# Patient Record
Sex: FEMALE | Race: WHITE | NOT HISPANIC OR LATINO | Employment: OTHER | URBAN - METROPOLITAN AREA
[De-identification: names, ages, dates, MRNs, and addresses within clinical notes are randomized per-mention and may not be internally consistent; named-entity substitution may affect disease eponyms.]

---

## 2017-01-04 ENCOUNTER — ALLSCRIPTS OFFICE VISIT (OUTPATIENT)
Dept: OTHER | Facility: OTHER | Age: 72
End: 2017-01-04

## 2017-01-04 DIAGNOSIS — Z00.00 ENCOUNTER FOR GENERAL ADULT MEDICAL EXAMINATION WITHOUT ABNORMAL FINDINGS: ICD-10-CM

## 2017-01-04 DIAGNOSIS — Z12.31 ENCOUNTER FOR SCREENING MAMMOGRAM FOR MALIGNANT NEOPLASM OF BREAST: ICD-10-CM

## 2017-08-04 ENCOUNTER — APPOINTMENT (OUTPATIENT)
Dept: LAB | Facility: CLINIC | Age: 72
End: 2017-08-04
Payer: COMMERCIAL

## 2017-08-04 DIAGNOSIS — Z12.11 ENCOUNTER FOR SCREENING FOR MALIGNANT NEOPLASM OF COLON: ICD-10-CM

## 2017-08-04 DIAGNOSIS — N28.9 DISORDER OF KIDNEY AND URETER: ICD-10-CM

## 2017-08-04 DIAGNOSIS — D49.4 NEOPLASM OF BLADDER: ICD-10-CM

## 2017-08-04 DIAGNOSIS — R73.01 IMPAIRED FASTING GLUCOSE: ICD-10-CM

## 2017-08-04 DIAGNOSIS — Z00.00 ENCOUNTER FOR GENERAL ADULT MEDICAL EXAMINATION WITHOUT ABNORMAL FINDINGS: ICD-10-CM

## 2017-08-04 DIAGNOSIS — R93.89 ABNORMAL FINDINGS ON DIAGNOSTIC IMAGING OF OTHER SPECIFIED BODY STRUCTURES: ICD-10-CM

## 2017-08-04 DIAGNOSIS — E78.5 HYPERLIPIDEMIA: ICD-10-CM

## 2017-08-04 LAB
ALBUMIN SERPL BCP-MCNC: 3.6 G/DL (ref 3.5–5)
ALP SERPL-CCNC: 60 U/L (ref 46–116)
ALT SERPL W P-5'-P-CCNC: 38 U/L (ref 12–78)
ANION GAP SERPL CALCULATED.3IONS-SCNC: 6 MMOL/L (ref 4–13)
AST SERPL W P-5'-P-CCNC: 21 U/L (ref 5–45)
BILIRUB SERPL-MCNC: 0.48 MG/DL (ref 0.2–1)
BUN SERPL-MCNC: 10 MG/DL (ref 5–25)
CALCIUM SERPL-MCNC: 8.7 MG/DL (ref 8.3–10.1)
CHLORIDE SERPL-SCNC: 102 MMOL/L (ref 100–108)
CHOLEST SERPL-MCNC: 151 MG/DL (ref 50–200)
CO2 SERPL-SCNC: 29 MMOL/L (ref 21–32)
CREAT SERPL-MCNC: 0.61 MG/DL (ref 0.6–1.3)
EST. AVERAGE GLUCOSE BLD GHB EST-MCNC: 140 MG/DL
GFR SERPL CREATININE-BSD FRML MDRD: 91 ML/MIN/1.73SQ M
GLUCOSE P FAST SERPL-MCNC: 102 MG/DL (ref 65–99)
HBA1C MFR BLD: 6.5 % (ref 4.2–6.3)
HDLC SERPL-MCNC: 38 MG/DL (ref 40–60)
LDLC SERPL CALC-MCNC: 81 MG/DL (ref 0–100)
POTASSIUM SERPL-SCNC: 4.1 MMOL/L (ref 3.5–5.3)
PROT SERPL-MCNC: 7.5 G/DL (ref 6.4–8.2)
SODIUM SERPL-SCNC: 137 MMOL/L (ref 136–145)
TRIGL SERPL-MCNC: 160 MG/DL

## 2017-08-04 PROCEDURE — 80061 LIPID PANEL: CPT

## 2017-08-04 PROCEDURE — 36415 COLL VENOUS BLD VENIPUNCTURE: CPT

## 2017-08-04 PROCEDURE — 83036 HEMOGLOBIN GLYCOSYLATED A1C: CPT

## 2017-08-04 PROCEDURE — 80053 COMPREHEN METABOLIC PANEL: CPT

## 2017-08-09 ENCOUNTER — HOSPITAL ENCOUNTER (EMERGENCY)
Facility: HOSPITAL | Age: 72
Discharge: HOME | End: 2017-08-09
Attending: EMERGENCY MEDICINE
Payer: COMMERCIAL

## 2017-08-09 ENCOUNTER — ALLSCRIPTS OFFICE VISIT (OUTPATIENT)
Dept: OTHER | Facility: OTHER | Age: 72
End: 2017-08-09

## 2017-08-09 ENCOUNTER — APPOINTMENT (EMERGENCY)
Dept: CT IMAGING | Facility: HOSPITAL | Age: 72
End: 2017-08-09
Payer: COMMERCIAL

## 2017-08-09 ENCOUNTER — TRANSCRIBE ORDERS (OUTPATIENT)
Dept: ADMINISTRATIVE | Facility: HOSPITAL | Age: 72
End: 2017-08-09

## 2017-08-09 VITALS
RESPIRATION RATE: 18 BRPM | HEART RATE: 90 BPM | BODY MASS INDEX: 29.71 KG/M2 | TEMPERATURE: 97.5 F | OXYGEN SATURATION: 97 % | DIASTOLIC BLOOD PRESSURE: 76 MMHG | WEIGHT: 152.12 LBS | SYSTOLIC BLOOD PRESSURE: 163 MMHG

## 2017-08-09 DIAGNOSIS — R19.7 DIARRHEA: ICD-10-CM

## 2017-08-09 DIAGNOSIS — D41.4 BLADDER POLYP: ICD-10-CM

## 2017-08-09 DIAGNOSIS — N28.9 UNSPECIFIED DISORDER OF KIDNEY AND URETER: ICD-10-CM

## 2017-08-09 DIAGNOSIS — N28.89 BILATERAL RENAL MASSES: ICD-10-CM

## 2017-08-09 DIAGNOSIS — R93.89 ABNORMAL RADIOLOGICAL FINDINGS IN SKIN AND SUBCUTANEOUS TISSUE: Primary | ICD-10-CM

## 2017-08-09 DIAGNOSIS — D49.4 NEOPLASM OF UNSPECIFIED NATURE OF BLADDER: ICD-10-CM

## 2017-08-09 DIAGNOSIS — R10.9 ABDOMINAL PAIN: Primary | ICD-10-CM

## 2017-08-09 LAB
ALBUMIN SERPL BCP-MCNC: 4.1 G/DL (ref 3.5–5)
ALP SERPL-CCNC: 72 U/L (ref 46–116)
ALT SERPL W P-5'-P-CCNC: 46 U/L (ref 12–78)
ANION GAP SERPL CALCULATED.3IONS-SCNC: 10 MMOL/L (ref 4–13)
AST SERPL W P-5'-P-CCNC: 25 U/L (ref 5–45)
BACTERIA UR QL AUTO: ABNORMAL /HPF
BASOPHILS # BLD AUTO: 0.07 THOUSANDS/ΜL (ref 0–0.1)
BASOPHILS NFR BLD AUTO: 1 % (ref 0–1)
BILIRUB SERPL-MCNC: 0.3 MG/DL (ref 0.2–1)
BILIRUB UR QL STRIP: NEGATIVE
BUN SERPL-MCNC: 15 MG/DL (ref 5–25)
CALCIUM SERPL-MCNC: 9.8 MG/DL (ref 8.3–10.1)
CHLORIDE SERPL-SCNC: 102 MMOL/L (ref 100–108)
CLARITY UR: CLEAR
CLARITY, POC: NORMAL
CO2 SERPL-SCNC: 31 MMOL/L (ref 21–32)
COLOR UR: YELLOW
COLOR, POC: YELLOW
CREAT SERPL-MCNC: 0.69 MG/DL (ref 0.6–1.3)
EOSINOPHIL # BLD AUTO: 0.32 THOUSAND/ΜL (ref 0–0.61)
EOSINOPHIL NFR BLD AUTO: 3 % (ref 0–6)
ERYTHROCYTE [DISTWIDTH] IN BLOOD BY AUTOMATED COUNT: 13.1 % (ref 11.6–15.1)
EXT BILIRUBIN, UA: NEGATIVE
EXT BLOOD URINE: NORMAL
EXT GLUCOSE, UA: NEGATIVE
EXT KETONES: NEGATIVE
EXT NITRITE, UA: NEGATIVE
EXT PH, UA: 6
EXT PROTEIN, UA: NEGATIVE
EXT SPECIFIC GRAVITY, UA: 1.02
EXT UROBILINOGEN: NEGATIVE
GFR SERPL CREATININE-BSD FRML MDRD: 87 ML/MIN/1.73SQ M
GLUCOSE SERPL-MCNC: 98 MG/DL (ref 65–140)
GLUCOSE UR STRIP-MCNC: NEGATIVE MG/DL
HCT VFR BLD AUTO: 44.7 % (ref 34.8–46.1)
HGB BLD-MCNC: 14.8 G/DL (ref 11.5–15.4)
HGB UR QL STRIP.AUTO: ABNORMAL
HOLD SPECIMEN: NORMAL
KETONES UR STRIP-MCNC: NEGATIVE MG/DL
LEUKOCYTE ESTERASE UR QL STRIP: ABNORMAL
LIPASE SERPL-CCNC: 148 U/L (ref 73–393)
LYMPHOCYTES # BLD AUTO: 4.07 THOUSANDS/ΜL (ref 0.6–4.47)
LYMPHOCYTES NFR BLD AUTO: 44 % (ref 14–44)
MCH RBC QN AUTO: 29.7 PG (ref 26.8–34.3)
MCHC RBC AUTO-ENTMCNC: 33.1 G/DL (ref 31.4–37.4)
MCV RBC AUTO: 90 FL (ref 82–98)
MONOCYTES # BLD AUTO: 0.79 THOUSAND/ΜL (ref 0.17–1.22)
MONOCYTES NFR BLD AUTO: 9 % (ref 4–12)
NEUTROPHILS # BLD AUTO: 4.05 THOUSANDS/ΜL (ref 1.85–7.62)
NEUTS SEG NFR BLD AUTO: 43 % (ref 43–75)
NITRITE UR QL STRIP: NEGATIVE
NON-SQ EPI CELLS URNS QL MICRO: ABNORMAL /HPF
PH UR STRIP.AUTO: 5.5 [PH] (ref 4.5–8)
PLATELET # BLD AUTO: 362 THOUSANDS/UL (ref 149–390)
PMV BLD AUTO: 9.5 FL (ref 8.9–12.7)
POTASSIUM SERPL-SCNC: 3.6 MMOL/L (ref 3.5–5.3)
PROT SERPL-MCNC: 8.6 G/DL (ref 6.4–8.2)
PROT UR STRIP-MCNC: NEGATIVE MG/DL
RBC # BLD AUTO: 4.99 MILLION/UL (ref 3.81–5.12)
RBC #/AREA URNS AUTO: ABNORMAL /HPF
SODIUM SERPL-SCNC: 143 MMOL/L (ref 136–145)
SP GR UR STRIP.AUTO: 1.02 (ref 1–1.03)
UROBILINOGEN UR QL STRIP.AUTO: 0.2 E.U./DL
WBC # BLD AUTO: 9.3 THOUSAND/UL (ref 4.31–10.16)
WBC # BLD EST: NEGATIVE 10*3/UL
WBC #/AREA URNS AUTO: ABNORMAL /HPF

## 2017-08-09 PROCEDURE — 74177 CT ABD & PELVIS W/CONTRAST: CPT

## 2017-08-09 PROCEDURE — 36415 COLL VENOUS BLD VENIPUNCTURE: CPT | Performed by: EMERGENCY MEDICINE

## 2017-08-09 PROCEDURE — 96360 HYDRATION IV INFUSION INIT: CPT

## 2017-08-09 PROCEDURE — 81002 URINALYSIS NONAUTO W/O SCOPE: CPT | Performed by: EMERGENCY MEDICINE

## 2017-08-09 PROCEDURE — 85025 COMPLETE CBC W/AUTO DIFF WBC: CPT | Performed by: PHYSICIAN ASSISTANT

## 2017-08-09 PROCEDURE — 96361 HYDRATE IV INFUSION ADD-ON: CPT

## 2017-08-09 PROCEDURE — 83690 ASSAY OF LIPASE: CPT | Performed by: PHYSICIAN ASSISTANT

## 2017-08-09 PROCEDURE — 81001 URINALYSIS AUTO W/SCOPE: CPT | Performed by: PHYSICIAN ASSISTANT

## 2017-08-09 PROCEDURE — 99284 EMERGENCY DEPT VISIT MOD MDM: CPT

## 2017-08-09 PROCEDURE — 80053 COMPREHEN METABOLIC PANEL: CPT | Performed by: PHYSICIAN ASSISTANT

## 2017-08-09 RX ORDER — LOSARTAN POTASSIUM 100 MG/1
100 TABLET ORAL DAILY
COMMUNITY
End: 2018-01-25 | Stop reason: SDUPTHER

## 2017-08-09 RX ORDER — SODIUM CHLORIDE 9 MG/ML
125 INJECTION, SOLUTION INTRAVENOUS CONTINUOUS
Status: DISCONTINUED | OUTPATIENT
Start: 2017-08-09 | End: 2017-08-09 | Stop reason: HOSPADM

## 2017-08-09 RX ORDER — SIMVASTATIN 20 MG
20 TABLET ORAL
COMMUNITY
End: 2018-01-25 | Stop reason: SDUPTHER

## 2017-08-09 RX ORDER — GABAPENTIN 300 MG/1
100 CAPSULE ORAL 3 TIMES DAILY
COMMUNITY
End: 2018-04-05

## 2017-08-09 RX ORDER — ALENDRONATE SODIUM 10 MG/1
TABLET ORAL
COMMUNITY
End: 2018-04-05 | Stop reason: SDUPTHER

## 2017-08-09 RX ORDER — HYDROCHLOROTHIAZIDE 12.5 MG/1
12.5 TABLET ORAL DAILY
COMMUNITY
End: 2018-02-09 | Stop reason: SDUPTHER

## 2017-08-09 RX ADMIN — SODIUM CHLORIDE 125 ML/HR: 0.9 INJECTION, SOLUTION INTRAVENOUS at 13:52

## 2017-08-09 RX ADMIN — IOHEXOL 100 ML: 350 INJECTION, SOLUTION INTRAVENOUS at 14:44

## 2017-08-21 ENCOUNTER — ALLSCRIPTS OFFICE VISIT (OUTPATIENT)
Dept: OTHER | Facility: OTHER | Age: 72
End: 2017-08-21

## 2017-08-21 ENCOUNTER — HOSPITAL ENCOUNTER (OUTPATIENT)
Dept: RADIOLOGY | Facility: HOSPITAL | Age: 72
Discharge: HOME | End: 2017-08-21
Payer: COMMERCIAL

## 2017-08-21 ENCOUNTER — APPOINTMENT (OUTPATIENT)
Dept: LAB | Facility: HOSPITAL | Age: 72
End: 2017-08-21
Attending: UROLOGY
Payer: COMMERCIAL

## 2017-08-21 DIAGNOSIS — N28.9 DISORDER OF KIDNEY AND URETER: ICD-10-CM

## 2017-08-21 DIAGNOSIS — D49.4 NEOPLASM OF BLADDER: ICD-10-CM

## 2017-08-21 DIAGNOSIS — R93.89 ABNORMAL FINDINGS ON DIAGNOSTIC IMAGING OF OTHER SPECIFIED BODY STRUCTURES: ICD-10-CM

## 2017-08-21 PROCEDURE — 74183 MRI ABD W/O CNTR FLWD CNTR: CPT

## 2017-08-21 PROCEDURE — A9585 GADOBUTROL INJECTION: HCPCS | Performed by: RADIOLOGY

## 2017-08-21 PROCEDURE — 87086 URINE CULTURE/COLONY COUNT: CPT

## 2017-08-21 RX ADMIN — GADOBUTROL 6 ML: 604.72 INJECTION INTRAVENOUS at 17:47

## 2017-08-22 ENCOUNTER — GENERIC CONVERSION - ENCOUNTER (OUTPATIENT)
Dept: OTHER | Facility: OTHER | Age: 72
End: 2017-08-22

## 2017-08-22 LAB — BACTERIA UR CULT: NORMAL

## 2017-08-23 ENCOUNTER — GENERIC CONVERSION - ENCOUNTER (OUTPATIENT)
Dept: OTHER | Facility: OTHER | Age: 72
End: 2017-08-23

## 2017-08-28 ENCOUNTER — GENERIC CONVERSION - ENCOUNTER (OUTPATIENT)
Dept: OTHER | Facility: OTHER | Age: 72
End: 2017-08-28

## 2017-09-12 ENCOUNTER — GENERIC CONVERSION - ENCOUNTER (OUTPATIENT)
Dept: OTHER | Facility: OTHER | Age: 72
End: 2017-09-12

## 2017-09-15 ENCOUNTER — GENERIC CONVERSION - ENCOUNTER (OUTPATIENT)
Dept: OTHER | Facility: OTHER | Age: 72
End: 2017-09-15

## 2017-09-21 PROCEDURE — G0145 SCR C/V CYTO,THINLAYER,RESCR: HCPCS | Performed by: OBSTETRICS & GYNECOLOGY

## 2017-09-21 PROCEDURE — 87624 HPV HI-RISK TYP POOLED RSLT: CPT | Performed by: OBSTETRICS & GYNECOLOGY

## 2017-09-22 ENCOUNTER — LAB REQUISITION (OUTPATIENT)
Dept: LAB | Facility: HOSPITAL | Age: 72
End: 2017-09-22
Payer: COMMERCIAL

## 2017-09-22 DIAGNOSIS — Z11.51 ENCOUNTER FOR SCREENING FOR HUMAN PAPILLOMAVIRUS (HPV): ICD-10-CM

## 2017-09-22 DIAGNOSIS — Z01.419 ENCOUNTER FOR GYNECOLOGICAL EXAMINATION WITHOUT ABNORMAL FINDING: ICD-10-CM

## 2017-09-29 LAB — HPV RRNA GENITAL QL NAA+PROBE: NORMAL

## 2017-10-04 LAB
LAB AP GYN PRIMARY INTERPRETATION: NORMAL
Lab: NORMAL

## 2017-10-10 ENCOUNTER — GENERIC CONVERSION - ENCOUNTER (OUTPATIENT)
Dept: OTHER | Facility: OTHER | Age: 72
End: 2017-10-10

## 2017-10-13 DIAGNOSIS — N64.89 OTHER SPECIFIED DISORDERS OF BREAST (CODE): ICD-10-CM

## 2017-10-19 ENCOUNTER — GENERIC CONVERSION - ENCOUNTER (OUTPATIENT)
Dept: OTHER | Facility: OTHER | Age: 72
End: 2017-10-19

## 2017-10-26 ENCOUNTER — ALLSCRIPTS OFFICE VISIT (OUTPATIENT)
Dept: OTHER | Facility: OTHER | Age: 72
End: 2017-10-26
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
## 2017-12-09 DIAGNOSIS — E11.9 TYPE 2 DIABETES MELLITUS WITHOUT COMPLICATIONS (HCC): ICD-10-CM

## 2018-01-09 NOTE — RESULT NOTES
Message   notify the patient normal blood urea nitrogen follow-up as scheduled        Verified Results  (1) BUN [id number] 12:22PM [facility] Order Number: [id number]     Test Name Result Flag Reference   BLOOD UREA NITROGEN 13 mg/dL  5-25       Signatures   Electronically signed by : Leon Jenkins DO; Feb 25 2016  8:13PM EST                       (Author)

## 2018-01-10 NOTE — RESULT NOTES
Message   Notify the patient normal urine protein electrophoresis follow up as scheduled        Verified Results  (1) PROTEIN ELECTRO, URINE 31Pjg3122 02:09PM [employer]     Test Name Result Flag Reference   ALPHA 1 URINE 0 0 %     ALPHA 2 URINE 0 0 %     BETA URINE 0 0 %     GAMMA GLOBULIN URINE 0 0 %     UPEP INTERPRETATION      The urine total protein and electrophoresis are within normal limits  No monoclonal bands noted  Reviewed by Neris Lopez, ([id number]) **Electronic Signature**   ALBUMIN URINE  0 %     URINE PROTEIN 10 0 mg/dL  2 0-17 5     (1) PROTEIN ELECTRO, SERUM 92Sga3900 07:54AM [employer]     Test Name Result Flag Reference   A/G RATIO 1 34  1 10-1 80   Albumin 57 2 %  52 0-65 0   Albumin Conc  4 06 g/dl  3 50-5 00   Alpha 1 Conc  0 27 g/dL  0 10-0 40   ALPHA 1 3 8 %  2 5-5 0   Alpha 2 Conc  0 84 g/dL  0 40-1 20   ALPHA 2 11 8 %  7 0-13 0   Beta 1 Conc  0 53 g/dL  0 40-0 80   BETA-1 7 4 %  5 0-13 0   Beta 2 Conc 0 45 g/dL  0 20-0 50   BETA-2 6 3 %  2 0-8 0   Gamma Conc 0 96 g/dL  0 50-1 60   GAMMA GLOBULIN 13 5 %  12 0-22 0   Interpretation      The serum total protein, albumin and electrophoresis are within normal limits  No monoclonal bands noted  Reviewed by: Michel Tai MD, PhD ([id number]) **Electronic Signature**   TOTAL PROTEIN 6 8 g/dL  6 4-8 2       Signatures   Electronically signed by : Treasure Madrid DO; Aug 21 2016  8:08AM EST                       (Author)
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
## 2018-01-10 NOTE — RESULT NOTES
Message   notify the patient normal serum protein electrophoresis follow up as scheduled        Verified Results  (1) PROTEIN ELECTRO, SERUM 22Ypg1410 07:54AM [facility]     Test Name Result Flag Reference   A/G RATIO 1 34  1 10-1 80   Albumin 57 2 %  52 0-65 0   Albumin Conc  4 06 g/dl  3 50-5 00   Alpha 1 Conc  0 27 g/dL  0 10-0 40   ALPHA 1 3 8 %  2 5-5 0   Alpha 2 Conc  0 84 g/dL  0 40-1 20   ALPHA 2 11 8 %  7 0-13 0   Beta 1 Conc  0 53 g/dL  0 40-0 80   BETA-1 7 4 %  5 0-13 0   Beta 2 Conc 0 45 g/dL  0 20-0 50   BETA-2 6 3 %  2 0-8 0   Gamma Conc 0 96 g/dL  0 50-1 60   GAMMA GLOBULIN 13 5 %  12 0-22 0   Interpretation      The serum total protein, albumin and electrophoresis are within normal limits  No monoclonal bands noted  Reviewed by: Ralston Canterbury [place], MD, PhD ([id number]) **Electronic Signature**   TOTAL PROTEIN 6 8 g/dL  6 4-8 2       Signatures   Electronically signed by : Tolu Rodrigues DO; Aug 21 2016  8:07AM EST                       (Author)

## 2018-01-11 NOTE — RESULT NOTES
Message   Please have the patient follow up with me in the next 1 week to discuss this report         Verified Results  * MRI ABDOMEN W WO CONTRAST [id number] 04:52PM Jared [address] Order Number: [id number]    - Patient Instructions: To schedule this appointment, please contact Central Scheduling at [phone]  Test Name Result Flag Reference   MRI ABDOMEN W [address] (Report)     This is a summary report  The complete report is available in the patient's medical record  If you cannot access the medical record, please contact the sending organization for a detailed fax or copy  MRI OF THE ABDOMEN (KIDNEYS) WITH AND WITHOUT CONTRAST     INDICATION: Bladder neoplasm  Evaluate renal lesions seen on recent CT imaging  COMPARISON: CT abdomen pelvis 8/9/2017, CTA chest and abdomen dated 3/27/2013     TECHNIQUE: The following pulse sequences were obtained on a 1 5 T scanner: Coronal and axial T2 with TE of 90 and 180 respectively, axial T2 with fat saturation, axial FIESTA fat-sat, axialT1-weighted in-and-out-of phase, axial DWI/ADC, pre-contrast    axial T1 with fat saturation, thin-section post-contrast dynamic axial T1 with fat saturation at 20, 70, and 180 seconds, followed by coronal T1 with fat saturation and 7-minute delayed axial T1 with fat saturation  Subtraction images of the kidneys were   obtained  IV Contrast: 6 mL of gadobutrol injection (MULTI-DOSE)      FINDINGS:     KIDNEYS:       Right:   11 x 6 mm iso to slightly hypointense T1 nodule seen along the anterior mid to upper pole cortex which is iso to minimally hypointense on T2  There is no discernible postcontrast enhancement  This measured only a few millimeters on the prior CT study    from March 27, 2013  Normal in size and contour with prompt enhancement  No hydronephrosis  There is an 8 mm exophytic posterior upper pole simple cyst      Left:   Normal in size and contour with prompt enhancement     No hydronephrosis  9 x 9 mm hemorrhagic or proteinaceous anterior upper pole cyst    Tiny cyst is seen in the midpole  LIVER: Fatty infiltration and hepatomegaly  Subcentimeter cyst seen in the dome of the lateral segment left lobe  BILIARY TREE: Normal       GALLBLADDER: Normal      PANCREAS: Normal      ADRENAL GLANDS: Normal      SPLEEN: Normal      ABDOMINAL CAVITY: No lymphadenopathy or ascites  3 4 x 2 5 cm simple appearing right adnexal cyst is stable  BOWEL: Large descending duodenal diverticulum  OSSEOUS STRUCTURES: No osseous destruction  VASCULAR STRUCTURES: Visualized vasculature is normal      ABDOMINAL WALL: Normal      LUNG BASES: Probable small hiatal hernia  IMPRESSION:     1  9 mm hemorrhagic or proteinaceous cyst upper pole left kidney  2  11 mm nodule right mid to upper kidney has indeterminate signal on T1 and T2, however, demonstrates no suspicious postcontrast enhancement  It has shown interval increase in size from 2013 in retrospect  I would recommend a follow-up MRI in 6 months   to ensure stability of this finding  3  Hepatomegaly with fatty infiltration and subcentimeter left hepatic lobe cyst  No evidence of metastatic disease  4  3 4 cm simple appearing right adnexal cyst  Annual ultrasound surveillance recommended       ##fuslh6##fuslh6       Workstation performed: [id number]     Signed by:   Jt Tran DO   8/22/17       Signatures   Electronically signed by : Juan Alonso DO; Aug 22 2017  6:58PM EST                       (Author)

## 2018-01-12 VITALS
SYSTOLIC BLOOD PRESSURE: 130 MMHG | HEART RATE: 78 BPM | BODY MASS INDEX: 30.65 KG/M2 | OXYGEN SATURATION: 97 % | WEIGHT: 152.03 LBS | DIASTOLIC BLOOD PRESSURE: 70 MMHG | HEIGHT: 59 IN | TEMPERATURE: 98.5 F | RESPIRATION RATE: 16 BRPM

## 2018-01-12 VITALS
HEART RATE: 76 BPM | SYSTOLIC BLOOD PRESSURE: 152 MMHG | DIASTOLIC BLOOD PRESSURE: 82 MMHG | RESPIRATION RATE: 14 BRPM | HEIGHT: 59 IN | WEIGHT: 155 LBS | BODY MASS INDEX: 31.25 KG/M2

## 2018-01-12 NOTE — RESULT NOTES
Message   Notify the patient normal lipid panel except a elevated triglyceride level please have the patient reduce carbohydrates and sweets follow up as scheduled        Verified Results  (1) COMPREHENSIVE METABOLIC PANEL [id number] 62:24HX Heather Issa     Test Name Result Flag Reference   GLUCOSE,RANDM 115 mg/dL     If the patient is fasting, the ADA then defines impaired fasting glucose as > 100 mg/dL and diabetes as > or equal to 123 mg/dL  SODIUM 139 mmol/L  136-145   POTASSIUM 4 2 mmol/L  3 5-5 3   CHLORIDE 103 mmol/L  100-108   CARBON DIOXIDE 32 mmol/L  21-32   ANION GAP (CALC) 4 mmol/L  4-13   BLOOD UREA NITROGEN 16 mg/dL  5-25   CREATININE 0 63 mg/dL  0 60-1 30   Standardized to IDMS reference method   CALCIUM 8 5 mg/dL  8 3-10 1   BILI, TOTAL 0 36 mg/dL  0 20-1 00   ALK PHOSPHATAS 59 U/L     ALT (SGPT) 34 U/L  12-78   AST(SGOT) 16 U/L  5-45   ALBUMIN 3 5 g/dL  3 5-5 0   TOTAL PROTEIN 7 1 g/dL  6 4-8 2   eGFR Non-African American      >60 0 ml/min/1 73sq m   [facility] [employer] Disease Education Program recommendations are as follows:  GFR calculation is accurate only with a steady state creatinine  Chronic Kidney disease less than 60 ml/min/1 73 sq  meters  Kidney failure less than 15 ml/min/1 73 sq  meters  (1) LIPID PANEL, FASTING 68Fei2163 07:54AM Heather Issa     Test Name Result Flag Reference   CHOLESTEROL 156 mg/dL     HDL,DIRECT 41 mg/dL  40-60   Specimen collection should occur prior to Metamizole administration due to the potential for falsely depressed results     LDL CHOLESTEROL CALCULATED 83 mg/dL  0-100   Triglyceride:         Normal              <150 mg/dl       Borderline High    150-199 mg/dl       High               200-499 mg/dl       Very High          >499 mg/dl  Cholesterol:         Desirable        <200 mg/dl      Borderline High  200-239 mg/dl      High             >239 mg/dl  HDL Cholesterol:        High    >59 mg/dL      Low     <41 mg/dL  LDL CALCULATED:    This screening LDL is a calculated result  It does not have the accuracy of the Direct Measured LDL in the monitoring of patients with hyperlipidemia and/or statin therapy  Direct Measure LDL (TFP445) must be ordered separately in these patients  TRIGLYCERIDES 158 mg/dL H <=150   Specimen collection should occur prior to N-Acetylcysteine or Metamizole administration due to the potential for falsely depressed results         Signatures   Electronically signed by : Greg Manzo DO; Aug 16 2016  5:27PM EST                       (Author)

## 2018-01-13 VITALS
RESPIRATION RATE: 16 BRPM | DIASTOLIC BLOOD PRESSURE: 88 MMHG | BODY MASS INDEX: 31.05 KG/M2 | WEIGHT: 154.04 LBS | SYSTOLIC BLOOD PRESSURE: 134 MMHG | HEART RATE: 84 BPM | OXYGEN SATURATION: 96 % | HEIGHT: 59 IN

## 2018-01-14 VITALS
DIASTOLIC BLOOD PRESSURE: 74 MMHG | SYSTOLIC BLOOD PRESSURE: 128 MMHG | WEIGHT: 154 LBS | BODY MASS INDEX: 31.04 KG/M2 | HEART RATE: 78 BPM | HEIGHT: 59 IN

## 2018-01-15 NOTE — RESULT NOTES
Message   Notify the patient normal comprehensive metabolic panel except a mild elevation of blood glucose please have the patient reduce carbohydrates and sweets in the diet follow up as scheduled        Verified Results  (1) COMPREHENSIVE METABOLIC PANEL [id number] 12:12DW Joel Holding     Test Name Result Flag Reference   GLUCOSE,RANDM 115 mg/dL     If the patient is fasting, the ADA then defines impaired fasting glucose as > 100 mg/dL and diabetes as > or equal to 123 mg/dL  SODIUM 139 mmol/L  136-145   POTASSIUM 4 2 mmol/L  3 5-5 3   CHLORIDE 103 mmol/L  100-108   CARBON DIOXIDE 32 mmol/L  21-32   ANION GAP (CALC) 4 mmol/L  4-13   BLOOD UREA NITROGEN 16 mg/dL  5-25   CREATININE 0 63 mg/dL  0 60-1 30   Standardized to IDMS reference method   CALCIUM 8 5 mg/dL  8 3-10 1   BILI, TOTAL 0 36 mg/dL  0 20-1 00   ALK PHOSPHATAS 59 U/L     ALT (SGPT) 34 U/L  12-78   AST(SGOT) 16 U/L  5-45   ALBUMIN 3 5 g/dL  3 5-5 0   TOTAL PROTEIN 7 1 g/dL  6 4-8 2   eGFR Non-African American      >60 0 ml/min/1 73sq [facility] Disease Education Program recommendations are as follows:  GFR calculation is accurate only with a steady state creatinine  Chronic Kidney disease less than 60 ml/min/1 73 sq  meters  Kidney failure less than 15 ml/min/1 73 sq  meters         Signatures   Electronically signed by : Mabel Mukherjee DO; Aug 16 2016  5:26PM EST                       (Author)

## 2018-01-16 NOTE — RESULT NOTES
Message   notify the patient MRI of the cervical spine does show enlarging disc herniation at C3-4, muscle spasm and arthritis it also shows persistent central disc herniation C4-5 and C5-6 please have the patient see orthopedics Dr Jessie Sparks follow up as scheduled        Verified Results  * MRI CERVICAL SPINE [address] [facility] [id number] 04:51PM Vivian Boyle Order Number: [id number]     Test Name Result Flag Reference   MRI CERVICAL SPINE [address] [facility] (Report)     This is a summary report  The complete report is available in the patient's medical record  If you cannot access the medical record, please contact the sending organization for a detailed fax or copy  MRI CERVICAL SPINE WITHOUT CONTRAST     INDICATION: 68-year-old female, chronic neck pain   COMPARISON: 12/20/2010 MRI     TECHNIQUE: Sagittal T1, sagittal T2, sagittal inversion recovery, axial T2, axial 2D merge        IMAGE QUALITY: Diagnostic     FINDINGS:     ALIGNMENT:    Straightening of normal lordosis suspicious for muscle spasm, similar to previous   No evidence of subluxation   No evidence of fracture   No evidence of scoliosis        MARROW SIGNAL: Normal marrow signal is identified within the visualized bony structures  No discrete marrow lesion  CERVICAL AND VISUALIZED THORACIC CORD: Normal signal within the visualized cord  PREVERTEBRAL AND PARASPINAL SOFT TISSUES: Prevertebral and paraspinal soft tissues are unremarkable  VISUALIZED POSTERIOR FOSSA: The visualized posterior fossa demonstrates no abnormal signal      CERVICAL DISC SPACES:        C2-C3: Normal      C3-C4: Mild degenerative disc dehydration, small to moderate central disc herniation posterior displacement of the noncompressed spinal cord, possible bilateral C4 nerve root encroachment  The disc herniation is larger than it was on the prior study       C4-C5: Mild degenerative disc and facet disease, tiny noncompressive central disc protrusion, essentially unchanged     C5-C6: Mild degenerative disc and facet disease, tiny noncompressive central disc protrusion, essentially unchanged     C6-C7: Normal      C7-T1: Normal      UPPER THORACIC DISC SPACES: Normal        IMPRESSION:   Persistent mild multilevel degenerative spondylosis     Persistent straightening of normal cervical lordosis suspicious for muscle spasm     Enlarging small to moderate central disc herniation C3-4 with posterior displacement of the noncompressed spinal cord and possible bilateral C4 nerve root encroachment     Persistent tiny noncompressive central disc protrusions C4-5 and C5-6       Workstation performed: [id number]     Signed by:   Ria Williamson MD   3/10/16       Signatures   Electronically signed by : Tolu Rodrigues DO; Mar 10 2016  7:51PM EST                       (Author)
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
## 2018-01-22 VITALS
WEIGHT: 154.03 LBS | OXYGEN SATURATION: 95 % | BODY MASS INDEX: 31.05 KG/M2 | RESPIRATION RATE: 16 BRPM | HEIGHT: 59 IN | SYSTOLIC BLOOD PRESSURE: 112 MMHG | DIASTOLIC BLOOD PRESSURE: 80 MMHG | TEMPERATURE: 98.2 F | HEART RATE: 81 BPM

## 2018-01-22 VITALS
DIASTOLIC BLOOD PRESSURE: 70 MMHG | SYSTOLIC BLOOD PRESSURE: 122 MMHG | HEART RATE: 78 BPM | BODY MASS INDEX: 30.7 KG/M2 | WEIGHT: 152 LBS | TEMPERATURE: 97.4 F | OXYGEN SATURATION: 97 %

## 2018-01-25 DIAGNOSIS — I10 ESSENTIAL HYPERTENSION: Primary | ICD-10-CM

## 2018-01-25 DIAGNOSIS — E78.5 HYPERLIPIDEMIA, UNSPECIFIED HYPERLIPIDEMIA TYPE: ICD-10-CM

## 2018-01-25 RX ORDER — SIMVASTATIN 20 MG
TABLET ORAL
Qty: 90 TABLET | Refills: 0 | Status: SHIPPED | OUTPATIENT
Start: 2018-01-25 | End: 2018-04-05 | Stop reason: SDUPTHER

## 2018-01-25 RX ORDER — LOSARTAN POTASSIUM 100 MG/1
TABLET ORAL
Qty: 90 TABLET | Refills: 3 | Status: SHIPPED | OUTPATIENT
Start: 2018-01-25 | End: 2018-02-09 | Stop reason: SDUPTHER

## 2018-01-31 ENCOUNTER — TELEPHONE (OUTPATIENT)
Dept: INTERNAL MEDICINE CLINIC | Facility: CLINIC | Age: 73
End: 2018-01-31

## 2018-02-05 DIAGNOSIS — E78.5 HYPERLIPIDEMIA, UNSPECIFIED HYPERLIPIDEMIA TYPE: ICD-10-CM

## 2018-02-05 DIAGNOSIS — K21.9 GASTROESOPHAGEAL REFLUX DISEASE WITHOUT ESOPHAGITIS: ICD-10-CM

## 2018-02-05 DIAGNOSIS — I10 ESSENTIAL HYPERTENSION: Primary | ICD-10-CM

## 2018-02-05 DIAGNOSIS — E11.8 TYPE 2 DIABETES MELLITUS WITH COMPLICATION, WITHOUT LONG-TERM CURRENT USE OF INSULIN (HCC): ICD-10-CM

## 2018-02-05 DIAGNOSIS — I25.10 CARDIOVASCULAR ARTERIOSCLEROSIS: ICD-10-CM

## 2018-02-05 RX ORDER — HYDROCHLOROTHIAZIDE 12.5 MG/1
12.5 TABLET ORAL DAILY
Qty: 90 TABLET | Refills: 1 | Status: CANCELLED | OUTPATIENT
Start: 2018-02-05

## 2018-02-08 ENCOUNTER — TELEPHONE (OUTPATIENT)
Dept: INTERNAL MEDICINE CLINIC | Facility: CLINIC | Age: 73
End: 2018-02-08

## 2018-02-09 DIAGNOSIS — I10 ESSENTIAL HYPERTENSION: ICD-10-CM

## 2018-02-09 RX ORDER — OMEPRAZOLE 40 MG/1
1 CAPSULE, DELAYED RELEASE ORAL DAILY
COMMUNITY
Start: 2017-08-28 | End: 2018-04-05 | Stop reason: SDUPTHER

## 2018-02-09 RX ORDER — MULTIVITAMIN/IRON/FOLIC ACID 18MG-0.4MG
1 TABLET ORAL DAILY
COMMUNITY

## 2018-02-09 RX ORDER — HYDROCHLOROTHIAZIDE 12.5 MG/1
12.5 TABLET ORAL DAILY
Qty: 90 TABLET | Refills: 1 | Status: SHIPPED | OUTPATIENT
Start: 2018-02-09 | End: 2018-04-05 | Stop reason: SDUPTHER

## 2018-02-09 RX ORDER — LOSARTAN POTASSIUM 100 MG/1
100 TABLET ORAL DAILY
Qty: 90 TABLET | Refills: 1 | Status: SHIPPED | OUTPATIENT
Start: 2018-02-09 | End: 2018-04-05 | Stop reason: SDUPTHER

## 2018-02-09 RX ORDER — BIOTIN 1 MG
1 TABLET ORAL DAILY
COMMUNITY

## 2018-02-09 RX ORDER — FEXOFENADINE HCL 180 MG/1
1 TABLET ORAL DAILY PRN
COMMUNITY
Start: 2017-10-26 | End: 2021-08-06

## 2018-02-09 NOTE — TELEPHONE ENCOUNTER
Sent order for you to authorize both medications  The Losartan was sent on 1/25/18 but they never received it  Please authorize

## 2018-02-12 RX ORDER — FAMOTIDINE 40 MG/1
40 TABLET, FILM COATED ORAL DAILY
Qty: 90 TABLET | Refills: 1 | Status: SHIPPED | OUTPATIENT
Start: 2018-02-12 | End: 2018-04-05 | Stop reason: SDUPTHER

## 2018-04-05 ENCOUNTER — HOSPITAL ENCOUNTER (OUTPATIENT)
Facility: HOSPITAL | Age: 73
Setting detail: OBSERVATION
Discharge: HOME | End: 2018-04-06
Attending: EMERGENCY MEDICINE | Admitting: INTERNAL MEDICINE
Payer: COMMERCIAL

## 2018-04-05 ENCOUNTER — APPOINTMENT (EMERGENCY)
Dept: RADIOLOGY | Facility: HOSPITAL | Age: 73
End: 2018-04-05
Payer: COMMERCIAL

## 2018-04-05 ENCOUNTER — OFFICE VISIT (OUTPATIENT)
Dept: INTERNAL MEDICINE CLINIC | Facility: CLINIC | Age: 73
End: 2018-04-05
Payer: COMMERCIAL

## 2018-04-05 VITALS
WEIGHT: 152.6 LBS | BODY MASS INDEX: 30.82 KG/M2 | TEMPERATURE: 98.1 F | SYSTOLIC BLOOD PRESSURE: 162 MMHG | DIASTOLIC BLOOD PRESSURE: 92 MMHG | OXYGEN SATURATION: 96 % | HEART RATE: 106 BPM

## 2018-04-05 DIAGNOSIS — R09.02 HYPOXIA: ICD-10-CM

## 2018-04-05 DIAGNOSIS — I10 ESSENTIAL HYPERTENSION: ICD-10-CM

## 2018-04-05 DIAGNOSIS — Q78.2 OSTEOPETROSIS: ICD-10-CM

## 2018-04-05 DIAGNOSIS — R09.89 LUNG CRACKLES: ICD-10-CM

## 2018-04-05 DIAGNOSIS — R05.9 COUGH: Primary | ICD-10-CM

## 2018-04-05 DIAGNOSIS — R05.9 COUGH: ICD-10-CM

## 2018-04-05 DIAGNOSIS — K21.9 GASTROESOPHAGEAL REFLUX DISEASE WITHOUT ESOPHAGITIS: ICD-10-CM

## 2018-04-05 DIAGNOSIS — R07.9 CHEST PAIN, UNSPECIFIED TYPE: ICD-10-CM

## 2018-04-05 DIAGNOSIS — J40 BRONCHITIS: ICD-10-CM

## 2018-04-05 DIAGNOSIS — E78.5 HYPERLIPIDEMIA, UNSPECIFIED HYPERLIPIDEMIA TYPE: ICD-10-CM

## 2018-04-05 DIAGNOSIS — R06.02 SHORTNESS OF BREATH: Primary | ICD-10-CM

## 2018-04-05 LAB
ALBUMIN SERPL BCP-MCNC: 3.8 G/DL (ref 3.5–5)
ALP SERPL-CCNC: 69 U/L (ref 46–116)
ALT SERPL W P-5'-P-CCNC: 65 U/L (ref 12–78)
ANION GAP SERPL CALCULATED.3IONS-SCNC: 9 MMOL/L (ref 4–13)
AST SERPL W P-5'-P-CCNC: 39 U/L (ref 5–45)
BASOPHILS # BLD AUTO: 0.04 THOUSANDS/ΜL (ref 0–0.1)
BASOPHILS NFR BLD AUTO: 1 % (ref 0–1)
BILIRUB SERPL-MCNC: 0.3 MG/DL (ref 0.2–1)
BUN SERPL-MCNC: 10 MG/DL (ref 5–25)
CALCIUM SERPL-MCNC: 8.4 MG/DL (ref 8.3–10.1)
CHLORIDE SERPL-SCNC: 97 MMOL/L (ref 100–108)
CO2 SERPL-SCNC: 30 MMOL/L (ref 21–32)
CREAT SERPL-MCNC: 0.74 MG/DL (ref 0.6–1.3)
EOSINOPHIL # BLD AUTO: 0.2 THOUSAND/ΜL (ref 0–0.61)
EOSINOPHIL NFR BLD AUTO: 3 % (ref 0–6)
ERYTHROCYTE [DISTWIDTH] IN BLOOD BY AUTOMATED COUNT: 13.4 % (ref 11.6–15.1)
GFR SERPL CREATININE-BSD FRML MDRD: 81 ML/MIN/1.73SQ M
GLUCOSE SERPL-MCNC: 115 MG/DL (ref 65–140)
HCT VFR BLD AUTO: 42.3 % (ref 34.8–46.1)
HGB BLD-MCNC: 13.9 G/DL (ref 11.5–15.4)
HOLD SPECIMEN: NORMAL
LACTATE SERPL-SCNC: 1.2 MMOL/L (ref 0.5–2)
LYMPHOCYTES # BLD AUTO: 1.83 THOUSANDS/ΜL (ref 0.6–4.47)
LYMPHOCYTES NFR BLD AUTO: 24 % (ref 14–44)
MCH RBC QN AUTO: 29 PG (ref 26.8–34.3)
MCHC RBC AUTO-ENTMCNC: 32.9 G/DL (ref 31.4–37.4)
MCV RBC AUTO: 88 FL (ref 82–98)
MONOCYTES # BLD AUTO: 0.8 THOUSAND/ΜL (ref 0.17–1.22)
MONOCYTES NFR BLD AUTO: 11 % (ref 4–12)
NEUTROPHILS # BLD AUTO: 4.71 THOUSANDS/ΜL (ref 1.85–7.62)
NEUTS SEG NFR BLD AUTO: 61 % (ref 43–75)
NT-PROBNP SERPL-MCNC: 17 PG/ML
PLATELET # BLD AUTO: 304 THOUSANDS/UL (ref 149–390)
PMV BLD AUTO: 9.1 FL (ref 8.9–12.7)
POTASSIUM SERPL-SCNC: 3.7 MMOL/L (ref 3.5–5.3)
PROT SERPL-MCNC: 7.9 G/DL (ref 6.4–8.2)
RBC # BLD AUTO: 4.79 MILLION/UL (ref 3.81–5.12)
SODIUM SERPL-SCNC: 136 MMOL/L (ref 136–145)
TROPONIN I SERPL-MCNC: <0.02 NG/ML
WBC # BLD AUTO: 7.58 THOUSAND/UL (ref 4.31–10.16)

## 2018-04-05 PROCEDURE — 87040 BLOOD CULTURE FOR BACTERIA: CPT | Performed by: EMERGENCY MEDICINE

## 2018-04-05 PROCEDURE — 80053 COMPREHEN METABOLIC PANEL: CPT | Performed by: EMERGENCY MEDICINE

## 2018-04-05 PROCEDURE — 99214 OFFICE O/P EST MOD 30 MIN: CPT | Performed by: INTERNAL MEDICINE

## 2018-04-05 PROCEDURE — 96360 HYDRATION IV INFUSION INIT: CPT

## 2018-04-05 PROCEDURE — 83605 ASSAY OF LACTIC ACID: CPT | Performed by: EMERGENCY MEDICINE

## 2018-04-05 PROCEDURE — 99285 EMERGENCY DEPT VISIT HI MDM: CPT

## 2018-04-05 PROCEDURE — 99220 PR INITIAL OBSERVATION CARE/DAY 70 MINUTES: CPT | Performed by: INTERNAL MEDICINE

## 2018-04-05 PROCEDURE — 83880 ASSAY OF NATRIURETIC PEPTIDE: CPT | Performed by: EMERGENCY MEDICINE

## 2018-04-05 PROCEDURE — 96361 HYDRATE IV INFUSION ADD-ON: CPT

## 2018-04-05 PROCEDURE — 71046 X-RAY EXAM CHEST 2 VIEWS: CPT

## 2018-04-05 PROCEDURE — 94640 AIRWAY INHALATION TREATMENT: CPT

## 2018-04-05 PROCEDURE — 94760 N-INVAS EAR/PLS OXIMETRY 1: CPT

## 2018-04-05 PROCEDURE — 1160F RVW MEDS BY RX/DR IN RCRD: CPT | Performed by: INTERNAL MEDICINE

## 2018-04-05 PROCEDURE — 94664 DEMO&/EVAL PT USE INHALER: CPT

## 2018-04-05 PROCEDURE — 36415 COLL VENOUS BLD VENIPUNCTURE: CPT

## 2018-04-05 PROCEDURE — 1111F DSCHRG MED/CURRENT MED MERGE: CPT | Performed by: INTERNAL MEDICINE

## 2018-04-05 PROCEDURE — 85025 COMPLETE CBC W/AUTO DIFF WBC: CPT | Performed by: EMERGENCY MEDICINE

## 2018-04-05 PROCEDURE — 93005 ELECTROCARDIOGRAM TRACING: CPT

## 2018-04-05 PROCEDURE — 87631 RESP VIRUS 3-5 TARGETS: CPT | Performed by: EMERGENCY MEDICINE

## 2018-04-05 PROCEDURE — 84484 ASSAY OF TROPONIN QUANT: CPT | Performed by: EMERGENCY MEDICINE

## 2018-04-05 RX ORDER — FAMOTIDINE 40 MG/1
20 TABLET, FILM COATED ORAL DAILY
Qty: 90 TABLET | Refills: 0 | Status: SHIPPED | OUTPATIENT
Start: 2018-04-05 | End: 2019-08-28 | Stop reason: SDUPTHER

## 2018-04-05 RX ORDER — HYDROCHLOROTHIAZIDE 12.5 MG/1
12.5 TABLET ORAL DAILY
Qty: 90 TABLET | Refills: 0 | Status: SHIPPED | OUTPATIENT
Start: 2018-04-05 | End: 2020-01-08

## 2018-04-05 RX ORDER — FAMOTIDINE 20 MG/1
20 TABLET, FILM COATED ORAL DAILY
Status: DISCONTINUED | OUTPATIENT
Start: 2018-04-06 | End: 2018-04-06 | Stop reason: HOSPADM

## 2018-04-05 RX ORDER — MELATONIN
1000 DAILY
Status: DISCONTINUED | OUTPATIENT
Start: 2018-04-06 | End: 2018-04-06 | Stop reason: HOSPADM

## 2018-04-05 RX ORDER — ALENDRONATE SODIUM 10 MG/1
10 TABLET ORAL
Status: DISCONTINUED | OUTPATIENT
Start: 2018-04-06 | End: 2018-04-05

## 2018-04-05 RX ORDER — METOCLOPRAMIDE HYDROCHLORIDE 5 MG/ML
10 INJECTION INTRAMUSCULAR; INTRAVENOUS ONCE
Status: COMPLETED | OUTPATIENT
Start: 2018-04-05 | End: 2018-04-05

## 2018-04-05 RX ORDER — LOSARTAN POTASSIUM 50 MG/1
100 TABLET ORAL DAILY
Status: DISCONTINUED | OUTPATIENT
Start: 2018-04-06 | End: 2018-04-06 | Stop reason: HOSPADM

## 2018-04-05 RX ORDER — METHYLPREDNISOLONE SODIUM SUCCINATE 40 MG/ML
20 INJECTION, POWDER, LYOPHILIZED, FOR SOLUTION INTRAMUSCULAR; INTRAVENOUS EVERY 8 HOURS SCHEDULED
Status: DISCONTINUED | OUTPATIENT
Start: 2018-04-05 | End: 2018-04-06

## 2018-04-05 RX ORDER — BENZONATATE 100 MG/1
100 CAPSULE ORAL 3 TIMES DAILY PRN
Status: DISCONTINUED | OUTPATIENT
Start: 2018-04-05 | End: 2018-04-06 | Stop reason: HOSPADM

## 2018-04-05 RX ORDER — KETOROLAC TROMETHAMINE 30 MG/ML
15 INJECTION, SOLUTION INTRAMUSCULAR; INTRAVENOUS ONCE
Status: COMPLETED | OUTPATIENT
Start: 2018-04-05 | End: 2018-04-05

## 2018-04-05 RX ORDER — MULTIVITAMIN/IRON/FOLIC ACID 18MG-0.4MG
1 TABLET ORAL DAILY
Status: DISCONTINUED | OUTPATIENT
Start: 2018-04-06 | End: 2018-04-06 | Stop reason: HOSPADM

## 2018-04-05 RX ORDER — ALENDRONATE SODIUM 10 MG/1
10 TABLET ORAL
Qty: 4 TABLET | Refills: 6 | Status: SHIPPED | OUTPATIENT
Start: 2018-04-05 | End: 2020-01-08

## 2018-04-05 RX ORDER — HYDROCHLOROTHIAZIDE 12.5 MG/1
12.5 TABLET ORAL DAILY
Status: DISCONTINUED | OUTPATIENT
Start: 2018-04-06 | End: 2018-04-06 | Stop reason: HOSPADM

## 2018-04-05 RX ORDER — DOCUSATE SODIUM 100 MG/1
100 CAPSULE, LIQUID FILLED ORAL 2 TIMES DAILY
Status: DISCONTINUED | OUTPATIENT
Start: 2018-04-05 | End: 2018-04-06 | Stop reason: HOSPADM

## 2018-04-05 RX ORDER — SIMVASTATIN 20 MG
20 TABLET ORAL DAILY
Qty: 90 TABLET | Refills: 0 | Status: SHIPPED | OUTPATIENT
Start: 2018-04-05 | End: 2019-04-29 | Stop reason: SDUPTHER

## 2018-04-05 RX ORDER — LORATADINE 10 MG/1
10 TABLET ORAL DAILY
Status: DISCONTINUED | OUTPATIENT
Start: 2018-04-06 | End: 2018-04-06 | Stop reason: HOSPADM

## 2018-04-05 RX ORDER — DIPHENHYDRAMINE HYDROCHLORIDE 50 MG/ML
25 INJECTION INTRAMUSCULAR; INTRAVENOUS ONCE
Status: COMPLETED | OUTPATIENT
Start: 2018-04-05 | End: 2018-04-05

## 2018-04-05 RX ORDER — SODIUM CHLORIDE 9 MG/ML
1000 INJECTION, SOLUTION INTRAVENOUS ONCE
Status: COMPLETED | OUTPATIENT
Start: 2018-04-05 | End: 2018-04-05

## 2018-04-05 RX ORDER — CALCIUM CARBONATE 200(500)MG
1000 TABLET,CHEWABLE ORAL DAILY PRN
Status: DISCONTINUED | OUTPATIENT
Start: 2018-04-05 | End: 2018-04-06 | Stop reason: HOSPADM

## 2018-04-05 RX ORDER — LOSARTAN POTASSIUM 100 MG/1
100 TABLET ORAL DAILY
Qty: 90 TABLET | Refills: 0 | Status: SHIPPED | OUTPATIENT
Start: 2018-04-05 | End: 2020-01-08

## 2018-04-05 RX ORDER — ACETAMINOPHEN 325 MG/1
650 TABLET ORAL EVERY 6 HOURS PRN
Status: DISCONTINUED | OUTPATIENT
Start: 2018-04-05 | End: 2018-04-06 | Stop reason: HOSPADM

## 2018-04-05 RX ORDER — CALCIUM CARBONATE/VITAMIN D3 250-3.125
1 TABLET ORAL 2 TIMES DAILY
Status: DISCONTINUED | OUTPATIENT
Start: 2018-04-05 | End: 2018-04-06 | Stop reason: HOSPADM

## 2018-04-05 RX ORDER — ONDANSETRON 2 MG/ML
4 INJECTION INTRAMUSCULAR; INTRAVENOUS EVERY 6 HOURS PRN
Status: DISCONTINUED | OUTPATIENT
Start: 2018-04-05 | End: 2018-04-06 | Stop reason: HOSPADM

## 2018-04-05 RX ORDER — OMEPRAZOLE 40 MG/1
40 CAPSULE, DELAYED RELEASE ORAL DAILY
Qty: 30 CAPSULE | Refills: 6 | Status: SHIPPED | OUTPATIENT
Start: 2018-04-05 | End: 2021-08-06 | Stop reason: HOSPADM

## 2018-04-05 RX ORDER — PRAVASTATIN SODIUM 40 MG
40 TABLET ORAL
Status: DISCONTINUED | OUTPATIENT
Start: 2018-04-05 | End: 2018-04-06 | Stop reason: HOSPADM

## 2018-04-05 RX ORDER — NEBIVOLOL 10 MG/1
10 TABLET ORAL DAILY
Qty: 28 TABLET | Refills: 0 | Status: CANCELLED | COMMUNITY
Start: 2018-04-05

## 2018-04-05 RX ORDER — GUAIFENESIN 600 MG
600 TABLET, EXTENDED RELEASE 12 HR ORAL EVERY 12 HOURS SCHEDULED
Status: DISCONTINUED | OUTPATIENT
Start: 2018-04-05 | End: 2018-04-06 | Stop reason: HOSPADM

## 2018-04-05 RX ORDER — OSELTAMIVIR PHOSPHATE 75 MG/1
75 CAPSULE ORAL EVERY 12 HOURS SCHEDULED
Status: DISCONTINUED | OUTPATIENT
Start: 2018-04-05 | End: 2018-04-06

## 2018-04-05 RX ORDER — PANTOPRAZOLE SODIUM 40 MG/1
40 TABLET, DELAYED RELEASE ORAL
Status: DISCONTINUED | OUTPATIENT
Start: 2018-04-06 | End: 2018-04-06 | Stop reason: HOSPADM

## 2018-04-05 RX ORDER — IPRATROPIUM BROMIDE AND ALBUTEROL SULFATE 2.5; .5 MG/3ML; MG/3ML
3 SOLUTION RESPIRATORY (INHALATION) ONCE
Status: COMPLETED | OUTPATIENT
Start: 2018-04-05 | End: 2018-04-05

## 2018-04-05 RX ADMIN — OSELTAMIVIR PHOSPHATE 75 MG: 75 CAPSULE ORAL at 18:34

## 2018-04-05 RX ADMIN — GUAIFENESIN 600 MG: 600 TABLET, EXTENDED RELEASE ORAL at 21:16

## 2018-04-05 RX ADMIN — ONDANSETRON 4 MG: 2 INJECTION INTRAMUSCULAR; INTRAVENOUS at 19:22

## 2018-04-05 RX ADMIN — DOCUSATE SODIUM 100 MG: 100 CAPSULE, LIQUID FILLED ORAL at 18:34

## 2018-04-05 RX ADMIN — SODIUM CHLORIDE 1000 ML/HR: 0.9 INJECTION, SOLUTION INTRAVENOUS at 12:19

## 2018-04-05 RX ADMIN — IPRATROPIUM BROMIDE AND ALBUTEROL SULFATE 3 ML: .5; 3 SOLUTION RESPIRATORY (INHALATION) at 14:34

## 2018-04-05 RX ADMIN — AZITHROMYCIN MONOHYDRATE 500 MG: 500 INJECTION, POWDER, LYOPHILIZED, FOR SOLUTION INTRAVENOUS at 15:35

## 2018-04-05 RX ADMIN — DIPHENHYDRAMINE HYDROCHLORIDE 25 MG: 50 INJECTION, SOLUTION INTRAMUSCULAR; INTRAVENOUS at 23:23

## 2018-04-05 RX ADMIN — METHYLPREDNISOLONE SODIUM SUCCINATE 20 MG: 40 INJECTION, POWDER, FOR SOLUTION INTRAMUSCULAR; INTRAVENOUS at 18:33

## 2018-04-05 RX ADMIN — KETOROLAC TROMETHAMINE 15 MG: 30 INJECTION, SOLUTION INTRAMUSCULAR at 23:23

## 2018-04-05 RX ADMIN — METOCLOPRAMIDE 10 MG: 5 INJECTION, SOLUTION INTRAMUSCULAR; INTRAVENOUS at 23:24

## 2018-04-05 RX ADMIN — ACETAMINOPHEN 650 MG: 325 TABLET, FILM COATED ORAL at 18:33

## 2018-04-05 RX ADMIN — CALCIUM CARBONATE-CHOLECALCIFEROL TAB 250 MG-125 UNIT 1 TABLET: 250-125 TAB at 19:20

## 2018-04-05 RX ADMIN — METHYLPREDNISOLONE SODIUM SUCCINATE 20 MG: 40 INJECTION, POWDER, FOR SOLUTION INTRAMUSCULAR; INTRAVENOUS at 21:16

## 2018-04-05 RX ADMIN — PRAVASTATIN SODIUM 40 MG: 40 TABLET ORAL at 18:34

## 2018-04-05 NOTE — PROGRESS NOTES
Assessment/Plan:         Diagnoses and all orders for this visit:    Shortness of breath  Comments:  Acute shortness of breath, lung crackles, hypoxia with ambulation will have the patient go to ER for evaluation of flu/pneumonia she will likely need admission     Essential hypertension  Comments:  Currently stable continue with current medical regimen will continue monitor  Orders:  -     hydrochlorothiazide (HYDRODIURIL) 12 5 mg tablet; Take 1 tablet (12 5 mg total) by mouth daily  -     losartan (COZAAR) 100 MG tablet; Take 1 tablet (100 mg total) by mouth daily    Gastroesophageal reflux disease without esophagitis  -     famotidine (PEPCID) 40 MG tablet; Take 0 5 tablets (20 mg total) by mouth daily  -     omeprazole (PriLOSEC) 40 MG capsule; Take 1 capsule (40 mg total) by mouth daily    Hyperlipidemia, unspecified hyperlipidemia type  Comments: Will refill the patient's Zocor  Orders:  -     simvastatin (ZOCOR) 20 mg tablet; Take 1 tablet (20 mg total) by mouth daily    Osteopetrosis  Comments: Will refill the patient's Fosamax  Orders:  -     alendronate (FOSAMAX) 10 mg tablet; Take 1 tablet (10 mg total) by mouth weekly before breakfast    Cough  Comments:  ER evaluation for pneumonia ER was notified of her pending arrival in the patient's  will drive her to [facility]  Orders:  -     Ambulatory Referral to Emergency Medicine; Future  -     Influenza A/B and RSV by PCR (Indicated for patients > 2 mo of age)    Chest pain, unspecified type  Comments:  Likely secondary to pleurisy she can have a cardiac workup in the emergency room to ensure no cardiac issue    Lung crackles  Comments:  Suspect pneumonia chest x-ray will completed in the ER, ER notified of pending arrival    Other orders  -     nebivolol (BYSTOLIC) 10 mg tablet;  Take 1 tablet (10 mg total) by mouth daily        Patient to call after hospitalization to set up a follow-up visit   Subjective:      Patient ID: Esperanza Anca Chapito Keita is a 67 y o  female  HPI 68 she is coming in for a follow-up examination regarding hypertension, GERD, hyperlipidemia, osteoporosis; the patient has an acute issue today she has developed shortness of breath, chest pain, coughing in the last several days; she does report me she has the symptoms started her initial symptoms were the coughing, sneezing, is a dry cough she reports no fevers, positive chills reports me body, fatigue, weakness, decreased appetite, the patient continues the 100 fluids  She reports me frontal sinus pain /headache, using Advil q 4 hours no nerve Lora he reports me sore throat , reports me on Sunday when after drinking too much coughing he she developed pain in the epigastrium, sub sternum, neck, mid axilla has a reports me pain with pressing, pain with coughing pain is in the epigastrium, mid chest wall into her lower neck, reports man who cough is not as bad today  No change in the pain with deep breath no change in the pain with rotation or movement,  burning type pain worsened with cough no cp tried delsym , got the flu shot , dizziness all week    The following portions of the patient's history were reviewed and updated as appropriate: allergies, current medications, past family history, past medical history, past social history and problem list     Review of Systems   Constitutional: Positive for chills  Negative for fever  HENT: Positive for congestion, ear pain, sinus pain, sinus pressure and sore throat  Respiratory: Positive for cough and wheezing  Cardiovascular: Negative for chest pain     Musculoskeletal:        Chest wall pain         Objective:      /92   Pulse (!) 106   Temp 98 1 °F (36 7 °C) (Oral)   Wt 69 2 kg (152 lb 9 6 oz)   LMP  (LMP Unknown)   SpO2 96%   BMI 30 82 kg/m²       Taila in the room during the examination nurse reproducible anterior chest wall pain which is reproducible    Physical Exam   Constitutional: She appears well-developed and well-nourished  Tired appearance, ill-appearing   HENT:   Head: Normocephalic  Mouth/Throat: Oropharynx is clear and moist    Eyes: Conjunctivae are normal  Pupils are equal, round, and reactive to light  Right eye exhibits no discharge  Left eye exhibits no discharge  No scleral icterus  Neck: Neck supple  Cardiovascular: Normal rate, regular rhythm, normal heart sounds and intact distal pulses  Exam reveals no gallop and no friction rub  No murmur heard  Pulmonary/Chest: No respiratory distress  She has no wheezes  She has rales (Left mid lung field)  Abdominal: Soft  Bowel sounds are normal  She exhibits no distension and no mass  There is no tenderness  There is no rebound and no guarding  Musculoskeletal: She exhibits no edema or deformity  Lymphadenopathy:     She has no cervical adenopathy  Neurological: She is alert   Coordination normal

## 2018-04-05 NOTE — H&P
History and Physical - [facility]    Patient Information: Carole Riley 67 y o  female MRN: [id number]  Unit/Bed#: -01 Encounter: [id number]  Admitting Physician: Akin Lai MD  PCP: Mabel Mukherjee DO  Date of Admission:  04/05/18    Assessment/Plan:    Hospital Problem List:     Principal Problem:    Bronchitis  Active Problems:    Hypoxia    Cough    Hypertension    Dyslipidemia      Plan for the Primary Problem(s):    · Acute bronchitis likely viral, follow up on flu PCR, will empirically place on Tamiflu, Solu-Medrol, respiratory treatments, supportive care  · Hypoxia noted in the ED will monitor O2 sats and being as tolerated to keep O2 sats more than 90%    Plan for Additional Problems:   · Dyslipidemia continue statin  · Hypertension continue antihypertensives    VTE Prophylaxis: Enoxaparin (Lovenox)  / sequential compression device   Code Status:  Full code  POLST: There is no POLST form on file for this patient (pre-hospital)    Anticipated Length of Stay:  Patient will be admitted on an Observation basis with an anticipated length of stay of  Less than 2 midnights  Chief Complaint:      Cough chest tightness for 3-4 days    History of Present Illness:    Carole Riley is a 67 y o  female who presents with cough chest tightness since 3-4 days  Patient reports worsening cough for the last few days associated scanty mucoid sputum chest discomfort and wheezing  She reports fatigue and myalgias  She however denies history of fever chills sweats  Denies abdominal pain nausea vomiting or diarrhea  No history of exertional chest pain diaphoresis palpitations  Denies urinary symptoms  Review of Systems:    Review of Systems   All other systems reviewed and are negative        Past Medical and Surgical History:     Past Medical History:   Diagnosis Date    Hypertension        Past Surgical History:   Procedure Laterality Date    APPENDECTOMY Meds/Allergies:    Prior to Admission medications    Medication Sig Start Date End Date Taking?  Authorizing Provider   alendronate (FOSAMAX) 10 mg tablet Take 1 tablet (10 mg total) by mouth weekly before breakfast 4/5/18  Yes Neyda Donaldson DO   calcium-vitamin D 250-100 MG-UNIT per tablet Take 1 tablet by mouth 2 (two) times a day   Yes Historical Provider, MD   Cholecalciferol (VITAMIN D3) 1000 units CAPS Take 1 capsule by mouth daily   Yes Historical Provider, MD   famotidine (PEPCID) 40 MG tablet Take 0 5 tablets (20 mg total) by mouth daily 4/5/18  Yes Neyda Donaldson DO   fexofenadine (ALLEGRA) 180 MG tablet Take 1 tablet by mouth daily as needed 10/26/17  Yes Historical Provider, MD   hydrochlorothiazide (HYDRODIURIL) 12 5 mg tablet Take 1 tablet (12 5 mg total) by mouth daily 4/5/18  Yes Neyda Donaldson DO   losartan (COZAAR) 100 MG tablet Take 1 tablet (100 mg total) by mouth daily 4/5/18  Yes Neyda Donaldson DO   multivitamin-minerals (CENTRUM ADULTS) tablet Take 1 tablet by mouth daily   Yes Historical Provider, MD   omeprazole (PriLOSEC) 40 MG capsule Take 1 capsule (40 mg total) by mouth daily 4/5/18  Yes Neyda Donaldson DO   simvastatin (ZOCOR) 20 mg tablet Take 1 tablet (20 mg total) by mouth daily 4/5/18  Yes Neyda Donaldson DO   alendronate (FOSAMAX) 10 mg tablet Take by mouth weekly before breakfast  4/5/18  Historical Provider, MD   famotidine (PEPCID) 40 MG tablet Take 1 tablet (40 mg total) by mouth daily 2/12/18 4/5/18  Neyda Donaldson DO   gabapentin (NEURONTIN) 300 mg capsule Take 100 mg by mouth 3 (three) times a day  4/5/18  Historical Provider, MD   hydrochlorothiazide (HYDRODIURIL) 12 5 mg tablet Take 1 tablet (12 5 mg total) by mouth daily 2/9/18 4/5/18  Neyda Donaldson DO   losartan (COZAAR) 100 MG tablet Take 1 tablet (100 mg total) by mouth daily 2/9/18 4/5/18  Neyda Donaldson DO   omeprazole (PriLOSEC) 40 MG capsule Take 1 capsule by mouth daily 8/28/17 4/5/18  Historical Provider, MD   simvastatin (ZOCOR) 20 mg tablet TAKE ONE TABLET BY MOUTH EVERY DAY 1/25/18 4/5/18  Magnus Seip, DO     I have reviewed home medications with patient personally  Allergies: Allergies   Allergen Reactions    Pollen Extract        Social History:     Marital Status: /Civil Union   Occupation:   Patient Pre-hospital Living Situation: home  Patient Pre-hospital Level of Mobility:  Independent  Patient Pre-hospital Diet Restrictions: no  Substance Use History:   History   Alcohol Use No     History   Smoking Status    Never Smoker   Smokeless Tobacco    Never Used     History   Drug Use No       Family History:    History reviewed  No pertinent family history  Physical Exam:     Vitals:   Blood Pressure: 120/74 (04/05/18 1607)  Pulse: 80 (04/05/18 1607)  Temperature: 98 2 °F (36 8 °C) (04/05/18 1607)  Temp Source: Oral (04/05/18 1607)  Respirations: 18 (04/05/18 1607)  Height: 5' 2" (157 5 cm) (04/05/18 1235)  Weight - Scale: 68 kg (150 lb) (04/05/18 1126)  SpO2: 99 % (04/05/18 1607)    Physical Exam    Comfortably sitting up in bed  Neck supple  Lungs bilateral rhonchi, diminished breath sounds at bases  Heart sounds S1-S2 noted no murmurs appreciable  Abdomen soft nontender  Pulses present  No pedal edema  Awake obeys simple commands  No rash    Additional Data:     Lab Results: I have personally reviewed pertinent reports          Results from last 7 days  Lab Units 04/05/18  1215   WBC Thousand/uL 7 58   HEMOGLOBIN g/dL 13 9   HEMATOCRIT % 42 3   PLATELETS Thousands/uL 304   NEUTROS PCT % 61   LYMPHS PCT % 24   MONOS PCT % 11   EOS PCT % 3       Results from last 7 days  Lab Units 04/05/18  1215   SODIUM mmol/L 136   POTASSIUM mmol/L 3 7   CHLORIDE mmol/L 97*   CO2 mmol/L 30   BUN mg/dL 10   CREATININE mg/dL 0 74   CALCIUM mg/dL 8 4   TOTAL PROTEIN g/dL 7 9   BILIRUBIN TOTAL mg/dL 0 30   ALK PHOS U/L 69   ALT U/L 65   AST U/L 39   GLUCOSE RANDOM mg/dL 115           Imaging: I have personally reviewed pertinent reports  X-ray Chest 2 Views    Chest x-ray per reviewed no active lung disease    Result Date: 4/5/2018  Narrative: CHEST INDICATION:   Chest pain  COMPARISON:  11/2/2015, report CT chest 11/27/2013 EXAM PERFORMED/VIEWS:  XR CHEST PA & LATERAL  The frontal view was performed utilizing dual energy radiographic technique  FINDINGS: Cardiomediastinal silhouette appears unremarkable  The lungs are clear  No pneumothorax or pleural effusion  Paravertebral ossifications thoracic spine  Impression: No acute cardiopulmonary disease  Workstation performed: [id number]       EKG, Pathology, and Other Studies Reviewed on Admission:   · EKG:  Pending    Allscripts / Epic Records Reviewed: Yes     ** Please Note: This note has been constructed using a voice recognition system   **

## 2018-04-05 NOTE — ED PROVIDER NOTES
History  Chief Complaint   Patient presents with    Cough     c/o body aches, coughing and sneezing sice sunday  denies fevers at home  sent from pcp for further eval of possible pna      79-year-old female presents today complaining body aches, cough, shortness of breath since Thursday  Was sent in from PCP to rule out possible pneumonia  History provided by:  Patient  Shortness of Breath   Severity:  Mild  Onset quality:  Gradual  Duration:  5 days  Timing:  Constant  Progression:  Unchanged  Chronicity:  New  Relieved by:  None tried  Worsened by:  Nothing  Ineffective treatments:  None tried  Associated symptoms: chest pain (With cough) and cough    Associated symptoms: no abdominal pain, no diaphoresis, no fever and no rash    Risk factors: no recent alcohol use, no family hx of DVT, no hx of cancer, no hx of PE/DVT, no obesity, no oral contraceptive use, no prolonged immobilization, no recent surgery and no tobacco use        Prior to Admission Medications   Prescriptions Last Dose Informant Patient Reported? Taking?    Cholecalciferol (VITAMIN D3) 1000 units CAPS   Yes Yes   Sig: Take 1 capsule by mouth daily   alendronate (FOSAMAX) 10 mg tablet   No Yes   Sig: Take 1 tablet (10 mg total) by mouth weekly before breakfast   calcium-vitamin D 250-100 MG-UNIT per tablet   Yes Yes   Sig: Take 1 tablet by mouth 2 (two) times a day   famotidine (PEPCID) 40 MG tablet   No Yes   Sig: Take 0 5 tablets (20 mg total) by mouth daily   fexofenadine (ALLEGRA) 180 MG tablet   Yes Yes   Sig: Take 1 tablet by mouth daily as needed   hydrochlorothiazide (HYDRODIURIL) 12 5 mg tablet   No Yes   Sig: Take 1 tablet (12 5 mg total) by mouth daily   losartan (COZAAR) 100 MG tablet   No Yes   Sig: Take 1 tablet (100 mg total) by mouth daily   multivitamin-minerals (CENTRUM ADULTS) tablet   Yes Yes   Sig: Take 1 tablet by mouth daily   omeprazole (PriLOSEC) 40 MG capsule   No Yes   Sig: Take 1 capsule (40 mg total) by mouth daily   simvastatin (ZOCOR) 20 mg tablet   No Yes   Sig: Take 1 tablet (20 mg total) by mouth daily      Facility-Administered Medications: None       Past Medical History:   Diagnosis Date    Hypertension        Past Surgical History:   Procedure Laterality Date    APPENDECTOMY         History reviewed  No pertinent family history  I have reviewed and agree with the history as documented  Social History   Substance Use Topics    Smoking status: Never Smoker    Smokeless tobacco: Never Used    Alcohol use No        Review of Systems   Constitutional: Negative for diaphoresis, fatigue and fever  HENT: Negative for congestion  Respiratory: Positive for cough and shortness of breath  Cardiovascular: Positive for chest pain (With cough)  Gastrointestinal: Negative for abdominal pain  Genitourinary: Negative for difficulty urinating  Musculoskeletal: Positive for myalgias  Skin: Negative for pallor, rash and wound  Neurological: Negative for dizziness  Psychiatric/Behavioral: Negative for confusion  Physical Exam  ED Triage Vitals   Temperature Pulse Respirations Blood Pressure SpO2   04/05/18 1126 04/05/18 1126 04/05/18 1126 04/05/18 1126 04/05/18 1126   98 5 °F (36 9 °C) 95 18 142/66 94 %      Temp Source Heart Rate Source Patient Position - Orthostatic VS BP Location FiO2 (%)   04/05/18 1126 04/05/18 1126 04/05/18 1126 04/05/18 1126 --   Oral Monitor Sitting Left arm       Pain Score       04/05/18 1610       7           Orthostatic Vital Signs  Vitals:    04/05/18 1430 04/05/18 1500 04/05/18 1530 04/05/18 1607   BP:  117/55 130/68 120/74   Pulse: 78 84 88 80   Patient Position - Orthostatic VS:    Lying       Physical Exam   Constitutional: She is oriented to person, place, and time  She appears well-developed and well-nourished  HENT:   Head: Normocephalic and atraumatic     Mouth/Throat: Uvula is midline, oropharynx is clear and moist and mucous membranes are normal  No tonsillar exudate  Eyes: Pupils are equal, round, and reactive to light  Neck: Normal range of motion  Neck supple  Cardiovascular: Normal rate and regular rhythm  Pulmonary/Chest: Effort normal  No respiratory distress  She has decreased breath sounds  She has rhonchi (Mild, left base)  Abdominal: Soft  Bowel sounds are normal  There is no tenderness  There is no rebound and no guarding  Musculoskeletal: Normal range of motion  Neurological: She is alert and oriented to person, place, and time  Patient moving all extremities equally, no focal neuro deficits noted  Skin: Skin is warm and dry  Psychiatric: She has a normal mood and affect  Nursing note and vitals reviewed        ED Medications  Medications   calcium-vitamin D 250-100 MG-UNIT per tablet 1 tablet (not administered)   cholecalciferol (VITAMIN D3) tablet 1,000 Units (not administered)   loratadine (CLARITIN) tablet 10 mg (not administered)   multivitamin-minerals (CENTRUM ADULTS) tablet 1 tablet (not administered)   alendronate (FOSAMAX) tablet 10 mg (not administered)   famotidine (PEPCID) tablet 20 mg (not administered)   hydrochlorothiazide (HYDRODIURIL) tablet 12 5 mg (not administered)   losartan (COZAAR) tablet 100 mg (not administered)   pantoprazole (PROTONIX) EC tablet 40 mg (not administered)   pravastatin (PRAVACHOL) tablet 40 mg (not administered)   docusate sodium (COLACE) capsule 100 mg (not administered)   ondansetron (ZOFRAN) injection 4 mg (not administered)   calcium carbonate (TUMS) chewable tablet 1,000 mg (not administered)   enoxaparin (LOVENOX) subcutaneous injection 40 mg (not administered)   oseltamivir (TAMIFLU) capsule 75 mg (not administered)   methylPREDNISolone sodium succinate (Solu-MEDROL) injection 20 mg (not administered)   benzonatate (TESSALON PERLES) capsule 100 mg (not administered)   guaiFENesin (MUCINEX) 12 hr tablet 600 mg (not administered)   sodium chloride 0 9 % infusion (1,000 mL/hr Intravenous New Bag 4/5/18 1219)   ipratropium-albuterol (DUO-NEB) 0 5-2 5 mg/3 mL inhalation solution 3 mL (3 mL Nebulization Given 4/5/18 1434)   azithromycin (ZITHROMAX) 500 mg in sodium chloride 0 9% 250mL IVPB 500 mg (500 mg Intravenous New Bag 4/5/18 1535)       Diagnostic Studies  Results Reviewed     Procedure Component Value Units Date/Time    Influenza A/B and RSV by PCR (indicated for patients >2 mo of age) [[id number]] Collected:  04/05/18 1520    Lab Status: In process Specimen:  Nasopharyngeal from Nasopharyngeal Swab Updated:  04/05/18 1524    Blood culture #2 [[id number]] Collected:  04/05/18 1520    Lab Status: In process Specimen:  Blood from Arm, Left Updated:  04/05/18 1523    Blood culture #1 [[id number]] Collected:  04/05/18 1520    Lab Status: In process Specimen:  Blood from Arm, Left Updated:  04/05/18 1523    B-type natriuretic peptide [[id number]]  (Normal) Collected:  04/05/18 1215    Lab Status:  Final result Specimen:  Blood from Arm, Right Updated:  04/05/18 1438     NT-proBNP 17 pg/mL     Lactic acid, plasma [[id number]]  (Normal) Collected:  04/05/18 1317    Lab Status:  Final result Specimen:  Blood from Arm, Right Updated:  04/05/18 1346     LACTIC ACID 1 2 mmol/L     Narrative:         Result may be elevated if tourniquet was used during collection  Troponin I [[id number]]  (Normal) Collected:  04/05/18 1317    Lab Status:  Final result Specimen:  Blood from Arm, Right Updated:  04/05/18 1343     Troponin I <0 02 ng/mL     Narrative:         [employer] Chemistry analyzer 99% cutoff is > 0 04 ng/mL in network labs    o cTnI 99% cutoff is useful only when applied to patients in the clinical setting of myocardial ischemia  o cTnI 99% cutoff should be interpreted in the context of clinical history, ECG findings and possibly cardiac imaging to establish correct diagnosis  o cTnI 99% cutoff may be suggestive but clearly not indicative of a coronary event without the clinical setting of myocardial ischemia  Comprehensive metabolic panel [[id number]]  (Abnormal) Collected:  04/05/18 1215    Lab Status:  Final result Specimen:  Blood from Arm, Right Updated:  04/05/18 1258     Sodium 136 mmol/L      Potassium 3 7 mmol/L      Chloride 97 (L) mmol/L      CO2 30 mmol/L      Anion Gap 9 mmol/L      BUN 10 mg/dL      Creatinine 0 74 mg/dL      Glucose 115 mg/dL      Calcium 8 4 mg/dL      AST 39 U/L      ALT 65 U/L      Alkaline Phosphatase 69 U/L      Total Protein 7 9 g/dL      Albumin 3 8 g/dL      Total Bilirubin 0 30 mg/dL      eGFR 81 ml/min/1 73sq m     Narrative:         National Kidney Disease Education Program recommendations are as follows:  GFR calculation is accurate only with a steady state creatinine  Chronic Kidney disease less than 60 ml/min/1 73 sq  meters  Kidney failure less than 15 ml/min/1 73 sq  meters  CBC and differential [[id number]]  (Normal) Collected:  04/05/18 1215    Lab Status:  Final result Specimen:  Blood from Arm, Right Updated:  04/05/18 1226     WBC 7 58 Thousand/uL      RBC 4 79 Million/uL      Hemoglobin 13 9 g/dL      Hematocrit 42 3 %      MCV 88 fL      MCH 29 0 pg      MCHC 32 9 g/dL      RDW 13 4 %      MPV 9 1 fL      Platelets 242 Thousands/uL      Neutrophils Relative 61 %      Lymphocytes Relative 24 %      Monocytes Relative 11 %      Eosinophils Relative 3 %      Basophils Relative 1 %      Neutrophils Absolute 4 71 Thousands/µL      Lymphocytes Absolute 1 83 Thousands/µL      Monocytes Absolute 0 80 Thousand/µL      Eosinophils Absolute 0 20 Thousand/µL      Basophils Absolute 0 04 Thousands/µL                  X-ray chest 2 views   Final Result by Shell Quan DO (04/05 1431)      No acute cardiopulmonary disease              Workstation performed: [id number]                    Procedures  ECG 12 Lead Documentation  Date/Time: 4/5/2018 1:00 PM  Performed by: Antolin Mcmillan  Authorized by: Antolin Mcmillan     Indications / Diagnosis:  Shortness of breath  ECG reviewed by me, the ED Provider: yes    Patient location:  ED  Previous ECG:     Previous ECG:  Compared to current  Comments:      Normal sinus rhythm at 88 beats per minute normal axis, normal intervals, no ST T wave changes consistent with ischemia  No change when compared old EKG from March of 2013  Phone Contacts  ED Phone Contact    ED Course  ED Course                                MDM  Number of Diagnoses or Management Options  Bronchitis: new and requires workup  Cough: new and requires workup  Hypoxia: new and requires workup  Diagnosis management comments: 2:48 PM  Feeling better after nebulizer  Breath sounds are improved, with some slight expiratory wheezing after the nebulizer that I did hear prior  Most likely diagnosis is bronchitis  Will observe off nasal cannula O2 right now and if remains normal, will ambulate with an ambulatory sat and if remains normal will likely Dc     5:23 PM  Late entry - O2 sat dropped to 82% on room air while at rest without ambulation  Discussed with ELIZABETH  We had a detailed discussion of the patient's condition and case,  including need for admission  Accepts to his/her service  Bed request/bridging orders placed             Amount and/or Complexity of Data Reviewed  Clinical lab tests: ordered and reviewed  Tests in the radiology section of CPT®: ordered and reviewed  Tests in the medicine section of CPT®: reviewed and ordered  Decide to obtain previous medical records or to obtain history from someone other than the patient: yes  Review and summarize past medical records: yes  Independent visualization of images, tracings, or specimens: yes    Risk of Complications, Morbidity, and/or Mortality  Presenting problems: high  Diagnostic procedures: high  Management options: moderate    Patient Progress  Patient progress: stable    CritCare Time    Disposition  Final diagnoses:   Cough   Bronchitis   Hypoxia     Time reflects when diagnosis was documented in both MDM as applicable and the Disposition within this note     Time User Action Codes Description Comment    4/5/2018  2:10 PM Marcy Kee Add [R05] Cough     4/5/2018  3:08 PM Marcy Kee Add [J40] Bronchitis     4/5/2018  3:08 PM Ras Valdez Add [R09 02] Hypoxia       ED Disposition     ED Disposition Condition Comment    Admit  Case was discussed with ELIZABETH and the patient's admission status was agreed to be Admission Status: observation status to the service of Dr Maria Esther Oliveira           Follow-up Information    None       Current Discharge Medication List      CONTINUE these medications which have NOT CHANGED    Details   alendronate (FOSAMAX) 10 mg tablet Take 1 tablet (10 mg total) by mouth weekly before breakfast  Qty: 4 tablet, Refills: 6    Associated Diagnoses: Osteopetrosis      calcium-vitamin D 250-100 MG-UNIT per tablet Take 1 tablet by mouth 2 (two) times a day      Cholecalciferol (VITAMIN D3) 1000 units CAPS Take 1 capsule by mouth daily      famotidine (PEPCID) 40 MG tablet Take 0 5 tablets (20 mg total) by mouth daily  Qty: 90 tablet, Refills: 0    Associated Diagnoses: Gastroesophageal reflux disease without esophagitis      fexofenadine (ALLEGRA) 180 MG tablet Take 1 tablet by mouth daily as needed      hydrochlorothiazide (HYDRODIURIL) 12 5 mg tablet Take 1 tablet (12 5 mg total) by mouth daily  Qty: 90 tablet, Refills: 0    Associated Diagnoses: Essential hypertension      losartan (COZAAR) 100 MG tablet Take 1 tablet (100 mg total) by mouth daily  Qty: 90 tablet, Refills: 0    Associated Diagnoses: Essential hypertension      multivitamin-minerals (CENTRUM ADULTS) tablet Take 1 tablet by mouth daily      omeprazole (PriLOSEC) 40 MG capsule Take 1 capsule (40 mg total) by mouth daily  Qty: 30 capsule, Refills: 6    Associated Diagnoses: Gastroesophageal reflux disease without esophagitis      simvastatin (ZOCOR) 20 mg tablet Take 1 tablet (20 mg total) by mouth daily  Qty: 90 tablet, Refills: 0    Associated Diagnoses: Hyperlipidemia, unspecified hyperlipidemia type           No discharge procedures on file      ED Provider  Electronically Signed by           Leticia Rodrigues DO  04/05/18 4391

## 2018-04-06 VITALS
BODY MASS INDEX: 27.6 KG/M2 | SYSTOLIC BLOOD PRESSURE: 173 MMHG | OXYGEN SATURATION: 92 % | HEART RATE: 99 BPM | HEIGHT: 62 IN | RESPIRATION RATE: 24 BRPM | WEIGHT: 150 LBS | TEMPERATURE: 97.9 F | DIASTOLIC BLOOD PRESSURE: 79 MMHG

## 2018-04-06 PROBLEM — R09.02 HYPOXIA: Status: RESOLVED | Noted: 2018-04-05 | Resolved: 2018-04-06

## 2018-04-06 LAB
ATRIAL RATE: 88 BPM
FLUAV AG SPEC QL: NORMAL
FLUBV AG SPEC QL: NORMAL
P AXIS: 69 DEGREES
PR INTERVAL: 162 MS
QRS AXIS: 35 DEGREES
QRSD INTERVAL: 68 MS
QT INTERVAL: 352 MS
QTC INTERVAL: 425 MS
RSV B RNA SPEC QL NAA+PROBE: NORMAL
T WAVE AXIS: 44 DEGREES
VENTRICULAR RATE: 88 BPM

## 2018-04-06 PROCEDURE — 93010 ELECTROCARDIOGRAM REPORT: CPT | Performed by: INTERNAL MEDICINE

## 2018-04-06 PROCEDURE — 99217 PR OBSERVATION CARE DISCHARGE MANAGEMENT: CPT | Performed by: INTERNAL MEDICINE

## 2018-04-06 RX ORDER — PREDNISONE 20 MG/1
40 TABLET ORAL DAILY
Status: DISCONTINUED | OUTPATIENT
Start: 2018-04-06 | End: 2018-04-06 | Stop reason: HOSPADM

## 2018-04-06 RX ORDER — PREDNISONE 20 MG/1
40 TABLET ORAL DAILY
Qty: 10 TABLET | Refills: 0 | Status: SHIPPED | OUTPATIENT
Start: 2018-04-06 | End: 2018-04-11

## 2018-04-06 RX ORDER — NEBIVOLOL 10 MG/1
10 TABLET ORAL DAILY
Qty: 21 TABLET | Refills: 0 | Status: CANCELLED | COMMUNITY
Start: 2018-04-06

## 2018-04-06 RX ORDER — ALBUTEROL SULFATE 90 UG/1
2 AEROSOL, METERED RESPIRATORY (INHALATION) EVERY 6 HOURS PRN
Qty: 1 EACH | Refills: 0 | Status: SHIPPED | OUTPATIENT
Start: 2018-04-06 | End: 2020-01-08

## 2018-04-06 RX ORDER — BENZONATATE 100 MG/1
100 CAPSULE ORAL 3 TIMES DAILY PRN
Qty: 20 CAPSULE | Refills: 0 | Status: ON HOLD | OUTPATIENT
Start: 2018-04-06 | End: 2020-01-15

## 2018-04-06 RX ORDER — GUAIFENESIN 600 MG
600 TABLET, EXTENDED RELEASE 12 HR ORAL EVERY 12 HOURS SCHEDULED
Qty: 10 TABLET | Refills: 0 | Status: SHIPPED | OUTPATIENT
Start: 2018-04-06 | End: 2018-04-11

## 2018-04-06 RX ORDER — KETOROLAC TROMETHAMINE 30 MG/ML
15 INJECTION, SOLUTION INTRAMUSCULAR; INTRAVENOUS ONCE
Status: COMPLETED | OUTPATIENT
Start: 2018-04-06 | End: 2018-04-06

## 2018-04-06 RX ORDER — PREDNISONE 20 MG/1
40 TABLET ORAL DAILY
Qty: 5 TABLET | Refills: 0 | Status: SHIPPED | OUTPATIENT
Start: 2018-04-06 | End: 2018-04-06

## 2018-04-06 RX ADMIN — KETOROLAC TROMETHAMINE 15 MG: 30 INJECTION, SOLUTION INTRAMUSCULAR at 11:04

## 2018-04-06 RX ADMIN — ACETAMINOPHEN 650 MG: 325 TABLET, FILM COATED ORAL at 08:35

## 2018-04-06 RX ADMIN — FAMOTIDINE 20 MG: 20 TABLET, FILM COATED ORAL at 08:35

## 2018-04-06 RX ADMIN — OSELTAMIVIR PHOSPHATE 75 MG: 75 CAPSULE ORAL at 08:34

## 2018-04-06 RX ADMIN — ENOXAPARIN SODIUM 40 MG: 40 INJECTION SUBCUTANEOUS at 08:34

## 2018-04-06 RX ADMIN — METHYLPREDNISOLONE SODIUM SUCCINATE 20 MG: 40 INJECTION, POWDER, FOR SOLUTION INTRAMUSCULAR; INTRAVENOUS at 05:05

## 2018-04-06 RX ADMIN — HYDROCHLOROTHIAZIDE 12.5 MG: 12.5 TABLET ORAL at 08:34

## 2018-04-06 RX ADMIN — LOSARTAN POTASSIUM 100 MG: 50 TABLET, FILM COATED ORAL at 08:34

## 2018-04-06 RX ADMIN — PREDNISONE 40 MG: 20 TABLET ORAL at 11:50

## 2018-04-06 RX ADMIN — VITAMIN D, TAB 1000IU (100/BT) 1000 UNITS: 25 TAB at 08:34

## 2018-04-06 RX ADMIN — Medication 1 TABLET: at 08:35

## 2018-04-06 RX ADMIN — PANTOPRAZOLE SODIUM 40 MG: 40 TABLET, DELAYED RELEASE ORAL at 05:05

## 2018-04-06 RX ADMIN — LORATADINE 10 MG: 10 TABLET ORAL at 08:34

## 2018-04-06 RX ADMIN — DOCUSATE SODIUM 100 MG: 100 CAPSULE, LIQUID FILLED ORAL at 08:34

## 2018-04-06 RX ADMIN — CALCIUM CARBONATE-CHOLECALCIFEROL TAB 250 MG-125 UNIT 1 TABLET: 250-125 TAB at 08:34

## 2018-04-06 RX ADMIN — GUAIFENESIN 600 MG: 600 TABLET, EXTENDED RELEASE ORAL at 08:34

## 2018-04-06 NOTE — DISCHARGE SUMMARY
Discharge Summary - [facility]    Patient Information: Jefe Maloney 67 y o  female MRN: [id number]  Unit/Bed#: -01 Encounter: [id number]    Discharging Physician / Practitioner: Jaci Madison MD  PCP: Leon Jenkins DO  Admission Date: 4/5/2018  Discharge Date: 04/06/18    Disposition:     Home    Reason for Admission:  Cough chest tightness 3-4 days    Discharge Diagnoses:     Principal Problem:    Bronchitis  Active Problems:    Cough    Hypertension    Dyslipidemia  Resolved Problems:    Hypoxia      Consultations During Hospital Stay:  · None     Procedures Performed:     · Chest radiograph no active lung disease      Hospital Course:     Jefe Maloney is a 67 y o  female patient who originally presented to the hospital on 4/5/2018 due to cough chest tightness 3-4 days  Patient presented to worsening cough of last few days duration associated scanty mucoid sputum and wheezing  Patient was admitted, empirically placed on Tamiflu, her flu PCR was checked and was negative, Tamiflu was discontinued  She was treated with IV Solu-Medrol and supportive care for cough, she reports improvement in her symptoms, her dyspnea and hypoxia resolved her wheezing and cough for improving, she will be transitioned to p o  prednisone  She remains hemodynamically stable symptomatically improved since admission and is deemed ready for discharge today      Condition at Discharge: fair     Discharge Day Visit / Exam:     Subjective:      Reports feeling better today  Dyspnea and cough improving  Headache slightly better today  Agreeable to discharge plan    Vitals: Blood Pressure: (!) 173/79 (04/06/18 0730)  Pulse: 99 (04/06/18 0730)  Temperature: 97 9 °F (36 6 °C) (04/06/18 0730)  Temp Source: Oral (04/06/18 0730)  Respirations: (!) 24 (04/06/18 0730)  Height: 5' 2" (157 5 cm) (04/05/18 1235)  Weight - Scale: 68 kg (150 lb) (04/05/18 1126)  SpO2: 92 % (04/06/18 0730)  Exam: Physical Exam     Comfortably sitting up in chair  Neck supple  Lungs occasional rhonchi much improved since yesterday  Heart sounds S1-S2 noted  Pulses present  No pedal edema  Awake obeys simple commands  No rash    The discharge plan discussed with the patient, she is agreeable discharge plan as outlined, outpatient follow-up with primary care physician    Discharge instructions/Information to patient and family:   See after visit summary for information provided to patient and family  Provisions for Follow-Up Care:  See after visit summary for information related to follow-up care and any pertinent home health orders  Planned Readmission: no     Discharge Statement:  I spent 45 minutes discharging the patient  This time was spent on the day of discharge  I had direct contact with the patient on the day of discharge  Greater than 50% of the total time was spent examining patient, answering all patient questions, arranging and discussing plan of care with patient as well as directly providing post-discharge instructions  Additional time then spent on discharge activities  Discharge Medications:  See after visit summary for reconciled discharge medications provided to patient and family        ** Please Note: This note has been constructed using a voice recognition system **

## 2018-04-06 NOTE — PROGRESS NOTES
Pt is resting, no s/s of distress  Pt has no complaints at this time  Bed is low and locked with call bell at reach  Pt is on room air and tolerating well, will continue to monitor

## 2018-04-06 NOTE — CASE MANAGEMENT
Initial Clinical Review    Admission: Date/Time/Statement:  5/5/2018  1527 OBSERVATION    Orders Placed This Encounter   Procedures    Place in Observation (expected length of stay for this patient is less than two midnights)     Standing Status:   Standing     Number of Occurrences:   1     Order Specific Question:   Admitting Physician     Answer:   Con Roca     Order Specific Question:   Level of Care     Answer:   Med Surg [16]         ED: Date/Time/Mode of Arrival:   ED Arrival Information     Expected Arrival Acuity Means of Arrival Escorted By Service Admission Type    - 4/5/2018 11:20 Urgent Walk-In Self General Medicine Urgent    Arrival Complaint    uri          Chief Complaint:   Chief Complaint   Patient presents with    Cough     c/o body aches, coughing and sneezing sice sunday  denies fevers at home  sent from pcp for further eval of possible pna  History of Illness: 67 y o  female who presents with cough chest tightness since 3-4 days  Patient reports worsening cough for the last few days associated scanty mucoid sputum chest discomfort and wheezing  She reports fatigue and myalgias  ED Vital Signs:   ED Triage Vitals   Temperature Pulse Respirations Blood Pressure SpO2   04/05/18 1126 04/05/18 1126 04/05/18 1126 04/05/18 1126 04/05/18 1126   98 5 °F (36 9 °C) 95 18 142/66 94 %      Temp Source Heart Rate Source Patient Position - Orthostatic VS BP Location FiO2 (%)   04/05/18 1126 04/05/18 1126 04/05/18 1126 04/05/18 1126 --   Oral Monitor Sitting Left arm       Pain Score       04/05/18 1610       7        Wt Readings from Last 1 Encounters:   04/05/18 68 kg (150 lb)       Vital Signs (abnormal):  Low sat 84% room air  Maximum pulse 106  Exam decreased breath sounds  Rhonchi - mild left base  Abnormal Labs/Diagnostic Test Results:   Cl 97        ED Treatment: oxygen 4 liters  Medication Administration from 04/05/2018 1120 to 04/05/2018 1557       Date/Time Order Dose Route Action Action by Comments     04/05/2018 1219 sodium chloride 0 9 % infusion 1,000 mL/hr Intravenous New Bag [id number] [id number], RN      04/05/2018 1434 ipratropium-albuterol (DUO-NEB) 0 5-2 5 mg/3 mL inhalation solution 3 mL 3 mL Nebulization Given Jamarcus Rehman RN      04/05/2018 1535 azithromycin (ZITHROMAX) 500 mg in sodium chloride 0 9% 250mL IVPB 500 mg 500 mg Intravenous New Bag Jamarcus Rehman RN           Past Medical/Surgical History:    Active Ambulatory Problems     Diagnosis Date Noted    No Active Ambulatory Problems     Resolved Ambulatory Problems     Diagnosis Date Noted    No Resolved Ambulatory Problems     Past Medical History:   Diagnosis Date    Hypertension        Admitting Diagnosis: Cough [R05]  Bronchitis [J40]  Hypoxia [R09 02]    Age/Sex: 67 y o  female  Assessment/Plan: Acute bronchitis likely viral, follow up on flu PCR, will empirically place on Tamiflu, Solu-Medrol, respiratory treatments, supportive care  · Hypoxia noted in the ED will monitor O2 sats and being as tolerated to keep O2 sats more than 90%     Plan for Additional Problems:   · Dyslipidemia continue statin  Hypertension continue antihypertensives    Admission Orders:  4/5/2018  1527 OBSERVATION  Scheduled Meds:   Current Facility-Administered Medications:  acetaminophen 650 mg Oral Q6H PRN Zechariah Denson PA-C   benzonatate 100 mg Oral TID PRN Dolores Meek MD   calcium carbonate 1,000 mg Oral Daily PRN Dolores Meek MD   calcium carbonate-vitamin D 1 tablet Oral BID Dolores Meek MD   cholecalciferol 1,000 Units Oral Daily Dolores Meek MD   docusate sodium 100 mg Oral BID Dolores Meek MD   enoxaparin 40 mg Subcutaneous Daily Dolores Meek MD   famotidine 20 mg Oral Daily Dolores Meek MD   guaiFENesin 600 mg Oral Q12H [address] Dolores Meek MD   hydrochlorothiazide 12 5 mg Oral Daily Dolores Meek MD   loratadine 10 mg Oral Daily Latanya Sarmiento MD   losartan 100 mg Oral Daily Latanya Sarmiento MD   methylPREDNISolone sodium succinate 20 mg Intravenous Q8H [facility] Latanya Sarmiento MD   multivitamin-minerals 1 tablet Oral Daily Latanya Sarmiento MD   ondansetron 4 mg Intravenous Q6H PRN Latanya Sarmiento MD   oseltamivir 75 mg Oral Q12H [facility] Latanya Sarmiento MD   pantoprazole 40 mg Oral Early Morning Latanya Sarmiento MD   pravastatin 40 mg Oral Daily With Robbie Haro MD     Continuous Infusions:    PRN Meds:   Acetaminophen - used x 1      benzonatate    calcium carbonate    Ondansetron  4 mg iv - used x 1  OTHER ORDERS: respiratory protocol - oxygen 3 liters  scds      Thank you,  [facility] in the [facility] by Dutch Cheney for 2017  Network Utilization Review Department  Phone: [phone]; Fax [phone]  ATTENTION: The Network Utilization Review Department is now centralized for our 7 Facilities  Make a note that we have a new phone and fax numbers for our Department  Please call with any questions or concerns to [phone] and carefully follow the prompts so that you are directed to the right person  All voicemails are confidential  Fax any determinations, approvals, denials, and requests for initial or continue stay review clinical to [phone]  Due to HIGH CALL volume, it would be easier if you could please send faxed requests to expedite your requests and in part, help us provide discharge notifications faster

## 2018-04-06 NOTE — SOCIAL WORK
Observation notice reviewed  Copy provided to patient  Copy placed in medical records    Patient provided with discharge checklist and information on Meds to [facility]

## 2018-04-06 NOTE — PROGRESS NOTES
Pt O2 stat on RA resting is 95%  O2 stat ambulating is 93%  Pt will remain on room air and will continue to monitor

## 2018-04-06 NOTE — NURSING NOTE
AVS summary explained to patient- verbalized understanding  Given scripts and walked with writer to lobby where  waiting

## 2018-04-10 LAB
BACTERIA BLD CULT: NORMAL
BACTERIA BLD CULT: NORMAL

## 2018-10-13 ENCOUNTER — IMMUNIZATION (OUTPATIENT)
Dept: INTERNAL MEDICINE CLINIC | Facility: CLINIC | Age: 73
End: 2018-10-13
Payer: COMMERCIAL

## 2018-10-13 DIAGNOSIS — Z23 ENCOUNTER FOR IMMUNIZATION: ICD-10-CM

## 2018-10-13 PROCEDURE — 90662 IIV NO PRSV INCREASED AG IM: CPT

## 2018-10-13 PROCEDURE — 4040F PNEUMOC VAC/ADMIN/RCVD: CPT

## 2018-10-13 PROCEDURE — G0008 ADMIN INFLUENZA VIRUS VAC: HCPCS

## 2018-10-29 DIAGNOSIS — I10 ESSENTIAL HYPERTENSION: ICD-10-CM

## 2018-10-29 RX ORDER — HYDROCHLOROTHIAZIDE 12.5 MG/1
TABLET ORAL
Qty: 90 TABLET | Refills: 1 | Status: SHIPPED | OUTPATIENT
Start: 2018-10-29 | End: 2019-04-19 | Stop reason: SDUPTHER

## 2019-01-30 DIAGNOSIS — I10 ESSENTIAL HYPERTENSION: ICD-10-CM

## 2019-01-30 PROCEDURE — 4010F ACE/ARB THERAPY RXD/TAKEN: CPT | Performed by: INTERNAL MEDICINE

## 2019-01-30 RX ORDER — LOSARTAN POTASSIUM 100 MG/1
TABLET ORAL
Qty: 90 TABLET | Refills: 1 | Status: SHIPPED | OUTPATIENT
Start: 2019-01-30 | End: 2019-08-28 | Stop reason: SDUPTHER

## 2019-02-07 DIAGNOSIS — I10 ESSENTIAL HYPERTENSION: Primary | ICD-10-CM

## 2019-02-18 ENCOUNTER — APPOINTMENT (OUTPATIENT)
Dept: LAB | Facility: CLINIC | Age: 74
End: 2019-02-18
Payer: COMMERCIAL

## 2019-02-18 DIAGNOSIS — I10 ESSENTIAL HYPERTENSION: ICD-10-CM

## 2019-02-18 LAB
ALBUMIN SERPL BCP-MCNC: 3.9 G/DL (ref 3.5–5)
ALP SERPL-CCNC: 58 U/L (ref 46–116)
ALT SERPL W P-5'-P-CCNC: 29 U/L (ref 12–78)
ANION GAP SERPL CALCULATED.3IONS-SCNC: 7 MMOL/L (ref 4–13)
AST SERPL W P-5'-P-CCNC: 17 U/L (ref 5–45)
BILIRUB SERPL-MCNC: 0.51 MG/DL (ref 0.2–1)
BUN SERPL-MCNC: 18 MG/DL (ref 5–25)
CALCIUM SERPL-MCNC: 9.1 MG/DL (ref 8.3–10.1)
CHLORIDE SERPL-SCNC: 103 MMOL/L (ref 100–108)
CHOLEST SERPL-MCNC: 153 MG/DL (ref 50–200)
CO2 SERPL-SCNC: 29 MMOL/L (ref 21–32)
CREAT SERPL-MCNC: 0.66 MG/DL (ref 0.6–1.3)
GFR SERPL CREATININE-BSD FRML MDRD: 87 ML/MIN/1.73SQ M
GLUCOSE P FAST SERPL-MCNC: 126 MG/DL (ref 65–99)
HDLC SERPL-MCNC: 44 MG/DL (ref 40–60)
LDLC SERPL CALC-MCNC: 79 MG/DL (ref 0–100)
POTASSIUM SERPL-SCNC: 4.4 MMOL/L (ref 3.5–5.3)
PROT SERPL-MCNC: 7.8 G/DL (ref 6.4–8.2)
SODIUM SERPL-SCNC: 139 MMOL/L (ref 136–145)
TRIGL SERPL-MCNC: 152 MG/DL

## 2019-02-18 PROCEDURE — 36415 COLL VENOUS BLD VENIPUNCTURE: CPT

## 2019-02-18 PROCEDURE — 80053 COMPREHEN METABOLIC PANEL: CPT

## 2019-02-18 PROCEDURE — 80061 LIPID PANEL: CPT

## 2019-02-20 ENCOUNTER — OFFICE VISIT (OUTPATIENT)
Dept: INTERNAL MEDICINE CLINIC | Facility: CLINIC | Age: 74
End: 2019-02-20
Payer: COMMERCIAL

## 2019-02-20 VITALS
RESPIRATION RATE: 16 BRPM | WEIGHT: 154.8 LBS | DIASTOLIC BLOOD PRESSURE: 70 MMHG | HEIGHT: 62 IN | BODY MASS INDEX: 28.49 KG/M2 | SYSTOLIC BLOOD PRESSURE: 144 MMHG

## 2019-02-20 DIAGNOSIS — I10 ESSENTIAL HYPERTENSION: ICD-10-CM

## 2019-02-20 DIAGNOSIS — E11.9 TYPE 2 DIABETES MELLITUS WITHOUT COMPLICATION, WITHOUT LONG-TERM CURRENT USE OF INSULIN (HCC): Primary | ICD-10-CM

## 2019-02-20 DIAGNOSIS — E78.5 DYSLIPIDEMIA: ICD-10-CM

## 2019-02-20 DIAGNOSIS — M54.16 LUMBAR RADICULOPATHY: ICD-10-CM

## 2019-02-20 DIAGNOSIS — Z12.31 ENCOUNTER FOR SCREENING MAMMOGRAM FOR BREAST CANCER: ICD-10-CM

## 2019-02-20 PROBLEM — E08.00 DIABETES MELLITUS DUE TO UNDERLYING CONDITION WITH HYPEROSMOLARITY WITHOUT COMA (HCC): Status: ACTIVE | Noted: 2019-02-20

## 2019-02-20 PROCEDURE — 99214 OFFICE O/P EST MOD 30 MIN: CPT | Performed by: INTERNAL MEDICINE

## 2019-02-20 PROCEDURE — 3725F SCREEN DEPRESSION PERFORMED: CPT | Performed by: INTERNAL MEDICINE

## 2019-02-20 PROCEDURE — 1101F PT FALLS ASSESS-DOCD LE1/YR: CPT | Performed by: INTERNAL MEDICINE

## 2019-02-20 NOTE — ASSESSMENT & PLAN NOTE
Increasing symptoms of lumbar radiculopathy no weakness no incontinence will have the patient see pain management for epidural steroid injection

## 2019-02-20 NOTE — ASSESSMENT & PLAN NOTE
Lab Results   Component Value Date    HGBA1C 6 5 (H) 08/04/2017       No results for input(s): POCGLU in the last 72 hours  Blood Sugar Average: Last 72 hrs:   I have counselled the pt to follow a healthy and balanced diet ,and recommend routine exercise  I will be ordering diabetic laboratories including comprehensive metabolic panel, hemoglobin A1c, urine microalbumin, lipid panel    Recommend weight loss

## 2019-02-20 NOTE — PROGRESS NOTES
Assessment/Plan:    Type 2 diabetes mellitus without complication, without long-term current use of insulin ([facility])  Lab Results   Component Value Date    HGBA1C 6 5 (H) 08/04/2017       No results for input(s): POCGLU in the last 72 hours  Blood Sugar Average: Last 72 hrs:   I have counselled the pt to follow a healthy and balanced diet ,and recommend routine exercise  I will be ordering diabetic laboratories including comprehensive metabolic panel, hemoglobin A1c, urine microalbumin, lipid panel  Recommend weight loss    Hypertension  Hypertension - controlled, I have counseled patient following healthy balance diet, I would like the patient reduce sodium, exercise routinely, I would like the patient continued the med current medical regiment and we will continue to monitor  Lumbar radiculopathy  Increasing symptoms of lumbar radiculopathy no weakness no incontinence will have the patient see pain management for epidural steroid injection  Dyslipidemia  Hyperlipidemia controlled continue with current medical regiment recommend a low-cholesterol diet and recommend routine exercise we will continue to monitor the progress  Encounter for screening mammogram for breast cancer  Counseled, will check mammogram screening         Problem List Items Addressed This Visit        Endocrine    Type 2 diabetes mellitus without complication, without long-term current use of insulin [facility] - Primary     Lab Results   Component Value Date    HGBA1C 6 5 (H) 08/04/2017       No results for input(s): POCGLU in the last 72 hours  Blood Sugar Average: Last 72 hrs:   I have counselled the pt to follow a healthy and balanced diet ,and recommend routine exercise  I will be ordering diabetic laboratories including comprehensive metabolic panel, hemoglobin A1c, urine microalbumin, lipid panel    Recommend weight loss         Relevant Orders    Hemoglobin A1C    Comprehensive metabolic panel    Lipid Panel with Direct LDL reflex       Cardiovascular and Mediastinum    Hypertension     Hypertension - controlled, I have counseled patient following healthy balance diet, I would like the patient reduce sodium, exercise routinely, I would like the patient continued the med current medical regiment and we will continue to monitor  Nervous and Auditory    Lumbar radiculopathy     Increasing symptoms of lumbar radiculopathy no weakness no incontinence will have the patient see pain management for epidural steroid injection  Relevant Orders    Ambulatory referral to Pain Management       Other    Dyslipidemia     Hyperlipidemia controlled continue with current medical regiment recommend a low-cholesterol diet and recommend routine exercise we will continue to monitor the progress  Encounter for screening mammogram for breast cancer     Counseled, will check mammogram screening         Relevant Orders    Mammo screening bilateral w 3d & cad          Return to office  6 months  call if any problems  Subjective:      Patient ID: Luis Alberto Ortega is a 75 y o  female  HPI 70-year old female coming in for a follow up office visit regarding type 2 diabetes, lumbar radiculopathy, essential hypertension, hyperlipidemia; The patient reports me compliant taking medications without untoward side effects the  The patient is here to review his medical condition, update me on the medical condition and the patient reports me no hospitalizations and no ER visits  Patient does report me increasing lower back pain with radiation down the right leg no weakness, no incontinence  She does reports me less active because of the back pain  She is trying to follow healthy diet      The following portions of the patient's history were reviewed and updated as appropriate: allergies, current medications, past family history, past medical history, past social history, past surgical history and problem list     Review of Systems Constitutional: Negative for activity change, appetite change and unexpected weight change  HENT: Negative for congestion and postnasal drip  Eyes: Negative for visual disturbance  Respiratory: Negative for cough and shortness of breath  Cardiovascular: Negative for chest pain  Gastrointestinal: Negative for abdominal pain, diarrhea, nausea and vomiting  Musculoskeletal: Positive for back pain  Neurological: Negative for dizziness, light-headedness and headaches  Hematological: Negative for adenopathy  Objective:    Return in about 6 months (around 8/27/2019)  No results found        Allergies   Allergen Reactions    Pollen Extract        Past Medical History:   Diagnosis Date    Hypertension      Past Surgical History:   Procedure Laterality Date    APPENDECTOMY      VEIN LIGATION AND STRIPPING       Current Outpatient Medications on File Prior to Visit   Medication Sig Dispense Refill    albuterol (PROVENTIL HFA,VENTOLIN HFA) 90 mcg/act inhaler Inhale 2 puffs every 6 (six) hours as needed for wheezing 1 each 0    alendronate (FOSAMAX) 10 mg tablet Take 1 tablet (10 mg total) by mouth weekly before breakfast 4 tablet 6    benzonatate (TESSALON PERLES) 100 mg capsule Take 1 capsule (100 mg total) by mouth 3 (three) times a day as needed for cough 20 capsule 0    calcium-vitamin D 250-100 MG-UNIT per tablet Take 1 tablet by mouth 2 (two) times a day      Cholecalciferol (VITAMIN D3) 1000 units CAPS Take 1 capsule by mouth daily      famotidine (PEPCID) 40 MG tablet Take 0 5 tablets (20 mg total) by mouth daily 90 tablet 0    fexofenadine (ALLEGRA) 180 MG tablet Take 1 tablet by mouth daily as needed      hydrochlorothiazide (HYDRODIURIL) 12 5 mg tablet Take 1 tablet (12 5 mg total) by mouth daily 90 tablet 0    hydrochlorothiazide (HYDRODIURIL) 12 5 mg tablet TAKE ONE TABLET BY MOUTH EVERY DAY 90 tablet 1    losartan (COZAAR) 100 MG tablet Take 1 tablet (100 mg total) by mouth daily 90 tablet 0    losartan (COZAAR) 100 MG tablet TAKE ONE TABLET BY MOUTH EVERY DAY 90 tablet 1    multivitamin-minerals (CENTRUM ADULTS) tablet Take 1 tablet by mouth daily      omeprazole (PriLOSEC) 40 MG capsule Take 1 capsule (40 mg total) by mouth daily 30 capsule 6    simvastatin (ZOCOR) 20 mg tablet Take 1 tablet (20 mg total) by mouth daily 90 tablet 0     No current facility-administered medications on file prior to visit        Family History   Problem Relation Age of Onset    Heart disease Mother     Hypertension Mother      Social History     Socioeconomic History    Marital status: /Civil Union     Spouse name: Not on file    Number of children: Not on file    Years of education: Not on file    Highest education level: Not on file   Occupational History    Not on file   Social Needs    Financial resource strain: Not on file    Food insecurity:     Worry: Not on file     Inability: Not on file    Transportation needs:     Medical: Not on file     Non-medical: Not on file   Tobacco Use    Smoking status: Never Smoker    Smokeless tobacco: Never Used   Substance and Sexual Activity    Alcohol use: No    Drug use: No    Sexual activity: Not on file   Lifestyle    Physical activity:     Days per week: Not on file     Minutes per session: Not on file    Stress: Not on file   Relationships    Social connections:     Talks on phone: Not on file     Gets together: Not on file     Attends [employer] service: Not on file     Active member of club or organization: Not on file     Attends meetings of clubs or organizations: Not on file     Relationship status: Not on file    Intimate partner violence:     Fear of current or ex partner: Not on file     Emotionally abused: Not on file     Physically abused: Not on file     Forced sexual activity: Not on file   Other Topics Concern    Not on file   Social History Narrative    Not on file     Vitals:    02/20/19 1108   BP: 144/70 Resp: 16   Weight: 70 2 kg (154 lb 12 8 oz)   Height: 5' 2" (1 575 m)     Results for orders placed or performed in visit on 02/18/19   Lipid Panel with Direct LDL reflex   Result Value Ref Range    Cholesterol 153 50 - 200 mg/dL    Triglycerides 152 (H) <=150 mg/dL    HDL, Direct 44 40 - 60 mg/dL    LDL Calculated 79 0 - 100 mg/dL   Comprehensive metabolic panel   Result Value Ref Range    Sodium 139 136 - 145 mmol/L    Potassium 4 4 3 5 - 5 3 mmol/L    Chloride 103 100 - 108 mmol/L    CO2 29 21 - 32 mmol/L    ANION GAP 7 4 - 13 mmol/L    BUN 18 5 - 25 mg/dL    Creatinine 0 66 0 60 - 1 30 mg/dL    Glucose, Fasting 126 (H) 65 - 99 mg/dL    Calcium 9 1 8 3 - 10 1 mg/dL    AST 17 5 - 45 U/L    ALT 29 12 - 78 U/L    Alkaline Phosphatase 58 46 - 116 U/L    Total Protein 7 8 6 4 - 8 2 g/dL    Albumin 3 9 3 5 - 5 0 g/dL    Total Bilirubin 0 51 0 20 - 1 00 mg/dL    eGFR 87 ml/min/1 73sq m     Weight (last 2 days)     Date/Time   Weight    02/20/19 1108   70 2 (154 8)            Body mass index is 28 31 kg/m²  BP      Temp      Pulse     Resp      SpO2        Vitals:    02/20/19 1108   Weight: 70 2 kg (154 lb 12 8 oz)     Vitals:    02/20/19 1108   Weight: 70 2 kg (154 lb 12 8 oz)       /70   Resp 16   Ht 5' 2" (1 575 m)   Wt 70 2 kg (154 lb 12 8 oz)   LMP  (LMP Unknown)   BMI 28 31 kg/m²          Physical Exam   Constitutional: She appears well-developed and well-nourished  HENT:   Head: Normocephalic  Mouth/Throat: Oropharynx is clear and moist    Eyes: Pupils are equal, round, and reactive to light  Conjunctivae are normal  Right eye exhibits no discharge  Left eye exhibits no discharge  No scleral icterus  Neck: Neck supple  Cardiovascular: Normal rate, regular rhythm, normal heart sounds and intact distal pulses  Exam reveals no gallop and no friction rub  No murmur heard  Pulmonary/Chest: Breath sounds normal  No respiratory distress  She has no wheezes  She has no rales  Abdominal: Soft  Bowel sounds are normal  She exhibits no distension and no mass  There is no tenderness  There is no rebound and no guarding  Musculoskeletal: She exhibits no edema or deformity  Lymphadenopathy:     She has no cervical adenopathy  Neurological: She is alert   Coordination normal

## 2019-02-26 ENCOUNTER — OFFICE VISIT (OUTPATIENT)
Dept: PAIN MEDICINE | Facility: CLINIC | Age: 74
End: 2019-02-26
Payer: COMMERCIAL

## 2019-02-26 ENCOUNTER — HOSPITAL ENCOUNTER (OUTPATIENT)
Dept: RADIOLOGY | Facility: HOSPITAL | Age: 74
Discharge: HOME | End: 2019-02-26
Payer: COMMERCIAL

## 2019-02-26 ENCOUNTER — TRANSCRIBE ORDERS (OUTPATIENT)
Dept: RADIOLOGY | Facility: HOSPITAL | Age: 74
End: 2019-02-26

## 2019-02-26 VITALS
HEART RATE: 79 BPM | BODY MASS INDEX: 28.16 KG/M2 | DIASTOLIC BLOOD PRESSURE: 84 MMHG | WEIGHT: 153 LBS | SYSTOLIC BLOOD PRESSURE: 138 MMHG | TEMPERATURE: 98.4 F | HEIGHT: 62 IN | RESPIRATION RATE: 18 BRPM

## 2019-02-26 DIAGNOSIS — M54.16 LUMBAR RADICULOPATHY: ICD-10-CM

## 2019-02-26 DIAGNOSIS — M25.551 RIGHT HIP PAIN: ICD-10-CM

## 2019-02-26 DIAGNOSIS — M47.816 LUMBAR SPONDYLOSIS: ICD-10-CM

## 2019-02-26 DIAGNOSIS — M25.551 RIGHT HIP PAIN: Primary | ICD-10-CM

## 2019-02-26 PROCEDURE — 99213 OFFICE O/P EST LOW 20 MIN: CPT | Performed by: NURSE PRACTITIONER

## 2019-02-26 PROCEDURE — 73502 X-RAY EXAM HIP UNI 2-3 VIEWS: CPT

## 2019-02-26 NOTE — PROGRESS NOTES
Pt c/o back pain that radiates to the right hip and leg    Assessment:  1  Right hip pain    2  Lumbar spondylosis    3  Lumbar radiculopathy        Plan:  Regla Soriano is a 75 y o  female with a history of lumbar spondylosis and sacroiliitis  The patient presents today with low back pain, which has worsened since her last office visit  I believe that her pain is multifactorial nature and may be stemming from her lumbar spine as well as her right hip  She reports increased right leg weakness and new pain radiating into her right groin and radiating up in to her back  She was noted to have a positive right straight leg raise as well as pain with internal and external rotation of her right leg  Therefore, at this time I have ordered the patient an updated MRI of her lumbar spine for further evaluation and a right hip x-ray  She was instructed that our office will call with results of the studies once they were resulted and at that time we can discuss moving forward with either a right hip x-ray or a lumbar epidural steroid injection  She verbalized understanding  The patient will follow up after the completion of her lumbar MRI and right hip x-ray, or sooner with worsening symptoms  My impressions and treatment recommendations were discussed in detail with the patient who verbalized understanding and had no further questions  Discharge instructions were provided  I personally saw and examined the patient and I agree with the above discussed plan of care  Orders Placed This Encounter   Procedures    MRI lumbar spine wo contrast     Standing Status:   Future     Standing Expiration Date:   2/26/2023     Scheduling Instructions: There is no preparation for this test  Please leave your jewelry and valuables at home, wedding rings are the exception  Please bring your insurance cards, a form of photo ID and a list of your medications with you   Arrive 15 minutes prior to your appointment time in order to register  Please bring any prior CT or MRI studies of this area that were not performed at a [facility] facility  To schedule this appointment, please contact Central Scheduling at [phone]  Order Specific Question:   What is the patient's sedation requirement? Answer:   No Sedation    XR hip/pelv 2-3 vws right if performed     Standing Status:   Future     Number of Occurrences:   1     Standing Expiration Date:   2/26/2023     Scheduling Instructions:      Bring along any outside films relating to this procedure  No orders of the defined types were placed in this encounter  History of Present Illness:  Mely Chacon is a 75 y o  female with a history of lumbar spondylosis and sacroiliitis  The patient was last seen office on 8/15/2016 where she was ordered to undergo a right L4-L5 transforaminal epidural steroid injection  She  presents for a follow up office visit in regards to Back Pain (radaites to the right hip and leg); Hip Pain; and Leg Pain  The patients current symptoms include right low back pain that radiates down the posterior aspect of her right leg to the posterior aspect of her right foot  She reports radiating pain in her right groin and right leg weakness  She denies left leg weakness or bowel or bladder issues  She denies any recent injury or trauma  She reports that her symptoms worsened over the last 2-3 months with symptoms being significantly worse since her last office visit  She describes her pain as cramping, pins and needles pain that is constant nature occurring mostly during the morning and nighttime hours  She currently rates her pain 8 out 10 numeric pain scale  I have personally reviewed and/or updated the patient's past medical history, past surgical history, family history, social history, current medications, allergies, and vital signs today  Review of Systems   Respiratory: Negative for shortness of breath  Cardiovascular: Negative for chest pain  Gastrointestinal: Negative for constipation, diarrhea, nausea and vomiting  Musculoskeletal: Negative for arthralgias, gait problem, joint swelling and myalgias  Skin: Negative for rash  Neurological: Negative for dizziness, seizures and weakness  All other systems reviewed and are negative        Patient Active Problem List   Diagnosis    Cough    Bronchitis    Hypertension    Dyslipidemia    Type 2 diabetes mellitus without complication, without long-term current use of insulin ([facility])    Lumbar radiculopathy    Encounter for screening mammogram for breast cancer       Past Medical History:   Diagnosis Date    Hypertension        Past Surgical History:   Procedure Laterality Date    APPENDECTOMY      VEIN LIGATION AND STRIPPING         Family History   Problem Relation Age of Onset    Heart disease Mother     Hypertension Mother        Social History     Occupational History    Not on file   Tobacco Use    Smoking status: Never Smoker    Smokeless tobacco: Never Used   Substance and Sexual Activity    Alcohol use: No    Drug use: No    Sexual activity: Not on file       Current Outpatient Medications on File Prior to Visit   Medication Sig    albuterol (PROVENTIL HFA,VENTOLIN HFA) 90 mcg/act inhaler Inhale 2 puffs every 6 (six) hours as needed for wheezing    alendronate (FOSAMAX) 10 mg tablet Take 1 tablet (10 mg total) by mouth weekly before breakfast    benzonatate (TESSALON PERLES) 100 mg capsule Take 1 capsule (100 mg total) by mouth 3 (three) times a day as needed for cough    calcium-vitamin D 250-100 MG-UNIT per tablet Take 1 tablet by mouth 2 (two) times a day    Cholecalciferol (VITAMIN D3) 1000 units CAPS Take 1 capsule by mouth daily    famotidine (PEPCID) 40 MG tablet Take 0 5 tablets (20 mg total) by mouth daily    fexofenadine (ALLEGRA) 180 MG tablet Take 1 tablet by mouth daily as needed    hydrochlorothiazide (HYDRODIURIL) 12 5 mg tablet Take 1 tablet (12 5 mg total) by mouth daily    hydrochlorothiazide (HYDRODIURIL) 12 5 mg tablet TAKE ONE TABLET BY MOUTH EVERY DAY    losartan (COZAAR) 100 MG tablet Take 1 tablet (100 mg total) by mouth daily    losartan (COZAAR) 100 MG tablet TAKE ONE TABLET BY MOUTH EVERY DAY    multivitamin-minerals (CENTRUM ADULTS) tablet Take 1 tablet by mouth daily    omeprazole (PriLOSEC) 40 MG capsule Take 1 capsule (40 mg total) by mouth daily    simvastatin (ZOCOR) 20 mg tablet Take 1 tablet (20 mg total) by mouth daily     No current facility-administered medications on file prior to visit  Allergies   Allergen Reactions    Pollen Extract        Physical Exam:    /84 (BP Location: Right arm, Patient Position: Sitting, Cuff Size: Standard)   Pulse 79   Temp 98 4 °F (36 9 °C) (Oral)   Resp 18   Ht 5' 2" (1 575 m)   Wt 69 4 kg (153 lb)   LMP  (LMP Unknown)   BMI 27 98 kg/m²     Constitutional:normal, well developed, well nourished, alert, in no distress and non-toxic and no overt pain behavior   and overweight  Eyes:anicteric  HEENT:grossly intact  Neck:supple, symmetric, trachea midline and no masses   Pulmonary:even and unlabored  Cardiovascular:No edema or pitting edema present  Skin:Normal without rashes or lesions and well hydrated  Psychiatric:Mood and affect appropriate  Neurologic:Cranial Nerves II-XII grossly intact  Musculoskeletal:normal     Lumbar Spine Exam    Appearance:  Normal lordosis  Palpation/Tenderness:  right lumbar paraspinal tenderness  right sacroiliac joint tenderness  Right hip tenderness  Sensory:  no sensory deficits noted  Range of Motion:  Flexion:  Minimally limited  with pain  Extension:  Minimally limited  with pain  Lateral Flexion - Left:  Minimally limited  with pain  Lateral Flexion - Right:  Minimally limited  with pain  Rotation - Left:  No limitation  without pain  Rotation - Right:  No limitation  without pain  Motor Strength:  Left hip flexion:  5/5  Right hip flexion:  5/5  Left knee extension:  5/5  Right knee extension:  5/5  Left foot dorsiflexion:  5/5  Left foot plantar flexion:  5/5  Right foot dorsiflexion:  5/5  Right foot plantar flexion:  5/5  Special Tests:  Left Straight Leg Test:  negative  Right Straight Leg Test:  positive  Left Juan A's Maneuver:  negative  Right Juan A's Maneuver:  positive  Left Pelvic Distraction Test:  negative  Right Pelvic Distraction Test:  positive     Imaging    MRI LUMBAR SPINE WITHOUT CONTRAST     INDICATION:  Radiculopathy of lumbar region  Chronic pain across low back and down right leg  No history of trauma      COMPARISON:  Plain radiographs lumbar spine October 12, 2015 as well as MRI lumbar spine April 23, 2013      TECHNIQUE:  Sagittal T1, sagittal T2, sagittal inversion recovery, axial T1 and axial T2, coronal T2        IMAGE QUALITY:  Diagnostic     FINDINGS:     ALIGNMENT:  Straightening of the lumbar lordotic curvature  No compression fracture  No spondylolysis or spondylolisthesis  No scoliosis      MARROW SIGNAL:  Normal marrow signal is identified within the visualized bony structures  No discrete marrow lesion  Anterior osteophytic spur formation throughout the lumbar spine  Progressive degenerative endplate changes K4-R6 anteriorly      DISTAL CORD AND CONUS:  Normal size and signal within the distal cord and conus  The conus ends at the T12-L1 level      PARASPINAL SOFT TISSUES:  Paraspinal soft tissues are unremarkable      SACRUM:  Normal signal within the sacrum  No evidence of insufficiency or stress fracture      LOWER THORACIC DISC SPACES:  Normal disc height and signal   No disc herniation, canal stenosis or foraminal narrowing      LUMBAR DISC SPACES:    L1-L2:  Mild desiccation  Facet hypertrophic changes bilaterally      L2-L3:  Mild desiccation  Facet hypertrophic changes bilaterally      L3-L4:  Mild desiccation    Facet hypertrophic changes bilaterally      L4-L5:  Progressive disc space narrowing with desiccation  Diffuse disc bulge with superimposed left paramedian and far lateral disc herniation  Small right paramedian annular tear  Facet hypertrophic changes bilaterally as well as ligamentum flavum   hypertrophy  Small bilateral facet effusions  Moderate bilateral neural foraminal narrowing, left side greater than right  This has progressed      L5-S1:  Mild disc desiccation    Facet hypertrophic changes bilaterally      IMPRESSION:  Progressive degenerative changes L4-L5

## 2019-03-09 ENCOUNTER — HOSPITAL ENCOUNTER (OUTPATIENT)
Dept: MRI IMAGING | Facility: HOSPITAL | Age: 74
Discharge: HOME | End: 2019-03-09
Payer: COMMERCIAL

## 2019-03-09 DIAGNOSIS — M47.816 LUMBAR SPONDYLOSIS: ICD-10-CM

## 2019-03-09 PROCEDURE — 72148 MRI LUMBAR SPINE W/O DYE: CPT

## 2019-03-15 ENCOUNTER — TELEPHONE (OUTPATIENT)
Dept: PAIN MEDICINE | Facility: CLINIC | Age: 74
End: 2019-03-15

## 2019-03-15 DIAGNOSIS — M51.16 INTERVERTEBRAL DISC DISORDERS WITH RADICULOPATHY, LUMBAR REGION: Primary | ICD-10-CM

## 2019-04-01 ENCOUNTER — TELEPHONE (OUTPATIENT)
Dept: UROLOGY | Facility: MEDICAL CENTER | Age: 74
End: 2019-04-01

## 2019-04-04 ENCOUNTER — HOSPITAL ENCOUNTER (OUTPATIENT)
Dept: RADIOLOGY | Facility: CLINIC | Age: 74
Discharge: HOME | End: 2019-04-04
Attending: ANESTHESIOLOGY | Admitting: ANESTHESIOLOGY
Payer: COMMERCIAL

## 2019-04-04 VITALS
RESPIRATION RATE: 20 BRPM | DIASTOLIC BLOOD PRESSURE: 78 MMHG | OXYGEN SATURATION: 97 % | TEMPERATURE: 98.1 F | SYSTOLIC BLOOD PRESSURE: 134 MMHG | HEART RATE: 73 BPM

## 2019-04-04 DIAGNOSIS — M51.16 INTERVERTEBRAL DISC DISORDERS WITH RADICULOPATHY, LUMBAR REGION: ICD-10-CM

## 2019-04-04 PROCEDURE — 64484 NJX AA&/STRD TFRM EPI L/S EA: CPT | Performed by: ANESTHESIOLOGY

## 2019-04-04 PROCEDURE — 64483 NJX AA&/STRD TFRM EPI L/S 1: CPT | Performed by: ANESTHESIOLOGY

## 2019-04-04 RX ORDER — 0.9 % SODIUM CHLORIDE 0.9 %
10 VIAL (ML) INJECTION ONCE
Status: COMPLETED | OUTPATIENT
Start: 2019-04-04 | End: 2019-04-04

## 2019-04-04 RX ORDER — METHYLPREDNISOLONE ACETATE 80 MG/ML
80 INJECTION, SUSPENSION INTRA-ARTICULAR; INTRALESIONAL; INTRAMUSCULAR; PARENTERAL; SOFT TISSUE ONCE
Status: COMPLETED | OUTPATIENT
Start: 2019-04-04 | End: 2019-04-04

## 2019-04-04 RX ORDER — BUPIVACAINE HCL/PF 2.5 MG/ML
10 VIAL (ML) INJECTION ONCE
Status: COMPLETED | OUTPATIENT
Start: 2019-04-04 | End: 2019-04-04

## 2019-04-04 RX ADMIN — BUPIVACAINE HYDROCHLORIDE 2 ML: 2.5 INJECTION, SOLUTION EPIDURAL; INFILTRATION; INTRACAUDAL at 14:26

## 2019-04-04 RX ADMIN — METHYLPREDNISOLONE ACETATE 80 MG: 80 INJECTION, SUSPENSION INTRA-ARTICULAR; INTRALESIONAL; INTRAMUSCULAR; PARENTERAL; SOFT TISSUE at 14:26

## 2019-04-04 RX ADMIN — Medication 5 ML: at 14:24

## 2019-04-04 RX ADMIN — SODIUM CHLORIDE 5 ML: 9 INJECTION, SOLUTION INTRAMUSCULAR; INTRAVENOUS; SUBCUTANEOUS at 14:24

## 2019-04-04 RX ADMIN — IOHEXOL 1 ML: 300 INJECTION, SOLUTION INTRAVENOUS at 14:26

## 2019-04-11 ENCOUNTER — TELEPHONE (OUTPATIENT)
Dept: PAIN MEDICINE | Facility: CLINIC | Age: 74
End: 2019-04-11

## 2019-04-19 DIAGNOSIS — I10 ESSENTIAL HYPERTENSION: ICD-10-CM

## 2019-04-19 RX ORDER — HYDROCHLOROTHIAZIDE 12.5 MG/1
TABLET ORAL
Qty: 90 TABLET | Refills: 1 | Status: SHIPPED | OUTPATIENT
Start: 2019-04-19 | End: 2019-08-28 | Stop reason: SDUPTHER

## 2019-04-29 DIAGNOSIS — E78.5 HYPERLIPIDEMIA, UNSPECIFIED HYPERLIPIDEMIA TYPE: ICD-10-CM

## 2019-04-29 RX ORDER — SIMVASTATIN 20 MG
TABLET ORAL
Qty: 90 TABLET | Refills: 3 | Status: SHIPPED | OUTPATIENT
Start: 2019-04-29 | End: 2019-08-28 | Stop reason: SDUPTHER

## 2019-07-09 ENCOUNTER — OFFICE VISIT (OUTPATIENT)
Dept: OBGYN CLINIC | Facility: CLINIC | Age: 74
End: 2019-07-09
Payer: COMMERCIAL

## 2019-07-09 VITALS
SYSTOLIC BLOOD PRESSURE: 122 MMHG | HEIGHT: 60 IN | WEIGHT: 152 LBS | BODY MASS INDEX: 29.84 KG/M2 | DIASTOLIC BLOOD PRESSURE: 80 MMHG

## 2019-07-09 DIAGNOSIS — R10.2 PELVIC PAIN: ICD-10-CM

## 2019-07-09 DIAGNOSIS — R30.0 BURNING WITH URINATION: Primary | ICD-10-CM

## 2019-07-09 DIAGNOSIS — N94.9 VAGINAL BURNING: ICD-10-CM

## 2019-07-09 LAB
SL AMB  POCT GLUCOSE, UA: NEGATIVE
SL AMB LEUKOCYTE ESTERASE,UA: ABNORMAL
SL AMB POCT BILIRUBIN,UA: NEGATIVE
SL AMB POCT BLOOD,UA: ABNORMAL
SL AMB POCT KETONES,UA: NEGATIVE
SL AMB POCT NITRITE,UA: POSITIVE
SL AMB POCT PH,UA: NEGATIVE
SL AMB POCT SPECIFIC GRAVITY,UA: NEGATIVE
SL AMB POCT URINE PROTEIN: NEGATIVE
SL AMB POCT UROBILINOGEN: NEGATIVE

## 2019-07-09 PROCEDURE — 81002 URINALYSIS NONAUTO W/O SCOPE: CPT | Performed by: PHYSICIAN ASSISTANT

## 2019-07-09 PROCEDURE — 99213 OFFICE O/P EST LOW 20 MIN: CPT | Performed by: PHYSICIAN ASSISTANT

## 2019-07-09 RX ORDER — CEPHALEXIN 500 MG/1
500 CAPSULE ORAL EVERY 12 HOURS SCHEDULED
Qty: 14 CAPSULE | Refills: 0 | Status: SHIPPED | OUTPATIENT
Start: 2019-07-09 | End: 2019-07-16

## 2019-07-09 NOTE — PROGRESS NOTES
Patient is here with complaints of burning internally and externally in her vaginal area  She states sym[ptoms started last night  She has no itching or discharge  She does have frequency and pelvic pain that radiates to her back as well  She also reports no bleeding

## 2019-07-09 NOTE — PROGRESS NOTES
Assessment/Plan:    No problem-specific Assessment & Plan notes found for this encounter  Diagnoses and all orders for this visit:    Burning with urination  -     POCT urine dip  -     cephalexin (KEFLEX) 500 mg capsule; Take 1 capsule (500 mg total) by mouth every 12 (twelve) hours for 7 days  -     terconazole (TERAZOL 3) 0 8 % vaginal cream; Insert 1 applicator into the vagina daily at bedtime    Pelvic pain  -     terconazole (TERAZOL 3) 0 8 % vaginal cream; Insert 1 applicator into the vagina daily at bedtime    Vaginal burning          Subjective:      Patient ID: Juliana Ortiz is a 75 y o  female  Pt complains of increase in urination x 2 days  Burning with urination  External burning  She denies any bleeding or itching/burning/discharge--  She does have some pelvic pressure  She denies any change in meds/vits/products/partners    UA+    H/o bladder ca--sees uro q 6 months, last was April 2019      The following portions of the patient's history were reviewed and updated as appropriate: allergies, current medications, past family history, past medical history, past social history, past surgical history and problem list     Review of Systems   Constitutional: Negative for chills, fever and unexpected weight change  Gastrointestinal: Negative for abdominal pain, blood in stool, constipation and diarrhea  Genitourinary: Positive for dysuria, frequency and pelvic pain  Negative for vaginal bleeding  Objective:      /80 (BP Location: Right arm, Patient Position: Sitting, Cuff Size: Standard)   Ht 5' (1 524 m)   Wt 68 9 kg (152 lb)   LMP  (LMP Unknown)   BMI 29 69 kg/m²          Physical Exam   Nursing note and vitals reviewed

## 2019-07-13 ENCOUNTER — APPOINTMENT (EMERGENCY)
Dept: CT IMAGING | Facility: HOSPITAL | Age: 74
End: 2019-07-13
Payer: COMMERCIAL

## 2019-07-13 ENCOUNTER — HOSPITAL ENCOUNTER (EMERGENCY)
Facility: HOSPITAL | Age: 74
Discharge: HOME | End: 2019-07-13
Attending: EMERGENCY MEDICINE
Payer: COMMERCIAL

## 2019-07-13 VITALS
DIASTOLIC BLOOD PRESSURE: 64 MMHG | SYSTOLIC BLOOD PRESSURE: 132 MMHG | RESPIRATION RATE: 18 BRPM | OXYGEN SATURATION: 95 % | HEART RATE: 70 BPM

## 2019-07-13 DIAGNOSIS — R10.32 ABDOMINAL PAIN, ACUTE, BILATERAL LOWER QUADRANT: Primary | ICD-10-CM

## 2019-07-13 DIAGNOSIS — R10.31 ABDOMINAL PAIN, ACUTE, BILATERAL LOWER QUADRANT: Primary | ICD-10-CM

## 2019-07-13 LAB
ALBUMIN SERPL BCP-MCNC: 3.6 G/DL (ref 3.5–5)
ALP SERPL-CCNC: 74 U/L (ref 46–116)
ALT SERPL W P-5'-P-CCNC: 23 U/L (ref 12–78)
ANION GAP SERPL CALCULATED.3IONS-SCNC: 9 MMOL/L (ref 4–13)
AST SERPL W P-5'-P-CCNC: 17 U/L (ref 5–45)
BACTERIA UR QL AUTO: ABNORMAL /HPF
BASOPHILS # BLD AUTO: 0.08 THOUSANDS/ΜL (ref 0–0.1)
BASOPHILS NFR BLD AUTO: 1 % (ref 0–1)
BILIRUB SERPL-MCNC: 0.3 MG/DL (ref 0.2–1)
BILIRUB UR QL STRIP: NEGATIVE
BUN SERPL-MCNC: 14 MG/DL (ref 5–25)
CALCIUM SERPL-MCNC: 8.7 MG/DL (ref 8.3–10.1)
CHLORIDE SERPL-SCNC: 99 MMOL/L (ref 100–108)
CLARITY UR: CLEAR
CO2 SERPL-SCNC: 28 MMOL/L (ref 21–32)
COLOR UR: YELLOW
CREAT SERPL-MCNC: 0.69 MG/DL (ref 0.6–1.3)
EOSINOPHIL # BLD AUTO: 0.4 THOUSAND/ΜL (ref 0–0.61)
EOSINOPHIL NFR BLD AUTO: 4 % (ref 0–6)
ERYTHROCYTE [DISTWIDTH] IN BLOOD BY AUTOMATED COUNT: 12.5 % (ref 11.6–15.1)
GFR SERPL CREATININE-BSD FRML MDRD: 86 ML/MIN/1.73SQ M
GLUCOSE SERPL-MCNC: 89 MG/DL (ref 65–140)
GLUCOSE UR STRIP-MCNC: NEGATIVE MG/DL
HCT VFR BLD AUTO: 39.7 % (ref 34.8–46.1)
HGB BLD-MCNC: 13.3 G/DL (ref 11.5–15.4)
HGB UR QL STRIP.AUTO: ABNORMAL
IMM GRANULOCYTES # BLD AUTO: 0.02 THOUSAND/UL (ref 0–0.2)
IMM GRANULOCYTES NFR BLD AUTO: 0 % (ref 0–2)
KETONES UR STRIP-MCNC: NEGATIVE MG/DL
LEUKOCYTE ESTERASE UR QL STRIP: NEGATIVE
LYMPHOCYTES # BLD AUTO: 3.12 THOUSANDS/ΜL (ref 0.6–4.47)
LYMPHOCYTES NFR BLD AUTO: 35 % (ref 14–44)
MCH RBC QN AUTO: 30.8 PG (ref 26.8–34.3)
MCHC RBC AUTO-ENTMCNC: 33.5 G/DL (ref 31.4–37.4)
MCV RBC AUTO: 92 FL (ref 82–98)
MONOCYTES # BLD AUTO: 0.88 THOUSAND/ΜL (ref 0.17–1.22)
MONOCYTES NFR BLD AUTO: 10 % (ref 4–12)
NEUTROPHILS # BLD AUTO: 4.49 THOUSANDS/ΜL (ref 1.85–7.62)
NEUTS SEG NFR BLD AUTO: 50 % (ref 43–75)
NITRITE UR QL STRIP: NEGATIVE
NON-SQ EPI CELLS URNS QL MICRO: ABNORMAL /HPF
NRBC BLD AUTO-RTO: 0 /100 WBCS
PH UR STRIP.AUTO: 6 [PH]
PLATELET # BLD AUTO: 306 THOUSANDS/UL (ref 149–390)
PMV BLD AUTO: 9 FL (ref 8.9–12.7)
POTASSIUM SERPL-SCNC: 3.3 MMOL/L (ref 3.5–5.3)
PROT SERPL-MCNC: 7.4 G/DL (ref 6.4–8.2)
PROT UR STRIP-MCNC: NEGATIVE MG/DL
RBC # BLD AUTO: 4.32 MILLION/UL (ref 3.81–5.12)
RBC #/AREA URNS AUTO: ABNORMAL /HPF
SODIUM SERPL-SCNC: 136 MMOL/L (ref 136–145)
SP GR UR STRIP.AUTO: <=1.005 (ref 1–1.03)
UROBILINOGEN UR QL STRIP.AUTO: 0.2 E.U./DL
WBC # BLD AUTO: 8.99 THOUSAND/UL (ref 4.31–10.16)
WBC #/AREA URNS AUTO: ABNORMAL /HPF

## 2019-07-13 PROCEDURE — 85025 COMPLETE CBC W/AUTO DIFF WBC: CPT | Performed by: EMERGENCY MEDICINE

## 2019-07-13 PROCEDURE — 99284 EMERGENCY DEPT VISIT MOD MDM: CPT | Performed by: EMERGENCY MEDICINE

## 2019-07-13 PROCEDURE — 80053 COMPREHEN METABOLIC PANEL: CPT | Performed by: EMERGENCY MEDICINE

## 2019-07-13 PROCEDURE — 96360 HYDRATION IV INFUSION INIT: CPT

## 2019-07-13 PROCEDURE — 81001 URINALYSIS AUTO W/SCOPE: CPT | Performed by: EMERGENCY MEDICINE

## 2019-07-13 PROCEDURE — 36415 COLL VENOUS BLD VENIPUNCTURE: CPT | Performed by: EMERGENCY MEDICINE

## 2019-07-13 PROCEDURE — 99284 EMERGENCY DEPT VISIT MOD MDM: CPT

## 2019-07-13 PROCEDURE — 74177 CT ABD & PELVIS W/CONTRAST: CPT

## 2019-07-13 RX ORDER — ACETAMINOPHEN 325 MG/1
650 TABLET ORAL ONCE
Status: COMPLETED | OUTPATIENT
Start: 2019-07-13 | End: 2019-07-13

## 2019-07-13 RX ORDER — IBUPROFEN 400 MG/1
400 TABLET ORAL ONCE
Status: COMPLETED | OUTPATIENT
Start: 2019-07-13 | End: 2019-07-13

## 2019-07-13 RX ADMIN — IBUPROFEN 400 MG: 400 TABLET ORAL at 19:32

## 2019-07-13 RX ADMIN — IOHEXOL 100 ML: 350 INJECTION, SOLUTION INTRAVENOUS at 19:10

## 2019-07-13 RX ADMIN — ACETAMINOPHEN 650 MG: 325 TABLET, FILM COATED ORAL at 19:32

## 2019-07-13 RX ADMIN — SODIUM CHLORIDE 1000 ML: 0.9 INJECTION, SOLUTION INTRAVENOUS at 17:48

## 2019-07-14 NOTE — ED PROVIDER NOTES
History  Chief Complaint   Patient presents with    Abdominal Pain     lower radiating to both sides  c o burning with urination  was seen at gyn on wednesday  told she had uti and started on abx   does not know name       History provided by:  Patient  Abdominal Pain   Pain location:  LLQ and RLQ  Pain quality: aching    Pain radiates to:  Does not radiate  Pain severity:  Moderate  Onset quality:  Gradual  Duration:  2 days  Timing:  Constant  Progression:  Waxing and waning  Chronicity:  New  Context comment:  Recent diagnosed with a UTI, having increasing lower abdominal pain, no back pain no flank pain no fevers no chills  Relieved by:  None tried  Worsened by:  Palpation  Ineffective treatments:  None tried  Associated symptoms: dysuria ( Significantly improved since being on Keflex over the past 3 days)    Associated symptoms: no anorexia, no chest pain, no chills, no constipation, no cough, no diarrhea, no fever, no nausea, no shortness of breath, no sore throat and no vomiting        Prior to Admission Medications   Prescriptions Last Dose Informant Patient Reported? Taking?    Cholecalciferol (VITAMIN D3) 1000 units CAPS   Yes No   Sig: Take 1 capsule by mouth daily   albuterol (PROVENTIL HFA,VENTOLIN HFA) 90 mcg/act inhaler   No No   Sig: Inhale 2 puffs every 6 (six) hours as needed for wheezing   alendronate (FOSAMAX) 10 mg tablet   No No   Sig: Take 1 tablet (10 mg total) by mouth weekly before breakfast   benzonatate (TESSALON PERLES) 100 mg capsule   No No   Sig: Take 1 capsule (100 mg total) by mouth 3 (three) times a day as needed for cough   calcium-vitamin D 250-100 MG-UNIT per tablet   Yes No   Sig: Take 1 tablet by mouth 2 (two) times a day   cephalexin (KEFLEX) 500 mg capsule   No No   Sig: Take 1 capsule (500 mg total) by mouth every 12 (twelve) hours for 7 days   famotidine (PEPCID) 40 MG tablet   No No   Sig: Take 0 5 tablets (20 mg total) by mouth daily   fexofenadine (ALLEGRA) 180 MG tablet   Yes No   Sig: Take 1 tablet by mouth daily as needed   hydrochlorothiazide (HYDRODIURIL) 12 5 mg tablet   No No   Sig: Take 1 tablet (12 5 mg total) by mouth daily   hydrochlorothiazide (HYDRODIURIL) 12 5 mg tablet   No No   Sig: TAKE ONE TABLET BY MOUTH EVERY DAY   losartan (COZAAR) 100 MG tablet   No No   Sig: Take 1 tablet (100 mg total) by mouth daily   losartan (COZAAR) 100 MG tablet   No No   Sig: TAKE ONE TABLET BY MOUTH EVERY DAY   multivitamin-minerals (CENTRUM ADULTS) tablet   Yes No   Sig: Take 1 tablet by mouth daily   omeprazole (PriLOSEC) 40 MG capsule   No No   Sig: Take 1 capsule (40 mg total) by mouth daily   simvastatin (ZOCOR) 20 mg tablet   No No   Sig: TAKE ONE TABLET BY MOUTH EVERY DAY   terconazole (TERAZOL 3) 0 8 % vaginal cream   No No   Sig: Insert 1 applicator into the vagina daily at bedtime      Facility-Administered Medications: None       Past Medical History:   Diagnosis Date    [facility]     bladder cancer     Hyperlipidemia     Hypertension        Past Surgical History:   Procedure Laterality Date    APPENDECTOMY      VEIN LIGATION AND STRIPPING         Family History   Problem Relation Age of Onset    Heart disease Mother     Hypertension Mother      I have reviewed and agree with the history as documented  Social History     Tobacco Use    Smoking status: Never Smoker    Smokeless tobacco: Never Used   Substance Use Topics    Alcohol use: No    Drug use: No        Review of Systems   Constitutional: Negative for activity change, chills, diaphoresis and fever  HENT: Negative for congestion, sinus pressure and sore throat  Eyes: Negative for pain and visual disturbance  Respiratory: Negative for cough, chest tightness, shortness of breath, wheezing and stridor  Cardiovascular: Negative for chest pain and palpitations  Gastrointestinal: Positive for abdominal pain   Negative for abdominal distention, anorexia, constipation, diarrhea, nausea and vomiting  Genitourinary: Positive for dysuria ( Significantly improved since being on Keflex over the past 3 days)  Negative for frequency  Musculoskeletal: Negative for neck pain and neck stiffness  Skin: Negative for rash  Neurological: Negative for dizziness, speech difficulty, light-headedness, numbness and headaches  Physical Exam  Physical Exam   Constitutional: She is oriented to person, place, and time  She appears well-developed  She appears toxic  No distress  HENT:   Head: Normocephalic and atraumatic  Eyes: Pupils are equal, round, and reactive to light  Neck: Normal range of motion  Neck supple  No tracheal deviation present  Cardiovascular: Normal rate, regular rhythm, normal heart sounds and intact distal pulses  No murmur heard  Pulmonary/Chest: Effort normal and breath sounds normal  No stridor  No respiratory distress  Abdominal: Soft  She exhibits no distension  There is tenderness in the right lower quadrant and left lower quadrant  There is no rebound and no guarding  Musculoskeletal: Normal range of motion  Neurological: She is alert and oriented to person, place, and time  Skin: Skin is warm and dry  She is not diaphoretic  No erythema  No pallor  Psychiatric: She has a normal mood and affect  Vitals reviewed        Vital Signs  ED Triage Vitals   Temp Pulse Respirations Blood Pressure SpO2   -- 07/13/19 1703 07/13/19 1703 07/13/19 1703 07/13/19 1703    80 16 169/74 95 %      Temp src Heart Rate Source Patient Position - Orthostatic VS BP Location FiO2 (%)   -- 07/13/19 1846 07/13/19 1703 07/13/19 1703 --    Monitor Lying Right arm       Pain Score       07/13/19 1703       Worst Possible Pain           Vitals:    07/13/19 1703 07/13/19 1846   BP: 169/74 132/64   Pulse: 80 70   Patient Position - Orthostatic VS: Lying Lying         Visual Acuity      ED Medications  Medications   sodium chloride 0 9 % bolus 1,000 mL (0 mL Intravenous Stopped 7/13/19 1900) iohexol (OMNIPAQUE) 350 MG/ML injection (MULTI-DOSE) 100 mL (100 mL Intravenous Given 7/13/19 1910)   acetaminophen (TYLENOL) tablet 650 mg (650 mg Oral Given 7/13/19 1932)   ibuprofen (MOTRIN) tablet 400 mg (400 mg Oral Given 7/13/19 1932)       Diagnostic Studies  Results Reviewed     Procedure Component Value Units Date/Time    Urine Microscopic [[id number]]  (Abnormal) Collected:  07/13/19 1752    Lab Status:  Final result Specimen:  Urine, Clean Catch Updated:  07/13/19 1855     RBC, UA 0-1 /hpf      WBC, UA None Seen /hpf      Epithelial Cells Occasional /hpf      Bacteria, UA None Seen /hpf     UA w Reflex to Microscopic w Reflex to Culture [[id number]]  (Abnormal) Collected:  07/13/19 1752    Lab Status:  Final result Specimen:  Urine, Clean Catch Updated:  07/13/19 1848     Color, UA Yellow     Clarity, UA Clear     Specific Gravity, UA <=1 005     pH, UA 6 0     Leukocytes, UA Negative     Nitrite, UA Negative     Protein, UA Negative mg/dl      Glucose, UA Negative mg/dl      Ketones, UA Negative mg/dl      Urobilinogen, UA 0 2 E U /dl      Bilirubin, UA Negative     Blood, UA Small    Comprehensive metabolic panel [[id number]]  (Abnormal) Collected:  07/13/19 1751    Lab Status:  Final result Specimen:  Blood from Arm, Right Updated:  07/13/19 1823     Sodium 136 mmol/L      Potassium 3 3 mmol/L      Chloride 99 mmol/L      CO2 28 mmol/L      ANION GAP 9 mmol/L      BUN 14 mg/dL      Creatinine 0 69 mg/dL      Glucose 89 mg/dL      Calcium 8 7 mg/dL      AST 17 U/L      ALT 23 U/L      Alkaline Phosphatase 74 U/L      Total Protein 7 4 g/dL      Albumin 3 6 g/dL      Total Bilirubin 0 30 mg/dL      eGFR 86 ml/min/1 73sq m     Narrative:       [place] guidelines for Chronic Kidney Disease (CKD):     Stage 1 with normal or high GFR (GFR > 90 mL/min/1 73 square meters)    Stage 2 Mild CKD (GFR = 60-89 mL/min/1 73 square meters)    Stage 3A Moderate CKD (GFR = 45-59 mL/min/1 73 square meters)    Stage 3B Moderate CKD (GFR = 30-44 mL/min/1 73 square meters)    Stage 4 Severe CKD (GFR = 15-29 mL/min/1 73 square meters)    Stage 5 End Stage CKD (GFR <15 mL/min/1 73 square meters)  Note: GFR calculation is accurate only with a steady state creatinine    CBC and differential [[id number]] Collected:  07/13/19 1751    Lab Status:  Final result Specimen:  Blood from Arm, Right Updated:  07/13/19 1803     WBC 8 99 Thousand/uL      RBC 4 32 Million/uL      Hemoglobin 13 3 g/dL      Hematocrit 39 7 %      MCV 92 fL      MCH 30 8 pg      MCHC 33 5 g/dL      RDW 12 5 %      MPV 9 0 fL      Platelets 860 Thousands/uL      nRBC 0 /100 WBCs      Neutrophils Relative 50 %      Immat GRANS % 0 %      Lymphocytes Relative 35 %      Monocytes Relative 10 %      Eosinophils Relative 4 %      Basophils Relative 1 %      Neutrophils Absolute 4 49 Thousands/µL      Immature Grans Absolute 0 02 Thousand/uL      Lymphocytes Absolute 3 12 Thousands/µL      Monocytes Absolute 0 88 Thousand/µL      Eosinophils Absolute 0 40 Thousand/µL      Basophils Absolute 0 08 Thousands/µL                  CT abdomen pelvis with contrast   Final Result by Martha Kang MD (07/13 1917)   No acute findings  No findings to account for lower abdominal pain                 Workstation performed: [id number]                    Procedures  Procedures       ED Course  ED Course as of Jul 14 1624   Sat Jul 13, 2019 1921 CT scan unremarkable, patient reports large amount of improvement, will discharge                                  MDM  Number of Diagnoses or Management Options  Abdominal pain, acute, bilateral lower quadrant: new and requires workup  Diagnosis management comments:       Initial ED assessment:  65-year-old female presents with bilateral lower quadrant abdominal pain recent diagnosis UTI but pain from the UTIs improving but now with worsening lower abdominal pain, tender on examination prompting ED visit    Initial DDx includes but is not limited to:   Diverticulitis, pyelonephritis, upper urinary tract infection, mesenteric adenitis, muscular pain    Initial ED plan:   Blood work, CT scan, disposition pending ED workup        Final ED summary/disposition:   After evaluation and workup in the emergency department, CT scan unremarkable, without intervention patient's symptoms actually greatly improved, unclear etiology of her discomfort, but no sinister pathology identified, patient agrees to return for worsening symptoms        Amount and/or Complexity of Data Reviewed  Clinical lab tests: ordered and reviewed  Tests in the radiology section of CPT®: ordered and reviewed  Decide to obtain previous medical records or to obtain history from someone other than the patient: yes  Obtain history from someone other than the patient: yes  Review and summarize past medical records: yes  Independent visualization of images, tracings, or specimens: yes        Disposition  Final diagnoses:   Abdominal pain, acute, bilateral lower quadrant     Time reflects when diagnosis was documented in both MDM as applicable and the Disposition within this note     Time User Action Codes Description Comment    7/13/2019  7:23 PM Carolynn Hernandez Add [R10 31,  R10 32] Abdominal pain, acute, bilateral lower quadrant       ED Disposition     ED Disposition Condition Date/Time Comment    Discharge Stable Sat Jul 13, 2019  7:21 PM Cheri Badillo discharge to home/self care              Follow-up Information     Follow up With Specialties Details Why Contact Info Additional [address] Emergency Department Emergency Medicine Go to  If symptoms worsen [address]  [facility] ED, [address], [place], [place], [address]          Discharge Medication List as of 7/13/2019  7:23 PM      CONTINUE these medications which have NOT CHANGED    Details   albuterol (PROVENTIL HFA,VENTOLIN HFA) 90 mcg/act inhaler Inhale 2 puffs every 6 (six) hours as needed for wheezing, Starting Fri 4/6/2018, Print      alendronate (FOSAMAX) 10 mg tablet Take 1 tablet (10 mg total) by mouth weekly before breakfast, Starting Thu 4/5/2018, Normal      benzonatate (TESSALON PERLES) 100 mg capsule Take 1 capsule (100 mg total) by mouth 3 (three) times a day as needed for cough, Starting Fri 4/6/2018, Print      calcium-vitamin D 250-100 MG-UNIT per tablet Take 1 tablet by mouth 2 (two) times a day, Historical Med      cephalexin (KEFLEX) 500 mg capsule Take 1 capsule (500 mg total) by mouth every 12 (twelve) hours for 7 days, Starting Tue 7/9/2019, Until Tue 7/16/2019, Normal      Cholecalciferol (VITAMIN D3) 1000 units CAPS Take 1 capsule by mouth daily, Historical Med      famotidine (PEPCID) 40 MG tablet Take 0 5 tablets (20 mg total) by mouth daily, Starting Thu 4/5/2018, Normal      fexofenadine (ALLEGRA) 180 MG tablet Take 1 tablet by mouth daily as needed, Starting Thu 10/26/2017, Historical Med      !! hydrochlorothiazide (HYDRODIURIL) 12 5 mg tablet Take 1 tablet (12 5 mg total) by mouth daily, Starting Thu 4/5/2018, Normal      !! hydrochlorothiazide (HYDRODIURIL) 12 5 mg tablet TAKE ONE TABLET BY MOUTH EVERY DAY, Normal      !! losartan (COZAAR) 100 MG tablet Take 1 tablet (100 mg total) by mouth daily, Starting Thu 4/5/2018, Normal      !! losartan (COZAAR) 100 MG tablet TAKE ONE TABLET BY MOUTH EVERY DAY, Normal      multivitamin-minerals (CENTRUM ADULTS) tablet Take 1 tablet by mouth daily, Historical Med      omeprazole (PriLOSEC) 40 MG capsule Take 1 capsule (40 mg total) by mouth daily, Starting Thu 4/5/2018, Normal      simvastatin (ZOCOR) 20 mg tablet TAKE ONE TABLET BY MOUTH EVERY DAY, Normal      terconazole (TERAZOL 3) 0 8 % vaginal cream Insert 1 applicator into the vagina daily at bedtime, Starting Tue 7/9/2019, Normal       !! - Potential duplicate medications found   Please discuss with provider  No discharge procedures on file      ED Provider  Electronically Signed by           [place],   07/14/19 6329

## 2019-07-16 ENCOUNTER — OFFICE VISIT (OUTPATIENT)
Dept: UROLOGY | Facility: CLINIC | Age: 74
End: 2019-07-16
Payer: COMMERCIAL

## 2019-07-16 VITALS
HEART RATE: 85 BPM | WEIGHT: 152 LBS | DIASTOLIC BLOOD PRESSURE: 80 MMHG | SYSTOLIC BLOOD PRESSURE: 140 MMHG | BODY MASS INDEX: 29.84 KG/M2 | HEIGHT: 60 IN

## 2019-07-16 DIAGNOSIS — R30.0 DYSURIA: Primary | ICD-10-CM

## 2019-07-16 DIAGNOSIS — C67.9 MALIGNANT NEOPLASM OF URINARY BLADDER, UNSPECIFIED SITE (HCC): ICD-10-CM

## 2019-07-16 DIAGNOSIS — R10.9 FLANK PAIN: ICD-10-CM

## 2019-07-16 LAB
BACTERIA UR QL AUTO: ABNORMAL /HPF
BILIRUB UR QL STRIP: NEGATIVE
CLARITY UR: CLEAR
COLOR UR: YELLOW
GLUCOSE UR STRIP-MCNC: NEGATIVE MG/DL
HGB UR QL STRIP.AUTO: ABNORMAL
HYALINE CASTS #/AREA URNS LPF: ABNORMAL /LPF
KETONES UR STRIP-MCNC: NEGATIVE MG/DL
LEUKOCYTE ESTERASE UR QL STRIP: NEGATIVE
NITRITE UR QL STRIP: NEGATIVE
NON-SQ EPI CELLS URNS QL MICRO: ABNORMAL /HPF
PH UR STRIP.AUTO: 6 [PH]
PROT UR STRIP-MCNC: NEGATIVE MG/DL
RBC #/AREA URNS AUTO: ABNORMAL /HPF
SL AMB  POCT GLUCOSE, UA: ABNORMAL
SL AMB LEUKOCYTE ESTERASE,UA: ABNORMAL
SL AMB POCT BILIRUBIN,UA: ABNORMAL
SL AMB POCT BLOOD,UA: ABNORMAL
SL AMB POCT CLARITY,UA: CLEAR
SL AMB POCT COLOR,UA: YELLOW
SL AMB POCT KETONES,UA: ABNORMAL
SL AMB POCT NITRITE,UA: ABNORMAL
SL AMB POCT PH,UA: 5
SL AMB POCT SPECIFIC GRAVITY,UA: 1.01
SL AMB POCT URINE PROTEIN: ABNORMAL
SL AMB POCT UROBILINOGEN: ABNORMAL
SP GR UR STRIP.AUTO: 1.01 (ref 1–1.03)
UROBILINOGEN UR QL STRIP.AUTO: 0.2 E.U./DL
WBC #/AREA URNS AUTO: ABNORMAL /HPF

## 2019-07-16 PROCEDURE — 81002 URINALYSIS NONAUTO W/O SCOPE: CPT | Performed by: UROLOGY

## 2019-07-16 PROCEDURE — 81001 URINALYSIS AUTO W/SCOPE: CPT | Performed by: UROLOGY

## 2019-07-16 PROCEDURE — 99204 OFFICE O/P NEW MOD 45 MIN: CPT | Performed by: UROLOGY

## 2019-07-16 RX ORDER — PHENAZOPYRIDINE HYDROCHLORIDE 100 MG/1
100 TABLET, FILM COATED ORAL 3 TIMES DAILY PRN
Qty: 20 TABLET | Refills: 0 | Status: SHIPPED | OUTPATIENT
Start: 2019-07-16 | End: 2020-01-15 | Stop reason: HOSPADM

## 2019-07-16 NOTE — PROGRESS NOTES
UROLOGY NEW CONSULT NOTE     CHIEF COMPLAINT   Victoria Chase is a 75 y o  female with a complaint of   Chief Complaint   Patient presents with    Flank Pain       History of Present Illness:     75 y o  female with a history of high-grade TA bladder cancer diagnosed and resected 9/27/2007  She has been undergoing routine surveillance cystoscopy at [facility]  Her last cystoscopy was in April of 2019  Given the difficulty with transportation, she was referred for more local cystoscopic surveillance  Patient has been having some lower abdominal discomfort and burning with urination  She was started on Keflex by her primary care team and then was seen in the emergency room on the 13th  Urine was tested and negative for microscopic blood or infection  Patient is still having persistent symptoms  She denies prior history of smoking or heavy alcohol use  She is anxious about the symptoms and her history of bladder cancer      Past Medical History:     Past Medical History:   Diagnosis Date    Cancer ([address] )     bladder cancer     Hyperlipidemia     Hypertension        PAST SURGICAL HISTORY:     Past Surgical History:   Procedure Laterality Date    APPENDECTOMY      VEIN LIGATION AND STRIPPING         CURRENT MEDICATIONS:     Current Outpatient Medications   Medication Sig Dispense Refill    albuterol (PROVENTIL HFA,VENTOLIN HFA) 90 mcg/act inhaler Inhale 2 puffs every 6 (six) hours as needed for wheezing 1 each 0    alendronate (FOSAMAX) 10 mg tablet Take 1 tablet (10 mg total) by mouth weekly before breakfast 4 tablet 6    benzonatate (TESSALON PERLES) 100 mg capsule Take 1 capsule (100 mg total) by mouth 3 (three) times a day as needed for cough 20 capsule 0    calcium-vitamin D 250-100 MG-UNIT per tablet Take 1 tablet by mouth 2 (two) times a day      cephalexin (KEFLEX) 500 mg capsule Take 1 capsule (500 mg total) by mouth every 12 (twelve) hours for 7 days 14 capsule 0    Cholecalciferol (VITAMIN D3) 1000 units CAPS Take 1 capsule by mouth daily      famotidine (PEPCID) 40 MG tablet Take 0 5 tablets (20 mg total) by mouth daily 90 tablet 0    fexofenadine (ALLEGRA) 180 MG tablet Take 1 tablet by mouth daily as needed      hydrochlorothiazide (HYDRODIURIL) 12 5 mg tablet Take 1 tablet (12 5 mg total) by mouth daily 90 tablet 0    hydrochlorothiazide (HYDRODIURIL) 12 5 mg tablet TAKE ONE TABLET BY MOUTH EVERY DAY 90 tablet 1    losartan (COZAAR) 100 MG tablet Take 1 tablet (100 mg total) by mouth daily 90 tablet 0    losartan (COZAAR) 100 MG tablet TAKE ONE TABLET BY MOUTH EVERY DAY 90 tablet 1    multivitamin-minerals (CENTRUM ADULTS) tablet Take 1 tablet by mouth daily      omeprazole (PriLOSEC) 40 MG capsule Take 1 capsule (40 mg total) by mouth daily 30 capsule 6    simvastatin (ZOCOR) 20 mg tablet TAKE ONE TABLET BY MOUTH EVERY DAY 90 tablet 3    terconazole (TERAZOL 3) 0 8 % vaginal cream Insert 1 applicator into the vagina daily at bedtime 20 g 0     No current facility-administered medications for this visit          ALLERGIES:     Allergies   Allergen Reactions    Pollen Extract        SOCIAL HISTORY:     Social History     Socioeconomic History    Marital status: /Civil Union     Spouse name: None    Number of children: None    Years of education: None    Highest education level: None   Occupational History    None   Social Needs    Financial resource strain: None    Food insecurity:     Worry: None     Inability: None    Transportation needs:     Medical: None     Non-medical: None   Tobacco Use    Smoking status: Never Smoker    Smokeless tobacco: Never Used   Substance and Sexual Activity    Alcohol use: No    Drug use: No    Sexual activity: None   Lifestyle    Physical activity:     Days per week: None     Minutes per session: None    Stress: None   Relationships    Social connections:     Talks on phone: None     Gets together: None Attends Denominational service: None     Active member of club or organization: None     Attends meetings of clubs or organizations: None     Relationship status: None    Intimate partner violence:     Fear of current or ex partner: None     Emotionally abused: None     Physically abused: None     Forced sexual activity: None   Other Topics Concern    None   Social History Narrative    None       SOCIAL HISTORY:     Family History   Problem Relation Age of Onset    Heart disease Mother     Hypertension Mother        REVIEW OF SYSTEMS:     Review of Systems   Constitutional: Negative  Respiratory: Negative  Cardiovascular: Negative  Gastrointestinal: Negative  Genitourinary: Positive for dysuria and pelvic pain  Musculoskeletal: Negative  Skin: Negative  Psychiatric/Behavioral: Negative  PHYSICAL EXAM:     /80   Pulse 85   Ht 5' (1 524 m)   Wt 68 9 kg (152 lb)   LMP  (LMP Unknown)   BMI 29 69 kg/m²     General:  Healthy appearing female in no acute distress  They have a normal affect  There is not appear to be any gross neurologic defects or abnormalities  HEENT:  Normocephalic, atraumatic  Neck is supple without any palpable lymphadenopathy  Cardiovascular:  Patient has normal palpable distal radial pulses  There is no significant peripheral edema  No JVD is noted  Respiratory:  Patient has unlabored respirations  There is no audible wheeze or rhonchi  Abdomen:  Abdomen is   Without surgical scars  Abdomen is soft and nontender  There is no tympany  Inguinal and umbilical hernia are not appreciated  Musculoskeletal:  Patient does not have significant CVA tenderness in the  flank with palpation or percussion  They full range of motion in all 4 extremities  Strength in all 4 extremities appears congruent  Patient is able to ambulate without assistance or difficulty  Dermatologic:  Patient has no skin abnormalities or rashes        LABS:     CBC:   Lab Results Component Value Date    WBC 8 99 2019    HGB 13 3 2019    HCT 39 7 2019    MCV 92 2019     2019       BMP:   Lab Results   Component Value Date    GLUCOSE 111 2015    CALCIUM 8 7 2019     2015    K 3 3 (L) 2019    CO2 28 2019    CL 99 (L) 2019    BUN 14 2019    CREATININE 0 69 2019       IMAGIN/13/19  CT ABDOMEN AND PELVIS WITH IV CONTRAST     INDICATION:   Bilateral lower abdominal pain, significant left lower quadrant tenderness, dysuria      COMPARISON:  2017 CT, 2017 MRI     TECHNIQUE:  CT examination of the abdomen and pelvis was performed  Axial, sagittal, and coronal 2D reformatted images were created from the source data and submitted for interpretation      Radiation dose length product (DLP) for this visit:  390 mGy-cm   This examination, like all CT scans performed in the [facility], was performed utilizing techniques to minimize radiation dose exposure, including the use of iterative   reconstruction and automated exposure control      IV Contrast:  100 mL of iohexol (OMNIPAQUE)  Enteric Contrast:  Enteric contrast was not administered      FINDINGS:     ABDOMEN     LOWER CHEST:  No clinically significant abnormality identified in the visualized lower chest      LIVER/BILIARY TREE:  Fatty liver noted  Tiny left hepatic dome hypodensity on , previously characterized as a simple cyst      GALLBLADDER:  No calcified gallstones  No pericholecystic inflammatory change      SPLEEN:  Unremarkable      PANCREAS:  Unremarkable      ADRENAL GLANDS:  Unremarkable      KIDNEYS/URETERS:  One or more sharply circumscribed subcentimeter renal hypodensities are noted  These lesions are too small to accurately characterize, but are statistically most likely to represent benign cortical renal cyst(s)    According to the   guidelines published in the [facility] Paper of the ACR Incidental Findings Committee (Radiology 2010), no further workup of these lesions is recommended      STOMACH AND BOWEL:  Small sliding-type hiatal hernia      APPENDIX:  No findings to suggest appendicitis      ABDOMINOPELVIC CAVITY:  No ascites or free intraperitoneal air  No lymphadenopathy      VESSELS:  Unremarkable for patient's age      PELVIS     REPRODUCTIVE ORGANS:  Right adnexal cyst noted, also seen on MRI dated 8/21/2017 and CT dated 8/9/2017     URINARY BLADDER:  Unremarkable      ABDOMINAL WALL/INGUINAL REGIONS:  Left inguinal fat only containing hernia      OSSEOUS STRUCTURES:  No acute fracture or destructive osseous lesion      IMPRESSION:  No acute findings  No findings to account for lower abdominal pain        PATHOLOGY:     9/2017  Final Diagnosis:  1  Bladder tumor near orifice, transurethral resection:  Non-invasive high-grade papillary urothelial carcinoma  - Muscularis propria is identified and uninvolved by carcinoma  2  Posterior bladder wall, biopsy:  Benign urothelium with no tumor seen  - Muscularis propria is identified  3  Lateral bladder wall, biopsy:  Benign urothelium with no tumor seen  - Muscularis propria is identified  DBF/wf/mm    PROCEDURE:     Recent Results (from the past 2 hour(s))   POCT urine dip    Collection Time: 07/16/19  2:58 PM   Result Value Ref Range    LEUKOCYTE ESTERASE,UA -     NITRITE,UA -     SL AMB POCT UROBILINOGEN -     POCT URINE PROTEIN -      PH,UA 5 0     BLOOD,UA trace     SPECIFIC GRAVITY,UA 1 010     KETONES,UA -     BILIRUBIN,UA -     GLUCOSE, UA -      COLOR,UA yellow     CLARITY,UA clear      ASSESSMENT:     75 y o  female with history of HGTa bladder cancer    PLAN:      given the patient's history of bladder cancer and her change in symptomatology, I have recommended stool in her cystoscopy here in the office  Patient has a history of high-grade disease that has not recurred over the last 1/2 years  Last cystoscopy in April    In the interim, I have recommended peridium as well as continue Tylenol and Motrin for symptomatic control  Patient knows to call with any worsening signs or symptoms  We will see her back in the near future for her cystoscopy

## 2019-07-29 DIAGNOSIS — I10 ESSENTIAL HYPERTENSION: Primary | ICD-10-CM

## 2019-07-29 PROCEDURE — 4010F ACE/ARB THERAPY RXD/TAKEN: CPT | Performed by: INTERNAL MEDICINE

## 2019-07-29 RX ORDER — LOSARTAN POTASSIUM 100 MG/1
TABLET ORAL
Qty: 90 TABLET | Refills: 0 | Status: SHIPPED | OUTPATIENT
Start: 2019-07-29 | End: 2019-10-25 | Stop reason: SDUPTHER

## 2019-08-12 ENCOUNTER — PROCEDURE VISIT (OUTPATIENT)
Dept: UROLOGY | Facility: CLINIC | Age: 74
End: 2019-08-12
Payer: COMMERCIAL

## 2019-08-12 VITALS
BODY MASS INDEX: 29.64 KG/M2 | DIASTOLIC BLOOD PRESSURE: 80 MMHG | SYSTOLIC BLOOD PRESSURE: 130 MMHG | HEIGHT: 60 IN | HEART RATE: 70 BPM | WEIGHT: 151 LBS

## 2019-08-12 DIAGNOSIS — C67.9 MALIGNANT NEOPLASM OF URINARY BLADDER, UNSPECIFIED SITE (HCC): Primary | ICD-10-CM

## 2019-08-12 PROCEDURE — 88112 CYTOPATH CELL ENHANCE TECH: CPT | Performed by: PATHOLOGY

## 2019-08-12 PROCEDURE — 52000 CYSTOURETHROSCOPY: CPT | Performed by: UROLOGY

## 2019-08-12 NOTE — PROGRESS NOTES
Cystoscopy  Date/Time: 8/12/2019 1:47 PM  Performed by: Corona Espinal MD  Authorized by: Corona Espinal MD     Procedure details: cystoscopy    Patient tolerance: Patient tolerated the procedure well with no immediate complications    Additional Procedure Details:   Cystoscopy Procedure note    Risk and benefits of flexible cystoscopy were discussed  Informed consent was obtained  A urine dip is adequate for cystoscopy  The patient was placed into the modified supine position  Her genitalia was prepped with Betadine and draped in a sterile fashion  Viscous 2% lidocaine was instilled into the urethra and allowed dwell time for local anesthesia  Flexible cystoscopy was then performed with a 16F scope  Urethra was inspected on entrance and exit of the scope  Patient was noted to have some apical or posterior prolapse  The bladder was thoroughly inspected in a 360 degree fashion  Both ureteral orifices were visualized in their orthotopic location with clear efflux of urine  The bladder mucosa was thoroughly inspected  Lateral to the right ureteral orifice, there was a less than 0 5 cm area of sessile erythema  Retroflexion for evaluation of the bladder neck was normal       Overall this was a  cystoscopy that demonstrated one potential suspicious area of low concern  A female office staff member was present throughout the entire procedure

## 2019-08-12 NOTE — PROGRESS NOTES
UROLOGY FOLLOWUP NOTE     CHIEF COMPLAINT   Simon Still is a 75 y o  female with a complaint of   Chief Complaint   Patient presents with    Cystoscopy       History of Present Illness:     75 y o  female with a history of high-grade TA bladder cancer diagnosed and resected 9/27/2007  She has been undergoing routine surveillance cystoscopy at [facility]  Her last cystoscopy was in April of 2019  Given the difficulty with transportation, she was referred for more local cystoscopic surveillance  Patient has been having some lower abdominal discomfort and burning with urination  She was started on Keflex by her primary care team and then was seen in the emergency room on the 13th  Urine was tested and negative for microscopic blood or infection  Patient is still having persistent symptoms  She denies prior history of smoking or heavy alcohol use  She is anxious about the symptoms and her history of bladder cancer  Returns for cystoscopy       Past Medical History:     Past Medical History:   Diagnosis Date    Cancer ([address] )     bladder cancer     Hyperlipidemia     Hypertension        PAST SURGICAL HISTORY:     Past Surgical History:   Procedure Laterality Date    APPENDECTOMY      VEIN LIGATION AND STRIPPING         CURRENT MEDICATIONS:     Current Outpatient Medications   Medication Sig Dispense Refill    albuterol (PROVENTIL HFA,VENTOLIN HFA) 90 mcg/act inhaler Inhale 2 puffs every 6 (six) hours as needed for wheezing 1 each 0    alendronate (FOSAMAX) 10 mg tablet Take 1 tablet (10 mg total) by mouth weekly before breakfast 4 tablet 6    benzonatate (TESSALON PERLES) 100 mg capsule Take 1 capsule (100 mg total) by mouth 3 (three) times a day as needed for cough 20 capsule 0    calcium-vitamin D 250-100 MG-UNIT per tablet Take 1 tablet by mouth 2 (two) times a day      Cholecalciferol (VITAMIN D3) 1000 units CAPS Take 1 capsule by mouth daily      famotidine (PEPCID) 40 MG tablet Take 0 5 tablets (20 mg total) by mouth daily 90 tablet 0    fexofenadine (ALLEGRA) 180 MG tablet Take 1 tablet by mouth daily as needed      hydrochlorothiazide (HYDRODIURIL) 12 5 mg tablet Take 1 tablet (12 5 mg total) by mouth daily 90 tablet 0    hydrochlorothiazide (HYDRODIURIL) 12 5 mg tablet TAKE ONE TABLET BY MOUTH EVERY DAY 90 tablet 1    losartan (COZAAR) 100 MG tablet Take 1 tablet (100 mg total) by mouth daily 90 tablet 0    losartan (COZAAR) 100 MG tablet TAKE ONE TABLET BY MOUTH EVERY DAY 90 tablet 1    losartan (COZAAR) 100 MG tablet TAKE ONE TABLET BY MOUTH EVERY DAY 90 tablet 0    multivitamin-minerals (CENTRUM ADULTS) tablet Take 1 tablet by mouth daily      omeprazole (PriLOSEC) 40 MG capsule Take 1 capsule (40 mg total) by mouth daily 30 capsule 6    phenazopyridine (PYRIDIUM) 100 mg tablet Take 1 tablet (100 mg total) by mouth 3 (three) times a day as needed for bladder spasms 20 tablet 0    simvastatin (ZOCOR) 20 mg tablet TAKE ONE TABLET BY MOUTH EVERY DAY 90 tablet 3    terconazole (TERAZOL 3) 0 8 % vaginal cream Insert 1 applicator into the vagina daily at bedtime 20 g 0     No current facility-administered medications for this visit          ALLERGIES:     Allergies   Allergen Reactions    Pollen Extract        SOCIAL HISTORY:     Social History     Socioeconomic History    Marital status: /Civil Union     Spouse name: None    Number of children: None    Years of education: None    Highest education level: None   Occupational History    None   Social Needs    Financial resource strain: None    Food insecurity:     Worry: None     Inability: None    Transportation needs:     Medical: None     Non-medical: None   Tobacco Use    Smoking status: Never Smoker    Smokeless tobacco: Never Used   Substance and Sexual Activity    Alcohol use: No    Drug use: No    Sexual activity: None   Lifestyle    Physical activity:     Days per week: None     Minutes per session: None    Stress: None   Relationships    Social connections:     Talks on phone: None     Gets together: None     Attends [employer] service: None     Active member of club or organization: None     Attends meetings of clubs or organizations: None     Relationship status: None    Intimate partner violence:     Fear of current or ex partner: None     Emotionally abused: None     Physically abused: None     Forced sexual activity: None   Other Topics Concern    None   Social History Narrative    None       SOCIAL HISTORY:     Family History   Problem Relation Age of Onset    Heart disease Mother     Hypertension Mother        REVIEW OF SYSTEMS:     Review of Systems   Constitutional: Negative  Respiratory: Negative  Cardiovascular: Negative  Gastrointestinal: Negative  Genitourinary: Positive for dysuria and pelvic pain  Musculoskeletal: Negative  Skin: Negative  Psychiatric/Behavioral: Negative  PHYSICAL EXAM:     /80   Pulse 70   Ht 5' (1 524 m)   Wt 68 5 kg (151 lb)   LMP  (LMP Unknown)   BMI 29 49 kg/m²     General:  Healthy appearing female in no acute distress  They have a normal affect  There is not appear to be any gross neurologic defects or abnormalities  HEENT:  Normocephalic, atraumatic  Neck is supple without any palpable lymphadenopathy  Cardiovascular:  Patient has normal palpable distal radial pulses  There is no significant peripheral edema  No JVD is noted  Respiratory:  Patient has unlabored respirations  There is no audible wheeze or rhonchi  Abdomen:  Abdomen is   Without surgical scars  Abdomen is soft and nontender  There is no tympany  Inguinal and umbilical hernia are not appreciated  :   Normal external female genitalia, some signs of apical or posterior prolapse easily reducible  Musculoskeletal:  Patient does not have significant CVA tenderness in the  flank with palpation or percussion    They full range of motion in all 4 extremities  Strength in all 4 extremities appears congruent  Patient is able to ambulate without assistance or difficulty  Dermatologic:  Patient has no skin abnormalities or rashes  LABS:     CBC:   Lab Results   Component Value Date    WBC 8 99 2019    HGB 13 3 2019    HCT 39 7 2019    MCV 92 2019     2019       BMP:   Lab Results   Component Value Date    GLUCOSE 111 2015    CALCIUM 8 7 2019     2015    K 3 3 (L) 2019    CO2 28 2019    CL 99 (L) 2019    BUN 14 2019    CREATININE 0 69 2019       IMAGIN/13/19  CT ABDOMEN AND PELVIS WITH IV CONTRAST     INDICATION:   Bilateral lower abdominal pain, significant left lower quadrant tenderness, dysuria      COMPARISON:  2017 CT, 2017 MRI     TECHNIQUE:  CT examination of the abdomen and pelvis was performed  Axial, sagittal, and coronal 2D reformatted images were created from the source data and submitted for interpretation      Radiation dose length product (DLP) for this visit:  390 mGy-cm   This examination, like all CT scans performed in the [facility], was performed utilizing techniques to minimize radiation dose exposure, including the use of iterative   reconstruction and automated exposure control      IV Contrast:  100 mL of iohexol (OMNIPAQUE)  Enteric Contrast:  Enteric contrast was not administered      FINDINGS:     ABDOMEN     LOWER CHEST:  No clinically significant abnormality identified in the visualized lower chest      LIVER/BILIARY TREE:  Fatty liver noted  Tiny left hepatic dome hypodensity on , previously characterized as a simple cyst      GALLBLADDER:  No calcified gallstones  No pericholecystic inflammatory change      SPLEEN:  Unremarkable      PANCREAS:  Unremarkable      ADRENAL GLANDS:  Unremarkable      KIDNEYS/URETERS:  One or more sharply circumscribed subcentimeter renal hypodensities are noted   These lesions are too small to accurately characterize, but are statistically most likely to represent benign cortical renal cyst(s)  According to the   guidelines published in the [facility] Paper of the ACR Incidental Findings Committee (Radiology 2010), no further workup of these lesions is recommended      STOMACH AND BOWEL:  Small sliding-type hiatal hernia      APPENDIX:  No findings to suggest appendicitis      ABDOMINOPELVIC CAVITY:  No ascites or free intraperitoneal air  No lymphadenopathy      VESSELS:  Unremarkable for patient's age      PELVIS     REPRODUCTIVE ORGANS:  Right adnexal cyst noted, also seen on MRI dated 8/21/2017 and CT dated 8/9/2017     URINARY BLADDER:  Unremarkable      ABDOMINAL WALL/INGUINAL REGIONS:  Left inguinal fat only containing hernia      OSSEOUS STRUCTURES:  No acute fracture or destructive osseous lesion      IMPRESSION:  No acute findings  No findings to account for lower abdominal pain        PATHOLOGY:     9/2017  Final Diagnosis:  1  Bladder tumor near orifice, transurethral resection:  Non-invasive high-grade papillary urothelial carcinoma  - Muscularis propria is identified and uninvolved by carcinoma  2  Posterior bladder wall, biopsy:  Benign urothelium with no tumor seen  - Muscularis propria is identified  3  Lateral bladder wall, biopsy:  Benign urothelium with no tumor seen  - Muscularis propria is identified  DBF/wf/mm    PROCEDURE:     SEE NOTE    ASSESSMENT:     75 y o  female with history of HGTa bladder cancer    PLAN:       The patient has a small (<0 5cm) area of sessile erythema lateral to the right ureteral orifice  I have offered her the option of proceeding to the operating room for biopsy  The patient will be traveling to [facility] and would prefer to reassess when she returns  Given the fact that she has noninvasive cancer and I do not see any dramatic signs of recurrence, I think this is reasonable  Return for cystoscopy in 3 months   She knows to call me with any worsening urinary signs or symptoms or gross hematuria

## 2019-08-14 ENCOUNTER — TELEPHONE (OUTPATIENT)
Dept: UROLOGY | Facility: CLINIC | Age: 74
End: 2019-08-14

## 2019-08-14 NOTE — TELEPHONE ENCOUNTER
----- Message from Baltazar Sethi MD sent at 8/14/2019 10:23 AM EDT -----  Please let Sydney Diaz know her cytology was negative, should be reassuring before her trip to [facility]

## 2019-08-22 ENCOUNTER — TRANSCRIBE ORDERS (OUTPATIENT)
Dept: LAB | Facility: CLINIC | Age: 74
End: 2019-08-22

## 2019-08-22 ENCOUNTER — APPOINTMENT (OUTPATIENT)
Dept: LAB | Facility: CLINIC | Age: 74
End: 2019-08-22
Payer: COMMERCIAL

## 2019-08-22 DIAGNOSIS — E11.9 TYPE 2 DIABETES MELLITUS WITHOUT COMPLICATION, WITHOUT LONG-TERM CURRENT USE OF INSULIN (HCC): ICD-10-CM

## 2019-08-22 LAB
ALBUMIN SERPL BCP-MCNC: 3.6 G/DL (ref 3.5–5)
ALP SERPL-CCNC: 64 U/L (ref 46–116)
ALT SERPL W P-5'-P-CCNC: 28 U/L (ref 12–78)
ANION GAP SERPL CALCULATED.3IONS-SCNC: 9 MMOL/L (ref 4–13)
AST SERPL W P-5'-P-CCNC: 21 U/L (ref 5–45)
BILIRUB SERPL-MCNC: 0.3 MG/DL (ref 0.2–1)
BUN SERPL-MCNC: 16 MG/DL (ref 5–25)
CALCIUM SERPL-MCNC: 9.1 MG/DL (ref 8.3–10.1)
CHLORIDE SERPL-SCNC: 103 MMOL/L (ref 100–108)
CHOLEST SERPL-MCNC: 149 MG/DL (ref 50–200)
CO2 SERPL-SCNC: 28 MMOL/L (ref 21–32)
CREAT SERPL-MCNC: 0.67 MG/DL (ref 0.6–1.3)
EST. AVERAGE GLUCOSE BLD GHB EST-MCNC: 128 MG/DL
GFR SERPL CREATININE-BSD FRML MDRD: 87 ML/MIN/1.73SQ M
GLUCOSE P FAST SERPL-MCNC: 109 MG/DL (ref 65–99)
HBA1C MFR BLD: 6.1 % (ref 4.2–6.3)
HDLC SERPL-MCNC: 38 MG/DL (ref 40–60)
LDLC SERPL CALC-MCNC: 82 MG/DL (ref 0–100)
POTASSIUM SERPL-SCNC: 4 MMOL/L (ref 3.5–5.3)
PROT SERPL-MCNC: 7.6 G/DL (ref 6.4–8.2)
SODIUM SERPL-SCNC: 140 MMOL/L (ref 136–145)
TRIGL SERPL-MCNC: 147 MG/DL

## 2019-08-22 PROCEDURE — 80053 COMPREHEN METABOLIC PANEL: CPT

## 2019-08-22 PROCEDURE — 80061 LIPID PANEL: CPT

## 2019-08-22 PROCEDURE — 83036 HEMOGLOBIN GLYCOSYLATED A1C: CPT

## 2019-08-22 PROCEDURE — 36415 COLL VENOUS BLD VENIPUNCTURE: CPT

## 2019-08-28 ENCOUNTER — OFFICE VISIT (OUTPATIENT)
Dept: INTERNAL MEDICINE CLINIC | Facility: CLINIC | Age: 74
End: 2019-08-28
Payer: COMMERCIAL

## 2019-08-28 VITALS
OXYGEN SATURATION: 94 % | SYSTOLIC BLOOD PRESSURE: 134 MMHG | BODY MASS INDEX: 29.25 KG/M2 | WEIGHT: 149 LBS | HEART RATE: 74 BPM | DIASTOLIC BLOOD PRESSURE: 82 MMHG | RESPIRATION RATE: 16 BRPM | HEIGHT: 60 IN

## 2019-08-28 DIAGNOSIS — K21.9 GASTROESOPHAGEAL REFLUX DISEASE WITHOUT ESOPHAGITIS: ICD-10-CM

## 2019-08-28 DIAGNOSIS — E78.5 DYSLIPIDEMIA: ICD-10-CM

## 2019-08-28 DIAGNOSIS — I10 ESSENTIAL HYPERTENSION: Primary | ICD-10-CM

## 2019-08-28 DIAGNOSIS — E78.5 HYPERLIPIDEMIA, UNSPECIFIED HYPERLIPIDEMIA TYPE: ICD-10-CM

## 2019-08-28 DIAGNOSIS — Z79.4 TYPE 2 DIABETES MELLITUS WITHOUT COMPLICATION, WITH LONG-TERM CURRENT USE OF INSULIN (HCC): ICD-10-CM

## 2019-08-28 DIAGNOSIS — E11.9 TYPE 2 DIABETES MELLITUS WITHOUT COMPLICATION, WITH LONG-TERM CURRENT USE OF INSULIN (HCC): ICD-10-CM

## 2019-08-28 DIAGNOSIS — E11.9 TYPE 2 DIABETES MELLITUS WITHOUT COMPLICATION, WITHOUT LONG-TERM CURRENT USE OF INSULIN (HCC): ICD-10-CM

## 2019-08-28 DIAGNOSIS — C67.9 MALIGNANT NEOPLASM OF URINARY BLADDER, UNSPECIFIED SITE (HCC): ICD-10-CM

## 2019-08-28 DIAGNOSIS — Z00.00 MEDICARE ANNUAL WELLNESS VISIT, SUBSEQUENT: ICD-10-CM

## 2019-08-28 PROCEDURE — 99214 OFFICE O/P EST MOD 30 MIN: CPT | Performed by: INTERNAL MEDICINE

## 2019-08-28 PROCEDURE — G0439 PPPS, SUBSEQ VISIT: HCPCS | Performed by: INTERNAL MEDICINE

## 2019-08-28 RX ORDER — SIMVASTATIN 20 MG
20 TABLET ORAL DAILY
Qty: 90 TABLET | Refills: 3 | Status: SHIPPED | OUTPATIENT
Start: 2019-08-28 | End: 2020-09-17

## 2019-08-28 RX ORDER — FAMOTIDINE 40 MG/1
20 TABLET, FILM COATED ORAL DAILY
Qty: 90 TABLET | Refills: 0 | Status: SHIPPED | OUTPATIENT
Start: 2019-08-28 | End: 2020-03-05 | Stop reason: SDUPTHER

## 2019-08-28 NOTE — PATIENT INSTRUCTIONS
Obesity   AMBULATORY CARE:   Obesity  is when your body mass index (BMI) is greater than 30  Your healthcare provider will use your height and weight to measure your BMI  The risks of obesity include  many health problems, such as injuries or physical disability  You may need tests to check for the following:  · Diabetes     · High blood pressure or high cholesterol     · Heart disease     · Gallbladder or liver disease     · Cancer of the colon, breast, prostate, liver, or kidney     · Sleep apnea     · Arthritis or gout  Seek care immediately if:   · You have a severe headache, confusion, or difficulty speaking  · You have weakness on one side of your body  · You have chest pain, sweating, or shortness of breath  Contact your healthcare provider if:   · You have symptoms of gallbladder or liver disease, such as pain in your upper abdomen  · You have knee or hip pain and discomfort while walking  · You have symptoms of diabetes, such as intense hunger and thirst, and frequent urination  · You have symptoms of sleep apnea, such as snoring or daytime sleepiness  · You have questions or concerns about your condition or care  Treatment for obesity  focuses on helping you lose weight to improve your health  Even a small decrease in BMI can reduce the risk for many health problems  Your healthcare provider will help you set a weight-loss goal   · Lifestyle changes  are the first step in treating obesity  These include making healthy food choices and getting regular physical activity  Your healthcare provider may suggest a weight-loss program that involves coaching, education, and therapy  · Medicine  may help you lose weight when it is used with a healthy diet and physical activity  · Surgery  can help you lose weight if you are very obese and have other health problems  There are several types of weight-loss surgery  Ask your healthcare provider for more information    Be successful losing weight:   · Set small, realistic goals  An example of a small goal is to walk for 20 minutes 5 days a week  Anther goal is to lose 5% of your body weight  · Tell friends, family members, and coworkers about your goals  and ask for their support  Ask a friend to lose weight with you, or join a weight-loss support group  · Identify foods or triggers that may cause you to overeat , and find ways to avoid them  Remove tempting high-calorie foods from your home and workplace  Place a bowl of fresh fruit on your kitchen counter  If stress causes you to eat, then find other ways to cope with stress  · Keep a diary to track what you eat and drink  Also write down how many minutes of physical activity you do each day  Weigh yourself once a week and record it in your diary  Eating changes: You will need to eat 500 to 1,000 fewer calories each day than you currently eat to lose 1 to 2 pounds a week  The following changes will help you cut calories:  · Eat smaller portions  Use small plates, no larger than 9 inches in diameter  Fill your plate half full of fruits and vegetables  Measure your food using measuring cups until you know what a serving size looks like  · Eat 3 meals and 1 or 2 snacks each day  Plan your meals in advance  Vonda Bilberry and eat at home most of the time  Eat slowly  · Eat fruits and vegetables at every meal   They are low in calories and high in fiber, which makes you feel full  Do not add butter, margarine, or cream sauce to vegetables  Use herbs to season steamed vegetables  · Eat less fat and fewer fried foods  Eat more baked or grilled chicken and fish  These protein sources are lower in calories and fat than red meat  Limit fast food  Dress your salads with olive oil and vinegar instead of bottled dressing  · Limit the amount of sugar you eat  Do not drink sugary beverages  Limit alcohol  Activity changes:  Physical activity is good for your body in many ways   It helps you burn calories and build strong muscles  It decreases stress and depression, and improves your mood  It can also help you sleep better  Talk to your healthcare provider before you begin an exercise program   · Exercise for at least 30 minutes 5 days a week  Start slowly  Set aside time each day for physical activity that you enjoy and that is convenient for you  It is best to do both weight training and an activity that increases your heart rate, such as walking, bicycling, or swimming  · Find ways to be more active  Do yard work and housecleaning  Walk up the stairs instead of using elevators  Spend your leisure time going to events that require walking, such as outdoor festivals or fairs  This extra physical activity can help you lose weight and keep it off  Follow up with your healthcare provider as directed: You may need to meet with a dietitian  Write down your questions so you remember to ask them during your visits  © 2017 [employer] Information is for End User's use only and may not be sold, redistributed or otherwise used for commercial purposes  All illustrations and images included in CareNotes® are the copyrighted property of [employer]  or Dutch Cheney  The above information is an  only  It is not intended as medical advice for individual conditions or treatments  Talk to your doctor, nurse or pharmacist before following any medical regimen to see if it is safe and effective for you  Urinary Incontinence   WHAT YOU NEED TO KNOW:   What is urinary incontinence? Urinary incontinence (UI) is when you lose control of your bladder  What causes UI? UI occurs because your bladder cannot store or empty urine properly  The following are the most common types of UI:  · Stress incontinence  is when you leak urine due to increased bladder pressure  This may happen when you cough, sneeze, or exercise       · Urge incontinence  is when you feel the need to urinate right away and leak urine accidentally  · Mixed incontinence  is when you have both stress and urge UI  What are the signs and symptoms of UI?   · You feel like your bladder does not empty completely when you urinate  · You urinate often and need to urinate immediately  · You leak urine when you sleep, or you wake up with the urge to urinate  · You leak urine when you cough, sneeze, exercise, or laugh  How is UI diagnosed? Your healthcare provider will ask how often you leak urine and whether you have stress or urge symptoms  Tell him which medicines you take, how often you urinate, and how much liquid you drink each day  You may need any of the following tests:  · Urine tests  may show infection or kidney function  · A pelvic exam  may be done to check for blockages  A pelvic exam will also show if your bladder, uterus, or other organs have moved out of place  · An x-ray, ultrasound, or CT  may show problems with parts of your urinary system  You may be given contrast liquid to help your organs show up better in the pictures  Tell the healthcare provider if you have ever had an allergic reaction to contrast liquid  Do not enter the MRI room with anything metal  Metal can cause serious injury  Tell the healthcare provider if you have any metal in or on your body  · A bladder scan  will show how much urine is left in your bladder after you urinate  You will be asked to urinate and then healthcare providers will use a small ultrasound machine to check the urine left in your bladder  · Cystometry  is used to check the function of your urinary system  Your healthcare provider checks the pressure in your bladder while filling it with fluid  Your bladder pressure may also be tested when your bladder is full and while you urinate  How is UI treated? · Medicines  can help strengthen your bladder control      · Electrical stimulation  is used to send a small amount of electrical energy to your pelvic floor muscles  This helps control your bladder function  Electrodes may be placed outside your body or in your rectum  For women, the electrodes may be placed in the vagina  · A bulking agent  may be injected into the wall of your urethra to make it thicker  This helps keep your urethra closed and decreases urine leakage  · Devices  such as a clamp, pessary, or tampon may help stop urine leaks  Ask your healthcare provider for more information about these and other devices  · Surgery  may be needed if other treatments do not work  Several types of surgery can help improve your bladder control  Ask your healthcare provider for more information about the surgery you may need  How can I manage my symptoms? · Do pelvic muscle exercises often  Your pelvic muscles help you stop urinating  Squeeze these muscles tight for 5 seconds, then relax for 5 seconds  Gradually work up to squeezing for 10 seconds  Do 3 sets of 15 repetitions a day, or as directed  This will help strengthen your pelvic muscles and improve bladder control  · A catheter  may be used to help empty your bladder  A catheter is a tiny, plastic tube that is put into your bladder to drain your urine  Your healthcare provider may tell you to use a catheter to prevent your bladder from getting too full and leaking urine  · Keep a UI record  Write down how often you leak urine and how much you leak  Make a note of what you were doing when you leaked urine  · Train your bladder  Go to the bathroom at set times, such as every 2 hours, even if you do not feel the urge to go  You can also try to hold your urine when you feel the urge to go  For example, hold your urine for 5 minutes when you feel the urge to go  As that becomes easier, hold your urine for 10 minutes  · Drink liquids as directed  Ask your healthcare provider how much liquid to drink each day and which liquids are best for you   You may need to limit the amount of liquid you drink to help control your urine leakage  Limit or do not have drinks that contain caffeine or alcohol  Do not drink any liquid right before you go to bed  · Prevent constipation  Eat a variety of high-fiber foods  Good examples are high-fiber cereals, beans, vegetables, and whole-grain breads  Prune juice may help make your bowel movement softer  Walking is the best way to trigger your intestines to have a bowel movement  · Exercise regularly and maintain a healthy weight  Ask your healthcare provider how much you should weigh and about the best exercise plan for you  Weight loss and exercise will decrease pressure on your bladder and help you control your leakage  Ask him to help you create a weight loss plan if you are overweight  When should I seek immediate care? · You have severe pain  · You are confused or cannot think clearly  When should I contact my healthcare provider? · You have a fever  · You see blood in your urine  · You have pain when you urinate  · You have new or worse pain, even after treatment  · Your mouth feels dry or you have vision changes  · Your urine is cloudy or smells bad  · You have questions or concerns about your condition or care  CARE AGREEMENT:   You have the right to help plan your care  Learn about your health condition and how it may be treated  Discuss treatment options with your caregivers to decide what care you want to receive  You always have the right to refuse treatment  The above information is an  only  It is not intended as medical advice for individual conditions or treatments  Talk to your doctor, nurse or pharmacist before following any medical regimen to see if it is safe and effective for you  © 2017 [employer] Information is for End User's use only and may not be sold, redistributed or otherwise used for commercial purposes   All illustrations and images included in CareNotes® are the copyrighted property of [employer]  or Dutch Cheney  Cigarette Smoking and Your Health   AMBULATORY CARE:   Risks to your health if you smoke:  Nicotine and other chemicals found in tobacco damage every cell in your body  Even if you are a light smoker, you have an increased risk for cancer, heart disease, and lung disease  If you are pregnant or have diabetes, smoking increases your risk for complications  Benefits to your health if you stop smoking:   · You decrease respiratory symptoms such as coughing, wheezing, and shortness of breath  · You reduce your risk for cancers of the lung, mouth, throat, kidney, bladder, pancreas, stomach, and cervix  If you already have cancer, you increase the benefits of chemotherapy  You also reduce your risk for cancer returning or a second cancer from developing  · You reduce your risk for heart disease, blood clots, heart attack, and stroke  · You reduce your risk for lung infections, and diseases such as pneumonia, asthma, chronic bronchitis, and emphysema  · Your circulation improves  More oxygen can be delivered to your body  If you have diabetes, you lower your risk for complications, such as kidney, artery, and eye diseases  You also lower your risk for nerve damage  Nerve damage can lead to amputations, poor vision, and blindness  · You improve your body's ability to heal and to fight infections  Benefits to the health of others if you stop smoking:  Tobacco is harmful to nonsmokers who breathe in your secondhand smoke  The following are ways the health of others around you may improve when you stop smoking:  · You lower the risks for lung cancer and heart disease in nonsmoking adults  · If you are pregnant, you lower the risk for miscarriage, early delivery, low birth weight, and stillbirth  You also lower your baby's risk for SIDS, obesity, developmental delay, and neurobehavioral problems, such as ADHD  · If you have children, you lower their risk for ear infections, colds, pneumonia, bronchitis, and asthma  For more information and support to stop smoking:   · Smokefree  gov  Phone: 5- 825 - 166-3623  Web Address: www smokefree  gov  Follow up with your healthcare provider as directed:  Write down your questions so you remember to ask them during your visits  © 2017 [employer] Information is for End User's use only and may not be sold, redistributed or otherwise used for commercial purposes  All illustrations and images included in CareNotes® are the copyrighted property of [employer]  or Dutch Cheney  The above information is an  only  It is not intended as medical advice for individual conditions or treatments  Talk to your doctor, nurse or pharmacist before following any medical regimen to see if it is safe and effective for you  Fall Prevention   AMBULATORY CARE:   Fall prevention  includes ways to make your home and other areas safer  It also includes ways you can move more carefully to prevent a fall  Health conditions that cause changes in your blood pressure, vision, or muscle strength and coordination may increase your risk for falls  Medicines may also increase your risk for falls if they make you dizzy, weak, or sleepy  Call 911 or have someone else call if:   · You have fallen and are unconscious  · You have fallen and cannot move part of your body  Contact your healthcare provider if:   · You have fallen and have pain or a headache  · You have questions or concerns about your condition or care  Fall prevention tips:   · Stand or sit up slowly  This may help you keep your balance and prevent falls  · Use assistive devices as directed  Your healthcare provider may suggest that you use a cane or walker to help you keep your balance  You may need to have grab bars put in your bathroom near the toilet or in the shower      · Wear shoes that fit well and have soles that   Wear shoes both inside and outside  Use slippers with good   Do not wear shoes with high heels  · Wear a personal alarm  This is a device that allows you to call 911 if you fall and need help  Ask your healthcare provider for more information  · Stay active  Exercise can help strengthen your muscles and improve your balance  Your healthcare provider may recommend water aerobics or walking  He or she may also recommend physical therapy to improve your coordination  Never start an exercise program without talking to your healthcare provider first      · Manage your medical conditions  Keep all appointments with your healthcare providers  Visit your eye doctor as directed  Home safety tips:   · Add items to prevent falls in the bathroom  Put nonslip strips on your bath or shower floor to prevent you from slipping  Use a bath mat if you do not have carpet in the bathroom  This will prevent you from falling when you step out of the bath or shower  Use a shower seat so you do not need to stand while you shower  Sit on the toilet or a chair in your bathroom to dry yourself and put on clothing  This will prevent you from losing your balance from drying or dressing yourself while you are standing  · Keep paths clear  Remove books, shoes, and other objects from walkways and stairs  Place cords for telephones and lamps out of the way so that you do not need to walk over them  Tape them down if you cannot move them  Remove small rugs  If you cannot remove a rug, secure it with double-sided tape  This will prevent you from tripping  · Install bright lights in your home  Use night lights to help light paths to the bathroom or kitchen  Always turn on the light before you start walking  · Keep items you use often on shelves within reach  Do not use a step stool to help you reach an item  · Paint or place reflective tape on the edges of your stairs    This will help you see the stairs better  Follow up with your healthcare provider as directed:  Write down your questions so you remember to ask them during your visits  © 2017 [facility] Information is for End User's use only and may not be sold, redistributed or otherwise used for commercial purposes  All illustrations and images included in CareNotes® are the copyrighted property of [employer]  or Dutch Cheney  The above information is an  only  It is not intended as medical advice for individual conditions or treatments  Talk to your doctor, nurse or pharmacist before following any medical regimen to see if it is safe and effective for you  Advance Directives   WHAT YOU NEED TO KNOW:   What are advance directives? Advance directives are legal documents that state your wishes and plans for medical care  These plans are made ahead of time in case you lose your ability to make decisions for yourself  Advance directives can apply to any medical decision, such as the treatments you want, and if you want to donate organs  What are the types of advance directives? There are many types of advance directives, and each state has rules about how to use them  You may choose a combination of any of the following:  · Living will: This is a written record of the treatment you want  You can also choose which treatments you do not want, which to limit, and which to stop at a certain time  This includes surgery, medicine, IV fluid, and tube feedings  · Durable power of  for healthcare [facility]): This is a written record that states who you want to make healthcare choices for you when you are unable to make them for yourself  This person, called a proxy, is usually a family member or a friend  You may choose more than 1 proxy  · Do not resuscitate (DNR) order:  A DNR order is used in case your heart stops beating or you stop breathing   It is a request not to have certain forms of treatment, such as CPR  A DNR order may be included in other types of advance directives  · Medical directive: This covers the care that you want if you are in a coma, near death, or unable to make decisions for yourself  You can list the treatments you want for each condition  Treatment may include pain medicine, surgery, blood transfusions, dialysis, IV or tube feedings, and a ventilator (breathing machine)  · Values history: This document has questions about your views, beliefs, and how you feel and think about life  This information can help others choose the care that you would choose  Why are advance directives important? An advance directive helps you control your care  Although spoken wishes may be used, it is better to have your wishes written down  Spoken wishes can be misunderstood, or not followed  Treatments may be given even if you do not want them  An advance directive may make it easier for your family to make difficult choices about your care  How do I decide what to put in my advance directives? · Make informed decisions:  Make sure you fully understand treatments or care you may receive  Think about the benefits and problems your decisions could cause for you or your family  Talk to healthcare providers if you have concerns or questions before you write down your wishes  You may also want to talk with your Roman Catholic or , or a   Check your state laws to make sure that what you put in your advance directive is legal      · Sign all forms:  Sign and date your advance directive when you have finished  You may also need 2 witnesses to sign the forms  Witnesses cannot be your doctor or his staff, your spouse, heirs or beneficiaries, people you owe money to, or your chosen proxy  Talk to your family, proxy, and healthcare providers about your advance directive  Give each person a copy, and keep one for yourself in a place you can get to easily   Do not keep it hidden or locked away  · Review and revise your plans: You can revise your advance directive at any time, as long as you are able to make decisions  Review your plan every year, and when there are changes in your life, or your health  When you make changes, let your family, proxy, and healthcare providers know  Give each a new copy  Where can I find more information? · [employer]  [address] , [place]  Phone: 2- [phone] - [phone]  Phone: 4- 158 - [phone]  Web Address: http://www  aafp org  · [address] [facility])  [address] [address] [facility], [address] [facility] , [address] [place] [address]  Phone: [phone]  Phone: [phone]  Web Address: Feliciano adan  CARE AGREEMENT:   You have the right to help plan your care  To help with this plan, you must learn about your health condition and treatment options  You must also learn about advance directives and how they are used  Work with your healthcare providers to decide what care will be used to treat you  You always have the right to refuse treatment  The above information is an  only  It is not intended as medical advice for individual conditions or treatments  Talk to your doctor, nurse or pharmacist before following any medical regimen to see if it is safe and effective for you  © 2017 [employer] Information is for End User's use only and may not be sold, redistributed or otherwise used for commercial purposes  All illustrations and images included in CareNotes® are the copyrighted property of [employer]  or Dutch Cheney

## 2019-08-28 NOTE — PROGRESS NOTES
Assessment and Plan:     Problem List Items Addressed This Visit        Cardiovascular and Mediastinum    Hypertension - Primary       Other    Medicare annual wellness visit, subsequent     Assessment and plan 1  Medicare subsequent annual wellness examination overall the patient is clinically stable and doing well, we encouraged the patient to follow a healthy and balanced diet  We recommend that the patient exercise routinely approximately 30 minutes 5 times per week   We have reviewed the patient's vaccines and have made recommendations for updates if necessary consider the new shingles vaccine at the pharmacy, annual flu shot recommended       We will be ordering screening laboratories which are age appropriate  Return to the office in  6 months    call if any problems             Other Visit Diagnoses     Hyperlipidemia, unspecified hyperlipidemia type        Will refill the patient's Zocor    Relevant Medications    simvastatin (ZOCOR) 20 mg tablet    Gastroesophageal reflux disease without esophagitis        Relevant Medications    famotidine (PEPCID) 40 MG tablet    Type 2 diabetes mellitus without complication, with long-term current use of insulin ([facility])        Relevant Orders    Diabetic foot exam    Ambulatory referral to Ophthalmology    Comprehensive metabolic panel    Hemoglobin A1C    Lipid Panel with Direct LDL reflex    Microalbumin / creatinine urine ratio         History of Present Illness:     Patient presents for Medicare Annual Wellness visit    Patient Care Team:  Mitzi Contreras DO as PCP - MD Mitzi Dennis DO  Render Demetris, MD Elva Paredes MD     Problem List:     Patient Active Problem List   Diagnosis    Cough    Bronchitis    Hypertension    Dyslipidemia    Type 2 diabetes mellitus without complication, without long-term current use of insulin ([facility] )    Lumbar radiculopathy    Encounter for screening mammogram for breast cancer    Intervertebral disc disorders with radiculopathy, lumbar region    Malignant neoplasm of urinary bladder ([facility] )    Dysuria    Medicare annual wellness visit, subsequent      Past Medical and Surgical History:     Past Medical History:   Diagnosis Date    Cancer ([facility] )     bladder cancer     Hyperlipidemia     Hypertension      Past Surgical History:   Procedure Laterality Date    APPENDECTOMY      VEIN LIGATION AND STRIPPING        Family History:     Family History   Problem Relation Age of Onset    Heart disease Mother     Hypertension Mother       Social History:     Social History     Tobacco Use   Smoking Status Never Smoker   Smokeless Tobacco Never Used     Social History     Substance and Sexual Activity   Alcohol Use No     Social History     Substance and Sexual Activity   Drug Use No      Medications and Allergies:     Current Outpatient Medications   Medication Sig Dispense Refill    albuterol (PROVENTIL HFA,VENTOLIN HFA) 90 mcg/act inhaler Inhale 2 puffs every 6 (six) hours as needed for wheezing 1 each 0    alendronate (FOSAMAX) 10 mg tablet Take 1 tablet (10 mg total) by mouth weekly before breakfast 4 tablet 6    benzonatate (TESSALON PERLES) 100 mg capsule Take 1 capsule (100 mg total) by mouth 3 (three) times a day as needed for cough 20 capsule 0    calcium-vitamin D 250-100 MG-UNIT per tablet Take 1 tablet by mouth 2 (two) times a day      Cholecalciferol (VITAMIN D3) 1000 units CAPS Take 1 capsule by mouth daily      famotidine (PEPCID) 40 MG tablet Take 0 5 tablets (20 mg total) by mouth daily 90 tablet 0    fexofenadine (ALLEGRA) 180 MG tablet Take 1 tablet by mouth daily as needed      hydrochlorothiazide (HYDRODIURIL) 12 5 mg tablet Take 1 tablet (12 5 mg total) by mouth daily 90 tablet 0    losartan (COZAAR) 100 MG tablet Take 1 tablet (100 mg total) by mouth daily 90 tablet 0    losartan (COZAAR) 100 MG tablet TAKE ONE TABLET BY MOUTH EVERY DAY 90 tablet 0    multivitamin-minerals (CENTRUM ADULTS) tablet Take 1 tablet by mouth daily      omeprazole (PriLOSEC) 40 MG capsule Take 1 capsule (40 mg total) by mouth daily 30 capsule 6    phenazopyridine (PYRIDIUM) 100 mg tablet Take 1 tablet (100 mg total) by mouth 3 (three) times a day as needed for bladder spasms 20 tablet 0    simvastatin (ZOCOR) 20 mg tablet Take 1 tablet (20 mg total) by mouth daily 90 tablet 3    terconazole (TERAZOL 3) 0 8 % vaginal cream Insert 1 applicator into the vagina daily at bedtime 20 g 0     No current facility-administered medications for this visit  Allergies   Allergen Reactions    Pollen Extract       Immunizations:     Immunization History   Administered Date(s) Administered    Influenza Split High Dose Preservative Free IM 10/04/2014, 10/03/2015, 10/08/2016, 10/16/2017    Influenza TIV (IM) 10/20/2012, 10/30/2013    Influenza, high dose seasonal 0 5 mL 10/13/2018    Pneumococcal Conjugate 13-Valent 08/13/2015    Pneumococcal Polysaccharide PPV23 01/28/2010    TD (adult) Preservative Free 01/28/2007    Zoster 07/29/2017    Zoster Vaccine Recombinant 07/29/2017, 08/02/2017      Medicare Screening Tests and Risk Assessments:     Alban Dennis is here for her Subsequent Wellness visit  Health Risk Assessment:  Patient rates overall health as good  Patient feels that their physical health rating is Same  Eyesight was rated as Same  Hearing was rated as Same  Patient feels that their emotional and mental health rating is Same  Pain experienced by patient in the last 7 days has been None  Patient states that she has experienced no weight loss or gain in last 6 months  Emotional/Mental Health:  Patient has not been feeling nervous/anxious  PHQ-9 Depression Screening:    Frequency of the following problems over the past two weeks:      1  Little interest or pleasure in doing things: 0 - not at all      2   Feeling down, depressed, or hopeless: 0 - not at all  PHQ-2 Score: 0          Broken Bones/Falls: Fall Risk Assessment:    In the past year, patient has experienced: No history of falling in past year          Bladder/Bowel:  Patient has not leaked urine accidently in the last six months  Patient reports no loss of bowel control  Immunizations:  Patient has had a flu vaccination within the last year  Patient has received a pneumonia shot  Patient has received a shingles shot  Patient has received tetanus/diphtheria shot  Home Safety:  Patient does not have trouble with stairs inside or outside of their home  Patient currently reports that there are no safety hazards present in home, working smoke alarms, working carbon monoxide detectors  Preventative Screenings:   Breast cancer screening performed, colon cancer screen completed, cholesterol screen completed, glaucoma eye exam completed,     Nutrition:  Current diet: Regular with servings of the following:    Medications:  Patient is currently taking over-the-counter supplements  Patient is able to manage medications  Lifestyle Choices:  Patient reports no tobacco use  Patient has not smoked or used tobacco in the past   Patient reports alcohol use  Patient drives a vehicle  Patient wears seat belt  Activities of Daily Living:  Can get out of bed by his or her self, able to dress self, able to make own meals, able to do own shopping, able to bathe self, can do own laundry/housekeeping, can manage own money, pay bills and track expenses    Previous Hospitalizations:  Hospitalization or ED visit in past 12 months  Number of hospitalizations within the last year: 1-2        Advanced Directives:  Patient has decided on a power of   Patient has spoken to designated power of   Patient has completed advanced directive          Preventative Screening/Counseling:      Cardiovascular:      Counseling: Healthy Diet and Healthy Weight          Diabetes:      Counseling: Healthy Diet and Healthy Weight          Colorectal Cancer:      General: Screening Current          Breast Cancer:      General: Screening Current          Cervical Cancer:      General: Screening Current          AAA:      General: Screening Not Indicated          Glaucoma:      General: Screening Current          HIV:      General: Screening Not Indicated          Hepatitis C:      General: Screening Not Indicated        Advanced Directives:   Patient has living will for healthcare, has durable POA for healthcare,

## 2019-09-02 PROBLEM — Z00.00 MEDICARE ANNUAL WELLNESS VISIT, SUBSEQUENT: Status: ACTIVE | Noted: 2019-09-02

## 2019-09-02 PROBLEM — E11.9 TYPE 2 DIABETES MELLITUS WITHOUT COMPLICATION, WITH LONG-TERM CURRENT USE OF INSULIN (HCC): Status: ACTIVE | Noted: 2019-09-02

## 2019-09-02 PROBLEM — E78.5 HYPERLIPIDEMIA: Status: ACTIVE | Noted: 2019-09-02

## 2019-09-02 PROBLEM — Z79.4 TYPE 2 DIABETES MELLITUS WITHOUT COMPLICATION, WITH LONG-TERM CURRENT USE OF INSULIN (HCC): Status: ACTIVE | Noted: 2019-09-02

## 2019-09-02 PROBLEM — K21.9 GASTROESOPHAGEAL REFLUX DISEASE WITHOUT ESOPHAGITIS: Status: ACTIVE | Noted: 2019-09-02

## 2019-09-02 NOTE — ASSESSMENT & PLAN NOTE
Currently she is stable she is seeing a specialist and being monitored closely overall she is doing well    She will continue ongoing follow up with Urology

## 2019-09-02 NOTE — ASSESSMENT & PLAN NOTE
Lab Results   Component Value Date    HGBA1C 6 1 08/22/2019       No results for input(s): POCGLU in the last 72 hours  Blood Sugar Average: Last 72 hrs:   I have counselled the pt to follow a healthy and balanced diet ,and recommend routine exercise  I will be ordering diabetic laboratories including comprehensive metabolic panel, hemoglobin A1c, urine microalbumin, lipid panel  Annual eye examination recommended  Diabetic foot examination completed today

## 2019-09-02 NOTE — ASSESSMENT & PLAN NOTE
Assessment and plan 1  Medicare subsequent annual wellness examination overall the patient is clinically stable and doing well, we encouraged the patient to follow a healthy and balanced diet  We recommend that the patient exercise routinely approximately 30 minutes 5 times per week   We have reviewed the patient's vaccines and have made recommendations for updates if necessary consider the new shingles vaccine at the pharmacy, annual flu shot recommended       We will be ordering screening laboratories which are age appropriate  Return to the office in  6 months    call if any problems
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
## 2019-10-12 ENCOUNTER — IMMUNIZATIONS (OUTPATIENT)
Dept: INTERNAL MEDICINE CLINIC | Facility: CLINIC | Age: 74
End: 2019-10-12
Payer: COMMERCIAL

## 2019-10-12 DIAGNOSIS — Z23 ENCOUNTER FOR IMMUNIZATION: ICD-10-CM

## 2019-10-12 PROCEDURE — G0008 ADMIN INFLUENZA VIRUS VAC: HCPCS

## 2019-10-12 PROCEDURE — 90662 IIV NO PRSV INCREASED AG IM: CPT

## 2019-10-25 DIAGNOSIS — I10 ESSENTIAL HYPERTENSION: ICD-10-CM

## 2019-10-25 RX ORDER — LOSARTAN POTASSIUM 100 MG/1
TABLET ORAL
Qty: 90 TABLET | Refills: 0 | Status: SHIPPED | OUTPATIENT
Start: 2019-10-25 | End: 2020-01-25

## 2019-10-25 RX ORDER — HYDROCHLOROTHIAZIDE 12.5 MG/1
TABLET ORAL
Qty: 90 TABLET | Refills: 1 | Status: SHIPPED | OUTPATIENT
Start: 2019-10-25 | End: 2020-04-26

## 2019-10-29 LAB
LEFT EYE DIABETIC RETINOPATHY: NORMAL
RIGHT EYE DIABETIC RETINOPATHY: NORMAL

## 2019-11-12 ENCOUNTER — TELEPHONE (OUTPATIENT)
Dept: UROLOGY | Facility: MEDICAL CENTER | Age: 74
End: 2019-11-12

## 2019-11-12 NOTE — TELEPHONE ENCOUNTER
Patient managed by Dr Rajendra Palma, [facility]  Patient with history of bladder cancer, last seen for office cysto on 8/12/19  "Overall this was a  cystoscopy that demonstrated one potential suspicious area of low concern  "    Patient with pending routine cysto scheduled for 12/10/19  Returned call to   Per , patient started with a painful lump on her lower right abdomen a few days ago  Denies any urinary symptoms  Per , since she has an upcoming appointment with Dr Rajendra Palma, he felt he should call urology regarding this lump  Advised patient, will discuss with provider, but may need to reach out to PCP regarding this, as it might not necessarily be urologic in nature  Advised, will discuss with provider and office will return call with any additional recommendations

## 2019-11-12 NOTE — TELEPHONE ENCOUNTER
Patient of Dr Claudene Fiscal seen in [facility] office  Patient  calling to try and get appointment for wife   states patient has pain on right side and lump  Please advise

## 2019-11-13 NOTE — TELEPHONE ENCOUNTER
Contacted and spoke with patient's , Mary Cheung, in regards to [email or web address] recommendations  Advised Casey to have patient's PCP examine patient instead of urology  Mary Cheung does not want to take patient to PCP  He will wait until 12/10/2019 appointment with Dr Ginger Bloom

## 2019-12-10 ENCOUNTER — PROCEDURE VISIT (OUTPATIENT)
Dept: UROLOGY | Facility: CLINIC | Age: 74
End: 2019-12-10
Payer: COMMERCIAL

## 2019-12-10 VITALS
SYSTOLIC BLOOD PRESSURE: 122 MMHG | HEIGHT: 60 IN | WEIGHT: 150 LBS | DIASTOLIC BLOOD PRESSURE: 70 MMHG | BODY MASS INDEX: 29.45 KG/M2 | HEART RATE: 91 BPM

## 2019-12-10 DIAGNOSIS — C67.9 MALIGNANT NEOPLASM OF URINARY BLADDER, UNSPECIFIED SITE (HCC): Primary | ICD-10-CM

## 2019-12-10 LAB
SL AMB  POCT GLUCOSE, UA: ABNORMAL
SL AMB LEUKOCYTE ESTERASE,UA: ABNORMAL
SL AMB POCT BILIRUBIN,UA: ABNORMAL
SL AMB POCT BLOOD,UA: ABNORMAL
SL AMB POCT CLARITY,UA: CLEAR
SL AMB POCT COLOR,UA: YELLOW
SL AMB POCT KETONES,UA: ABNORMAL
SL AMB POCT NITRITE,UA: ABNORMAL
SL AMB POCT PH,UA: 5
SL AMB POCT SPECIFIC GRAVITY,UA: 1.02
SL AMB POCT URINE PROTEIN: ABNORMAL
SL AMB POCT UROBILINOGEN: ABNORMAL

## 2019-12-10 PROCEDURE — 88112 CYTOPATH CELL ENHANCE TECH: CPT | Performed by: PATHOLOGY

## 2019-12-10 PROCEDURE — 81002 URINALYSIS NONAUTO W/O SCOPE: CPT | Performed by: UROLOGY

## 2019-12-10 PROCEDURE — 52000 CYSTOURETHROSCOPY: CPT | Performed by: UROLOGY

## 2019-12-10 PROCEDURE — 99214 OFFICE O/P EST MOD 30 MIN: CPT | Performed by: UROLOGY

## 2019-12-10 RX ORDER — CEFAZOLIN SODIUM 1 G/50ML
1000 SOLUTION INTRAVENOUS ONCE
Status: CANCELLED | OUTPATIENT
Start: 2020-01-15 | End: 2019-12-10

## 2019-12-10 NOTE — PATIENT INSTRUCTIONS
Transurethral Resection of Bladder Tumors   WHAT YOU NEED TO KNOW:   Transurethral resection of bladder tumors (TURBT) is surgery to remove one or more tumors from your bladder  HOW TO PREPARE:   The week before your surgery:   · Write down the correct date, time, and location of your surgery  · Arrange a ride home  Ask a family member or friend to drive you home after your surgery or procedure  Do not drive yourself home  · Ask your caregiver if you need to stop using aspirin or any other prescribed or over-the-counter medicine before your procedure or surgery  · Bring your medicine bottles or a list of your medicines when you see your caregiver  Tell your caregiver if you are allergic to any medicine  Tell your caregiver if you use any herbs, food supplements, or over-the-counter medicine  · You may need blood or urine tests before your surgery  You may also need a CT scan or a cystoscopy  Talk to your healthcare provider about these or other tests you may need  Write down the date, time, and location for each test   The night before your surgery:  Ask caregivers about directions for eating and drinking  The day of your surgery:   · Ask your caregiver before you take any medicine on the day of your surgery  Bring a list of all the medicines you take, or your pill bottles, with you to the hospital  Caregivers will check that your medicines will not interact poorly with the medicine you need for surgery  · You or a close family member will be asked to sign a legal document called a consent form  It gives caregivers permission to do the procedure or surgery  It also explains the problems that may happen, and your choices  Make sure all your questions are answered before you sign this form  · Caregivers may insert an intravenous tube (IV) into your vein  A vein in the arm is usually chosen  Through the IV tube, you may be given liquids and medicine       · An anesthesiologist will talk to you before your surgery  You may need medicine to keep you asleep or numb an area of your body during surgery  Tell caregivers if you or anyone in your family has had a problem with anesthesia in the past   WHAT WILL HAPPEN:   What will happen: Your surgeon will insert a scope through your urethra and into your bladder  He will put fluid through the scope to wash your bladder and widen it for surgery  The scope will have a wire with an electric current  The current is used to stop bleeding in your bladder and remove bladder tumors  Your surgeon may also remove muscle and tissue from your bladder  He may use the scope to insert medicine into your bladder  The medicine will destroy pieces of tumor in your bladder and help prevent new tumors from growing  Your surgeon will remove the scope  After your surgery: You will be taken to a room to rest until you are fully awake  You will be monitored closely for any problems  Do not get out of bed until your healthcare provider says it is okay  You will then be able to go home or be taken to your hospital room  CONTACT YOUR HEALTHCARE PROVIDER IF:   · You cannot make it to your surgery  · You have a fever  · You get a cold or the flu  · You have questions or concerns about your surgery  SEEK CARE IMMEDIATELY IF:   · You have bleeding from your urethra that does not stop  · You start to urinate less often, very little, or not at all  · You have severe pain in your abdomen or pelvis  RISKS:   You may bleed more than expected or get an infection  Your bladder may be damaged  It may be painful to urinate, or you may have blood in your urine  You may feel discomfort in your abdomen or pelvis  You may feel like you need to urinate more often or without warning  You may develop more bladder tumors  CARE AGREEMENT:   You have the right to help plan your care  Learn about your health condition and how it may be treated   Discuss treatment options with your caregivers to decide what care you want to receive  You always have the right to refuse treatment  © 2017 [employer] Information is for End User's use only and may not be sold, redistributed or otherwise used for commercial purposes  All illustrations and images included in CareNotes® are the copyrighted property of A D A M , Inc  or Dutch Cheney  The above information is an  only  It is not intended as medical advice for individual conditions or treatments  Talk to your doctor, nurse or pharmacist before following any medical regimen to see if it is safe and effective for you

## 2019-12-10 NOTE — PROGRESS NOTES
UROLOGY FOLLOWUP NOTE     CHIEF COMPLAINT   Isis Randolph is a 75 y o  female with a complaint of   Chief Complaint   Patient presents with    Cystoscopy       History of Present Illness:     75 y o  female with a history of high-grade TA bladder cancer diagnosed and resected 9/27/2007  She has been undergoing routine surveillance cystoscopy at [facility]  Her last cystoscopy was in April of 2019  Given the difficulty with transportation, she was referred for more local cystoscopic surveillance  Patient has been having some lower abdominal discomfort and burning with urination  She was started on Keflex by her primary care team and then was seen in the emergency room on the 13th  Urine was tested and negative for microscopic blood or infection  Patient is still having persistent symptoms  She denies prior history of smoking or heavy alcohol use  She is anxious about the symptoms and her history of bladder cancer  At last visit, 8/2019, the patient had a small (<0 5cm) area of sessile erythema lateral to the right ureteral orifice  I offered her the option of proceeding to the operating room for biopsy at that time  The patient was shortly traveling to [place] and preferred to reassess when she returns  Returns today for repeat cystoscopy       Past Medical History:     Past Medical History:   Diagnosis Date    Cancer ([facility] [address] )     bladder cancer     Hyperlipidemia     Hypertension        PAST SURGICAL HISTORY:     Past Surgical History:   Procedure Laterality Date    APPENDECTOMY      VEIN LIGATION AND STRIPPING         CURRENT MEDICATIONS:     Current Outpatient Medications   Medication Sig Dispense Refill    albuterol (PROVENTIL HFA,VENTOLIN HFA) 90 mcg/act inhaler Inhale 2 puffs every 6 (six) hours as needed for wheezing 1 each 0    alendronate (FOSAMAX) 10 mg tablet Take 1 tablet (10 mg total) by mouth weekly before breakfast 4 tablet 6    benzonatate (TESSALON PERLES) 100 mg capsule Take 1 capsule (100 mg total) by mouth 3 (three) times a day as needed for cough 20 capsule 0    calcium-vitamin D 250-100 MG-UNIT per tablet Take 1 tablet by mouth 2 (two) times a day      Cholecalciferol (VITAMIN D3) 1000 units CAPS Take 1 capsule by mouth daily      famotidine (PEPCID) 40 MG tablet Take 0 5 tablets (20 mg total) by mouth daily 90 tablet 0    fexofenadine (ALLEGRA) 180 MG tablet Take 1 tablet by mouth daily as needed      hydrochlorothiazide (HYDRODIURIL) 12 5 mg tablet Take 1 tablet (12 5 mg total) by mouth daily 90 tablet 0    hydrochlorothiazide (HYDRODIURIL) 12 5 mg tablet TAKE ONE TABLET BY MOUTH EVERY DAY 90 tablet 1    losartan (COZAAR) 100 MG tablet Take 1 tablet (100 mg total) by mouth daily 90 tablet 0    losartan (COZAAR) 100 MG tablet TAKE ONE TABLET BY MOUTH EVERY DAY 90 tablet 0    multivitamin-minerals (CENTRUM ADULTS) tablet Take 1 tablet by mouth daily      omeprazole (PriLOSEC) 40 MG capsule Take 1 capsule (40 mg total) by mouth daily 30 capsule 6    phenazopyridine (PYRIDIUM) 100 mg tablet Take 1 tablet (100 mg total) by mouth 3 (three) times a day as needed for bladder spasms 20 tablet 0    simvastatin (ZOCOR) 20 mg tablet Take 1 tablet (20 mg total) by mouth daily 90 tablet 3    terconazole (TERAZOL 3) 0 8 % vaginal cream Insert 1 applicator into the vagina daily at bedtime 20 g 0     No current facility-administered medications for this visit          ALLERGIES:     Allergies   Allergen Reactions    Pollen Extract        SOCIAL HISTORY:     Social History     Socioeconomic History    Marital status: /Civil Union     Spouse name: None    Number of children: None    Years of education: None    Highest education level: None   Occupational History    None   Social Needs    Financial resource strain: None    Food insecurity:     Worry: None     Inability: None    Transportation needs:     Medical: None     Non-medical: None   Tobacco Use    Smoking status: Never Smoker    Smokeless tobacco: Never Used   Substance and Sexual Activity    Alcohol use: No    Drug use: No    Sexual activity: None   Lifestyle    Physical activity:     Days per week: None     Minutes per session: None    Stress: None   Relationships    Social connections:     Talks on phone: None     Gets together: None     Attends Holiness service: None     Active member of club or organization: None     Attends meetings of clubs or organizations: None     Relationship status: None    Intimate partner violence:     Fear of current or ex partner: None     Emotionally abused: None     Physically abused: None     Forced sexual activity: None   Other Topics Concern    None   Social History Narrative    None       SOCIAL HISTORY:     Family History   Problem Relation Age of Onset    Heart disease Mother     Hypertension Mother        REVIEW OF SYSTEMS:     Review of Systems   Constitutional: Negative  Respiratory: Negative  Cardiovascular: Negative  Gastrointestinal: Negative  Genitourinary: Negative for dysuria and hematuria  Musculoskeletal: Negative  Skin: Negative  Psychiatric/Behavioral: Negative  PHYSICAL EXAM:     /70   Pulse 91   Ht 5' (1 524 m)   Wt 68 kg (150 lb)   LMP  (LMP Unknown)   BMI 29 29 kg/m²     General:  Healthy appearing female in no acute distress  They have a normal affect  There is not appear to be any gross neurologic defects or abnormalities  HEENT:  Normocephalic, atraumatic  Neck is supple without any palpable lymphadenopathy  Cardiovascular:  Patient has normal palpable distal radial pulses  There is no significant peripheral edema  No JVD is noted  Respiratory:  Patient has unlabored respirations  There is no audible wheeze or rhonchi  Abdomen:  Abdomen is [id number] surgical scars  Abdomen is soft and nontender  There is no tympany  Inguinal and umbilical hernia are not appreciated      Genitourinary: Normal external female genitalia, slightly atrophic vaginal introitus, some signs of mild lichen sclerosis of the urethra    Musculoskeletal:  Patient does not have significant CVA tenderness in the  flank with palpation or percussion  They full range of motion in all 4 extremities  Strength in all 4 extremities appears congruent  Patient is able to ambulate without assistance or difficulty  Dermatologic:  Patient has no skin abnormalities or rashes  LABS:     CBC:   Lab Results   Component Value Date    WBC 8 99 2019    HGB 13 3 2019    HCT 39 7 2019    MCV 92 2019     2019       BMP:   Lab Results   Component Value Date    GLUCOSE 111 2015    CALCIUM 9 1 2019     2015    K 4 0 2019    CO2 28 2019     2019    BUN 16 2019    CREATININE 0 67 2019       IMAGIN/13/19  CT ABDOMEN AND PELVIS WITH IV CONTRAST     INDICATION:   Bilateral lower abdominal pain, significant left lower quadrant tenderness, dysuria      COMPARISON:  2017 CT, 2017 MRI     TECHNIQUE:  CT examination of the abdomen and pelvis was performed  Axial, sagittal, and coronal 2D reformatted images were created from the source data and submitted for interpretation      Radiation dose length product (DLP) for this visit:  390 mGy-cm   This examination, like all CT scans performed in the [facility], was performed utilizing techniques to minimize radiation dose exposure, including the use of iterative   reconstruction and automated exposure control      IV Contrast:  100 mL of iohexol (OMNIPAQUE)  Enteric Contrast:  Enteric contrast was not administered      FINDINGS:     ABDOMEN     LOWER CHEST:  No clinically significant abnormality identified in the visualized lower chest      LIVER/BILIARY TREE:  Fatty liver noted   Tiny left hepatic dome hypodensity on , previously characterized as a simple cyst      GALLBLADDER:  No calcified gallstones  No pericholecystic inflammatory change      SPLEEN:  Unremarkable      PANCREAS:  Unremarkable      ADRENAL GLANDS:  Unremarkable      KIDNEYS/URETERS:  One or more sharply circumscribed subcentimeter renal hypodensities are noted  These lesions are too small to accurately characterize, but are statistically most likely to represent benign cortical renal cyst(s)  According to the   guidelines published in the [facility] Paper of the ACR Incidental Findings Committee (Radiology 2010), no further workup of these lesions is recommended      STOMACH AND BOWEL:  Small sliding-type hiatal hernia      APPENDIX:  No findings to suggest appendicitis      ABDOMINOPELVIC CAVITY:  No ascites or free intraperitoneal air  No lymphadenopathy      VESSELS:  Unremarkable for patient's age      PELVIS     REPRODUCTIVE ORGANS:  Right adnexal cyst noted, also seen on MRI dated 8/21/2017 and CT dated 8/9/2017     URINARY BLADDER:  Unremarkable      ABDOMINAL WALL/INGUINAL REGIONS:  Left inguinal fat only containing hernia      OSSEOUS STRUCTURES:  No acute fracture or destructive osseous lesion      IMPRESSION:  No acute findings  No findings to account for lower abdominal pain        PATHOLOGY:     9/2017  Final Diagnosis:  1  Bladder tumor near orifice, transurethral resection:  Non-invasive high-grade papillary urothelial carcinoma  - Muscularis propria is identified and uninvolved by carcinoma  2  Posterior bladder wall, biopsy:  Benign urothelium with no tumor seen  - Muscularis propria is identified  3  Lateral bladder wall, biopsy:  Benign urothelium with no tumor seen  - Muscularis propria is identified  DBF/wf/mm    CYTOLOGY:     Final Diagnosis   A  Urine, Clean Catch:  Negative for high grade urothelial carcinoma ([address]) - see comment  Degenerated benign urothelial cells, squamous cells, few mixed inflammatory cells and red blood cells      Satisfactory for evaluation  PROCEDURE:     SEE NOTE    ASSESSMENT:     75 y o  female with history of HGTa bladder cancer    PLAN:       Patient has persistence of some papillary tissue lateral to the right ureteral orifice  I have recommended more strongly that the patient consider cystoscopy with transurethral resection of this area of the rod trigone with postprocedural instillation of mitomycin-C  If we need to resect through the ureteral orifice, retrograde pyelogram and stent placement would be required  Patient understands the plan and the risks of surgery  She has signed informed consent today

## 2019-12-10 NOTE — PROGRESS NOTES
Cystoscopy  Date/Time: 12/10/2019 9:15 AM  Performed by: Opal Sandoval MD  Authorized by: Opal Sandoval MD     Procedure details: cystoscopy    Patient tolerance: Patient tolerated the procedure well with no immediate complications    Additional Procedure Details:   Cystoscopy Procedure note    Risk and benefits of flexible cystoscopy were discussed  Informed consent was obtained  A urine dip is adequate for cystoscopy  The patient was placed into the modified supine position  Her genitalia was prepped with Betadine and draped in a sterile fashion  Viscous 2% lidocaine was instilled into the urethra and allowed dwell time for local anesthesia  Flexible cystoscopy was then performed with a 16F scope  Urethra was inspected on entrance and exit of the scope  The bladder was thoroughly inspected in a 360 degree fashion  Both ureteral orifices were visualized in their orthotopic location with clear efflux of urine  The bladder mucosa was thoroughly inspected  Lateral to the right ureteral orifice there is some small papillary changes consistent with prior cystoscopy  Retroflexion for evaluation of the bladder neck was normal       Overall this was a   Cystoscopy concerning for early papillary recurrence lateral of the right ureteral orifice     A female office staff member was present throughout the entire procedure

## 2020-01-06 NOTE — PROGRESS NOTES
Pre-op visit  1/7/2020      Chief Complaint   Patient presents with    bladder mass       Assessment and Plan     75 y o  female managed by Dr Reyna Coronado    1  Bladder mass    History and physical was performed for the patients upcoming TURBT scheduled for 1/15/20 with Dr Reyna Coronado  The patient has yet to undergo PATs, she will go to the hospital to do this now  All questions and concerns regarding surgery have been addressed and answered  Will proceed with surgery as planned as long as PATs are within normal limits  History of Present Illness  Isis Randolph is a 75 y o  female here for history and physical prior to their upcoming TURBT for papillary tissue lateral to the right ureteral orifice  The patient has had no overall changes in their health since their last visit and denies any prior complications with anesthesia  Review of Systems   Constitutional: Negative for activity change, chills and fever  Gastrointestinal: Negative for abdominal distention and abdominal pain  Musculoskeletal: Negative for back pain and gait problem  Psychiatric/Behavioral: Negative for behavioral problems and confusion         Past Medical History  Past Medical History:   Diagnosis Date    Cancer [facility])     bladder cancer     Hyperlipidemia     Hypertension        Past Social History  Past Surgical History:   Procedure Laterality Date    APPENDECTOMY      VEIN LIGATION AND STRIPPING         Past Family History  Family History   Problem Relation Age of Onset    Heart disease Mother     Hypertension Mother        Past Social history  Social History     Socioeconomic History    Marital status: /Civil Union     Spouse name: Not on file    Number of children: Not on file    Years of education: Not on file    Highest education level: Not on file   Occupational History    Not on file   Social Needs    Financial resource strain: Not on file    Food insecurity:     Worry: Not on file Inability: Not on file    Transportation needs:     Medical: Not on file     Non-medical: Not on file   Tobacco Use    Smoking status: Never Smoker    Smokeless tobacco: Never Used   Substance and Sexual Activity    Alcohol use: No    Drug use: No    Sexual activity: Not on file   Lifestyle    Physical activity:     Days per week: Not on file     Minutes per session: Not on file    Stress: Not on file   Relationships    Social connections:     Talks on phone: Not on file     Gets together: Not on file     Attends Moravian service: Not on file     Active member of club or organization: Not on file     Attends meetings of clubs or organizations: Not on file     Relationship status: Not on file    Intimate partner violence:     Fear of current or ex partner: Not on file     Emotionally abused: Not on file     Physically abused: Not on file     Forced sexual activity: Not on file   Other Topics Concern    Not on file   Social History Narrative    Not on file       Current Medications  Current Outpatient Medications   Medication Sig Dispense Refill    albuterol (PROVENTIL HFA,VENTOLIN HFA) 90 mcg/act inhaler Inhale 2 puffs every 6 (six) hours as needed for wheezing 1 each 0    alendronate (FOSAMAX) 10 mg tablet Take 1 tablet (10 mg total) by mouth weekly before breakfast 4 tablet 6    benzonatate (TESSALON PERLES) 100 mg capsule Take 1 capsule (100 mg total) by mouth 3 (three) times a day as needed for cough 20 capsule 0    calcium-vitamin D 250-100 MG-UNIT per tablet Take 1 tablet by mouth 2 (two) times a day      Cholecalciferol (VITAMIN D3) 1000 units CAPS Take 1 capsule by mouth daily      famotidine (PEPCID) 40 MG tablet Take 0 5 tablets (20 mg total) by mouth daily 90 tablet 0    fexofenadine (ALLEGRA) 180 MG tablet Take 1 tablet by mouth daily as needed      hydrochlorothiazide (HYDRODIURIL) 12 5 mg tablet Take 1 tablet (12 5 mg total) by mouth daily 90 tablet 0    hydrochlorothiazide (HYDRODIURIL) 12 5 mg tablet TAKE ONE TABLET BY MOUTH EVERY DAY 90 tablet 1    losartan (COZAAR) 100 MG tablet Take 1 tablet (100 mg total) by mouth daily 90 tablet 0    losartan (COZAAR) 100 MG tablet TAKE ONE TABLET BY MOUTH EVERY DAY 90 tablet 0    multivitamin-minerals (CENTRUM ADULTS) tablet Take 1 tablet by mouth daily      omeprazole (PriLOSEC) 40 MG capsule Take 1 capsule (40 mg total) by mouth daily 30 capsule 6    phenazopyridine (PYRIDIUM) 100 mg tablet Take 1 tablet (100 mg total) by mouth 3 (three) times a day as needed for bladder spasms 20 tablet 0    simvastatin (ZOCOR) 20 mg tablet Take 1 tablet (20 mg total) by mouth daily 90 tablet 3    terconazole (TERAZOL 3) 0 8 % vaginal cream Insert 1 applicator into the vagina daily at bedtime 20 g 0     No current facility-administered medications for this visit  Allergies  Allergies   Allergen Reactions    Pollen Extract          Past Medical History, Social History, Family History, medications and allergies were reviewed  Vitals  Vitals:    01/07/20 1455   BP: 124/76   BP Location: Left arm   Patient Position: Sitting   Cuff Size: Standard   Pulse: 90   Weight: 68 9 kg (152 lb)   Height: 5' (1 524 m)         Physical Exam  Constitutional   General appearance: Patient is seated and in no acute distress, well appearing and well nourished  Head and Face   Head and face: Normal     Eyes   Conjunctiva and lids: No erythema, swelling or discharge  Ears, Nose, Mouth, and Throat   Hearing: Normal     Pulmonary   Lungs: Clear to auscultation with no wheezes, rhonchi, or rales  Respiratory effort: No increased work of breathing or signs of respiratory distress  Cardiovascular   Heart: Regular rate and rhythm, no gallops, no murmurs  Examination of extremities for edema and/or varicosities: Normal     Abdomen   Abdomen: Non-tender, no masses      Musculoskeletal   Gait and station: Normal      Skin   Skin and subcutaneous tissue: Warm, dry, and intact  No visible rashes or lesions     Psychiatric   Mood and affect: Normal

## 2020-01-06 NOTE — H&P (VIEW-ONLY)
Pre-op visit  1/7/2020      Chief Complaint   Patient presents with    bladder mass       Assessment and Plan     75 y o  female managed by Dr Tati Corral    1  Bladder mass    History and physical was performed for the patients upcoming TURBT scheduled for 1/15/20 with Dr Tati Corral  The patient has yet to undergo PATs, she will go to the hospital to do this now  All questions and concerns regarding surgery have been addressed and answered  Will proceed with surgery as planned as long as PATs are within normal limits  History of Present Illness  Mariellen Canavan is a 75 y o  female here for history and physical prior to their upcoming TURBT for papillary tissue lateral to the right ureteral orifice  The patient has had no overall changes in their health since their last visit and denies any prior complications with anesthesia  Review of Systems   Constitutional: Negative for activity change, chills and fever  Gastrointestinal: Negative for abdominal distention and abdominal pain  Musculoskeletal: Negative for back pain and gait problem  Psychiatric/Behavioral: Negative for behavioral problems and confusion         Past Medical History  Past Medical History:   Diagnosis Date    Cancer [facility])     bladder cancer     Hyperlipidemia     Hypertension        Past Social History  Past Surgical History:   Procedure Laterality Date    APPENDECTOMY      VEIN LIGATION AND STRIPPING         Past Family History  Family History   Problem Relation Age of Onset    Heart disease Mother     Hypertension Mother        Past Social history  Social History     Socioeconomic History    Marital status: /Civil Union     Spouse name: Not on file    Number of children: Not on file    Years of education: Not on file    Highest education level: Not on file   Occupational History    Not on file   Social Needs    Financial resource strain: Not on file    Food insecurity:     Worry: Not on file Inability: Not on file    Transportation needs:     Medical: Not on file     Non-medical: Not on file   Tobacco Use    Smoking status: Never Smoker    Smokeless tobacco: Never Used   Substance and Sexual Activity    Alcohol use: No    Drug use: No    Sexual activity: Not on file   Lifestyle    Physical activity:     Days per week: Not on file     Minutes per session: Not on file    Stress: Not on file   Relationships    Social connections:     Talks on phone: Not on file     Gets together: Not on file     Attends Mu-ism service: Not on file     Active member of club or organization: Not on file     Attends meetings of clubs or organizations: Not on file     Relationship status: Not on file    Intimate partner violence:     Fear of current or ex partner: Not on file     Emotionally abused: Not on file     Physically abused: Not on file     Forced sexual activity: Not on file   Other Topics Concern    Not on file   Social History Narrative    Not on file       Current Medications  Current Outpatient Medications   Medication Sig Dispense Refill    albuterol (PROVENTIL HFA,VENTOLIN HFA) 90 mcg/act inhaler Inhale 2 puffs every 6 (six) hours as needed for wheezing 1 each 0    alendronate (FOSAMAX) 10 mg tablet Take 1 tablet (10 mg total) by mouth weekly before breakfast 4 tablet 6    benzonatate (TESSALON PERLES) 100 mg capsule Take 1 capsule (100 mg total) by mouth 3 (three) times a day as needed for cough 20 capsule 0    calcium-vitamin D 250-100 MG-UNIT per tablet Take 1 tablet by mouth 2 (two) times a day      Cholecalciferol (VITAMIN D3) 1000 units CAPS Take 1 capsule by mouth daily      famotidine (PEPCID) 40 MG tablet Take 0 5 tablets (20 mg total) by mouth daily 90 tablet 0    fexofenadine (ALLEGRA) 180 MG tablet Take 1 tablet by mouth daily as needed      hydrochlorothiazide (HYDRODIURIL) 12 5 mg tablet Take 1 tablet (12 5 mg total) by mouth daily 90 tablet 0    hydrochlorothiazide (HYDRODIURIL) 12 5 mg tablet TAKE ONE TABLET BY MOUTH EVERY DAY 90 tablet 1    losartan (COZAAR) 100 MG tablet Take 1 tablet (100 mg total) by mouth daily 90 tablet 0    losartan (COZAAR) 100 MG tablet TAKE ONE TABLET BY MOUTH EVERY DAY 90 tablet 0    multivitamin-minerals (CENTRUM ADULTS) tablet Take 1 tablet by mouth daily      omeprazole (PriLOSEC) 40 MG capsule Take 1 capsule (40 mg total) by mouth daily 30 capsule 6    phenazopyridine (PYRIDIUM) 100 mg tablet Take 1 tablet (100 mg total) by mouth 3 (three) times a day as needed for bladder spasms 20 tablet 0    simvastatin (ZOCOR) 20 mg tablet Take 1 tablet (20 mg total) by mouth daily 90 tablet 3    terconazole (TERAZOL 3) 0 8 % vaginal cream Insert 1 applicator into the vagina daily at bedtime 20 g 0     No current facility-administered medications for this visit  Allergies  Allergies   Allergen Reactions    Pollen Extract          Past Medical History, Social History, Family History, medications and allergies were reviewed  Vitals  Vitals:    01/07/20 1455   BP: 124/76   BP Location: Left arm   Patient Position: Sitting   Cuff Size: Standard   Pulse: 90   Weight: 68 9 kg (152 lb)   Height: 5' (1 524 m)         Physical Exam  Constitutional   General appearance: Patient is seated and in no acute distress, well appearing and well nourished  Head and Face   Head and face: Normal     Eyes   Conjunctiva and lids: No erythema, swelling or discharge  Ears, Nose, Mouth, and Throat   Hearing: Normal     Pulmonary   Lungs: Clear to auscultation with no wheezes, rhonchi, or rales  Respiratory effort: No increased work of breathing or signs of respiratory distress  Cardiovascular   Heart: Regular rate and rhythm, no gallops, no murmurs  Examination of extremities for edema and/or varicosities: Normal     Abdomen   Abdomen: Non-tender, no masses      Musculoskeletal   Gait and station: Normal      Skin   Skin and subcutaneous tissue: Warm, dry, and intact  No visible rashes or lesions     Psychiatric   Mood and affect: Normal

## 2020-01-07 ENCOUNTER — HOSPITAL ENCOUNTER (OUTPATIENT)
Dept: NON INVASIVE DIAGNOSTICS | Facility: HOSPITAL | Age: 75
Discharge: HOME | End: 2020-01-07
Attending: UROLOGY
Payer: COMMERCIAL

## 2020-01-07 ENCOUNTER — OFFICE VISIT (OUTPATIENT)
Dept: UROLOGY | Facility: MEDICAL CENTER | Age: 75
End: 2020-01-07
Payer: COMMERCIAL

## 2020-01-07 ENCOUNTER — APPOINTMENT (OUTPATIENT)
Dept: LAB | Facility: HOSPITAL | Age: 75
End: 2020-01-07
Attending: UROLOGY
Payer: COMMERCIAL

## 2020-01-07 VITALS
BODY MASS INDEX: 29.84 KG/M2 | SYSTOLIC BLOOD PRESSURE: 124 MMHG | WEIGHT: 152 LBS | DIASTOLIC BLOOD PRESSURE: 76 MMHG | HEIGHT: 60 IN | HEART RATE: 90 BPM

## 2020-01-07 DIAGNOSIS — C67.9 MALIGNANT NEOPLASM OF URINARY BLADDER, UNSPECIFIED SITE (HCC): Primary | ICD-10-CM

## 2020-01-07 DIAGNOSIS — C67.9 MALIGNANT NEOPLASM OF URINARY BLADDER, UNSPECIFIED SITE (HCC): ICD-10-CM

## 2020-01-07 LAB
ANION GAP SERPL CALCULATED.3IONS-SCNC: 8 MMOL/L (ref 4–13)
APTT PPP: 33 SECONDS (ref 23–37)
BASOPHILS # BLD AUTO: 0.09 THOUSANDS/ΜL (ref 0–0.1)
BASOPHILS NFR BLD AUTO: 1 % (ref 0–1)
BUN SERPL-MCNC: 20 MG/DL (ref 5–25)
CALCIUM SERPL-MCNC: 9.1 MG/DL (ref 8.3–10.1)
CHLORIDE SERPL-SCNC: 103 MMOL/L (ref 100–108)
CO2 SERPL-SCNC: 32 MMOL/L (ref 21–32)
CREAT SERPL-MCNC: 0.7 MG/DL (ref 0.6–1.3)
EOSINOPHIL # BLD AUTO: 0.35 THOUSAND/ΜL (ref 0–0.61)
EOSINOPHIL NFR BLD AUTO: 4 % (ref 0–6)
ERYTHROCYTE [DISTWIDTH] IN BLOOD BY AUTOMATED COUNT: 12.7 % (ref 11.6–15.1)
EST. AVERAGE GLUCOSE BLD GHB EST-MCNC: 123 MG/DL
GFR SERPL CREATININE-BSD FRML MDRD: 85 ML/MIN/1.73SQ M
GLUCOSE SERPL-MCNC: 111 MG/DL (ref 65–140)
HBA1C MFR BLD: 5.9 % (ref 4.2–6.3)
HCT VFR BLD AUTO: 41.6 % (ref 34.8–46.1)
HGB BLD-MCNC: 13.6 G/DL (ref 11.5–15.4)
IMM GRANULOCYTES # BLD AUTO: 0.03 THOUSAND/UL (ref 0–0.2)
IMM GRANULOCYTES NFR BLD AUTO: 0 % (ref 0–2)
INR PPP: 0.97 (ref 0.84–1.19)
LYMPHOCYTES # BLD AUTO: 3.49 THOUSANDS/ΜL (ref 0.6–4.47)
LYMPHOCYTES NFR BLD AUTO: 38 % (ref 14–44)
MCH RBC QN AUTO: 30.7 PG (ref 26.8–34.3)
MCHC RBC AUTO-ENTMCNC: 32.7 G/DL (ref 31.4–37.4)
MCV RBC AUTO: 94 FL (ref 82–98)
MONOCYTES # BLD AUTO: 0.72 THOUSAND/ΜL (ref 0.17–1.22)
MONOCYTES NFR BLD AUTO: 8 % (ref 4–12)
NEUTROPHILS # BLD AUTO: 4.54 THOUSANDS/ΜL (ref 1.85–7.62)
NEUTS SEG NFR BLD AUTO: 49 % (ref 43–75)
NRBC BLD AUTO-RTO: 0 /100 WBCS
PLATELET # BLD AUTO: 331 THOUSANDS/UL (ref 149–390)
PMV BLD AUTO: 9.2 FL (ref 8.9–12.7)
POTASSIUM SERPL-SCNC: 3.5 MMOL/L (ref 3.5–5.3)
PROTHROMBIN TIME: 13 SECONDS (ref 11.6–14.5)
RBC # BLD AUTO: 4.43 MILLION/UL (ref 3.81–5.12)
SODIUM SERPL-SCNC: 143 MMOL/L (ref 136–145)
WBC # BLD AUTO: 9.22 THOUSAND/UL (ref 4.31–10.16)

## 2020-01-07 PROCEDURE — 85025 COMPLETE CBC W/AUTO DIFF WBC: CPT

## 2020-01-07 PROCEDURE — 83036 HEMOGLOBIN GLYCOSYLATED A1C: CPT

## 2020-01-07 PROCEDURE — 87086 URINE CULTURE/COLONY COUNT: CPT

## 2020-01-07 PROCEDURE — 93005 ELECTROCARDIOGRAM TRACING: CPT

## 2020-01-07 PROCEDURE — 36415 COLL VENOUS BLD VENIPUNCTURE: CPT

## 2020-01-07 PROCEDURE — 99213 OFFICE O/P EST LOW 20 MIN: CPT | Performed by: PHYSICIAN ASSISTANT

## 2020-01-07 PROCEDURE — 85730 THROMBOPLASTIN TIME PARTIAL: CPT

## 2020-01-07 PROCEDURE — 85610 PROTHROMBIN TIME: CPT

## 2020-01-07 PROCEDURE — 1160F RVW MEDS BY RX/DR IN RCRD: CPT | Performed by: PHYSICIAN ASSISTANT

## 2020-01-07 PROCEDURE — 80048 BASIC METABOLIC PNL TOTAL CA: CPT

## 2020-01-08 LAB
ATRIAL RATE: 83 BPM
BACTERIA UR CULT: NORMAL
P AXIS: 47 DEGREES
PR INTERVAL: 154 MS
QRS AXIS: 4 DEGREES
QRSD INTERVAL: 78 MS
QT INTERVAL: 394 MS
QTC INTERVAL: 462 MS
T WAVE AXIS: 42 DEGREES
VENTRICULAR RATE: 83 BPM

## 2020-01-08 PROCEDURE — 93010 ELECTROCARDIOGRAM REPORT: CPT

## 2020-01-08 NOTE — PRE-PROCEDURE INSTRUCTIONS
Pre-Surgery Instructions:   Medication Instructions    calcium-vitamin D 250-100 MG-UNIT per tablet Instructed patient per Anesthesia Guidelines   Cholecalciferol (VITAMIN D3) 1000 units CAPS Instructed patient per Anesthesia Guidelines   cyanocobalamin (VITAMIN B-12) 1000 MCG tablet Instructed patient per Anesthesia Guidelines   famotidine (PEPCID) 40 MG tablet Instructed patient per Anesthesia Guidelines   fexofenadine (ALLEGRA) 180 MG tablet Instructed patient per Anesthesia Guidelines   hydrochlorothiazide (HYDRODIURIL) 12 5 mg tablet Instructed patient per Anesthesia Guidelines   losartan (COZAAR) 100 MG tablet Instructed patient per Anesthesia Guidelines   multivitamin-minerals (CENTRUM ADULTS) tablet Instructed patient per Anesthesia Guidelines   simvastatin (ZOCOR) 20 mg tablet Instructed patient per Anesthesia Guidelines  Instructed to take Famotidine with sip of water the morning of surgery per anesthesia guidelines  No aspirin, NSAIDs, vitamins, or supplements 1 week before surgery

## 2020-01-14 ENCOUNTER — ANESTHESIA EVENT (OUTPATIENT)
Dept: PERIOP | Facility: HOSPITAL | Age: 75
End: 2020-01-14
Payer: COMMERCIAL

## 2020-01-15 ENCOUNTER — APPOINTMENT (OUTPATIENT)
Dept: RADIOLOGY | Facility: HOSPITAL | Age: 75
End: 2020-01-15
Payer: COMMERCIAL

## 2020-01-15 ENCOUNTER — ANESTHESIA (OUTPATIENT)
Dept: PERIOP | Facility: HOSPITAL | Age: 75
End: 2020-01-15
Payer: COMMERCIAL

## 2020-01-15 ENCOUNTER — HOSPITAL ENCOUNTER (OUTPATIENT)
Facility: HOSPITAL | Age: 75
Setting detail: OUTPATIENT SURGERY
Discharge: HOME | End: 2020-01-15
Attending: UROLOGY | Admitting: UROLOGY
Payer: COMMERCIAL

## 2020-01-15 VITALS
SYSTOLIC BLOOD PRESSURE: 137 MMHG | TEMPERATURE: 98.7 F | HEART RATE: 80 BPM | BODY MASS INDEX: 29.84 KG/M2 | OXYGEN SATURATION: 96 % | WEIGHT: 152 LBS | RESPIRATION RATE: 17 BRPM | HEIGHT: 60 IN | DIASTOLIC BLOOD PRESSURE: 65 MMHG

## 2020-01-15 DIAGNOSIS — C67.9 MALIGNANT NEOPLASM OF URINARY BLADDER, UNSPECIFIED SITE (HCC): ICD-10-CM

## 2020-01-15 PROCEDURE — 52224 CYSTOSCOPY AND TREATMENT: CPT | Performed by: UROLOGY

## 2020-01-15 PROCEDURE — 88112 CYTOPATH CELL ENHANCE TECH: CPT | Performed by: PATHOLOGY

## 2020-01-15 PROCEDURE — 88305 TISSUE EXAM BY PATHOLOGIST: CPT | Performed by: PATHOLOGY

## 2020-01-15 RX ORDER — CEFAZOLIN SODIUM 1 G/50ML
1000 SOLUTION INTRAVENOUS ONCE
Status: COMPLETED | OUTPATIENT
Start: 2020-01-15 | End: 2020-01-15

## 2020-01-15 RX ORDER — ONDANSETRON 2 MG/ML
INJECTION INTRAMUSCULAR; INTRAVENOUS AS NEEDED
Status: DISCONTINUED | OUTPATIENT
Start: 2020-01-15 | End: 2020-01-15 | Stop reason: SURG

## 2020-01-15 RX ORDER — MAGNESIUM HYDROXIDE 1200 MG/15ML
LIQUID ORAL AS NEEDED
Status: DISCONTINUED | OUTPATIENT
Start: 2020-01-15 | End: 2020-01-15 | Stop reason: HOSPADM

## 2020-01-15 RX ORDER — LIDOCAINE HYDROCHLORIDE 20 MG/ML
INJECTION, SOLUTION EPIDURAL; INFILTRATION; INTRACAUDAL; PERINEURAL AS NEEDED
Status: DISCONTINUED | OUTPATIENT
Start: 2020-01-15 | End: 2020-01-15 | Stop reason: SURG

## 2020-01-15 RX ORDER — DEXAMETHASONE SODIUM PHOSPHATE 4 MG/ML
INJECTION, SOLUTION INTRA-ARTICULAR; INTRALESIONAL; INTRAMUSCULAR; INTRAVENOUS; SOFT TISSUE AS NEEDED
Status: DISCONTINUED | OUTPATIENT
Start: 2020-01-15 | End: 2020-01-15 | Stop reason: SURG

## 2020-01-15 RX ORDER — FENTANYL CITRATE/PF 50 MCG/ML
50 SYRINGE (ML) INJECTION
Status: DISCONTINUED | OUTPATIENT
Start: 2020-01-15 | End: 2020-01-15 | Stop reason: HOSPADM

## 2020-01-15 RX ORDER — LIDOCAINE HYDROCHLORIDE 20 MG/ML
JELLY TOPICAL AS NEEDED
Status: DISCONTINUED | OUTPATIENT
Start: 2020-01-15 | End: 2020-01-15 | Stop reason: HOSPADM

## 2020-01-15 RX ORDER — PROPOFOL 10 MG/ML
INJECTION, EMULSION INTRAVENOUS AS NEEDED
Status: DISCONTINUED | OUTPATIENT
Start: 2020-01-15 | End: 2020-01-15 | Stop reason: SURG

## 2020-01-15 RX ORDER — PHENAZOPYRIDINE HYDROCHLORIDE 100 MG/1
100 TABLET, FILM COATED ORAL ONCE
Status: COMPLETED | OUTPATIENT
Start: 2020-01-15 | End: 2020-01-15

## 2020-01-15 RX ORDER — SODIUM CHLORIDE 9 MG/ML
125 INJECTION, SOLUTION INTRAVENOUS CONTINUOUS
Status: DISCONTINUED | OUTPATIENT
Start: 2020-01-15 | End: 2020-01-15 | Stop reason: HOSPADM

## 2020-01-15 RX ORDER — FENTANYL CITRATE 50 UG/ML
INJECTION, SOLUTION INTRAMUSCULAR; INTRAVENOUS AS NEEDED
Status: DISCONTINUED | OUTPATIENT
Start: 2020-01-15 | End: 2020-01-15 | Stop reason: SURG

## 2020-01-15 RX ORDER — PHENAZOPYRIDINE HYDROCHLORIDE 100 MG/1
100 TABLET, FILM COATED ORAL 3 TIMES DAILY PRN
Qty: 20 TABLET | Refills: 0 | Status: SHIPPED | OUTPATIENT
Start: 2020-01-15 | End: 2020-03-24

## 2020-01-15 RX ORDER — MIDAZOLAM HYDROCHLORIDE 2 MG/2ML
INJECTION, SOLUTION INTRAMUSCULAR; INTRAVENOUS AS NEEDED
Status: DISCONTINUED | OUTPATIENT
Start: 2020-01-15 | End: 2020-01-15 | Stop reason: SURG

## 2020-01-15 RX ORDER — TAMSULOSIN HYDROCHLORIDE 0.4 MG/1
0.4 CAPSULE ORAL ONCE
Status: COMPLETED | OUTPATIENT
Start: 2020-01-15 | End: 2020-01-15

## 2020-01-15 RX ORDER — ONDANSETRON 2 MG/ML
4 INJECTION INTRAMUSCULAR; INTRAVENOUS ONCE AS NEEDED
Status: DISCONTINUED | OUTPATIENT
Start: 2020-01-15 | End: 2020-01-15 | Stop reason: HOSPADM

## 2020-01-15 RX ADMIN — DEXAMETHASONE SODIUM PHOSPHATE 4 MG: 4 INJECTION, SOLUTION INTRAMUSCULAR; INTRAVENOUS at 14:50

## 2020-01-15 RX ADMIN — PROPOFOL 200 MG: 10 INJECTION, EMULSION INTRAVENOUS at 14:46

## 2020-01-15 RX ADMIN — ONDANSETRON 4 MG: 2 INJECTION INTRAMUSCULAR; INTRAVENOUS at 14:50

## 2020-01-15 RX ADMIN — PHENAZOPYRIDINE 100 MG: 100 TABLET ORAL at 18:12

## 2020-01-15 RX ADMIN — FENTANYL CITRATE 100 MCG: 50 INJECTION, SOLUTION INTRAMUSCULAR; INTRAVENOUS at 14:57

## 2020-01-15 RX ADMIN — FENTANYL CITRATE 50 MCG: 50 INJECTION, SOLUTION INTRAMUSCULAR; INTRAVENOUS at 15:57

## 2020-01-15 RX ADMIN — SODIUM CHLORIDE: 0.9 INJECTION, SOLUTION INTRAVENOUS at 15:11

## 2020-01-15 RX ADMIN — MIDAZOLAM 2 MG: 1 INJECTION INTRAMUSCULAR; INTRAVENOUS at 14:42

## 2020-01-15 RX ADMIN — SODIUM CHLORIDE 125 ML/HR: 0.9 INJECTION, SOLUTION INTRAVENOUS at 12:13

## 2020-01-15 RX ADMIN — FENTANYL CITRATE 50 MCG: 50 INJECTION, SOLUTION INTRAMUSCULAR; INTRAVENOUS at 15:42

## 2020-01-15 RX ADMIN — LIDOCAINE HYDROCHLORIDE 60 MG: 20 INJECTION, SOLUTION EPIDURAL; INFILTRATION; INTRACAUDAL; PERINEURAL at 14:46

## 2020-01-15 RX ADMIN — CEFAZOLIN SODIUM 1000 MG: 1 SOLUTION INTRAVENOUS at 14:40

## 2020-01-15 RX ADMIN — FENTANYL CITRATE 100 MCG: 50 INJECTION, SOLUTION INTRAMUSCULAR; INTRAVENOUS at 14:46

## 2020-01-15 RX ADMIN — MITOMYCIN 40 MG: 40 INJECTION, POWDER, LYOPHILIZED, FOR SOLUTION INTRAVENOUS at 15:00

## 2020-01-15 RX ADMIN — TAMSULOSIN HYDROCHLORIDE 0.4 MG: 0.4 CAPSULE ORAL at 18:13

## 2020-01-15 NOTE — ANESTHESIA PREPROCEDURE EVALUATION
Review of Systems/Medical History  Patient summary reviewed  Chart reviewed      Cardiovascular  Hyperlipidemia, Hypertension controlled,    Pulmonary    Comment: Hx bronchitis, cough     GI/Hepatic    GERD well controlled,        Genitourinary malignancy Bladder cancer,        Endo/Other  Diabetes ,      GYN  Negative gynecology ROS          Hematology  Negative hematology ROS      Musculoskeletal  Back pain , lumbar pain, Sciatica,   Arthritis     Neurology  Negative neurology ROS      Psychology   Negative psychology ROS              Physical Exam    Airway    Mallampati score: II  TM Distance: >3 FB  Neck ROM: full     Dental   No notable dental hx     Cardiovascular  Rhythm: regular, Rate: normal, Cardiovascular exam normal    Pulmonary  Pulmonary exam normal Breath sounds clear to auscultation,     Other Findings        Anesthesia Plan  ASA Score- 2     Anesthesia Type- general with ASA Monitors  Additional Monitors:   Airway Plan:         Plan Factors-Patient not instructed to abstain from smoking on day of procedure  Patient did not smoke on day of surgery  Induction- intravenous  Postoperative Plan-     Informed Consent- Anesthetic plan and risks discussed with patient

## 2020-01-15 NOTE — DISCHARGE INSTRUCTIONS
Transurethral Resection of Bladder Tumors   WHAT YOU NEED TO KNOW:   Transurethral resection of bladder tumors (TURBT) is surgery to remove one or more tumors from your bladder  DISCHARGE INSTRUCTIONS:   Medicines:   · Medicines  help decrease pain or prevent vomiting  · Take your medicine as directed  Contact your healthcare provider if you think your medicine is not helping or if you have side effects  Tell him or her if you are allergic to any medicine  Keep a list of the medicines, vitamins, and herbs you take  Include the amounts, and when and why you take them  Bring the list or the pill bottles to follow-up visits  Carry your medicine list with you in case of an emergency  Follow up with your healthcare provider as directed:  Write down your questions so you remember to ask them during your visits  Care for your El catheter:  Keep the bag below your waist  This will prevent urine from flowing back into your bladder and causing an infection or other problems  Also, keep the tube free of kinks so the urine will drain properly  Do not pull on the catheter  This can cause pain and bleeding and may cause the catheter to come out  Empty your urine drainage bag when it is ½ to ? full, or every 8 hours  If you have a smaller leg bag, empty it every 3 to 4 hours  Do the following when you empty your urine drainage bag:  · Hold the urine bag over the toilet or a large container  · Remove the drain spout from its sleeve at the bottom of the urine bag  Do not touch the tip of the drain spout  Open the slide valve on the spout  · Let the urine flow out of the urine bag into the toilet or container  Do not let the drainage tube touch anything  · Clean the end of the drain spout with alcohol when the bag is empty  Ask which cleaning solution is best to use  · Close the slide valve and put the drain spout into its sleeve at the bottom of the urine bag   Write down how much urine was in your bag if you were asked to keep a record  Contact your healthcare provider if:   · You have a fever or chills  · You have new or more blood in your urine  · You have nausea or are vomiting  · You have new or more pain when you urinate  · You are unable to control when you urinate  · You have questions or concerns about your condition or care  Seek care immediately or call 911 if:   · You have heavy bleeding from your urethra  · You start to urinate less often, very little, or not at all  · You have severe pain in your abdomen or pelvis  © 2017 [facility] Information is for End User's use only and may not be sold, redistributed or otherwise used for commercial purposes  All illustrations and images included in CareNotes® are the copyrighted property of [employer]  or [employer]  The above information is an  only  It is not intended as medical advice for individual conditions or treatments  Talk to your doctor, nurse or pharmacist before following any medical regimen to see if it is safe and effective for you

## 2020-01-15 NOTE — INTERVAL H&P NOTE
H&P reviewed  After examining the patient I find no changes in the patients condition since the H&P had been written      Vitals:    01/15/20 1146   BP: 148/70   Pulse: 74   Resp: 16   Temp: 97 9 °F (36 6 °C)   SpO2: 94%

## 2020-01-15 NOTE — ANESTHESIA POSTPROCEDURE EVALUATION
Post-Op Assessment Note    CV Status:  Stable  Pain Score: 4    Pain management: adequate     Mental Status:  Alert and awake   Hydration Status:  Euvolemic   PONV Controlled:  Controlled   Airway Patency:  Patent   Post Op Vitals Reviewed: Yes      Staff: Anesthesiologist, CRNA           BP      Temp      Pulse     Resp      SpO2

## 2020-01-16 ENCOUNTER — TELEPHONE (OUTPATIENT)
Dept: UROLOGY | Facility: CLINIC | Age: 75
End: 2020-01-16

## 2020-01-16 NOTE — TELEPHONE ENCOUNTER
Post Op Note    Sam Gómez is a 75 y o  female s/p Cystoscopy TRANSURETHRAL RESECTION OF BLADDER TUMOR (TURBT) performed 1/16/20  Sam Gómez is a patient of Dr Claudene Fiscal and is seen at the [place] [address] office  How would you rate your pain on a scale from 1 to 10, 10 being the worst pain ever? 3, tolerable   Have you had a fever? No    Have your bowel movements been regular? Yes  Do you have any difficulty urinating? No    Do you have any other questions or concerns that I can address at this time? No     Advised burning and some discomfort common after surgery  Instructed on proper hydration  Knows to call office with any questions or concerns  Otherwise, to follow up as scheduled on 1/24/20 with Dr Claudene Fiscal

## 2020-01-23 ENCOUNTER — TELEPHONE (OUTPATIENT)
Dept: UROLOGY | Facility: CLINIC | Age: 75
End: 2020-01-23

## 2020-01-23 NOTE — TELEPHONE ENCOUNTER
Spoke with patients , he is aware that appointment for tomorrow will be cancelled due to the fact that pathology is not back yet  TURBT was done by Dr Sary Quesada on 1/15/20  Please advise appropriate spot for appt  Thank you

## 2020-01-24 DIAGNOSIS — I10 ESSENTIAL HYPERTENSION: ICD-10-CM

## 2020-01-25 RX ORDER — LOSARTAN POTASSIUM 100 MG/1
TABLET ORAL
Qty: 90 TABLET | Refills: 0 | Status: SHIPPED | OUTPATIENT
Start: 2020-01-25 | End: 2020-04-26

## 2020-01-29 NOTE — TELEPHONE ENCOUNTER
Pathology report appears benign  Could contact pt over the phone with results   Please advise on F/u

## 2020-01-29 NOTE — TELEPHONE ENCOUNTER
That would be a great idea  Please mail her copy of pathology  If she is agreeable to avoid office visit, would perform next cystoscopy at 2 year [facility], 9/2020

## 2020-01-29 NOTE — TELEPHONE ENCOUNTER
Spoke with pt advised results of pathology report per Dr Ewa Schaeffer and his recommendation for her to return in 9/2020 for cysto  Pt verbalized understanding  All questions answered  Appointment made for 10/2/20 at [address] [place], [place] [address]d in [facility] end office   Path report mailed

## 2020-03-02 ENCOUNTER — APPOINTMENT (OUTPATIENT)
Dept: LAB | Facility: CLINIC | Age: 75
End: 2020-03-02
Payer: COMMERCIAL

## 2020-03-02 DIAGNOSIS — Z79.4 TYPE 2 DIABETES MELLITUS WITHOUT COMPLICATION, WITH LONG-TERM CURRENT USE OF INSULIN (HCC): ICD-10-CM

## 2020-03-02 DIAGNOSIS — E11.9 TYPE 2 DIABETES MELLITUS WITHOUT COMPLICATION, WITH LONG-TERM CURRENT USE OF INSULIN (HCC): ICD-10-CM

## 2020-03-02 LAB
ALBUMIN SERPL BCP-MCNC: 3.6 G/DL (ref 3.5–5)
ALP SERPL-CCNC: 59 U/L (ref 46–116)
ALT SERPL W P-5'-P-CCNC: 32 U/L (ref 12–78)
ANION GAP SERPL CALCULATED.3IONS-SCNC: 5 MMOL/L (ref 4–13)
AST SERPL W P-5'-P-CCNC: 18 U/L (ref 5–45)
BILIRUB SERPL-MCNC: 0.44 MG/DL (ref 0.2–1)
BUN SERPL-MCNC: 18 MG/DL (ref 5–25)
CALCIUM SERPL-MCNC: 9.2 MG/DL (ref 8.3–10.1)
CHLORIDE SERPL-SCNC: 103 MMOL/L (ref 100–108)
CHOLEST SERPL-MCNC: 158 MG/DL (ref 50–200)
CO2 SERPL-SCNC: 33 MMOL/L (ref 21–32)
CREAT SERPL-MCNC: 0.71 MG/DL (ref 0.6–1.3)
CREAT UR-MCNC: 96.2 MG/DL
EST. AVERAGE GLUCOSE BLD GHB EST-MCNC: 128 MG/DL
GFR SERPL CREATININE-BSD FRML MDRD: 84 ML/MIN/1.73SQ M
GLUCOSE P FAST SERPL-MCNC: 126 MG/DL (ref 65–99)
HBA1C MFR BLD: 6.1 %
HDLC SERPL-MCNC: 43 MG/DL
LDLC SERPL CALC-MCNC: 85 MG/DL (ref 0–100)
MICROALBUMIN UR-MCNC: 11.9 MG/L (ref 0–20)
MICROALBUMIN/CREAT 24H UR: 12 MG/G CREATININE (ref 0–30)
POTASSIUM SERPL-SCNC: 4.9 MMOL/L (ref 3.5–5.3)
PROT SERPL-MCNC: 7.7 G/DL (ref 6.4–8.2)
SODIUM SERPL-SCNC: 141 MMOL/L (ref 136–145)
TRIGL SERPL-MCNC: 148 MG/DL

## 2020-03-02 PROCEDURE — 3061F NEG MICROALBUMINURIA REV: CPT | Performed by: ORTHOPAEDIC SURGERY

## 2020-03-02 PROCEDURE — 80061 LIPID PANEL: CPT

## 2020-03-02 PROCEDURE — 83036 HEMOGLOBIN GLYCOSYLATED A1C: CPT

## 2020-03-02 PROCEDURE — 80053 COMPREHEN METABOLIC PANEL: CPT

## 2020-03-02 PROCEDURE — 82043 UR ALBUMIN QUANTITATIVE: CPT

## 2020-03-02 PROCEDURE — 3044F HG A1C LEVEL LT 7.0%: CPT | Performed by: ORTHOPAEDIC SURGERY

## 2020-03-02 PROCEDURE — 82570 ASSAY OF URINE CREATININE: CPT

## 2020-03-02 PROCEDURE — 36415 COLL VENOUS BLD VENIPUNCTURE: CPT

## 2020-03-05 ENCOUNTER — OFFICE VISIT (OUTPATIENT)
Dept: INTERNAL MEDICINE CLINIC | Facility: CLINIC | Age: 75
End: 2020-03-05
Payer: COMMERCIAL

## 2020-03-05 VITALS
HEART RATE: 81 BPM | BODY MASS INDEX: 30.12 KG/M2 | RESPIRATION RATE: 16 BRPM | SYSTOLIC BLOOD PRESSURE: 128 MMHG | DIASTOLIC BLOOD PRESSURE: 74 MMHG | WEIGHT: 153.4 LBS | HEIGHT: 60 IN | OXYGEN SATURATION: 94 %

## 2020-03-05 DIAGNOSIS — G89.29 CHRONIC PAIN OF RIGHT KNEE: ICD-10-CM

## 2020-03-05 DIAGNOSIS — G89.29 CHRONIC PAIN OF LEFT KNEE: ICD-10-CM

## 2020-03-05 DIAGNOSIS — K21.9 GASTROESOPHAGEAL REFLUX DISEASE WITHOUT ESOPHAGITIS: ICD-10-CM

## 2020-03-05 DIAGNOSIS — M25.562 CHRONIC PAIN OF LEFT KNEE: ICD-10-CM

## 2020-03-05 DIAGNOSIS — M25.561 CHRONIC PAIN OF RIGHT KNEE: ICD-10-CM

## 2020-03-05 DIAGNOSIS — E78.5 HYPERLIPIDEMIA, UNSPECIFIED HYPERLIPIDEMIA TYPE: ICD-10-CM

## 2020-03-05 DIAGNOSIS — I10 ESSENTIAL HYPERTENSION: Primary | ICD-10-CM

## 2020-03-05 DIAGNOSIS — Z79.4 TYPE 2 DIABETES MELLITUS WITHOUT COMPLICATION, WITH LONG-TERM CURRENT USE OF INSULIN (HCC): ICD-10-CM

## 2020-03-05 DIAGNOSIS — E11.9 TYPE 2 DIABETES MELLITUS WITHOUT COMPLICATION, WITH LONG-TERM CURRENT USE OF INSULIN (HCC): ICD-10-CM

## 2020-03-05 PROCEDURE — 99214 OFFICE O/P EST MOD 30 MIN: CPT | Performed by: INTERNAL MEDICINE

## 2020-03-05 PROCEDURE — 1036F TOBACCO NON-USER: CPT | Performed by: INTERNAL MEDICINE

## 2020-03-05 PROCEDURE — 4040F PNEUMOC VAC/ADMIN/RCVD: CPT | Performed by: INTERNAL MEDICINE

## 2020-03-05 PROCEDURE — 3044F HG A1C LEVEL LT 7.0%: CPT | Performed by: INTERNAL MEDICINE

## 2020-03-05 PROCEDURE — 3078F DIAST BP <80 MM HG: CPT | Performed by: INTERNAL MEDICINE

## 2020-03-05 PROCEDURE — 1160F RVW MEDS BY RX/DR IN RCRD: CPT | Performed by: INTERNAL MEDICINE

## 2020-03-05 PROCEDURE — 3074F SYST BP LT 130 MM HG: CPT | Performed by: INTERNAL MEDICINE

## 2020-03-05 RX ORDER — FAMOTIDINE 40 MG/1
40 TABLET, FILM COATED ORAL DAILY
Qty: 90 TABLET | Refills: 0 | Status: SHIPPED | OUTPATIENT
Start: 2020-03-05 | End: 2020-03-06

## 2020-03-05 NOTE — PROGRESS NOTES
BMI Counseling: Body mass index is 29 96 kg/m²  The BMI is above normal  Nutrition recommendations include decreasing portion sizes and moderation in carbohydrate intake  Exercise recommendations include exercising 3-5 times per week  Assessment/Plan:    Gastroesophageal reflux disease without esophagitis  She reports me symptoms not well controlled on Pepcid 20 mg are full will increase the dose of Pepcid her symptoms are mild no red flag symptoms  Start Pepcid 40 mg once daily ulcer free diet    Type 2 diabetes mellitus without complication, with long-term current use of insulin ([facility] [address] )    Lab Results   Component Value Date    HGBA1C 6 1 (H) 03/02/2020   I have counselled the pt to follow a healthy and balanced diet ,and recommend routine exercise  Annual eye examination recommended I will be ordering diabetic laboratories including comprehensive metabolic panel, hemoglobin A1c, urine microalbumin, lipid panel  Clinically stable and doing well continue the current medical regiment will continue monitor  Hypertension  Hypertension - controlled, I have counseled patient following healthy balance diet, I would like the patient reduce sodium, exercise routinely, I would like the patient continued the med current medical regiment and we will continue to monitor  Continue Cozaar 100 mg once daily    Hyperlipidemia  Hyperlipidemia controlled continue with current medical regiment recommend a low-cholesterol diet and recommend routine exercise we will continue to monitor the progress    Continue Zocor 20 mg once daily    Chronic pain of left knee  Suspect degenerative arthritis of bilateral knees will check x-rays of bilateral knees she may use Tylenol arthritis as directed p r n , range-of-motion exercises will continue monitor         Problem List Items Addressed This Visit        Digestive    Gastroesophageal reflux disease without esophagitis     She reports me symptoms not well controlled on Pepcid 20 mg are full will increase the dose of Pepcid her symptoms are mild no red flag symptoms  Start Pepcid 40 mg once daily ulcer free diet            Endocrine    Type 2 diabetes mellitus without complication, with long-term current use of insulin ([facility])       Lab Results   Component Value Date    HGBA1C 6 1 (H) 03/02/2020   I have counselled the pt to follow a healthy and balanced diet ,and recommend routine exercise  Annual eye examination recommended I will be ordering diabetic laboratories including comprehensive metabolic panel, hemoglobin A1c, urine microalbumin, lipid panel  Clinically stable and doing well continue the current medical regiment will continue monitor  Relevant Orders    Comprehensive metabolic panel    Hemoglobin A1C    Lipid Panel with Direct LDL reflex    Microalbumin / creatinine urine ratio       Cardiovascular and Mediastinum    Hypertension - Primary     Hypertension - controlled, I have counseled patient following healthy balance diet, I would like the patient reduce sodium, exercise routinely, I would like the patient continued the med current medical regiment and we will continue to monitor  Continue Cozaar 100 mg once daily            Other    Hyperlipidemia     Hyperlipidemia controlled continue with current medical regiment recommend a low-cholesterol diet and recommend routine exercise we will continue to monitor the progress  Continue Zocor 20 mg once daily         Chronic pain of left knee     Suspect degenerative arthritis of bilateral knees will check x-rays of bilateral knees she may use Tylenol arthritis as directed p r n , range-of-motion exercises will continue monitor         Relevant Orders    XR knee 3 vw left non injury    Chronic pain of right knee    Relevant Orders    XR knee 3 vw right non injury          Return to office 6  months  call if any problems  Subjective:      Patient ID: Capo Alex is a 75 y o  female      HPI 75-year old female coming in for a follow up office visit regarding hypertension, hyperlipidemia, type 2 diabetes, GERD, chronic left and right knee pain; The patient reports me compliant taking medications without untoward side effects the  The patient is here to review his medical condition, update me on the medical condition and the patient reports me no hospitalizations and no ER visits  reports me left knee pain is worse going up and down the steps not on a daily basis last week it was worse with it was raining but this week not as bad  No injury that she reports    The following portions of the patient's history were reviewed and updated as appropriate: allergies, current medications, past family history, past medical history, past social history, past surgical history and problem list     Review of Systems   Constitutional: Negative for activity change, appetite change and unexpected weight change  HENT: Negative for congestion and postnasal drip  Eyes: Negative for visual disturbance  Respiratory: Negative for cough and shortness of breath  Cardiovascular: Negative for chest pain  Gastrointestinal: Negative for abdominal pain, diarrhea, nausea and vomiting  Neurological: Negative for dizziness, light-headedness and headaches  Hematological: Negative for adenopathy  Objective:    No follow-ups on file  No results found  No Known Allergies    Past Medical History:   Diagnosis Date    Arthritis     Back pain     Cancer ([facility] [address] )     bladder cancer     GERD (gastroesophageal reflux disease)     Hyperlipidemia     Hypertension     Seasonal allergies     Wears partial dentures     1 small tooth removed and placed in cup      Past Surgical History:   Procedure Laterality Date    APPENDECTOMY      OK CYSTOURETHROSCOPY,FULGUR <0 5 CM LESN N/A 1/15/2020    Procedure: Cystoscopy TRANSURETHRAL RESECTION OF BLADDER TUMOR (TURBT);   Surgeon: Nadira Wilson MD;  Location: [facility];  Service: Urology    MT INSTILL ANTICANCER AGENT IN BLADDER N/A 1/15/2020    Procedure: INSTILLATION MITOMYCIN;  Surgeon: Paulina Denton MD;  Location: [facility];  Service: Urology    VEIN LIGATION AND STRIPPING       Current Outpatient Medications on File Prior to Visit   Medication Sig Dispense Refill    calcium-vitamin D 250-100 MG-UNIT per tablet Take 1 tablet by mouth 2 (two) times a day      Cholecalciferol (VITAMIN D3) 1000 units CAPS Take 1 capsule by mouth daily      cyanocobalamin (VITAMIN B-12) 1000 MCG tablet Take 1,000 mcg by mouth daily      fexofenadine (ALLEGRA) 180 MG tablet Take 1 tablet by mouth daily as needed      hydrochlorothiazide (HYDRODIURIL) 12 5 mg tablet TAKE ONE TABLET BY MOUTH EVERY DAY 90 tablet 1    losartan (COZAAR) 100 MG tablet TAKE ONE TABLET BY MOUTH EVERY DAY 90 tablet 0    multivitamin-minerals (CENTRUM ADULTS) tablet Take 1 tablet by mouth daily      omeprazole (PriLOSEC) 40 MG capsule Take 1 capsule (40 mg total) by mouth daily 30 capsule 6    phenazopyridine (PYRIDIUM) 100 mg tablet Take 1 tablet (100 mg total) by mouth 3 (three) times a day as needed for bladder spasms 20 tablet 0    simvastatin (ZOCOR) 20 mg tablet Take 1 tablet (20 mg total) by mouth daily 90 tablet 3    terconazole (TERAZOL 3) 0 8 % vaginal cream Insert 1 applicator into the vagina daily at bedtime 20 g 0     No current facility-administered medications on file prior to visit        Family History   Problem Relation Age of Onset    Heart disease Mother     Hypertension Mother      Social History     Socioeconomic History    Marital status: /Civil Union     Spouse name: Not on file    Number of children: Not on file    Years of education: Not on file    Highest education level: Not on file   Occupational History    Not on file   Social Needs    Financial resource strain: Not on file    Food insecurity:     Worry: Not on file     Inability: Not on file    Transportation needs:     Medical: Not on file     Non-medical: Not on file   Tobacco Use    Smoking status: Never Smoker    Smokeless tobacco: Never Used   Substance and Sexual Activity    Alcohol use: No    Drug use: No    Sexual activity: Not on file   Lifestyle    Physical activity:     Days per week: Not on file     Minutes per session: Not on file    Stress: Not on file   Relationships    Social connections:     Talks on phone: Not on file     Gets together: Not on file     Attends Sikhism service: Not on file     Active member of club or organization: Not on file     Attends meetings of clubs or organizations: Not on file     Relationship status: Not on file    Intimate partner violence:     Fear of current or ex partner: Not on file     Emotionally abused: Not on file     Physically abused: Not on file     Forced sexual activity: Not on file   Other Topics Concern    Not on file   Social History Narrative    Not on file     Vitals:    03/05/20 1333   BP: 128/74   Pulse: 81   Resp: 16   SpO2: 94%   Weight: 69 6 kg (153 lb 6 4 oz)   Height: 5' (1 524 m)     Results for orders placed or performed in visit on 03/02/20   Comprehensive metabolic panel   Result Value Ref Range    Sodium 141 136 - 145 mmol/L    Potassium 4 9 3 5 - 5 3 mmol/L    Chloride 103 100 - 108 mmol/L    CO2 33 (H) 21 - 32 mmol/L    ANION GAP 5 4 - 13 mmol/L    BUN 18 5 - 25 mg/dL    Creatinine 0 71 0 60 - 1 30 mg/dL    Glucose, Fasting 126 (H) 65 - 99 mg/dL    Calcium 9 2 8 3 - 10 1 mg/dL    AST 18 5 - 45 U/L    ALT 32 12 - 78 U/L    Alkaline Phosphatase 59 46 - 116 U/L    Total Protein 7 7 6 4 - 8 2 g/dL    Albumin 3 6 3 5 - 5 0 g/dL    Total Bilirubin 0 44 0 20 - 1 00 mg/dL    eGFR 84 ml/min/1 73sq m   Hemoglobin A1C   Result Value Ref Range    Hemoglobin A1C 6 1 (H) Normal 3 8-5 6%; PreDiabetic 5 7-6 4%;  Diabetic >=6 5%; Glycemic control for adults with diabetes <7 0% %     mg/dl   Lipid Panel with Direct LDL reflex   Result Value Ref Range Cholesterol 158 50 - 200 mg/dL    Triglycerides 148 <=150 mg/dL    HDL, Direct 43 >=40 mg/dL    LDL Calculated 85 0 - 100 mg/dL   Microalbumin / creatinine urine ratio   Result Value Ref Range    Creatinine, Ur 96 2 mg/dL    Microalbum  ,U,Random 11 9 0 0 - 20 0 mg/L    Microalb Creat Ratio 12 0 - 30 mg/g creatinine     Weight (last 2 days)     None        Body mass index is 29 96 kg/m²  BP      Temp      Pulse     Resp      SpO2        Vitals:    03/05/20 1333   Weight: 69 6 kg (153 lb 6 4 oz)     Vitals:    03/05/20 1333   Weight: 69 6 kg (153 lb 6 4 oz)       /74   Pulse 81   Resp 16   Ht 5' (1 524 m)   Wt 69 6 kg (153 lb 6 4 oz)   LMP  (LMP Unknown)   SpO2 94%   BMI 29 96 kg/m²          Physical Exam   Constitutional: She appears well-developed and well-nourished  HENT:   Head: Normocephalic  Mouth/Throat: Oropharynx is clear and moist    Eyes: Pupils are equal, round, and reactive to light  Conjunctivae are normal  Right eye exhibits no discharge  Left eye exhibits no discharge  No scleral icterus  Neck: Neck supple  Cardiovascular: Normal rate, regular rhythm, normal heart sounds and intact distal pulses  Exam reveals no gallop and no friction rub  No murmur heard  Pulmonary/Chest: Breath sounds normal  No respiratory distress  She has no wheezes  She has no rales  Abdominal: Soft  Bowel sounds are normal  She exhibits no distension and no mass  There is no tenderness  There is no rebound and no guarding  Musculoskeletal: She exhibits no edema or deformity  Lymphadenopathy:     She has no cervical adenopathy  Neurological: She is alert   Coordination normal      crepitus of bilateral knees

## 2020-03-06 ENCOUNTER — HOSPITAL ENCOUNTER (OUTPATIENT)
Dept: RADIOLOGY | Facility: HOSPITAL | Age: 75
Discharge: HOME | End: 2020-03-06
Payer: COMMERCIAL

## 2020-03-06 ENCOUNTER — TRANSCRIBE ORDERS (OUTPATIENT)
Dept: ADMINISTRATIVE | Facility: HOSPITAL | Age: 75
End: 2020-03-06

## 2020-03-06 DIAGNOSIS — M25.561 CHRONIC PAIN OF RIGHT KNEE: ICD-10-CM

## 2020-03-06 DIAGNOSIS — G89.29 CHRONIC PAIN OF RIGHT KNEE: ICD-10-CM

## 2020-03-06 DIAGNOSIS — G89.29 CHRONIC PAIN OF LEFT KNEE: ICD-10-CM

## 2020-03-06 DIAGNOSIS — K21.9 GASTROESOPHAGEAL REFLUX DISEASE WITHOUT ESOPHAGITIS: ICD-10-CM

## 2020-03-06 DIAGNOSIS — M25.562 CHRONIC PAIN OF LEFT KNEE: ICD-10-CM

## 2020-03-06 PROCEDURE — 73562 X-RAY EXAM OF KNEE 3: CPT

## 2020-03-06 RX ORDER — FAMOTIDINE 40 MG/1
TABLET, FILM COATED ORAL
Qty: 90 TABLET | Refills: 0 | Status: SHIPPED | OUTPATIENT
Start: 2020-03-06 | End: 2020-05-29

## 2020-03-08 PROBLEM — M25.561 CHRONIC PAIN OF RIGHT KNEE: Status: ACTIVE | Noted: 2020-03-08

## 2020-03-08 PROBLEM — M25.562 CHRONIC PAIN OF LEFT KNEE: Status: ACTIVE | Noted: 2020-03-08

## 2020-03-08 PROBLEM — G89.29 CHRONIC PAIN OF RIGHT KNEE: Status: ACTIVE | Noted: 2020-03-08

## 2020-03-08 PROBLEM — G89.29 CHRONIC PAIN OF LEFT KNEE: Status: ACTIVE | Noted: 2020-03-08

## 2020-03-08 NOTE — ASSESSMENT & PLAN NOTE
Hyperlipidemia controlled continue with current medical regiment recommend a low-cholesterol diet and recommend routine exercise we will continue to monitor the progress    Continue Zocor 20 mg once daily

## 2020-03-08 NOTE — ASSESSMENT & PLAN NOTE
She reports me symptoms not well controlled on Pepcid 20 mg are full will increase the dose of Pepcid her symptoms are mild no red flag symptoms    Start Pepcid 40 mg once daily ulcer free diet

## 2020-03-08 NOTE — ASSESSMENT & PLAN NOTE
Lab Results   Component Value Date    HGBA1C 6 1 (H) 03/02/2020   I have counselled the pt to follow a healthy and balanced diet ,and recommend routine exercise  Annual eye examination recommended I will be ordering diabetic laboratories including comprehensive metabolic panel, hemoglobin A1c, urine microalbumin, lipid panel  Clinically stable and doing well continue the current medical regiment will continue monitor

## 2020-03-08 NOTE — ASSESSMENT & PLAN NOTE
Hypertension - controlled, I have counseled patient following healthy balance diet, I would like the patient reduce sodium, exercise routinely, I would like the patient continued the med current medical regiment and we will continue to monitor    Continue Cozaar 100 mg once daily

## 2020-03-08 NOTE — ASSESSMENT & PLAN NOTE
Suspect degenerative arthritis of bilateral knees will check x-rays of bilateral knees she may use Tylenol arthritis as directed p r n , range-of-motion exercises will continue monitor

## 2020-03-12 ENCOUNTER — TELEPHONE (OUTPATIENT)
Dept: INTERNAL MEDICINE CLINIC | Facility: CLINIC | Age: 75
End: 2020-03-12

## 2020-03-12 NOTE — TELEPHONE ENCOUNTER
I am not sure who the patient had seen last I think the best orthopedic for her problem is Dr Lavonne Edmond

## 2020-03-12 NOTE — TELEPHONE ENCOUNTER
Pt said that she was told to see ortho, Dr Shipman Sat  She wants to see whoever she saw last time for her back in [facility]  I do not see an order in for her to go to ortho  Please, enter order and let us know if she can see whomever she prefers  Thank you

## 2020-03-13 DIAGNOSIS — G89.29 CHRONIC PAIN OF LEFT KNEE: ICD-10-CM

## 2020-03-13 DIAGNOSIS — M25.561 CHRONIC PAIN OF RIGHT KNEE: ICD-10-CM

## 2020-03-13 DIAGNOSIS — M25.562 CHRONIC PAIN OF LEFT KNEE: ICD-10-CM

## 2020-03-13 DIAGNOSIS — G89.29 CHRONIC PAIN OF RIGHT KNEE: ICD-10-CM

## 2020-03-13 DIAGNOSIS — M54.16 LUMBAR RADICULOPATHY: Primary | ICD-10-CM

## 2020-03-23 ENCOUNTER — APPOINTMENT (OUTPATIENT)
Dept: LAB | Facility: CLINIC | Age: 75
End: 2020-03-23
Payer: COMMERCIAL

## 2020-03-23 ENCOUNTER — TELEPHONE (OUTPATIENT)
Dept: UROLOGY | Facility: MEDICAL CENTER | Age: 75
End: 2020-03-23

## 2020-03-23 DIAGNOSIS — R30.0 DYSURIA: ICD-10-CM

## 2020-03-23 DIAGNOSIS — C67.9 MALIGNANT NEOPLASM OF URINARY BLADDER, UNSPECIFIED SITE (HCC): Primary | ICD-10-CM

## 2020-03-23 LAB
BACTERIA UR QL AUTO: ABNORMAL /HPF
BILIRUB UR QL STRIP: NEGATIVE
CLARITY UR: CLEAR
COLOR UR: YELLOW
GLUCOSE UR STRIP-MCNC: NEGATIVE MG/DL
HGB UR QL STRIP.AUTO: ABNORMAL
KETONES UR STRIP-MCNC: NEGATIVE MG/DL
LEUKOCYTE ESTERASE UR QL STRIP: ABNORMAL
NITRITE UR QL STRIP: NEGATIVE
NON-SQ EPI CELLS URNS QL MICRO: ABNORMAL /HPF
PH UR STRIP.AUTO: 6.5 [PH]
PROT UR STRIP-MCNC: NEGATIVE MG/DL
RBC #/AREA URNS AUTO: ABNORMAL /HPF
SP GR UR STRIP.AUTO: <=1.005 (ref 1–1.03)
UROBILINOGEN UR QL STRIP.AUTO: 0.2 E.U./DL
WBC #/AREA URNS AUTO: ABNORMAL /HPF

## 2020-03-23 PROCEDURE — 87086 URINE CULTURE/COLONY COUNT: CPT

## 2020-03-23 PROCEDURE — 81001 URINALYSIS AUTO W/SCOPE: CPT

## 2020-03-23 NOTE — TELEPHONE ENCOUNTER
Patient of Dr Ori Lu seen in the [facility] office  Patient called to report burning with urination, occasional odor  Patient reported taking OTC medication with nor relief  Patient reported 10 days of terrible pain    She can be reached at [phone]  Thank you

## 2020-03-24 LAB — BACTERIA UR CULT: NORMAL

## 2020-03-24 RX ORDER — PHENAZOPYRIDINE HYDROCHLORIDE 200 MG/1
200 TABLET, FILM COATED ORAL
Qty: 10 TABLET | Refills: 1 | Status: SHIPPED | OUTPATIENT
Start: 2020-03-24 | End: 2021-08-06 | Stop reason: ALTCHOICE

## 2020-03-24 NOTE — TELEPHONE ENCOUNTER
Called and spoke with patient  Informed patient that pyridium has been sent to the pharmacy for her to start taking  Patient to notify office if no relief with the pyridium

## 2020-03-24 NOTE — TELEPHONE ENCOUNTER
Called and spoke with patient to inform her that her urine testing came back normal  Patient stated that she is still having burning and pain with urination with no relief  Patient stated that she is hydrating well with water  Patient would like to know what to do  Please advise

## 2020-03-24 NOTE — TELEPHONE ENCOUNTER
Pyridium sent to the patients pharmacy  I also recommend she avoid coffee, tea, soda, alcohol and all bladder irritants  If no improvement with Pyridium the patient should notify us

## 2020-04-25 DIAGNOSIS — I10 ESSENTIAL HYPERTENSION: ICD-10-CM

## 2020-04-26 RX ORDER — HYDROCHLOROTHIAZIDE 12.5 MG/1
TABLET ORAL
Qty: 90 TABLET | Refills: 1 | Status: SHIPPED | OUTPATIENT
Start: 2020-04-26 | End: 2020-10-26

## 2020-04-26 RX ORDER — LOSARTAN POTASSIUM 100 MG/1
TABLET ORAL
Qty: 90 TABLET | Refills: 0 | Status: SHIPPED | OUTPATIENT
Start: 2020-04-26 | End: 2020-06-22

## 2020-05-29 DIAGNOSIS — K21.9 GASTROESOPHAGEAL REFLUX DISEASE WITHOUT ESOPHAGITIS: ICD-10-CM

## 2020-05-29 RX ORDER — FAMOTIDINE 40 MG/1
TABLET, FILM COATED ORAL
Qty: 90 TABLET | Refills: 0 | Status: SHIPPED | OUTPATIENT
Start: 2020-05-29 | End: 2020-11-02

## 2020-06-18 ENCOUNTER — OFFICE VISIT (OUTPATIENT)
Dept: OBGYN CLINIC | Facility: HOSPITAL | Age: 75
End: 2020-06-18
Payer: COMMERCIAL

## 2020-06-18 VITALS
HEIGHT: 60 IN | HEART RATE: 76 BPM | SYSTOLIC BLOOD PRESSURE: 135 MMHG | WEIGHT: 155 LBS | BODY MASS INDEX: 30.43 KG/M2 | DIASTOLIC BLOOD PRESSURE: 73 MMHG

## 2020-06-18 DIAGNOSIS — M17.11 PRIMARY OSTEOARTHRITIS OF RIGHT KNEE: Primary | ICD-10-CM

## 2020-06-18 DIAGNOSIS — M17.12 PRIMARY OSTEOARTHRITIS OF LEFT KNEE: ICD-10-CM

## 2020-06-18 PROCEDURE — 3075F SYST BP GE 130 - 139MM HG: CPT | Performed by: ORTHOPAEDIC SURGERY

## 2020-06-18 PROCEDURE — 3078F DIAST BP <80 MM HG: CPT | Performed by: ORTHOPAEDIC SURGERY

## 2020-06-18 PROCEDURE — 1036F TOBACCO NON-USER: CPT | Performed by: ORTHOPAEDIC SURGERY

## 2020-06-18 PROCEDURE — 2022F DILAT RTA XM EVC RTNOPTHY: CPT | Performed by: ORTHOPAEDIC SURGERY

## 2020-06-18 PROCEDURE — 99203 OFFICE O/P NEW LOW 30 MIN: CPT | Performed by: ORTHOPAEDIC SURGERY

## 2020-06-18 PROCEDURE — 3044F HG A1C LEVEL LT 7.0%: CPT | Performed by: ORTHOPAEDIC SURGERY

## 2020-06-18 PROCEDURE — 4040F PNEUMOC VAC/ADMIN/RCVD: CPT | Performed by: ORTHOPAEDIC SURGERY

## 2020-06-18 PROCEDURE — 3008F BODY MASS INDEX DOCD: CPT | Performed by: ORTHOPAEDIC SURGERY

## 2020-06-18 PROCEDURE — 1160F RVW MEDS BY RX/DR IN RCRD: CPT | Performed by: ORTHOPAEDIC SURGERY

## 2020-06-21 DIAGNOSIS — I10 ESSENTIAL HYPERTENSION: ICD-10-CM

## 2020-06-22 PROCEDURE — 4010F ACE/ARB THERAPY RXD/TAKEN: CPT | Performed by: ORTHOPAEDIC SURGERY

## 2020-06-22 RX ORDER — LOSARTAN POTASSIUM 100 MG/1
TABLET ORAL
Qty: 90 TABLET | Refills: 0 | Status: SHIPPED | OUTPATIENT
Start: 2020-06-22 | End: 2020-10-26

## 2020-06-26 ENCOUNTER — TELEPHONE (OUTPATIENT)
Dept: OBGYN CLINIC | Facility: HOSPITAL | Age: 75
End: 2020-06-26

## 2020-09-17 DIAGNOSIS — E78.5 HYPERLIPIDEMIA, UNSPECIFIED HYPERLIPIDEMIA TYPE: ICD-10-CM

## 2020-09-17 RX ORDER — SIMVASTATIN 20 MG
TABLET ORAL
Qty: 90 TABLET | Refills: 3 | Status: SHIPPED | OUTPATIENT
Start: 2020-09-17 | End: 2021-09-17

## 2020-09-18 ENCOUNTER — APPOINTMENT (OUTPATIENT)
Dept: LAB | Facility: CLINIC | Age: 75
End: 2020-09-18
Payer: COMMERCIAL

## 2020-09-18 ENCOUNTER — TRANSCRIBE ORDERS (OUTPATIENT)
Dept: LAB | Facility: CLINIC | Age: 75
End: 2020-09-18

## 2020-09-18 DIAGNOSIS — E11.9 TYPE 2 DIABETES MELLITUS WITHOUT COMPLICATION, WITH LONG-TERM CURRENT USE OF INSULIN (HCC): ICD-10-CM

## 2020-09-18 DIAGNOSIS — Z79.4 TYPE 2 DIABETES MELLITUS WITHOUT COMPLICATION, WITH LONG-TERM CURRENT USE OF INSULIN (HCC): ICD-10-CM

## 2020-09-18 LAB
ALBUMIN SERPL BCP-MCNC: 3.9 G/DL (ref 3.5–5)
ALP SERPL-CCNC: 57 U/L (ref 46–116)
ALT SERPL W P-5'-P-CCNC: 26 U/L (ref 12–78)
ANION GAP SERPL CALCULATED.3IONS-SCNC: 9 MMOL/L (ref 4–13)
AST SERPL W P-5'-P-CCNC: 18 U/L (ref 5–45)
BILIRUB SERPL-MCNC: 0.46 MG/DL (ref 0.2–1)
BUN SERPL-MCNC: 16 MG/DL (ref 5–25)
CALCIUM SERPL-MCNC: 9.6 MG/DL (ref 8.3–10.1)
CHLORIDE SERPL-SCNC: 104 MMOL/L (ref 100–108)
CHOLEST SERPL-MCNC: 155 MG/DL (ref 50–200)
CO2 SERPL-SCNC: 28 MMOL/L (ref 21–32)
CREAT SERPL-MCNC: 0.69 MG/DL (ref 0.6–1.3)
CREAT UR-MCNC: 71 MG/DL
EST. AVERAGE GLUCOSE BLD GHB EST-MCNC: 131 MG/DL
GFR SERPL CREATININE-BSD FRML MDRD: 85 ML/MIN/1.73SQ M
GLUCOSE P FAST SERPL-MCNC: 107 MG/DL (ref 65–99)
HBA1C MFR BLD: 6.2 %
HDLC SERPL-MCNC: 43 MG/DL
LDLC SERPL CALC-MCNC: 84 MG/DL (ref 0–100)
MICROALBUMIN UR-MCNC: 7.6 MG/L (ref 0–20)
MICROALBUMIN/CREAT 24H UR: 11 MG/G CREATININE (ref 0–30)
POTASSIUM SERPL-SCNC: 5.1 MMOL/L (ref 3.5–5.3)
PROT SERPL-MCNC: 7.8 G/DL (ref 6.4–8.2)
SODIUM SERPL-SCNC: 141 MMOL/L (ref 136–145)
TRIGL SERPL-MCNC: 141 MG/DL

## 2020-09-18 PROCEDURE — 82570 ASSAY OF URINE CREATININE: CPT

## 2020-09-18 PROCEDURE — 80061 LIPID PANEL: CPT

## 2020-09-18 PROCEDURE — 80053 COMPREHEN METABOLIC PANEL: CPT

## 2020-09-18 PROCEDURE — 83036 HEMOGLOBIN GLYCOSYLATED A1C: CPT

## 2020-09-18 PROCEDURE — 36415 COLL VENOUS BLD VENIPUNCTURE: CPT

## 2020-09-18 PROCEDURE — 82043 UR ALBUMIN QUANTITATIVE: CPT

## 2020-09-18 PROCEDURE — 3044F HG A1C LEVEL LT 7.0%: CPT | Performed by: UROLOGY

## 2020-09-24 ENCOUNTER — OFFICE VISIT (OUTPATIENT)
Dept: INTERNAL MEDICINE CLINIC | Facility: CLINIC | Age: 75
End: 2020-09-24
Payer: COMMERCIAL

## 2020-09-24 VITALS
HEIGHT: 60 IN | SYSTOLIC BLOOD PRESSURE: 130 MMHG | RESPIRATION RATE: 16 BRPM | BODY MASS INDEX: 29.49 KG/M2 | OXYGEN SATURATION: 94 % | HEART RATE: 79 BPM | TEMPERATURE: 98.4 F | DIASTOLIC BLOOD PRESSURE: 74 MMHG | WEIGHT: 150.2 LBS

## 2020-09-24 DIAGNOSIS — M54.16 LUMBAR RADICULOPATHY: ICD-10-CM

## 2020-09-24 DIAGNOSIS — Z79.4 TYPE 2 DIABETES MELLITUS WITHOUT COMPLICATION, WITH LONG-TERM CURRENT USE OF INSULIN (HCC): ICD-10-CM

## 2020-09-24 DIAGNOSIS — I10 ESSENTIAL HYPERTENSION: ICD-10-CM

## 2020-09-24 DIAGNOSIS — K21.9 GASTROESOPHAGEAL REFLUX DISEASE WITHOUT ESOPHAGITIS: ICD-10-CM

## 2020-09-24 DIAGNOSIS — E78.5 HYPERLIPIDEMIA, UNSPECIFIED HYPERLIPIDEMIA TYPE: ICD-10-CM

## 2020-09-24 DIAGNOSIS — Z23 NEED FOR VACCINATION: Primary | ICD-10-CM

## 2020-09-24 DIAGNOSIS — E11.9 TYPE 2 DIABETES MELLITUS WITHOUT COMPLICATION, WITH LONG-TERM CURRENT USE OF INSULIN (HCC): ICD-10-CM

## 2020-09-24 DIAGNOSIS — Z00.00 MEDICARE ANNUAL WELLNESS VISIT, SUBSEQUENT: ICD-10-CM

## 2020-09-24 PROCEDURE — G0008 ADMIN INFLUENZA VIRUS VAC: HCPCS

## 2020-09-24 PROCEDURE — 1170F FXNL STATUS ASSESSED: CPT | Performed by: INTERNAL MEDICINE

## 2020-09-24 PROCEDURE — 1101F PT FALLS ASSESS-DOCD LE1/YR: CPT | Performed by: INTERNAL MEDICINE

## 2020-09-24 PROCEDURE — G0439 PPPS, SUBSEQ VISIT: HCPCS | Performed by: INTERNAL MEDICINE

## 2020-09-24 PROCEDURE — 3288F FALL RISK ASSESSMENT DOCD: CPT | Performed by: INTERNAL MEDICINE

## 2020-09-24 PROCEDURE — 3725F SCREEN DEPRESSION PERFORMED: CPT | Performed by: INTERNAL MEDICINE

## 2020-09-24 PROCEDURE — 3078F DIAST BP <80 MM HG: CPT | Performed by: INTERNAL MEDICINE

## 2020-09-24 PROCEDURE — 99214 OFFICE O/P EST MOD 30 MIN: CPT | Performed by: INTERNAL MEDICINE

## 2020-09-24 PROCEDURE — 1125F AMNT PAIN NOTED PAIN PRSNT: CPT | Performed by: INTERNAL MEDICINE

## 2020-09-24 PROCEDURE — 90662 IIV NO PRSV INCREASED AG IM: CPT

## 2020-09-24 NOTE — PATIENT INSTRUCTIONS
Medicare Preventive Visit Patient Instructions  Thank you for completing your Welcome to Medicare Visit or Medicare Annual Wellness Visit today  Your next wellness visit will be due in one year (9/24/2021)  The screening/preventive services that you may require over the next 5-10 years are detailed below  Some tests may not apply to you based off risk factors and/or age  Screening tests ordered at today's visit but not completed yet may show as past due  Also, please note that scanned in results may not display below  Preventive Screenings:  Service Recommendations Previous Testing/Comments   Colorectal Cancer Screening  * Colonoscopy    * Fecal Occult Blood Test (FOBT)/Fecal Immunochemical Test (FIT)  * Fecal DNA/Cologuard Test  * Flexible Sigmoidoscopy Age: 54-65 years old   Colonoscopy: every 10 years (may be performed more frequently if at higher risk)  OR  FOBT/FIT: every 1 year  OR  Cologuard: every 3 years  OR  Sigmoidoscopy: every 5 years  Screening may be recommended earlier than age 48 if at higher risk for colorectal cancer  Also, an individualized decision between you and your healthcare provider will decide whether screening between the ages of 74-80 would be appropriate  Colonoscopy: 09/12/2017  FOBT/FIT: Not on file  Cologuard: Not on file  Sigmoidoscopy: Not on file         Breast Cancer Screening Age: 36 years old  Frequency: every 1-2 years  Not required if history of left and right mastectomy Mammogram: 10/10/2017       Cervical Cancer Screening Between the ages of 21-29, pap smear recommended once every 3 years  Between the ages of 33-67, can perform pap smear with HPV co-testing every 5 years     Recommendations may differ for women with a history of total hysterectomy, cervical cancer, or abnormal pap smears in past  Pap Smear: 09/21/2017       Hepatitis C Screening Once for adults born between 1945 and 1965  More frequently in patients at high risk for Hepatitis C Hep C Antibody: Not on file       Diabetes Screening 1-2 times per year if you're at risk for diabetes or have pre-diabetes Fasting glucose: 107 mg/dL   A1C: 6 2 %       Cholesterol Screening Once every 5 years if you don't have a lipid disorder  May order more often based on risk factors  Lipid panel: 09/18/2020         Other Preventive Screenings Covered by Medicare:  1  Abdominal Aortic Aneurysm (AAA) Screening: covered once if your at risk  You're considered to be at risk if you have a family history of AAA  2  Lung Cancer Screening: covers low dose CT scan once per year if you meet all of the following conditions: (1) Age 50-69; (2) No signs or symptoms of lung cancer; (3) Current smoker or have quit smoking within the last 15 years; (4) You have a tobacco smoking history of at least 30 pack years (packs per day multiplied by number of years you smoked); (5) You get a written order from a healthcare provider  3  Glaucoma Screening: covered annually if you're considered high risk: (1) You have diabetes OR (2) Family history of glaucoma OR (3)  aged 48 and older OR (3)  American aged 72 and older  3  Osteoporosis Screening: covered every 2 years if you meet one of the following conditions: (1) You're estrogen deficient and at risk for osteoporosis based off medical history and other findings; (2) Have a vertebral abnormality; (3) On glucocorticoid therapy for more than 3 months; (4) Have primary hyperparathyroidism; (5) On osteoporosis medications and need to assess response to drug therapy  · Last bone density test (DXA Scan): Not on file  5  HIV Screening: covered annually if you're between the age of 12-76  Also covered annually if you are younger than 13 and older than 72 with risk factors for HIV infection  For pregnant patients, it is covered up to 3 times per pregnancy      Immunizations:  Immunization Recommendations   Influenza Vaccine Annual influenza vaccination during flu season is recommended for all persons aged >= 6 months who do not have contraindications   Pneumococcal Vaccine (Prevnar and Pneumovax)  * Prevnar = PCV13  * Pneumovax = PPSV23   Adults 25-60 years old: 1-3 doses may be recommended based on certain risk factors  Adults 72 years old: Prevnar (PCV13) vaccine recommended followed by Pneumovax (PPSV23) vaccine  If already received PPSV23 since turning 65, then PCV13 recommended at least one year after PPSV23 dose  Hepatitis B Vaccine 3 dose series if at intermediate or high risk (ex: diabetes, end stage renal disease, liver disease)   Tetanus (Td) Vaccine - COST NOT COVERED BY MEDICARE PART B Following completion of primary series, a booster dose should be given every 10 years to maintain immunity against tetanus  Td may also be given as tetanus wound prophylaxis  Tdap Vaccine - COST NOT COVERED BY MEDICARE PART B Recommended at least once for all adults  For pregnant patients, recommended with each pregnancy  Shingles Vaccine (Shingrix) - COST NOT COVERED BY MEDICARE PART B  2 shot series recommended in those aged 48 and above     Health Maintenance Due:  There are no preventive care reminders to display for this patient  Immunizations Due:      Topic Date Due    Influenza Vaccine  07/01/2020     Advance Directives   What are advance directives? Advance directives are legal documents that state your wishes and plans for medical care  These plans are made ahead of time in case you lose your ability to make decisions for yourself  Advance directives can apply to any medical decision, such as the treatments you want, and if you want to donate organs  What are the types of advance directives? There are many types of advance directives, and each state has rules about how to use them  You may choose a combination of any of the following:  · Living will: This is a written record of the treatment you want   You can also choose which treatments you do not want, which to limit, and which to stop at a certain time  This includes surgery, medicine, IV fluid, and tube feedings  · Durable power of  for healthcare [facility]): This is a written record that states who you want to make healthcare choices for you when you are unable to make them for yourself  This person, called a proxy, is usually a family member or a friend  You may choose more than 1 proxy  · Do not resuscitate (DNR) order:  A DNR order is used in case your heart stops beating or you stop breathing  It is a request not to have certain forms of treatment, such as CPR  A DNR order may be included in other types of advance directives  · Medical directive: This covers the care that you want if you are in a coma, near death, or unable to make decisions for yourself  You can list the treatments you want for each condition  Treatment may include pain medicine, surgery, blood transfusions, dialysis, IV or tube feedings, and a ventilator (breathing machine)  · Values history: This document has questions about your views, beliefs, and how you feel and think about life  This information can help others choose the care that you would choose  Why are advance directives important? An advance directive helps you control your care  Although spoken wishes may be used, it is better to have your wishes written down  Spoken wishes can be misunderstood, or not followed  Treatments may be given even if you do not want them  An advance directive may make it easier for your family to make difficult choices about your care  Weight Management   Why it is important to manage your weight:  Being overweight increases your risk of health conditions such as heart disease, high blood pressure, type 2 diabetes, and certain types of cancer  It can also increase your risk for osteoarthritis, sleep apnea, and other respiratory problems  Aim for a slow, steady weight loss  Even a small amount of weight loss can lower your risk of health problems    How to lose weight safely:  A safe and healthy way to lose weight is to eat fewer calories and get regular exercise  You can lose up about 1 pound a week by decreasing the number of calories you eat by 500 calories each day  Healthy meal plan for weight management:  A healthy meal plan includes a variety of foods, contains fewer calories, and helps you stay healthy  A healthy meal plan includes the following:  · Eat whole-grain foods more often  A healthy meal plan should contain fiber  Fiber is the part of grains, fruits, and vegetables that is not broken down by your body  Whole-grain foods are healthy and provide extra fiber in your diet  Some examples of whole-grain foods are whole-wheat breads and pastas, oatmeal, brown rice, and bulgur  · Eat a variety of vegetables every day  Include dark, leafy greens such as spinach, kale, amara greens, and mustard greens  Eat yellow and orange vegetables such as carrots, sweet potatoes, and winter squash  · Eat a variety of fruits every day  Choose fresh or canned fruit (canned in its own juice or light syrup) instead of juice  Fruit juice has very little or no fiber  · Eat low-fat dairy foods  Drink fat-free (skim) milk or 1% milk  Eat fat-free yogurt and low-fat cottage cheese  Try low-fat cheeses such as mozzarella and other reduced-fat cheeses  · Choose meat and other protein foods that are low in fat  Choose beans or other legumes such as split peas or lentils  Choose fish, skinless poultry (chicken or turkey), or lean cuts of red meat (beef or pork)  Before you cook meat or poultry, cut off any visible fat  · Use less fat and oil  Try baking foods instead of frying them  Add less fat, such as margarine, sour cream, regular salad dressing and mayonnaise to foods  Eat fewer high-fat foods  Some examples of high-fat foods include french fries, doughnuts, ice cream, and cakes  · Eat fewer sweets  Limit foods and drinks that are high in sugar   This includes candy, cookies, regular soda, and sweetened drinks  Exercise:  Exercise at least 30 minutes per day on most days of the week  Some examples of exercise include walking, biking, dancing, and swimming  You can also fit in more physical activity by taking the stairs instead of the elevator or parking farther away from stores  Ask your healthcare provider about the best exercise plan for you  © Copyright [employer] 2018 Information is for End User's use only and may not be sold, redistributed or otherwise used for commercial purposes   All illustrations and images included in CareNotes® are the copyrighted property of [employer]  or [address] [place] [address]

## 2020-09-24 NOTE — ASSESSMENT & PLAN NOTE
I have counselled the pt to follow a healthy and balanced diet ,and recommend routine exercise  Lab Results   Component Value Date    HGBA1C 6 2 (H) 09/18/2020   I will be ordering diabetic laboratories including comprehensive metabolic panel, hemoglobin A1c, urine microalbumin, lipid panel    Reviewed labs today and foot examination completed

## 2020-09-24 NOTE — PROGRESS NOTES
Assessment/Plan:    Medicare annual wellness visit, subsequent  Assessment and plan 1  Medicare subsequent annual wellness examination overall the patient is clinically stable and doing well, we encouraged the patient to follow a healthy and balanced diet  We recommend that the patient exercise routinely approximately 30 minutes 5 times per week   We have reviewed the patient's vaccines and have made recommendations for updates if necessary  annual flu shot      We will be ordering screening laboratories which are age appropriate  Return to the office in   recommend 6 months patient prefers 12 months   call if any problems  Gastroesophageal reflux disease without esophagitis  Clinically stable and doing well continue the current medical regiment will continue monitor  Continue Pepcid 40 mg once daily    Type 2 diabetes mellitus without complication, with long-term current use of insulin ([facility] [address] )  I have counselled the pt to follow a healthy and balanced diet ,and recommend routine exercise  Lab Results   Component Value Date    HGBA1C 6 2 (H) 09/18/2020   I will be ordering diabetic laboratories including comprehensive metabolic panel, hemoglobin A1c, urine microalbumin, lipid panel  Reviewed labs today and foot examination completed    Essential hypertension  Hypertension - controlled, I have counseled patient following healthy balance diet, I would like the patient reduce sodium, exercise routinely, I would like the patient continued the med current medical regiment and we will continue to monitor    Continue losartan 100 mg once daily    Lumbar radiculopathy  Currently stable and doing well she has worked with pain management in the past which was very helpful currently she will monitor her symptoms if any recurrence she will notify pain management    Need for vaccination  Counseled, patient received the influenza vaccine today    Hyperlipidemia  Hyperlipidemia controlled continue with current medical regiment recommend a low-cholesterol diet and recommend routine exercise we will continue to monitor the progress  Continue Zocor 20 mg once daily         Problem List Items Addressed This Visit        Digestive    Gastroesophageal reflux disease without esophagitis     Clinically stable and doing well continue the current medical regiment will continue monitor  Continue Pepcid 40 mg once daily            Endocrine    Type 2 diabetes mellitus without complication, with long-term current use of insulin ([facility] [address] )     I have counselled the pt to follow a healthy and balanced diet ,and recommend routine exercise  Lab Results   Component Value Date    HGBA1C 6 2 (H) 09/18/2020   I will be ordering diabetic laboratories including comprehensive metabolic panel, hemoglobin A1c, urine microalbumin, lipid panel  Reviewed labs today and foot examination completed         Relevant Orders    Diabetic foot exam    Comprehensive metabolic panel    Hemoglobin A1C    Lipid Panel with Direct LDL reflex    Microalbumin / creatinine urine ratio       Cardiovascular and Mediastinum    Essential hypertension     Hypertension - controlled, I have counseled patient following healthy balance diet, I would like the patient reduce sodium, exercise routinely, I would like the patient continued the med current medical regiment and we will continue to monitor  Continue losartan 100 mg once daily            Nervous and Auditory    Lumbar radiculopathy     Currently stable and doing well she has worked with pain management in the past which was very helpful currently she will monitor her symptoms if any recurrence she will notify pain management            Other    Medicare annual wellness visit, subsequent     Assessment and plan 1  Medicare subsequent annual wellness examination overall the patient is clinically stable and doing well, we encouraged the patient to follow a healthy and balanced diet    We recommend that the patient exercise routinely approximately 30 minutes 5 times per week   We have reviewed the patient's vaccines and have made recommendations for updates if necessary  annual flu shot      We will be ordering screening laboratories which are age appropriate  Return to the office in   recommend 6 months patient prefers 12 months   call if any problems  Hyperlipidemia     Hyperlipidemia controlled continue with current medical regiment recommend a low-cholesterol diet and recommend routine exercise we will continue to monitor the progress  Continue Zocor 20 mg once daily         Need for vaccination - Primary     Counseled, patient received the influenza vaccine today         Relevant Orders    influenza vaccine, high-dose, PF 0 7 mL (FLUZONE HIGH-DOSE) (Completed)          RTO in 6 months patient prefers 12 month follow-up call if any problems  Subjective:      Patient ID: Regla Soriano is a 75 y o  female  HPI 72-year old female coming in for a follow up office visit regarding type 2 diabetes, hyperlipidemia, hypertension, GERD and lumbar radiculopathy; The patient reports me compliant taking medications without untoward side effects the  The patient is here to review his medical condition, update me on the medical condition and the patient reports me no hospitalizations and no ER visits  She is here to review her laboratories  She reports me she is trying to follow healthy diet she remains active  She reports me her back pain is stable and doing well she had been to pain management past which was helpful  She reports me her acid reflux is controlled  She reports me her  will need a surgery for his peripheral artery disease in the future    She would like to receive her flu vaccine today    The following portions of the patient's history were reviewed and updated as appropriate: allergies, current medications, past family history, past medical history, past social history, past surgical history and problem list     Review of Systems   Constitutional: Negative for activity change, appetite change and unexpected weight change  HENT: Negative for congestion and postnasal drip  Eyes: Negative for visual disturbance  Respiratory: Negative for cough and shortness of breath  Cardiovascular: Negative for chest pain  Gastrointestinal: Negative for abdominal pain, diarrhea, nausea and vomiting  Neurological: Negative for dizziness, light-headedness and headaches  Objective:    No follow-ups on file  No results found  No Known Allergies    Past Medical History:   Diagnosis Date    Arthritis     Back pain     Cancer ([address] )     bladder cancer     GERD (gastroesophageal reflux disease)     Hyperlipidemia     Hypertension     Seasonal allergies     Wears partial dentures     1 small tooth removed and placed in cup      Past Surgical History:   Procedure Laterality Date    APPENDECTOMY      IN CYSTOURETHROSCOPY,FULGUR <0 5 CM LESN N/A 1/15/2020    Procedure: Cystoscopy TRANSURETHRAL RESECTION OF BLADDER TUMOR (TURBT);   Surgeon: Deborah Friedman MD;  Location: [facility];  Service: Urology    IN INSTILL ANTICANCER AGENT IN BLADDER N/A 1/15/2020    Procedure: [place];  Surgeon: Deborah Friedman MD;  Location: [facility];  Service: Urology    VEIN LIGATION AND STRIPPING       Current Outpatient Medications on File Prior to Visit   Medication Sig Dispense Refill    calcium-vitamin D 250-100 MG-UNIT per tablet Take 1 tablet by mouth 2 (two) times a day      Cholecalciferol (VITAMIN D3) 1000 units CAPS Take 1 capsule by mouth daily      cyanocobalamin (VITAMIN B-12) 1000 MCG tablet Take 1,000 mcg by mouth daily      famotidine (PEPCID) 40 MG tablet TAKE ONE TABLET BY MOUTH EVERY DAY 90 tablet 0    fexofenadine (ALLEGRA) 180 MG tablet Take 1 tablet by mouth daily as needed      hydrochlorothiazide (HYDRODIURIL) 12 5 mg tablet TAKE ONE TABLET BY MOUTH EVERY DAY 90 tablet 1    losartan (COZAAR) 100 MG tablet TAKE ONE TABLET BY MOUTH EVERY DAY 90 tablet 0    multivitamin-minerals (CENTRUM ADULTS) tablet Take 1 tablet by mouth daily      omeprazole (PriLOSEC) 40 MG capsule Take 1 capsule (40 mg total) by mouth daily 30 capsule 6    phenazopyridine (PYRIDIUM) 200 mg tablet Take 1 tablet (200 mg total) by mouth 3 (three) times a day with meals 10 tablet 1    simvastatin (ZOCOR) 20 mg tablet TAKE 1 TABLET BY MOUTH DAILY  90 tablet 3    terconazole (TERAZOL 3) 0 8 % vaginal cream Insert 1 applicator into the vagina daily at bedtime 20 g 0     No current facility-administered medications on file prior to visit        Family History   Problem Relation Age of Onset    Heart disease Mother     Hypertension Mother      Social History     Socioeconomic History    Marital status: /Civil Union     Spouse name: Not on file    Number of children: Not on file    Years of education: Not on file    Highest education level: Not on file   Occupational History    Not on file   Social Needs    Financial resource strain: Not on file    Food insecurity     Worry: Not on file     Inability: Not on file   [employer] needs     Medical: Not on file     Non-medical: Not on file   Tobacco Use    Smoking status: Never Smoker    Smokeless tobacco: Never Used   Substance and Sexual Activity    Alcohol use: No    Drug use: No    Sexual activity: Not on file   Lifestyle    Physical activity     Days per week: Not on file     Minutes per session: Not on file    Stress: Not on file   Relationships    Social connections     Talks on phone: Not on file     Gets together: Not on file     Attends [employer] service: Not on file     Active member of club or organization: Not on file     Attends meetings of clubs or organizations: Not on file     Relationship status: Not on file    Intimate partner violence     Fear of current or ex partner: Not on file     Emotionally abused: Not on file     Physically abused: Not on file     Forced sexual activity: Not on file   Other Topics Concern    Not on file   Social History Narrative    Not on file     Vitals:    09/24/20 1043   BP: 130/74   Pulse: 79   Resp: 16   Temp: 98 4 °F (36 9 °C)   TempSrc: Temporal   SpO2: 94%   Weight: 68 1 kg (150 lb 3 2 oz)   Height: 5' (1 524 m)     Results for orders placed or performed in visit on 09/18/20   Comprehensive metabolic panel   Result Value Ref Range    Sodium 141 136 - 145 mmol/L    Potassium 5 1 3 5 - 5 3 mmol/L    Chloride 104 100 - 108 mmol/L    CO2 28 21 - 32 mmol/L    ANION GAP 9 4 - 13 mmol/L    BUN 16 5 - 25 mg/dL    Creatinine 0 69 0 60 - 1 30 mg/dL    Glucose, Fasting 107 (H) 65 - 99 mg/dL    Calcium 9 6 8 3 - 10 1 mg/dL    AST 18 5 - 45 U/L    ALT 26 12 - 78 U/L    Alkaline Phosphatase 57 46 - 116 U/L    Total Protein 7 8 6 4 - 8 2 g/dL    Albumin 3 9 3 5 - 5 0 g/dL    Total Bilirubin 0 46 0 20 - 1 00 mg/dL    eGFR 85 ml/min/1 73sq m   Hemoglobin A1C   Result Value Ref Range    Hemoglobin A1C 6 2 (H) Normal 3 8-5 6%; PreDiabetic 5 7-6 4%; Diabetic >=6 5%; Glycemic control for adults with diabetes <7 0% %     mg/dl   Lipid Panel with Direct LDL reflex   Result Value Ref Range    Cholesterol 155 50 - 200 mg/dL    Triglycerides 141 <=150 mg/dL    HDL, Direct 43 >=40 mg/dL    LDL Calculated 84 0 - 100 mg/dL   Microalbumin / creatinine urine ratio   Result Value Ref Range    Creatinine, Ur 71 0 mg/dL    Microalbum  ,U,Random 7 6 0 0 - 20 0 mg/L    Microalb Creat Ratio 11 0 - 30 mg/g creatinine     Weight (last 2 days)     Date/Time   Weight    09/24/20 1043   68 1 (150 2)            Body mass index is 29 33 kg/m²    /74 (09/24/20 1043)    Temp 98 4 °F (36 9 °C) (09/24/20 1043)    Pulse 79 (09/24/20 1043)   Resp 16 (09/24/20 1043)    SpO2 94 % (09/24/20 1043)      Vitals:    09/24/20 1043   Weight: 68 1 kg (150 lb 3 2 oz)     Vitals:    09/24/20 1043   Weight: 68 1 kg (150 lb 3 2 oz) /74   Pulse 79   Temp 98 4 °F (36 9 °C) (Temporal)   Resp 16   Ht 5' (1 524 m)   Wt 68 1 kg (150 lb 3 2 oz)   LMP  (LMP Unknown)   SpO2 94%   BMI 29 33 kg/m²          Physical Exam  Constitutional:       Appearance: She is well-developed  HENT:      Head: Normocephalic  Eyes:      General: No scleral icterus  Right eye: No discharge  Left eye: No discharge  Conjunctiva/sclera: Conjunctivae normal       Pupils: Pupils are equal, round, and reactive to light  Neck:      Musculoskeletal: Neck supple  Cardiovascular:      Rate and Rhythm: Normal rate and regular rhythm  Pulses: no weak pulses          Dorsalis pedis pulses are 2+ on the right side and 2+ on the left side  Posterior tibial pulses are 2+ on the right side and 2+ on the left side  Heart sounds: Normal heart sounds  No murmur  No friction rub  No gallop  Pulmonary:      Effort: No respiratory distress  Breath sounds: Normal breath sounds  No wheezing or rales  Abdominal:      General: Bowel sounds are normal  There is no distension  Palpations: Abdomen is soft  There is no mass  Tenderness: There is no abdominal tenderness  There is no guarding or rebound  Musculoskeletal:         General: No deformity  Feet:      Right foot:      Skin integrity: No ulcer, skin breakdown, erythema, warmth, callus or dry skin  Left foot:      Skin integrity: No ulcer, skin breakdown, erythema, warmth, callus or dry skin  Lymphadenopathy:      Cervical: No cervical adenopathy  Neurological:      Mental Status: She is alert  Coordination: Coordination normal        Patient's shoes and socks removed  Right Foot/Ankle   Right Foot Inspection  Skin Exam: skin normal and skin intact no dry skin, no warmth, no callus, no erythema, no maceration, no abnormal color, no pre-ulcer, no ulcer and no callus                          Toe Exam: ROM and strength within normal limits  Sensory Monofilament testing: intact  Vascular    The right DP pulse is 2+  The right PT pulse is 2+  Left Foot/Ankle  Left Foot Inspection  Skin Exam: skin normal and skin intactno dry skin, no warmth, no erythema, no maceration, normal color, no pre-ulcer, no ulcer and no callus                         Toe Exam: ROM and strength within normal limits                   Sensory       Monofilament: intact  Vascular    The left DP pulse is 2+  The left PT pulse is 2+  Assign Risk Category:  No deformity present; No loss of protective sensation;  No weak pulses       Risk: 0

## 2020-09-24 NOTE — ASSESSMENT & PLAN NOTE
Currently stable and doing well she has worked with pain management in the past which was very helpful currently she will monitor her symptoms if any recurrence she will notify pain management

## 2020-09-24 NOTE — ASSESSMENT & PLAN NOTE
Hypertension - controlled, I have counseled patient following healthy balance diet, I would like the patient reduce sodium, exercise routinely, I would like the patient continued the med current medical regiment and we will continue to monitor    Continue losartan 100 mg once daily

## 2020-09-24 NOTE — ASSESSMENT & PLAN NOTE
Clinically stable and doing well continue the current medical regiment will continue monitor    Continue Pepcid 40 mg once daily

## 2020-09-24 NOTE — ASSESSMENT & PLAN NOTE
Assessment and plan 1  Medicare subsequent annual wellness examination overall the patient is clinically stable and doing well, we encouraged the patient to follow a healthy and balanced diet  We recommend that the patient exercise routinely approximately 30 minutes 5 times per week   We have reviewed the patient's vaccines and have made recommendations for updates if necessary  annual flu shot      We will be ordering screening laboratories which are age appropriate  Return to the office in   recommend 6 months patient prefers 12 months   call if any problems

## 2020-09-24 NOTE — PROGRESS NOTES
Assessment and Plan:     Problem List Items Addressed This Visit        Digestive    Gastroesophageal reflux disease without esophagitis       Endocrine    Type 2 diabetes mellitus without complication, with long-term current use of insulin ([address] )    Relevant Orders    Diabetic foot exam    Comprehensive metabolic panel    Hemoglobin A1C    Lipid Panel with Direct LDL reflex    Microalbumin / creatinine urine ratio       Cardiovascular and Mediastinum    Essential hypertension       Nervous and Auditory    Lumbar radiculopathy       Other    Medicare annual wellness visit, subsequent     Assessment and plan 1  Medicare subsequent annual wellness examination overall the patient is clinically stable and doing well, we encouraged the patient to follow a healthy and balanced diet  We recommend that the patient exercise routinely approximately 30 minutes 5 times per week   We have reviewed the patient's vaccines and have made recommendations for updates if necessary  annual flu shot      We will be ordering screening laboratories which are age appropriate  Return to the office in   recommend 6 months patient prefers 12 months   call if any problems  Hyperlipidemia    Need for vaccination - Primary    Relevant Orders    influenza vaccine, high-dose, PF 0 7 mL (FLUZONE HIGH-DOSE) (Completed)           Preventive health issues were discussed with patient, and age appropriate screening tests were ordered as noted in patient's After Visit Summary  Personalized health advice and appropriate referrals for health education or preventive services given if needed, as noted in patient's After Visit Summary       History of Present Illness:     Patient presents for Medicare Annual Wellness visit    Patient Care Team:  [facility]  as PCP - Low Barrera MD  [facility]   MD Chester Engel MD     Problem List:     Patient Active Problem List   Diagnosis    Cough    Bronchitis    Hypertension    Dyslipidemia    Type 2 diabetes mellitus without complication, without long-term current use of insulin (HCC)    Lumbar radiculopathy    Encounter for screening mammogram for breast cancer    Intervertebral disc disorders with radiculopathy, lumbar region    Malignant neoplasm of urinary bladder ([facility] )    Dysuria    Medicare annual wellness visit, subsequent    Gastroesophageal reflux disease without esophagitis    Hyperlipidemia    Type 2 diabetes mellitus without complication, with long-term current use of insulin (HCC)    Chronic pain of left knee    Chronic pain of right knee    Primary osteoarthritis of right knee    Primary osteoarthritis of left knee    Need for vaccination    Essential hypertension      Past Medical and Surgical History:     Past Medical History:   Diagnosis Date    Arthritis     Back pain     Cancer ([facility] )     bladder cancer     GERD (gastroesophageal reflux disease)     Hyperlipidemia     Hypertension     Seasonal allergies     Wears partial dentures     1 small tooth removed and placed in cup      Past Surgical History:   Procedure Laterality Date    APPENDECTOMY      MT CYSTOURETHROSCOPY,FULGUR <0 5 CM LESN N/A 1/15/2020    Procedure: Cystoscopy TRANSURETHRAL RESECTION OF BLADDER TUMOR (TURBT);   Surgeon: Damian Romo MD;  [facility];  Service: Urology    MT INSTILL ANTICANCER AGENT IN BLADDER N/A 1/15/2020    Procedure: Keri Padilla;  Surgeon: Damian Romo MD;  [facility];  Service: Urology    VEIN LIGATION AND STRIPPING        Family History:     Family History   Problem Relation Age of Onset    Heart disease Mother     Hypertension Mother       Social History:        Social History     Socioeconomic History    Marital status: /Civil Union     Spouse name: None    Number of children: None    Years of education: None    Highest education level: None   Occupational History    None   Social Needs    Financial resource strain: None    Food insecurity     Worry: None     Inability: None    Transportation needs     Medical: None     Non-medical: None   Tobacco Use    Smoking status: Never Smoker    Smokeless tobacco: Never Used   Substance and Sexual Activity    Alcohol use: No    Drug use: No    Sexual activity: None   Lifestyle    Physical activity     Days per week: None     Minutes per session: None    Stress: None   Relationships    Social connections     Talks on phone: None     Gets together: None     Attends Denominational service: None     Active member of club or organization: None     Attends meetings of clubs or organizations: None     Relationship status: None    Intimate partner violence     Fear of current or ex partner: None     Emotionally abused: None     Physically abused: None     Forced sexual activity: None   Other Topics Concern    None   Social History Narrative    None      Medications and Allergies:     Current Outpatient Medications   Medication Sig Dispense Refill    calcium-vitamin D 250-100 MG-UNIT per tablet Take 1 tablet by mouth 2 (two) times a day      Cholecalciferol (VITAMIN D3) 1000 units CAPS Take 1 capsule by mouth daily      cyanocobalamin (VITAMIN B-12) 1000 MCG tablet Take 1,000 mcg by mouth daily      famotidine (PEPCID) 40 MG tablet TAKE ONE TABLET BY MOUTH EVERY DAY 90 tablet 0    fexofenadine (ALLEGRA) 180 MG tablet Take 1 tablet by mouth daily as needed      hydrochlorothiazide (HYDRODIURIL) 12 5 mg tablet TAKE ONE TABLET BY MOUTH EVERY DAY 90 tablet 1    losartan (COZAAR) 100 MG tablet TAKE ONE TABLET BY MOUTH EVERY DAY 90 tablet 0    multivitamin-minerals (CENTRUM ADULTS) tablet Take 1 tablet by mouth daily      omeprazole (PriLOSEC) 40 MG capsule Take 1 capsule (40 mg total) by mouth daily 30 capsule 6    phenazopyridine (PYRIDIUM) 200 mg tablet Take 1 tablet (200 mg total) by mouth 3 (three) times a day with meals 10 tablet 1    simvastatin (ZOCOR) 20 mg tablet TAKE 1 TABLET BY MOUTH DAILY  90 tablet 3    terconazole (TERAZOL 3) 0 8 % vaginal cream Insert 1 applicator into the vagina daily at bedtime 20 g 0     No current facility-administered medications for this visit  No Known Allergies   Immunizations:     Immunization History   Administered Date(s) Administered    Influenza Split High Dose Preservative Free IM 10/04/2014, 10/03/2015, 10/08/2016, 10/16/2017    Influenza TIV (IM) 10/20/2012, 10/30/2013    Influenza, high dose seasonal 0 7 mL 10/13/2018, 10/12/2019, 09/24/2020    Pneumococcal Conjugate 13-Valent 08/13/2015    Pneumococcal Polysaccharide PPV23 01/28/2010    TD (adult) Preservative Free 01/28/2007    Zoster 07/29/2017    Zoster Vaccine Recombinant 07/29/2017, 08/02/2017      Health Maintenance: There are no preventive care reminders to display for this patient  There are no preventive care reminders to display for this patient  Medicare Health Risk Assessment:     /74   Pulse 79   Temp 98 4 °F (36 9 °C) (Temporal)   Resp 16   Ht 5' (1 524 m)   Wt 68 1 kg (150 lb 3 2 oz)   LMP  (LMP Unknown)   SpO2 94%   BMI 29 33 kg/m²      Nilda Freeman is here for her Subsequent Wellness visit  Health Risk Assessment:   Patient rates overall health as good  Patient feels that their physical health rating is same  Eyesight was rated as same  Hearing was rated as same  Patient feels that their emotional and mental health rating is same  Pain experienced in the last 7 days has been none  Patient states that she has experienced no weight loss or gain in last 6 months  Depression Screening:   PHQ-2 Score: 0      Fall Risk Screening: In the past year, patient has experienced: no history of falling in past year      Urinary Incontinence Screening:   Patient has not leaked urine accidently in the last six months       Home Safety:  Patient does not have trouble with stairs inside or outside of their home  Patient has working smoke alarms and has working carbon monoxide detector  Home safety hazards include: none  Nutrition:   Current diet is Regular  Medications:   Patient is currently taking over-the-counter supplements  OTC medications include: see medication list  Patient is able to manage medications  Activities of Daily Living (ADLs)/Instrumental Activities of Daily Living (IADLs):   Walk and transfer into and out of bed and chair?: Yes  Dress and groom yourself?: Yes    Bathe or shower yourself?: Yes    Feed yourself? Yes  Do your laundry/housekeeping?: Yes  Manage your money, pay your bills and track your expenses?: Yes  Make your own meals?: Yes    Do your own shopping?: Yes    Previous Hospitalizations:   Any hospitalizations or ED visits within the last 12 months?: No      Advance Care Planning:   Living will: Yes    Durable POA for healthcare:  Yes    Advanced directive: Yes      Cognitive Screening:   Provider or family/friend/caregiver concerned regarding cognition?: No    PREVENTIVE SCREENINGS      Cardiovascular Screening:    General: Screening Not Indicated and History Lipid Disorder      Diabetes Screening:     General: Screening Not Indicated and History Diabetes      Colorectal Cancer Screening:     General: Screening Current      Cervical Cancer Screening:    General: Screening Not Indicated      Lung Cancer Screening:     General: Screening Not Indicated      Mariah Metzger,

## 2020-10-02 ENCOUNTER — PROCEDURE VISIT (OUTPATIENT)
Dept: UROLOGY | Facility: CLINIC | Age: 75
End: 2020-10-02
Payer: COMMERCIAL

## 2020-10-02 VITALS
WEIGHT: 151.6 LBS | SYSTOLIC BLOOD PRESSURE: 118 MMHG | DIASTOLIC BLOOD PRESSURE: 82 MMHG | HEART RATE: 75 BPM | BODY MASS INDEX: 29.76 KG/M2 | HEIGHT: 60 IN | TEMPERATURE: 97.5 F

## 2020-10-02 DIAGNOSIS — C67.9 MALIGNANT NEOPLASM OF URINARY BLADDER, UNSPECIFIED SITE (HCC): Primary | ICD-10-CM

## 2020-10-02 DIAGNOSIS — R30.0 DYSURIA: ICD-10-CM

## 2020-10-02 LAB
SL AMB  POCT GLUCOSE, UA: NORMAL
SL AMB LEUKOCYTE ESTERASE,UA: NORMAL
SL AMB POCT BILIRUBIN,UA: NORMAL
SL AMB POCT BLOOD,UA: NORMAL
SL AMB POCT CLARITY,UA: CLEAR
SL AMB POCT COLOR,UA: YELLOW
SL AMB POCT KETONES,UA: NORMAL
SL AMB POCT NITRITE,UA: NORMAL
SL AMB POCT PH,UA: 6.5
SL AMB POCT SPECIFIC GRAVITY,UA: 1
SL AMB POCT URINE PROTEIN: NORMAL
SL AMB POCT UROBILINOGEN: 0.2

## 2020-10-02 PROCEDURE — 99213 OFFICE O/P EST LOW 20 MIN: CPT | Performed by: UROLOGY

## 2020-10-02 PROCEDURE — 1160F RVW MEDS BY RX/DR IN RCRD: CPT | Performed by: UROLOGY

## 2020-10-02 PROCEDURE — 81002 URINALYSIS NONAUTO W/O SCOPE: CPT | Performed by: UROLOGY

## 2020-10-02 PROCEDURE — 3061F NEG MICROALBUMINURIA REV: CPT | Performed by: UROLOGY

## 2020-10-02 PROCEDURE — 52000 CYSTOURETHROSCOPY: CPT | Performed by: UROLOGY

## 2020-10-02 PROCEDURE — 1036F TOBACCO NON-USER: CPT | Performed by: UROLOGY

## 2020-10-25 DIAGNOSIS — I10 ESSENTIAL HYPERTENSION: ICD-10-CM

## 2020-10-25 PROCEDURE — 4010F ACE/ARB THERAPY RXD/TAKEN: CPT | Performed by: UROLOGY

## 2020-10-26 DIAGNOSIS — I10 ESSENTIAL HYPERTENSION: ICD-10-CM

## 2020-10-26 RX ORDER — HYDROCHLOROTHIAZIDE 12.5 MG/1
TABLET ORAL
Qty: 90 TABLET | Refills: 1 | Status: SHIPPED | OUTPATIENT
Start: 2020-10-26 | End: 2021-04-25

## 2020-10-26 RX ORDER — LOSARTAN POTASSIUM 100 MG/1
TABLET ORAL
Qty: 90 TABLET | Refills: 0 | Status: SHIPPED | OUTPATIENT
Start: 2020-10-26 | End: 2021-01-24

## 2020-10-26 RX ORDER — LOSARTAN POTASSIUM 100 MG/1
TABLET ORAL
Qty: 90 TABLET | Refills: 0 | Status: SHIPPED | OUTPATIENT
Start: 2020-10-26 | End: 2020-10-26

## 2020-11-02 DIAGNOSIS — K21.9 GASTROESOPHAGEAL REFLUX DISEASE WITHOUT ESOPHAGITIS: ICD-10-CM

## 2020-11-02 RX ORDER — FAMOTIDINE 40 MG/1
TABLET, FILM COATED ORAL
Qty: 90 TABLET | Refills: 0 | Status: SHIPPED | OUTPATIENT
Start: 2020-11-02 | End: 2021-01-24

## 2021-01-12 ENCOUNTER — VBI (OUTPATIENT)
Dept: ADMINISTRATIVE | Facility: OTHER | Age: 76
End: 2021-01-12

## 2021-01-23 DIAGNOSIS — I10 ESSENTIAL HYPERTENSION: ICD-10-CM

## 2021-01-23 DIAGNOSIS — K21.9 GASTROESOPHAGEAL REFLUX DISEASE WITHOUT ESOPHAGITIS: ICD-10-CM

## 2021-01-24 RX ORDER — FAMOTIDINE 40 MG/1
TABLET, FILM COATED ORAL
Qty: 90 TABLET | Refills: 0 | Status: SHIPPED | OUTPATIENT
Start: 2021-01-24 | End: 2021-04-25

## 2021-01-24 RX ORDER — LOSARTAN POTASSIUM 100 MG/1
TABLET ORAL
Qty: 90 TABLET | Refills: 0 | Status: SHIPPED | OUTPATIENT
Start: 2021-01-24 | End: 2021-04-25

## 2021-04-11 ENCOUNTER — APPOINTMENT (EMERGENCY)
Dept: RADIOLOGY | Facility: HOSPITAL | Age: 76
End: 2021-04-11
Payer: COMMERCIAL

## 2021-04-11 ENCOUNTER — HOSPITAL ENCOUNTER (EMERGENCY)
Facility: HOSPITAL | Age: 76
Discharge: HOME | End: 2021-04-11
Attending: EMERGENCY MEDICINE
Payer: COMMERCIAL

## 2021-04-11 VITALS
SYSTOLIC BLOOD PRESSURE: 144 MMHG | OXYGEN SATURATION: 97 % | DIASTOLIC BLOOD PRESSURE: 65 MMHG | BODY MASS INDEX: 30.27 KG/M2 | HEART RATE: 92 BPM | RESPIRATION RATE: 18 BRPM | WEIGHT: 155 LBS | TEMPERATURE: 98.2 F

## 2021-04-11 DIAGNOSIS — R06.02 SHORTNESS OF BREATH: Primary | ICD-10-CM

## 2021-04-11 LAB
ALBUMIN SERPL BCP-MCNC: 3.7 G/DL (ref 3.5–5)
ALP SERPL-CCNC: 55 U/L (ref 46–116)
ALT SERPL W P-5'-P-CCNC: 36 U/L (ref 12–78)
ANION GAP SERPL CALCULATED.3IONS-SCNC: 11 MMOL/L (ref 4–13)
AST SERPL W P-5'-P-CCNC: 22 U/L (ref 5–45)
ATRIAL RATE: 87 BPM
BASOPHILS # BLD AUTO: 0.1 THOUSANDS/ΜL (ref 0–0.1)
BASOPHILS NFR BLD AUTO: 1 % (ref 0–1)
BILIRUB SERPL-MCNC: 0.42 MG/DL (ref 0.2–1)
BUN SERPL-MCNC: 18 MG/DL (ref 5–25)
CALCIUM SERPL-MCNC: 9.3 MG/DL (ref 8.3–10.1)
CHLORIDE SERPL-SCNC: 105 MMOL/L (ref 100–108)
CO2 SERPL-SCNC: 28 MMOL/L (ref 21–32)
CREAT SERPL-MCNC: 0.77 MG/DL (ref 0.6–1.3)
D DIMER PPP FEU-MCNC: <0.27 UG/ML FEU
EOSINOPHIL # BLD AUTO: 0.44 THOUSAND/ΜL (ref 0–0.61)
EOSINOPHIL NFR BLD AUTO: 6 % (ref 0–6)
ERYTHROCYTE [DISTWIDTH] IN BLOOD BY AUTOMATED COUNT: 12.8 % (ref 11.6–15.1)
GFR SERPL CREATININE-BSD FRML MDRD: 75 ML/MIN/1.73SQ M
GLUCOSE SERPL-MCNC: 113 MG/DL (ref 65–140)
HCT VFR BLD AUTO: 41.1 % (ref 34.8–46.1)
HGB BLD-MCNC: 14 G/DL (ref 11.5–15.4)
IMM GRANULOCYTES # BLD AUTO: 0.03 THOUSAND/UL (ref 0–0.2)
IMM GRANULOCYTES NFR BLD AUTO: 0 % (ref 0–2)
LYMPHOCYTES # BLD AUTO: 2.8 THOUSANDS/ΜL (ref 0.6–4.47)
LYMPHOCYTES NFR BLD AUTO: 39 % (ref 14–44)
MCH RBC QN AUTO: 30.8 PG (ref 26.8–34.3)
MCHC RBC AUTO-ENTMCNC: 34.1 G/DL (ref 31.4–37.4)
MCV RBC AUTO: 90 FL (ref 82–98)
MONOCYTES # BLD AUTO: 0.68 THOUSAND/ΜL (ref 0.17–1.22)
MONOCYTES NFR BLD AUTO: 9 % (ref 4–12)
NEUTROPHILS # BLD AUTO: 3.22 THOUSANDS/ΜL (ref 1.85–7.62)
NEUTS SEG NFR BLD AUTO: 45 % (ref 43–75)
NRBC BLD AUTO-RTO: 0 /100 WBCS
NT-PROBNP SERPL-MCNC: 20 PG/ML
P AXIS: 134 DEGREES
PLATELET # BLD AUTO: 333 THOUSANDS/UL (ref 149–390)
PMV BLD AUTO: 8.9 FL (ref 8.9–12.7)
POTASSIUM SERPL-SCNC: 4.1 MMOL/L (ref 3.5–5.3)
PR INTERVAL: 184 MS
PROT SERPL-MCNC: 7.7 G/DL (ref 6.4–8.2)
QRS AXIS: 40 DEGREES
QRSD INTERVAL: 70 MS
QT INTERVAL: 364 MS
QTC INTERVAL: 428 MS
RBC # BLD AUTO: 4.55 MILLION/UL (ref 3.81–5.12)
SODIUM SERPL-SCNC: 144 MMOL/L (ref 136–145)
T WAVE AXIS: 21 DEGREES
TROPONIN I SERPL-MCNC: <0.02 NG/ML
VENTRICULAR RATE: 83 BPM
WBC # BLD AUTO: 7.27 THOUSAND/UL (ref 4.31–10.16)

## 2021-04-11 PROCEDURE — 71045 X-RAY EXAM CHEST 1 VIEW: CPT

## 2021-04-11 PROCEDURE — 93005 ELECTROCARDIOGRAM TRACING: CPT

## 2021-04-11 PROCEDURE — 85025 COMPLETE CBC W/AUTO DIFF WBC: CPT | Performed by: EMERGENCY MEDICINE

## 2021-04-11 PROCEDURE — 93010 ELECTROCARDIOGRAM REPORT: CPT | Performed by: INTERNAL MEDICINE

## 2021-04-11 PROCEDURE — 80053 COMPREHEN METABOLIC PANEL: CPT | Performed by: EMERGENCY MEDICINE

## 2021-04-11 PROCEDURE — 84484 ASSAY OF TROPONIN QUANT: CPT | Performed by: EMERGENCY MEDICINE

## 2021-04-11 PROCEDURE — 83880 ASSAY OF NATRIURETIC PEPTIDE: CPT | Performed by: EMERGENCY MEDICINE

## 2021-04-11 PROCEDURE — 36415 COLL VENOUS BLD VENIPUNCTURE: CPT | Performed by: EMERGENCY MEDICINE

## 2021-04-11 PROCEDURE — 99284 EMERGENCY DEPT VISIT MOD MDM: CPT

## 2021-04-11 PROCEDURE — 85379 FIBRIN DEGRADATION QUANT: CPT | Performed by: EMERGENCY MEDICINE

## 2021-04-11 PROCEDURE — 99285 EMERGENCY DEPT VISIT HI MDM: CPT | Performed by: EMERGENCY MEDICINE

## 2021-04-11 RX ORDER — LORAZEPAM 0.5 MG/1
0.5 TABLET ORAL ONCE
Status: COMPLETED | OUTPATIENT
Start: 2021-04-11 | End: 2021-04-11

## 2021-04-11 RX ORDER — FAMOTIDINE 20 MG/1
20 TABLET, FILM COATED ORAL ONCE
Status: COMPLETED | OUTPATIENT
Start: 2021-04-11 | End: 2021-04-11

## 2021-04-11 RX ADMIN — LORAZEPAM 0.5 MG: 0.5 TABLET ORAL at 10:11

## 2021-04-11 RX ADMIN — FAMOTIDINE 20 MG: 20 TABLET, FILM COATED ORAL at 10:11

## 2021-04-11 NOTE — ED PROVIDER NOTES
History  Chief Complaint   Patient presents with    Dizziness     dizzy, sob started this morning     HPI     59-year-old female with history of bladder cancer not on chemotherapy or radiation presenting for evaluation of dizziness and shortness of breath  Patient's  was recently in the hospital for a lower extremity bypass and she states that she has not been sleeping well over the last couple of weeks  She initially accompanied her  to the emergency department this morning who is severely ill  She began to experience worsening shortness of breath and dizziness while at his bedside but did have symptoms throughout the day yesterday  She questions whether her symptoms could be due to lack of sleep  Reports feeling like she can not get a deep breath  Reports feeling mildly anxious  No pain or chest tightness  Denies calf swelling or tenderness  No history of DVT or PE  Denies cardiac history or history of lung disease  No cough, fevers, or chills  She had COVID-19 2 months ago and has also been fully vaccinated  Patient reports feeling dizzy but has difficulty describing exactly what this feels like  Denies sensation of the room spinning or feeling like she is going to pass out  Prior to Admission Medications   Prescriptions Last Dose Informant Patient Reported? Taking?    Cholecalciferol (VITAMIN D3) 1000 units CAPS  Self Yes No   Sig: Take 1 capsule by mouth daily   calcium-vitamin D 250-100 MG-UNIT per tablet  Self Yes No   Sig: Take 1 tablet by mouth 2 (two) times a day   cyanocobalamin (VITAMIN B-12) 1000 MCG tablet  Self Yes No   Sig: Take 1,000 mcg by mouth daily   famotidine (PEPCID) 40 MG tablet   No No   Sig: TAKE ONE TABLET BY MOUTH EVERY DAY   fexofenadine (ALLEGRA) 180 MG tablet  Self Yes No   Sig: Take 1 tablet by mouth daily as needed   hydrochlorothiazide (HYDRODIURIL) 12 5 mg tablet   No No   Sig: TAKE ONE TABLET BY MOUTH EVERY DAY   losartan (COZAAR) 100 MG tablet   No No   Sig: TAKE ONE TABLET EVERY DAY  multivitamin-minerals (CENTRUM ADULTS) tablet  Self Yes No   Sig: Take 1 tablet by mouth daily   omeprazole (PriLOSEC) 40 MG capsule  Self No No   Sig: Take 1 capsule (40 mg total) by mouth daily   phenazopyridine (PYRIDIUM) 200 mg tablet  Self No No   Sig: Take 1 tablet (200 mg total) by mouth 3 (three) times a day with meals   simvastatin (ZOCOR) 20 mg tablet  Self No No   Sig: TAKE 1 TABLET BY MOUTH DAILY  terconazole (TERAZOL 3) 0 8 % vaginal cream  Self No No   Sig: Insert 1 applicator into the vagina daily at bedtime      Facility-Administered Medications: None       Past Medical History:   Diagnosis Date    Arthritis     Back pain     Cancer ([address] )     bladder cancer     GERD (gastroesophageal reflux disease)     Hyperlipidemia     Hypertension     Seasonal allergies     Wears partial dentures     1 small tooth removed and placed in cup        Past Surgical History:   Procedure Laterality Date    APPENDECTOMY      MA CYSTOURETHROSCOPY,FULGUR <0 5 CM LESN N/A 1/15/2020    Procedure: Cystoscopy TRANSURETHRAL RESECTION OF BLADDER TUMOR (TURBT); Surgeon: Garrett Mcclure MD;  [facility];  Service: Urology    MA INSTILL ANTICANCER AGENT IN BLADDER N/A 1/15/2020    Procedure: Samira Spawoodrow;  Surgeon: Garrett Mcclure MD;  [facility];  Service: Urology    VEIN LIGATION AND [address]         Family History   Problem Relation Age of Onset    Heart disease Mother     Hypertension Mother      I have reviewed and agree with the history as documented  E-Cigarette/Vaping    E-Cigarette Use Never User      E-Cigarette/Vaping Substances     Social History     Tobacco Use    Smoking status: Never Smoker    Smokeless tobacco: Never Used   Substance Use Topics    Alcohol use: No    Drug use: No       Review of Systems   Constitutional: Negative for chills and fever  HENT: Negative for congestion      Eyes: Negative for visual disturbance  Respiratory: Positive for shortness of breath  Negative for cough and chest tightness  Cardiovascular: Negative for chest pain and leg swelling  Gastrointestinal: Negative for abdominal pain, diarrhea, nausea and vomiting  Genitourinary: Negative for dysuria and frequency  Musculoskeletal: Negative for arthralgias, back pain, neck pain and neck stiffness  Skin: Negative for rash  Neurological: Positive for dizziness  Negative for weakness, light-headedness, numbness and headaches  Psychiatric/Behavioral: Negative for agitation, behavioral problems and confusion  Physical Exam  Physical Exam  Constitutional:       General: She is not in acute distress  Appearance: She is well-developed  She is not diaphoretic  HENT:      Head: Normocephalic and atraumatic  Right Ear: External ear normal       Left Ear: External ear normal       Nose: Nose normal    Eyes:      Conjunctiva/sclera: Conjunctivae normal    Neck:      Musculoskeletal: Normal range of motion and neck supple  Cardiovascular:      Rate and Rhythm: Normal rate and regular rhythm  Pulses: Normal pulses  Heart sounds: Normal heart sounds  No murmur  No friction rub  No gallop  Pulmonary:      Effort: Pulmonary effort is normal  No respiratory distress  Breath sounds: Normal breath sounds  No wheezing or rales  Abdominal:      General: Bowel sounds are normal  There is no distension  Palpations: Abdomen is soft  Tenderness: There is no abdominal tenderness  There is no guarding  Musculoskeletal: Normal range of motion  General: No deformity  Comments: No calf swelling or tenderness   Skin:     General: Skin is warm and dry  Neurological:      Mental Status: She is alert and oriented to person, place, and time  Motor: No abnormal muscle tone        Comments: Face symmetric, tongue midline, 5/5 strength in the proximal and distal upper and lower extremities bilaterally with intact sensation to light touch throughout  CN II-XII intact  Normal finger-to-nose, rapid alternating movements, and heel-to-shin bilaterally  Normal speech, normal gait  No pronator drift        Psychiatric:         Mood and Affect: Mood normal          Vital Signs  ED Triage Vitals [04/11/21 0741]   Temperature Pulse Respirations Blood Pressure SpO2   98 2 °F (36 8 °C) 92 18 144/65 97 %      Temp Source Heart Rate Source Patient Position - Orthostatic VS BP Location FiO2 (%)   Oral Monitor -- -- --      Pain Score       --           Vitals:    04/11/21 0741   BP: 144/65   Pulse: 92         Visual Acuity      ED Medications  Medications   LORazepam (ATIVAN) tablet 0 5 mg (0 5 mg Oral Given 4/11/21 1011)   famotidine (PEPCID) tablet 20 mg (20 mg Oral Given 4/11/21 1011)       Diagnostic Studies  Results Reviewed     Procedure Component Value Units Date/Time    NT-BNP PRO [[id number]]  (Normal) Collected: 04/11/21 0840    Lab Status: Final result Specimen: Blood from Arm, Left Updated: 04/11/21 0922     NT-proBNP 20 pg/mL     Troponin I [[id number]]  (Normal) Collected: 04/11/21 0840    Lab Status: Final result Specimen: Blood from Arm, Left Updated: 04/11/21 0917     Troponin I <0 02 ng/mL     Comprehensive metabolic panel [[id number]] Collected: 04/11/21 0840    Lab Status: Final result Specimen: Blood from Arm, Left Updated: 04/11/21 0914     Sodium 144 mmol/L      Potassium 4 1 mmol/L      Chloride 105 mmol/L      CO2 28 mmol/L      ANION GAP 11 mmol/L      BUN 18 mg/dL      Creatinine 0 77 mg/dL      Glucose 113 mg/dL      Calcium 9 3 mg/dL      AST 22 U/L      ALT 36 U/L      Alkaline Phosphatase 55 U/L      Total Protein 7 7 g/dL      Albumin 3 7 g/dL      Total Bilirubin 0 42 mg/dL      eGFR 75 ml/min/1 73sq m     Narrative:      Justin guidelines for Chronic Kidney Disease (CKD):     Stage 1 with normal or high GFR (GFR > 90 mL/min/1 73 square meters)    Stage 2 Mild CKD (GFR = 60-89 mL/min/1 73 square meters)    Stage 3A Moderate CKD (GFR = 45-59 mL/min/1 73 square meters)    Stage 3B Moderate CKD (GFR = 30-44 mL/min/1 73 square meters)    Stage 4 Severe CKD (GFR = 15-29 mL/min/1 73 square meters)    Stage 5 End Stage CKD (GFR <15 mL/min/1 73 square meters)  Note: GFR calculation is accurate only with a steady state creatinine    D-dimer, quantitative [[id number]]  (Normal) Collected: 04/11/21 0840    Lab Status: Final result Specimen: Blood from Arm, Left Updated: 04/11/21 0912     D-Dimer, Quant <0 27 ug/ml FEU     CBC and differential [[id number]] Collected: 04/11/21 0840    Lab Status: Final result Specimen: Blood from Arm, Left Updated: 04/11/21 0851     WBC 7 27 Thousand/uL      RBC 4 55 Million/uL      Hemoglobin 14 0 g/dL      Hematocrit 41 1 %      MCV 90 fL      MCH 30 8 pg      MCHC 34 1 g/dL      RDW 12 8 %      MPV 8 9 fL      Platelets 886 Thousands/uL      nRBC 0 /100 WBCs      Neutrophils Relative 45 %      Immat GRANS % 0 %      Lymphocytes Relative 39 %      Monocytes Relative 9 %      Eosinophils Relative 6 %      Basophils Relative 1 %      Neutrophils Absolute 3 22 Thousands/µL      Immature Grans Absolute 0 03 Thousand/uL      Lymphocytes Absolute 2 80 Thousands/µL      Monocytes Absolute 0 68 Thousand/µL      Eosinophils Absolute 0 44 Thousand/µL      Basophils Absolute 0 10 Thousands/µL                  XR chest 1 view portable   Final Result by Antone Ormond, MD (04/11 8644)      No acute cardiopulmonary disease  Workstation performed: [id number]                    Procedures  Procedures         ED Course                                           MDM  Number of Diagnoses or Management Options  Shortness of breath: new and requires workup  Diagnosis management comments:   Well-appearing though anxious as her  is currently critically ill  Afebrile and hemodynamically stable    Patient denies chest pain but reports a burning sensation her stomach consistent with GERD that she has had in the past   This is been present for greater than 3 hours  Single troponin obtained which is undetectable  Pain would be very atypical for ischemia, delta troponin not indicated given presence of pain for longer than 3 hours  I personally interpreted the patient's EKG which reveals normal rate, ectopic atrial rhythm, normal ventricular axis but unusual P-wave axis, normal intervals, nonspecific T-wave abnormality isolated to lead 3, no ST segment deviation  Given her cancer history and report of shortness of breath, D-dimer obtained which is not elevated, low risk by Wells criteria, doubt PE  Chest x-ray with no cardiopulmonary abnormalities  Remainder of labs unremarkable  Patient denies cough or fever and had COVID-19 2 months ago and has been vaccinated, doubt COVID  Patient reports dizziness but has no signs of ataxia on exam   Denies feeling like she is going to pass out  Doubt cardiac or neurogenic cause  Suspect anxiety as the cause of her symptoms  On re-assessment she reports improvement in symptoms  Patient given Ativan and Pepcid  She was discharged to be with her            Amount and/or Complexity of Data Reviewed  Clinical lab tests: ordered and reviewed  Tests in the radiology section of CPT®: ordered and reviewed  Review and summarize past medical records: yes  Independent visualization of images, tracings, or specimens: yes    Patient Progress  Patient progress: stable       Disposition  Final diagnoses:   Shortness of breath     Time reflects when diagnosis was documented in both MDM as applicable and the Disposition within this note     Time User Action Codes Description Comment    4/11/2021  9:55 AM Abram Lombardi Add [R06 02] Shortness of breath       ED Disposition     ED Disposition Condition Date/Time Comment    Discharge Stable Sun Apr 11, 2021  9:55 AM Maria Teresa Arce discharge to home/self care             Follow-up Information     Follow up With Specialties Details Why Contact Info Additional Information    [facility]  Return to the Emergency Department immediately for trouble breathing, chest pain, feeling like you are going to pass out, or for new or concerning symptoms  [address] Emergency Department, [address], [place], [place], [address]          Discharge Medication List as of 4/11/2021  9:56 AM      CONTINUE these medications which have NOT CHANGED    Details   calcium-vitamin D 250-100 MG-UNIT per tablet Take 1 tablet by mouth 2 (two) times a day, Historical Med      Cholecalciferol (VITAMIN D3) 1000 units CAPS Take 1 capsule by mouth daily, Historical Med      cyanocobalamin (VITAMIN B-12) 1000 MCG tablet Take 1,000 mcg by mouth daily, Historical Med      famotidine (PEPCID) 40 MG tablet TAKE ONE TABLET BY MOUTH EVERY DAY, Normal      fexofenadine (ALLEGRA) 180 MG tablet Take 1 tablet by mouth daily as needed, Starting Thu 10/26/2017, Historical Med      hydrochlorothiazide (HYDRODIURIL) 12 5 mg tablet TAKE ONE TABLET BY MOUTH EVERY DAY, Normal      losartan (COZAAR) 100 MG tablet TAKE ONE TABLET EVERY DAY , Normal      multivitamin-minerals (CENTRUM ADULTS) tablet Take 1 tablet by mouth daily, Historical Med      omeprazole (PriLOSEC) 40 MG capsule Take 1 capsule (40 mg total) by mouth daily, Starting Thu 4/5/2018, Normal      phenazopyridine (PYRIDIUM) 200 mg tablet Take 1 tablet (200 mg total) by mouth 3 (three) times a day with meals, Starting Tue 3/24/2020, Normal      simvastatin (ZOCOR) 20 mg tablet TAKE 1 TABLET BY MOUTH DAILY , Normal      terconazole (TERAZOL 3) 0 8 % vaginal cream Insert 1 applicator into the vagina daily at bedtime, Starting Tue 7/9/2019, Normal           No discharge procedures on file      PDMP Review None          ED Provider  Electronically Signed by           Paula Chirinos MD  04/11/21 5095

## 2021-04-13 ENCOUNTER — VBI (OUTPATIENT)
Dept: INTERNAL MEDICINE CLINIC | Facility: CLINIC | Age: 76
End: 2021-04-13

## 2021-04-13 NOTE — TELEPHONE ENCOUNTER
Nayeli Kimberly    ED Visit Information     Ed visit date: 4/11/21   Diagnosis Description: Shortness of breath  In Network? Yes [address] [facility]  Discharge status: Home  Discharged with meds ? No  Number of ED visits to date: 1  ED Severity:1     Outreach Information    Outreach successful: No 2  Date letter mailed:unable to mail letter due to pandemic  Date [address] Coordination    Follow up appointment with pcp: no PCP f/u appt  Transportation issues ?  NA    Value Base Outreach    Outreach type: 3 Day Outreach  04/13/2021 11:39 AM Phone (VBI) Naomi Shafer (Self) [phone] (M) Remove  Left Message - Attempt 1   By Sean Rodriguez    04/16/2021 12:33 PM Phone (VBI) Naomi Shafer (Self) [phone] (M) Remove  Left Message - Attempt 2  By Sean Rodriguez

## 2021-04-24 DIAGNOSIS — K21.9 GASTROESOPHAGEAL REFLUX DISEASE WITHOUT ESOPHAGITIS: ICD-10-CM

## 2021-04-24 DIAGNOSIS — I10 ESSENTIAL HYPERTENSION: ICD-10-CM

## 2021-04-25 RX ORDER — FAMOTIDINE 40 MG/1
TABLET, FILM COATED ORAL
Qty: 90 TABLET | Refills: 0 | Status: SHIPPED | OUTPATIENT
Start: 2021-04-25 | End: 2021-07-26

## 2021-04-25 RX ORDER — LOSARTAN POTASSIUM 100 MG/1
TABLET ORAL
Qty: 90 TABLET | Refills: 0 | Status: SHIPPED | OUTPATIENT
Start: 2021-04-25 | End: 2021-07-26

## 2021-04-25 RX ORDER — HYDROCHLOROTHIAZIDE 12.5 MG/1
TABLET ORAL
Qty: 90 TABLET | Refills: 1 | Status: SHIPPED | OUTPATIENT
Start: 2021-04-25 | End: 2021-10-21

## 2021-05-27 ENCOUNTER — TELEPHONE (OUTPATIENT)
Dept: GASTROENTEROLOGY | Facility: CLINIC | Age: 76
End: 2021-05-27

## 2021-05-27 NOTE — TELEPHONE ENCOUNTER
Pt is due for recall EGD with Dr Camelia Kelley for hx of Olson's, GERD, hx of Gastric Ulcer  I lmom for pt to please call back to schedule a procedure with Dr Camelia Kelley  Will call pt again in one week if do not hear back from her

## 2021-06-03 NOTE — TELEPHONE ENCOUNTER
I lmom for pt to please call back to schedule a procedure with Dr Joe Mortensen  Since pt was called in the past via automated system, will wait for pt's call back at this time

## 2021-06-21 ENCOUNTER — PREP FOR PROCEDURE (OUTPATIENT)
Dept: GASTROENTEROLOGY | Facility: CLINIC | Age: 76
End: 2021-06-21

## 2021-06-21 DIAGNOSIS — K21.9 GASTROESOPHAGEAL REFLUX DISEASE, UNSPECIFIED WHETHER ESOPHAGITIS PRESENT: ICD-10-CM

## 2021-06-21 DIAGNOSIS — K22.70 BARRETT'S ESOPHAGUS WITHOUT DYSPLASIA: Primary | ICD-10-CM

## 2021-06-21 DIAGNOSIS — Z87.11 HISTORY OF GASTRIC ULCER: ICD-10-CM

## 2021-07-12 ENCOUNTER — TELEPHONE (OUTPATIENT)
Dept: UROLOGY | Facility: AMBULATORY SURGERY CENTER | Age: 76
End: 2021-07-12

## 2021-07-12 NOTE — TELEPHONE ENCOUNTER
Patient managed by Dr Ashley [facility]) patients daughter Malvern Ammon called to reschedule cysto from 10/19 with Dr Ashley Lance stated patient has a eye procedure the previous day    Casi can be reached at [phone]

## 2021-07-25 DIAGNOSIS — K21.9 GASTROESOPHAGEAL REFLUX DISEASE WITHOUT ESOPHAGITIS: ICD-10-CM

## 2021-07-25 DIAGNOSIS — I10 ESSENTIAL HYPERTENSION: ICD-10-CM

## 2021-07-26 RX ORDER — FAMOTIDINE 40 MG/1
TABLET, FILM COATED ORAL
Qty: 90 TABLET | Refills: 1 | Status: SHIPPED | OUTPATIENT
Start: 2021-07-26 | End: 2021-08-06 | Stop reason: ALTCHOICE

## 2021-07-26 RX ORDER — LOSARTAN POTASSIUM 100 MG/1
TABLET ORAL
Qty: 90 TABLET | Refills: 1 | Status: SHIPPED | OUTPATIENT
Start: 2021-07-26 | End: 2021-10-21 | Stop reason: SDUPTHER

## 2021-08-02 ENCOUNTER — OFFICE VISIT (OUTPATIENT)
Dept: INTERNAL MEDICINE CLINIC | Facility: CLINIC | Age: 76
End: 2021-08-02
Payer: COMMERCIAL

## 2021-08-02 VITALS
WEIGHT: 146 LBS | TEMPERATURE: 97.6 F | BODY MASS INDEX: 28.51 KG/M2 | HEART RATE: 92 BPM | SYSTOLIC BLOOD PRESSURE: 148 MMHG | DIASTOLIC BLOOD PRESSURE: 76 MMHG

## 2021-08-02 DIAGNOSIS — M54.41 ACUTE RIGHT-SIDED LOW BACK PAIN WITH RIGHT-SIDED SCIATICA: Primary | ICD-10-CM

## 2021-08-02 PROCEDURE — 99213 OFFICE O/P EST LOW 20 MIN: CPT | Performed by: GENERAL ACUTE CARE HOSPITAL

## 2021-08-02 PROCEDURE — 1036F TOBACCO NON-USER: CPT | Performed by: GENERAL ACUTE CARE HOSPITAL

## 2021-08-02 PROCEDURE — 1160F RVW MEDS BY RX/DR IN RCRD: CPT | Performed by: GENERAL ACUTE CARE HOSPITAL

## 2021-08-02 RX ORDER — CYCLOBENZAPRINE HCL 5 MG
5 TABLET ORAL 3 TIMES DAILY PRN
Qty: 30 TABLET | Refills: 0 | Status: SHIPPED | OUTPATIENT
Start: 2021-08-02

## 2021-08-02 NOTE — PATIENT INSTRUCTIONS
For your back pain,   1-continue heat pad  2-continue tylenol, you can take 650mg, 2pills, three times a day as needed  3-try muscle relaxer   It may cause sleepiness and drawsiness, so try first take at bedtime to see how you feel the next morning  4-could try Over the counter Lidocaine patch   5-If your pain continues , make an appointment with pain management

## 2021-08-02 NOTE — PROGRESS NOTES
Assessment/Plan:    Acute right-sided low back pain with right-sided sciatica  Symptoms is consistent with lumbar radioculopathy with right side sciatica  Exam also showed diffuse muscle tenderness in right lower back  1-continue heat pad  2-continue tylenol, you can take 650mg, 2pills, three times a day as needed  3-try muscle relaxer  It may cause sleepiness and drawsiness, so try first take at bedtime to see how you feel the next morning  4-could try Over the counter Lidocaine patch   5-If your pain continues , make an appointment with pain management        Diagnoses and all orders for this visit:    Acute right-sided low back pain with right-sided sciatica  -     Ambulatory referral to Pain Management; Future  -     cyclobenzaprine (FLEXERIL) 5 mg tablet; Take 1 tablet (5 mg total) by mouth 3 (three) times a day as needed for muscle spasms First try at bedtime to see how you feel the next morning  Other orders  -     Cancel: Hepatitis C Antibody ([facility]); Future  -     Cancel: Mammo screening bilateral w 3d & cad; Future          Subjective:      Patient ID: Nikki Littlejohn is a 67 y o  female  HPI    Worse back pain over the past 3 days  Took tylenol helped little  Chronic back pain, a long spine, now worse in right lower back, radiating down along the back of leg  Had injection several years ago by pain management which helped  Pain is all the time, 9/10 at night  The following portions of the patient's history were reviewed and updated as appropriate: allergies, past family history, past social history and past surgical history      Review of Systems      Objective:      /76   Pulse 92   Temp 97 6 °F (36 4 °C) (Temporal)   Wt 66 2 kg (146 lb)   LMP  (LMP Unknown)   Breastfeeding Yes   BMI 28 51 kg/m²          Physical Exam

## 2021-08-02 NOTE — ASSESSMENT & PLAN NOTE
Symptoms is consistent with lumbar radioculopathy with right side sciatica  Exam also showed diffuse muscle tenderness in right lower back  1-continue heat pad  2-continue tylenol, you can take 650mg, 2pills, three times a day as needed  3-try muscle relaxer   It may cause sleepiness and drawsiness, so try first take at bedtime to see how you feel the next morning  4-could try Over the counter Lidocaine patch   5-If your pain continues , make an appointment with pain management

## 2021-08-06 ENCOUNTER — ANESTHESIA (OUTPATIENT)
Dept: GASTROENTEROLOGY | Facility: AMBULATORY SURGERY CENTER | Age: 76
End: 2021-08-06

## 2021-08-06 ENCOUNTER — ANESTHESIA EVENT (OUTPATIENT)
Dept: GASTROENTEROLOGY | Facility: AMBULATORY SURGERY CENTER | Age: 76
End: 2021-08-06

## 2021-08-06 ENCOUNTER — HOSPITAL ENCOUNTER (OUTPATIENT)
Dept: GASTROENTEROLOGY | Facility: AMBULATORY SURGERY CENTER | Age: 76
Discharge: HOME | End: 2021-08-06
Payer: COMMERCIAL

## 2021-08-06 VITALS
RESPIRATION RATE: 18 BRPM | HEART RATE: 74 BPM | SYSTOLIC BLOOD PRESSURE: 134 MMHG | TEMPERATURE: 96.8 F | HEIGHT: 60 IN | DIASTOLIC BLOOD PRESSURE: 80 MMHG | BODY MASS INDEX: 29.45 KG/M2 | WEIGHT: 150 LBS | OXYGEN SATURATION: 96 %

## 2021-08-06 DIAGNOSIS — Z87.11 HISTORY OF GASTRIC ULCER: ICD-10-CM

## 2021-08-06 DIAGNOSIS — K22.70 BARRETT'S ESOPHAGUS WITHOUT DYSPLASIA: ICD-10-CM

## 2021-08-06 DIAGNOSIS — K21.9 GASTROESOPHAGEAL REFLUX DISEASE, UNSPECIFIED WHETHER ESOPHAGITIS PRESENT: ICD-10-CM

## 2021-08-06 DIAGNOSIS — K21.9 GASTROESOPHAGEAL REFLUX DISEASE WITHOUT ESOPHAGITIS: ICD-10-CM

## 2021-08-06 PROBLEM — K63.5 COLON POLYPS: Status: ACTIVE | Noted: 2021-08-06

## 2021-08-06 PROCEDURE — 43239 EGD BIOPSY SINGLE/MULTIPLE: CPT | Performed by: INTERNAL MEDICINE

## 2021-08-06 PROCEDURE — 88305 TISSUE EXAM BY PATHOLOGIST: CPT | Performed by: PATHOLOGY

## 2021-08-06 PROCEDURE — 00731 ANES UPR GI NDSC PX NOS: CPT | Performed by: NURSE ANESTHETIST, CERTIFIED REGISTERED

## 2021-08-06 PROCEDURE — 99100 ANES PT EXTEME AGE<1 YR&>70: CPT | Performed by: NURSE ANESTHETIST, CERTIFIED REGISTERED

## 2021-08-06 RX ORDER — PROPOFOL 10 MG/ML
INJECTION, EMULSION INTRAVENOUS AS NEEDED
Status: DISCONTINUED | OUTPATIENT
Start: 2021-08-06 | End: 2021-08-06

## 2021-08-06 RX ORDER — SODIUM CHLORIDE 9 MG/ML
INJECTION, SOLUTION INTRAVENOUS CONTINUOUS PRN
Status: DISCONTINUED | OUTPATIENT
Start: 2021-08-06 | End: 2021-08-06

## 2021-08-06 RX ORDER — SODIUM CHLORIDE 9 MG/ML
30 INJECTION, SOLUTION INTRAVENOUS CONTINUOUS
Status: DISCONTINUED | OUTPATIENT
Start: 2021-08-06 | End: 2021-08-10 | Stop reason: HOSPADM

## 2021-08-06 RX ORDER — SODIUM CHLORIDE 9 MG/ML
50 INJECTION, SOLUTION INTRAVENOUS CONTINUOUS
Status: DISCONTINUED | OUTPATIENT
Start: 2021-08-06 | End: 2021-08-10 | Stop reason: HOSPADM

## 2021-08-06 RX ORDER — SODIUM CHLORIDE 9 MG/ML
20 INJECTION, SOLUTION INTRAVENOUS CONTINUOUS
Status: DISCONTINUED | OUTPATIENT
Start: 2021-08-06 | End: 2021-08-10 | Stop reason: HOSPADM

## 2021-08-06 RX ORDER — LIDOCAINE HYDROCHLORIDE 10 MG/ML
INJECTION, SOLUTION EPIDURAL; INFILTRATION; INTRACAUDAL; PERINEURAL AS NEEDED
Status: DISCONTINUED | OUTPATIENT
Start: 2021-08-06 | End: 2021-08-06

## 2021-08-06 RX ORDER — OMEPRAZOLE 20 MG/1
20 CAPSULE, DELAYED RELEASE ORAL DAILY
Qty: 30 CAPSULE | Refills: 3 | Status: SHIPPED | OUTPATIENT
Start: 2021-08-06 | End: 2021-10-21 | Stop reason: SDUPTHER

## 2021-08-06 RX ADMIN — LIDOCAINE HYDROCHLORIDE 50 MG: 10 INJECTION, SOLUTION EPIDURAL; INFILTRATION; INTRACAUDAL; PERINEURAL at 10:12

## 2021-08-06 RX ADMIN — SODIUM CHLORIDE: 9 INJECTION, SOLUTION INTRAVENOUS at 10:10

## 2021-08-06 RX ADMIN — PROPOFOL 100 MG: 10 INJECTION, EMULSION INTRAVENOUS at 10:12

## 2021-08-06 RX ADMIN — PROPOFOL 20 MG: 10 INJECTION, EMULSION INTRAVENOUS at 10:14

## 2021-08-06 RX ADMIN — PROPOFOL 20 MG: 10 INJECTION, EMULSION INTRAVENOUS at 10:16

## 2021-08-06 NOTE — ANESTHESIA POSTPROCEDURE EVALUATION
Post-Op Assessment Note    CV Status:  Stable  Pain Score: 0    Pain management: adequate     Mental Status:  Alert and awake   Hydration Status:  Euvolemic   PONV Controlled:  Controlled   Airway Patency:  Patent      Post Op Vitals Reviewed: Yes      Staff: CRNA         No complications documented      BP   122/64   Temp     Pulse  82   Resp   15   SpO2   96%

## 2021-08-06 NOTE — DISCHARGE INSTRUCTIONS
Upper Endoscopy   WHAT YOU NEED TO KNOW:   An upper endoscopy is also called an upper gastrointestinal (GI) endoscopy, or an esophagogastroduodenoscopy (EGD)  It is a procedure to examine the inside of your esophagus, stomach, and duodenum (first part of the small intestine) with a scope  You may feel bloated, gassy, or have some abdominal discomfort after your procedure  Your throat may be sore for 24 to 36 hours  You may burp or pass gas from air that is still inside your body  DISCHARGE INSTRUCTIONS:   Seek care immediately if:    You have sudden, severe abdominal pain   You have problems swallowing   You have a large amount of black, sticky bowel movements or blood in your bowel movements   You have sudden trouble breathing   You feel weak, lightheaded, or faint or your heart beats faster than normal for you  Contact your healthcare provider if:    You have a fever and chills   You have nausea or are vomiting   Your abdomen is bloated or feels full and hard   You have abdominal pain   You have black, sticky bowel movements or blood in your bowel movements   You have not had a bowel movement for 3 days after your procedure   You have rash or hives   You have questions or concerns about your procedure  Activity:    Do not lift, strain, or run for 24 hours after your procedure   Rest after your procedure  You have been given medicine to relax you  Do not drive or make important decisions until the day after your procedure  Return to your normal activity as directed   Relieve gas and discomfort from bloating by lying on your right side with a heating pad on your abdomen  You may need to take short walks to help the gas move out  Eat small meals until bloating is relieved  Follow up with your healthcare provider as directed: Write down your questions so you remember to ask them during your visits       If you take a blood thinner, please review the specific instructions from your endoscopist about when you should resume it  These can be found in the Recommendation and Your Medication list sections of this After Visit Summary  GERD (Gastroesophageal Reflux Disease)   WHAT YOU NEED TO KNOW:   What is gastroesophageal reflux disease (GERD)? GERD is reflux that occurs more than twice a week for a few weeks  Reflux means acid and food in the stomach back up into the esophagus  It usually causes heartburn and other symptoms  GERD can cause other health problems over time if it is not treated  What causes GERD? GERD often occurs when the lower muscle (sphincter) of the esophagus does not close properly  The sphincter normally opens to let food into the stomach  It then closes to keep food and stomach acid in the stomach  If the sphincter does not close properly, stomach acid and food back up (reflux) into the esophagus  The following may increase your risk for GERD:  · Certain foods such as spicy foods, chocolate, foods that contain caffeine, peppermint, and fried foods    · Hiatal hernia    · Certain medicines such as calcium channel blockers (used to treat high blood pressure), allergy medicines, sedatives, or antidepressants    · Pregnancy or obesity    · Lying down after a meal    · Drinking alcohol or smoking    What are the signs and symptoms of GERD? · Heartburn (burning pain in your chest)    · Pain after meals that spreads to your neck, jaw, or shoulder    · Pain that gets better when you change positions    · Bitter or acid taste in your mouth    · A dry cough    · Trouble swallowing or pain with swallowing    · Hoarseness or a sore throat    · Burping or hiccups    · Feeling full soon after you start eating    How is GERD diagnosed? Your healthcare provider will ask about your symptoms and when they started  Tell him or her about other medical conditions you have, your eating habits, and your activities   You may also need any of the following:  · The amount of acid  in your esophagus and stomach may be checked  A small probe is used to check the amount  · An endoscopy  is a procedure used to look at the inside of your esophagus and stomach  An endoscope is a bendable tube with a light and camera on the end  Your healthcare provider may remove a small sample of tissue and send it to a lab for tests  · X-ray  pictures may be taken of your stomach and intestines (bowel)  You may be given a chalky liquid to drink before the pictures are taken  This liquid helps your stomach and intestines show up better on the x-rays  · Pressure and function  tests of your esophagus can help find problems such as a hiatal hernia  How is GERD treated? · Medicines  are used to decrease stomach acid  Medicine may also be used to help your lower esophageal sphincter and stomach contract (tighten) more  · Surgery  is done to wrap the upper part of the stomach around the esophageal sphincter  This will strengthen the sphincter and prevent reflux  How can I manage GERD? · Do not have foods or drinks that may increase heartburn  These include chocolate, peppermint, fried or fatty foods, drinks that contain caffeine, or carbonated drinks (soda)  Other foods include spicy foods, onions, tomatoes, and tomato-based foods  Do not have foods or drinks that can irritate your esophagus, such as citrus fruits, juices, and alcohol  · Do not eat large meals  When you eat a lot of food at one time, your stomach needs more acid to digest it  Eat 6 small meals each day instead of 3 large ones, and eat slowly  Do not eat meals 2 to 3 hours before bedtime  · Elevate the head of your bed  Place 6-inch blocks under the head of your bed frame  You may also use more than one pillow under your head and shoulders while you sleep  · Maintain a healthy weight  If you are overweight, weight loss may help relieve symptoms of GERD  · Do not smoke  Smoking weakens the lower esophageal sphincter and increases the risk of GERD  Ask your healthcare provider for information if you currently smoke and need help to quit  E-cigarettes or smokeless tobacco still contain nicotine  Talk to your healthcare provider before you use these products  · Do not wear clothing that is tight around your waist   Tight clothing can put pressure on your stomach and cause or worsen GERD symptoms  Call your local emergency number (911 in the [address]) if:   · You have severe chest pain and sudden trouble breathing  When should I seek immediate care? · You have trouble breathing after you vomit  · You have trouble swallowing, or pain with swallowing  · Your bowel movements are black, bloody, or tarry-looking  · Your vomit looks like coffee grounds or has blood in it  When should I call my doctor? · You feel full and cannot burp or vomit  · You vomit large amounts, or you vomit often  · You are losing weight without trying  · Your symptoms get worse or do not improve with treatment  · You have questions or concerns about your condition or care  CARE AGREEMENT:   You have the right to help plan your care  Learn about your health condition and how it may be treated  Discuss treatment options with your healthcare providers to decide what care you want to receive  You always have the right to refuse treatment  The above information is an  only  It is not intended as medical advice for individual conditions or treatments  Talk to your doctor, nurse or pharmacist before following any medical regimen to see if it is safe and effective for you  © Copyright [employer] 2021 Information is for End User's use only and may not be sold, redistributed or otherwise used for commercial purposes   All illustrations and images included in CareNotes® are the copyrighted property of A D A M , Inc  or Dragan Scott  Gastritis   WHAT YOU NEED TO KNOW: What is gastritis? Gastritis is inflammation or irritation of the lining of your stomach  What increases my risk for gastritis? · Infection with bacteria, a virus, or a parasite    · NSAIDs, aspirin, or steroid medicine    · Use of tobacco products or alcohol    · Trauma such as an injury to your stomach or intestine    · Autoimmune disorders such as diabetes, thyroid disease, or Crohn disease    · Stress    · Age older than 60 years    · Illegal drugs, such as cocaine    What are the signs and symptoms of gastritis? · Stomach pain, burning, or tenderness when you press on it    · Stomach fullness or tightness    · Nausea or vomiting    · Loss of appetite, or feeling full quickly when you eat    · Bad breath    · Fatigue or feeling more tired than usual    · Heartburn    How is gastritis diagnosed? Your healthcare provider will ask about your signs and symptoms and examine you  You may need any of the following:  · Blood tests  may be used to show an infection, dehydration, or anemia (low red blood cell levels)  · A bowel movement sample  may be tested for blood or the germ that may be causing your gastritis  · A breath test  may show if H pylori is causing your gastritis  You will be given a liquid to drink  Then you will breathe into a bag  Your healthcare provider will measure the amount of carbon dioxide in your breath  Extra amounts of carbon dioxide may mean you have an H pylori infection  · An endoscopy  may be used to look for irritation or bleeding in your stomach  Your healthcare provider will use an endoscope (tube with a light and camera on the end) during the procedure  He or she may take a sample from your stomach to be tested  How is gastritis treated? Your symptoms may go away without treatment  Treatment will depend on what is causing your gastritis  Your healthcare provider may recommend changes to the medicines you take   Medicines may be given to help treat a bacterial infection or decrease stomach acid  How can I manage or prevent gastritis? · Do not smoke  Nicotine and other chemicals in cigarettes and cigars can make your symptoms worse and cause lung damage  Ask your healthcare provider for information if you currently smoke and need help to quit  E-cigarettes or smokeless tobacco still contain nicotine  Talk to your healthcare provider before you use these products  · Do not drink alcohol  Alcohol can prevent healing and make your gastritis worse  Talk to your healthcare provider if you need help to stop drinking  · Do not take NSAIDs or aspirin unless directed  These and similar medicines can cause irritation of your stomach lining  If your healthcare provider says it is okay to take NSAIDs, take them with food  · Do not eat foods that cause irritation  Foods such as oranges and salsa can cause burning or pain  Eat a variety of healthy foods  Examples include fruits (not citrus), vegetables, low-fat dairy products, beans, whole-grain breads, and lean meats and fish  Try to eat small meals, and drink water with your meals  Do not eat for at least 3 hours before you go to bed  · Find ways to relax and decrease stress  Stress can increase stomach acid and make gastritis worse  Activities such as yoga, meditation, or listening to music can help you relax  Spend time with friends, or do things you enjoy  Call 911 for any of the following:   · You develop chest pain or shortness of breath  When should I seek immediate care? · You vomit blood  · You have black or bloody bowel movements  · You have severe stomach or back pain  When should I contact my healthcare provider? · You have a fever  · You have new or worsening symptoms, even after treatment  · You have questions or concerns about your condition or care  CARE AGREEMENT:   You have the right to help plan your care   Learn about your health condition and how it may be treated  Discuss treatment options with your healthcare providers to decide what care you want to receive  You always have the right to refuse treatment  The above information is an  only  It is not intended as medical advice for individual conditions or treatments  Talk to your doctor, nurse or pharmacist before following any medical regimen to see if it is safe and effective for you  © Copyright [employer] 2021 Information is for End User's use only and may not be sold, redistributed or otherwise used for commercial purposes   All illustrations and images included in CareNotes® are the copyrighted property of A D A [employer]  or [address] [place] [address] [employer]

## 2021-08-06 NOTE — ANESTHESIA PREPROCEDURE EVALUATION
Procedure:  EGD    Relevant Problems   CARDIO   (+) Hypertension      ENDO   (+) Type 2 diabetes mellitus without complication, without long-term current use of insulin (HCC)      GI/HEPATIC   (+) Gastroesophageal reflux disease without esophagitis        Physical Exam    Airway    Mallampati score: II  TM Distance: >3 FB  Neck ROM: full     Dental       Cardiovascular  Rhythm: regular, Rate: normal,     Pulmonary  Breath sounds clear to auscultation,     Other Findings        Anesthesia Plan  ASA Score- 2     Anesthesia Type- IV sedation with anesthesia with ASA Monitors  Additional Monitors:   Airway Plan:           Plan Factors-Exercise tolerance (METS): >4 METS  Chart reviewed  Patient summary reviewed  Induction-     Postoperative Plan-     Informed Consent- Anesthetic plan and risks discussed with patient

## 2021-08-06 NOTE — H&P
History and Physical - [facility]  Libby Thompson 67 y o  female MRN: [id number]                  HPI: Libby Thompson is a 67y o  year old female who presents for upper endoscopy      REVIEW OF SYSTEMS: Per the HPI, and otherwise unremarkable  Historical Information   Past Medical History:   Diagnosis Date    Arthritis     Back pain     sciatic pain right hip and down right leg    Cancer ([address] )     bladder cancer ; had surgical scraping    GERD (gastroesophageal reflux disease)     Hyperlipidemia     Hypertension     Seasonal allergies     Wears partial dentures     1 small tooth removed and placed in cup      Past Surgical History:   Procedure Laterality Date    APPENDECTOMY      COLONOSCOPY      WI CYSTOURETHROSCOPY,FULGUR <0 5 CM LESN N/A 1/15/2020    Procedure: Cystoscopy TRANSURETHRAL RESECTION OF BLADDER TUMOR (TURBT);   Surgeon: Alex West MD;  Location: [facility];  Service: Urology    WI INSTILL ANTICANCER AGENT IN BLADDER N/A 1/15/2020    Procedure: Radha Leal;  Surgeon: Alex West MD;  Location: [facility];  Service: Urology    UPPER GASTROINTESTINAL ENDOSCOPY      VEIN LIGATION AND STRIPPING       Social History   Social History     Substance and Sexual Activity   Alcohol Use No     Social History     Substance and Sexual Activity   Drug Use No     Social History     Tobacco Use   Smoking Status Never Smoker   Smokeless Tobacco Never Used     Family History   Problem Relation Age of Onset    Heart disease Mother     Hypertension Mother        Meds/Allergies       Current Outpatient Medications:     calcium-vitamin D 250-100 MG-UNIT per tablet    Cholecalciferol (VITAMIN D3) 1000 units CAPS    cyanocobalamin (VITAMIN B-12) 1000 MCG tablet    cyclobenzaprine (FLEXERIL) 5 mg tablet    hydrochlorothiazide (HYDRODIURIL) 12 5 mg tablet    losartan (COZAAR) 100 MG tablet    multivitamin-minerals (CENTRUM ADULTS) tablet   omeprazole (PriLOSEC) 40 MG capsule    simvastatin (ZOCOR) 20 mg tablet    Current Facility-Administered Medications:     sodium chloride 0 9 % infusion, 30 mL/hr, Intravenous, Continuous    sodium chloride 0 9 % infusion, 20 mL/hr, Intravenous, Continuous    Facility-Administered Medications Ordered in Other Encounters:     lidocaine (PF) (XYLOCAINE-MPF) 1 % injection, , Intravenous, PRN, 50 mg at 08/06/21 1012    propofol (DIPRIVAN) 200 MG/20ML bolus injection, , Intravenous, PRN, 100 mg at 08/06/21 1012    sodium chloride 0 9 % infusion, , Intravenous, Continuous PRN, New Bag at 08/06/21 1010    No Known Allergies    Objective     /82   Pulse 81   Temp (!) 96 8 °F (36 °C) (Skin)   Resp 18   Ht 5' (1 524 m)   Wt 68 kg (150 lb)   LMP  (LMP Unknown)   SpO2 96%   BMI 29 29 kg/m²       PHYSICAL EXAM    Gen: NAD  Head: NCAT  CV: RRR  CHEST: Clear  ABD: soft, NT/ND  EXT: no edema      ASSESSMENT/PLAN:  This is a 67y o  year old female here for upper endoscopy, and she is stable and optimized for her procedure

## 2021-08-10 NOTE — RESULT ENCOUNTER NOTE
Please inform patient that their biopsies were benign   Schedule follow-up office visit, repeat EGD 5 years repeat colonoscopy 2024

## 2021-08-16 ENCOUNTER — TELEPHONE (OUTPATIENT)
Dept: GASTROENTEROLOGY | Facility: CLINIC | Age: 76
End: 2021-08-16

## 2021-08-16 NOTE — TELEPHONE ENCOUNTER
----- Message from Tarcy Fregoso LPN sent at 4/58/0466 10:18 AM EDT -----  Patient aware  Educated  Egd 5 years  Colon 2024  Please call patient to schedule f/u appt   Thank you

## 2021-09-17 DIAGNOSIS — E78.5 HYPERLIPIDEMIA, UNSPECIFIED HYPERLIPIDEMIA TYPE: ICD-10-CM

## 2021-09-17 RX ORDER — SIMVASTATIN 20 MG
TABLET ORAL
Qty: 90 TABLET | Refills: 3 | Status: SHIPPED | OUTPATIENT
Start: 2021-09-17 | End: 2021-10-21 | Stop reason: SDUPTHER

## 2021-09-22 ENCOUNTER — VBI (OUTPATIENT)
Dept: ADMINISTRATIVE | Facility: OTHER | Age: 76
End: 2021-09-22

## 2021-09-25 ENCOUNTER — APPOINTMENT (EMERGENCY)
Dept: CT IMAGING | Facility: HOSPITAL | Age: 76
End: 2021-09-25
Payer: COMMERCIAL

## 2021-09-25 ENCOUNTER — APPOINTMENT (EMERGENCY)
Dept: RADIOLOGY | Facility: HOSPITAL | Age: 76
End: 2021-09-25
Payer: COMMERCIAL

## 2021-09-25 ENCOUNTER — HOSPITAL ENCOUNTER (EMERGENCY)
Facility: HOSPITAL | Age: 76
Discharge: HOME | End: 2021-09-25
Attending: EMERGENCY MEDICINE | Admitting: EMERGENCY MEDICINE
Payer: COMMERCIAL

## 2021-09-25 VITALS
OXYGEN SATURATION: 97 % | HEIGHT: 62 IN | SYSTOLIC BLOOD PRESSURE: 146 MMHG | RESPIRATION RATE: 18 BRPM | WEIGHT: 150 LBS | TEMPERATURE: 98.5 F | HEART RATE: 86 BPM | BODY MASS INDEX: 27.6 KG/M2 | DIASTOLIC BLOOD PRESSURE: 80 MMHG
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
## 2021-09-25 DIAGNOSIS — S70.00XA CONTUSION OF HIP: ICD-10-CM

## 2021-09-25 DIAGNOSIS — M54.50 LOW BACK PAIN: ICD-10-CM

## 2021-09-25 LAB
ALBUMIN SERPL BCP-MCNC: 4 G/DL (ref 3.5–5)
ALP SERPL-CCNC: 62 U/L (ref 46–116)
ALT SERPL W P-5'-P-CCNC: 30 U/L (ref 12–78)
ANION GAP SERPL CALCULATED.3IONS-SCNC: 9 MMOL/L (ref 4–13)
AST SERPL W P-5'-P-CCNC: 30 U/L (ref 5–45)
BASOPHILS # BLD AUTO: 0.1 THOUSANDS/ΜL (ref 0–0.1)
BASOPHILS NFR BLD AUTO: 1 % (ref 0–1)
BILIRUB SERPL-MCNC: 0.46 MG/DL (ref 0.2–1)
BUN SERPL-MCNC: 18 MG/DL (ref 5–25)
CALCIUM SERPL-MCNC: 9.6 MG/DL (ref 8.3–10.1)
CHLORIDE SERPL-SCNC: 101 MMOL/L (ref 100–108)
CO2 SERPL-SCNC: 29 MMOL/L (ref 21–32)
CREAT SERPL-MCNC: 0.6 MG/DL (ref 0.6–1.3)
EOSINOPHIL # BLD AUTO: 0.28 THOUSAND/ΜL (ref 0–0.61)
EOSINOPHIL NFR BLD AUTO: 2 % (ref 0–6)
ERYTHROCYTE [DISTWIDTH] IN BLOOD BY AUTOMATED COUNT: 12.6 % (ref 11.6–15.1)
GFR SERPL CREATININE-BSD FRML MDRD: 89 ML/MIN/1.73SQ M
GLUCOSE SERPL-MCNC: 99 MG/DL (ref 65–140)
HCT VFR BLD AUTO: 44 % (ref 34.8–46.1)
HGB BLD-MCNC: 14.2 G/DL (ref 11.5–15.4)
HOLD SPECIMEN: NORMAL
IMM GRANULOCYTES # BLD AUTO: 0.05 THOUSAND/UL (ref 0–0.2)
IMM GRANULOCYTES NFR BLD AUTO: 0 % (ref 0–2)
LYMPHOCYTES # BLD AUTO: 4.14 THOUSANDS/ΜL (ref 0.6–4.47)
LYMPHOCYTES NFR BLD AUTO: 33 % (ref 14–44)
MCH RBC QN AUTO: 29.8 PG (ref 26.8–34.3)
MCHC RBC AUTO-ENTMCNC: 32.3 G/DL (ref 31.4–37.4)
MCV RBC AUTO: 92 FL (ref 82–98)
MONOCYTES # BLD AUTO: 0.88 THOUSAND/ΜL (ref 0.17–1.22)
MONOCYTES NFR BLD AUTO: 7 % (ref 4–12)
NEUTROPHILS # BLD AUTO: 6.93 THOUSANDS/ΜL (ref 1.85–7.62)
NEUTS SEG NFR BLD AUTO: 57 % (ref 43–75)
NRBC BLD AUTO-RTO: 0 /100 WBCS
PLATELET # BLD AUTO: 351 THOUSANDS/UL (ref 149–390)
PMV BLD AUTO: 9.1 FL (ref 8.9–12.7)
POTASSIUM SERPL-SCNC: 4.7 MMOL/L (ref 3.5–5.3)
PROT SERPL-MCNC: 7.4 G/DL (ref 6.4–8.2)
RBC # BLD AUTO: 4.77 MILLION/UL (ref 3.81–5.12)
SODIUM SERPL-SCNC: 139 MMOL/L (ref 136–145)
WBC # BLD AUTO: 12.38 THOUSAND/UL (ref 4.31–10.16)

## 2021-09-25 PROCEDURE — 96374 THER/PROPH/DIAG INJ IV PUSH: CPT

## 2021-09-25 PROCEDURE — 72100 X-RAY EXAM L-S SPINE 2/3 VWS: CPT

## 2021-09-25 PROCEDURE — 80053 COMPREHEN METABOLIC PANEL: CPT | Performed by: EMERGENCY MEDICINE

## 2021-09-25 PROCEDURE — 73502 X-RAY EXAM HIP UNI 2-3 VIEWS: CPT

## 2021-09-25 PROCEDURE — 72070 X-RAY EXAM THORAC SPINE 2VWS: CPT

## 2021-09-25 PROCEDURE — 99285 EMERGENCY DEPT VISIT HI MDM: CPT | Performed by: EMERGENCY MEDICINE

## 2021-09-25 PROCEDURE — 99284 EMERGENCY DEPT VISIT MOD MDM: CPT

## 2021-09-25 PROCEDURE — 70450 CT HEAD/BRAIN W/O DYE: CPT

## 2021-09-25 PROCEDURE — G1004 CDSM NDSC: HCPCS

## 2021-09-25 PROCEDURE — 85025 COMPLETE CBC W/AUTO DIFF WBC: CPT | Performed by: EMERGENCY MEDICINE

## 2021-09-25 PROCEDURE — 36415 COLL VENOUS BLD VENIPUNCTURE: CPT | Performed by: EMERGENCY MEDICINE

## 2021-09-25 PROCEDURE — 72125 CT NECK SPINE W/O DYE: CPT

## 2021-09-25 RX ORDER — ONDANSETRON 2 MG/ML
INJECTION INTRAMUSCULAR; INTRAVENOUS
Status: COMPLETED
Start: 2021-09-25 | End: 2021-09-25

## 2021-09-25 RX ADMIN — ONDANSETRON 4 MG: 2 INJECTION INTRAMUSCULAR; INTRAVENOUS at 17:30

## 2021-09-25 RX ADMIN — MORPHINE SULFATE 2 MG: 2 INJECTION, SOLUTION INTRAMUSCULAR; INTRAVENOUS at 17:30

## 2021-09-25 NOTE — ED NOTES
Ambulated patient  Patient had some complaints of pain although did very well  Nurse informed       Elli Crimes  09/25/21 1909

## 2021-09-25 NOTE — ED PROVIDER NOTES
History  Chief Complaint   Patient presents with   lEle Cortes going down stairs today, striking back of head  Denies LOC, not on blood thinners  Patient is a 74-year-old female with a history of hypertension, arthritis and chronic back pain who presents after a fall  Patient states that she was walking down stairs when she slipped and fell down the last 5 steps  She states that she hit her neck and back edge of the steps  She denies loss of consciousness  She was able to get up and ambulate but required assistance  She complains of significant pain in her neck, lower back and right hip  She did not take anything for pain prior to arrival   She states that ambulating and movement worsens for pain  She denies any symptoms preceding the fall, including but not limited to chest pain, dizziness, shortness of breath, palpitations or other symptoms  She was previously on aspirin but has not taken it in about 1 month  She is not on any other antiplatelets or anticoagulants  History provided by:  Patient  Fall  Mechanism of injury: fall    Injury location:  Head/neck and pelvis  Pelvic injury location:  R hip and pelvis  Arrived directly from scene: yes    Fall:     Fall occurred:  Down stairs    Point of impact:  Neck and back  Suspicion of alcohol use: no    Suspicion of drug use: no    Prior to arrival data:     Loss of consciousness: no      Amnesic to event: no    Associated symptoms: back pain and neck pain    Associated symptoms: no abdominal pain, no chest pain, no headaches, no loss of consciousness, no nausea, no seizures and no vomiting        Prior to Admission Medications   Prescriptions Last Dose Informant Patient Reported? Taking?    Cholecalciferol (VITAMIN D3) 1000 units CAPS  Self Yes No   Sig: Take 1 capsule by mouth daily   calcium-vitamin D 250-100 MG-UNIT per tablet  Self Yes No   Sig: Take 1 tablet by mouth 2 (two) times a day   cyanocobalamin (VITAMIN B-12) 1000 MCG tablet  Self Yes No   Sig: Take 1,000 mcg by mouth daily   cyclobenzaprine (FLEXERIL) 5 mg tablet   No No   Sig: Take 1 tablet (5 mg total) by mouth 3 (three) times a day as needed for muscle spasms First try at bedtime to see how you feel the next morning  hydrochlorothiazide (HYDRODIURIL) 12 5 mg tablet   No No   Sig: TAKE ONE TABLET EVERY DAY  losartan (COZAAR) 100 MG tablet   No No   Sig: TAKE ONE TABLET BY MOUTH EVERY DAY   multivitamin-minerals (CENTRUM ADULTS) tablet  Self Yes No   Sig: Take 1 tablet by mouth daily   omeprazole (PriLOSEC) 20 mg delayed release capsule   No No   Sig: Take 1 capsule (20 mg total) by mouth daily   simvastatin (ZOCOR) 20 mg tablet   No No   Sig: TAKE 1 TABLET BY MOUTH DAILY  Facility-Administered Medications: None       Past Medical History:   Diagnosis Date    Arthritis     Back pain     sciatic pain right hip and down right leg    Cancer ([address] )     bladder cancer ; had surgical scraping    GERD (gastroesophageal reflux disease)     Hyperlipidemia     Hypertension     Seasonal allergies     Wears partial dentures     1 small tooth removed and placed in cup        Past Surgical History:   Procedure Laterality Date    APPENDECTOMY      COLONOSCOPY      OR CYSTOURETHROSCOPY,FULGUR <0 5 CM LESN N/A 1/15/2020    Procedure: Cystoscopy TRANSURETHRAL RESECTION OF BLADDER TUMOR (TURBT); Surgeon: Leon Vigil MD;  Location: [facility];  Service: Urology    OR INSTILL ANTICANCER AGENT IN BLADDER N/A 1/15/2020    Procedure: Chata Hand;  Surgeon: Leon Vigil MD;  Location[facility];  Service: Urology    UPPER GASTROINTESTINAL ENDOSCOPY      VEIN LIGATION AND [address]         Family History   Problem Relation Age of Onset    Heart disease Mother     Hypertension Mother      I have reviewed and agree with the history as documented      E-Cigarette/Vaping    E-Cigarette Use Never User      E-Cigarette/Vaping Substances     Social History     Tobacco Use    Smoking status: Never Smoker    Smokeless tobacco: Never Used   Vaping Use    Vaping Use: Never used   Substance Use Topics    Alcohol use: No    Drug use: No       Review of Systems   Constitutional: Negative for chills, diaphoresis and fever  HENT: Negative for nosebleeds, sore throat and trouble swallowing  Eyes: Negative for photophobia, pain and visual disturbance  Respiratory: Negative for cough, chest tightness and shortness of breath  Cardiovascular: Negative for chest pain, palpitations and leg swelling  Gastrointestinal: Negative for abdominal pain, constipation, diarrhea, nausea and vomiting  Endocrine: Negative for polydipsia and polyuria  Genitourinary: Negative for difficulty urinating, dysuria, hematuria, pelvic pain, vaginal bleeding and vaginal discharge  Musculoskeletal: Positive for back pain and neck pain  Negative for neck stiffness  Skin: Negative for pallor and rash  Neurological: Negative for dizziness, seizures, loss of consciousness, light-headedness and headaches  All other systems reviewed and are negative  Physical Exam  Physical Exam  Vitals and nursing note reviewed  Constitutional:       General: She is not in acute distress  Appearance: She is well-developed  HENT:      Head: Normocephalic and atraumatic  Eyes:      Pupils: Pupils are equal, round, and reactive to light  Cardiovascular:      Rate and Rhythm: Normal rate and regular rhythm  Pulses: Normal pulses  Heart sounds: Normal heart sounds  Pulmonary:      Effort: Pulmonary effort is normal  No respiratory distress  Breath sounds: Normal breath sounds  Abdominal:      General: There is no distension  Palpations: Abdomen is soft  Abdomen is not rigid  Tenderness: There is no abdominal tenderness  There is no guarding or rebound  Musculoskeletal:         General: No tenderness  Normal range of motion        Cervical back: Normal range of motion and neck supple  Spinous process tenderness (lower cervical spine) present  Thoracic back: Bony tenderness (lower thoracic region) present  Lumbar back: Bony tenderness (upper lumbar region) present  Right hip: Bony tenderness (right greater trochanter) present  Lymphadenopathy:      Cervical: No cervical adenopathy  Skin:     General: Skin is warm and dry  Capillary Refill: Capillary refill takes less than 2 seconds  Neurological:      Mental Status: She is alert and oriented to person, place, and time  Cranial Nerves: No cranial nerve deficit  Sensory: No sensory deficit  Vital Signs  ED Triage Vitals   Temperature Pulse Respirations Blood Pressure SpO2   09/25/21 1538 09/25/21 1538 09/25/21 1541 09/25/21 1538 09/25/21 1538   98 5 °F (36 9 °C) 87 18 (!) 162/108 95 %      Temp Source Heart Rate Source Patient Position - Orthostatic VS BP Location FiO2 (%)   09/25/21 1538 09/25/21 1541 09/25/21 1630 09/25/21 1538 --   Oral Monitor Sitting Left arm       Pain Score       09/25/21 1541       8           Vitals:    09/25/21 1715 09/25/21 1730 09/25/21 1800 09/25/21 1900   BP: 160/88 158/79 151/88 146/80   Pulse: 88 82 80 86   Patient Position - Orthostatic VS:   Lying          Visual Acuity  Visual Acuity      Most Recent Value   L Pupil Size (mm)  3   R Pupil Size (mm)  3          ED Medications  Medications   morphine injection 2 mg (2 mg Intravenous Given 9/25/21 1730)   ondansetron (ZOFRAN) 4 mg/2 mL injection **ADS Override Pull** (4 mg  Given 9/25/21 1730)       Diagnostic Studies  Results Reviewed     Procedure Component Value Units Date/Time    Trauma tubes on hold [[id number]] Collected: 09/25/21 1644    Lab Status: Final result Specimen: Blood from Arm, Right Updated: 09/27/21 050    Narrative: The following orders were created for panel order Trauma tubes on hold    Procedure                               Abnormality         Status                     --------- -----------         ------                     Warnell Bare Top on [facility][[id number]]                           Final result               Gold top on E[facility]G[[id number]]                                 Final result               Green / Black tube on EGWA[[id number]]                       Final result               Lavender Top 7ml on SAEK[[id number]]                         Final result                 Please view results for these tests on the individual orders      Comprehensive metabolic panel [[id number]] Collected: 09/25/21 1658    Lab Status: Final result Specimen: Blood from Arm, Right Updated: 09/25/21 1727     Sodium 139 mmol/L      Potassium 4 7 mmol/L      Chloride 101 mmol/L      CO2 29 mmol/L      ANION GAP 9 mmol/L      BUN 18 mg/dL      Creatinine 0 60 mg/dL      Glucose 99 mg/dL      Calcium 9 6 mg/dL      AST 30 U/L      ALT 30 U/L      Alkaline Phosphatase 62 U/L      Total Protein 7 4 g/dL      Albumin 4 0 g/dL      Total Bilirubin 0 46 mg/dL      eGFR 89 ml/min/1 73sq m     Narrative:      [place] guidelines for Chronic Kidney Disease (CKD):     Stage 1 with normal or high GFR (GFR > 90 mL/min/1 73 square meters)    Stage 2 Mild CKD (GFR = 60-89 mL/min/1 73 square meters)    Stage 3A Moderate CKD (GFR = 45-59 mL/min/1 73 square meters)    Stage 3B Moderate CKD (GFR = 30-44 mL/min/1 73 square meters)    Stage 4 Severe CKD (GFR = 15-29 mL/min/1 73 square meters)    Stage 5 End Stage CKD (GFR <15 mL/min/1 73 square meters)  Note: GFR calculation is accurate only with a steady state creatinine    CBC and differential [[id number]]  (Abnormal) Collected: 09/25/21 1644    Lab Status: Final result Specimen: Blood from Arm, Right Updated: 09/25/21 1649     WBC 12 38 Thousand/uL      RBC 4 77 Million/uL      Hemoglobin 14 2 g/dL      Hematocrit 44 0 %      MCV 92 fL      MCH 29 8 pg      MCHC 32 3 g/dL      RDW 12 6 %      MPV 9 1 fL      Platelets 611 Thousands/uL      nRBC 0 /100 WBCs      Neutrophils Relative 57 %      Immat GRANS % 0 %      Lymphocytes Relative 33 %      Monocytes Relative 7 %      Eosinophils Relative 2 %      Basophils Relative 1 %      Neutrophils Absolute 6 93 Thousands/µL      Immature Grans Absolute 0 05 Thousand/uL      Lymphocytes Absolute 4 14 Thousands/µL      Monocytes Absolute 0 88 Thousand/µL      Eosinophils Absolute 0 28 Thousand/µL      Basophils Absolute 0 10 Thousands/µL                  CT head without contrast   Final Result by Leon Harding MD (09/25 1844)      No acute intracranial abnormality  Workstation performed: [id number]         CT spine cervical without contrast   Final Result by Leon Harding MD (09/25 1849)      No cervical spine fracture or traumatic malalignment  Workstation performed: [id number]         XR spine thoracic 2 views   ED Interpretation by Jj Gao DO (09/25 1802)   Arthritic changes  No acute compression deformity      Final Result by Delma Lau MD (09/26 1029)      No acute osseous abnormality  Degenerative changes as described  Workstation performed: [id number]         XR spine lumbar 2 or 3 views injury   ED Interpretation by Jj Gao DO (09/25 1802)   Arthritic changes  No acute compression deformity      Final Result by Delma Lau MD (09/26 1029)      No acute osseous abnormality  Degenerative changes as described  Workstation performed: [id number]         XR hip/pelv 2-3 vws right if performed   ED Interpretation by Jj Gao DO (09/25 1803)   Chronic right inferior ramus fracture  No acute fracture or dislocation  Final Result by Delma Lau MD (09/26 1028)      No acute osseous abnormality  Degenerative changes as described              Workstation performed: [id number]                    Procedures  Procedures         ED Course SBIRT 22yo+      Most Recent Value   SBIRT (24 yo +)   In order to provide better care to our patients, we are screening all of our patients for alcohol and drug use  Would it be okay to ask you these screening questions? Yes Filed at: 09/25/2021 1542   Initial Alcohol Screen: US AUDIT-C    1  How often do you have a drink containing alcohol?  0 Filed at: 09/25/2021 1542   2  How many drinks containing alcohol do you have on a typical day you are drinking? 0 Filed at: 09/25/2021 1542   3a  Male UNDER 65: How often do you have five or more drinks on one occasion? 0 Filed at: 09/25/2021 1542   3b  FEMALE Any Age, or MALE 65+: How often do you have 4 or more drinks on one occassion? 0 Filed at: 09/25/2021 1542   Audit-C Score  0 Filed at: 09/25/2021 1542   KIMBERLI: How many times in the past year have you    Used an illegal drug or used a prescription medication for non-medical reasons? Never Filed at: 09/25/2021 1542                    MDM  Number of Diagnoses or Management Options  Contusion of hip: new and requires workup  Fall, initial encounter: new and requires workup  Low back pain: new and requires workup  Diagnosis management comments: Patient presents after a mechanical fall down 5 steps  She complains of block and right hip pain primarily  Head imaging reveals no acute osseous abnormalities  Patient was given analgesics in the emergency department  She ambulated in the emergency department with minimal assistance  She is feeling somewhat better and is comfortable with discharge  She states that she is going to stay with her son this evening  She will continue over-the-counter pain medication, ice and rest   She agrees to return to ED if symptoms worsen  Otherwise she will follow up with PCP      Portions of the above record have been created with voice recognition software   Occasional wrong word or "sound alike" substitutions may have occurred due to the inherent limitations of voice recognition software   Read the chart carefully and recognize, using context, where substitutions may have occurred  Amount and/or Complexity of Data Reviewed  Tests in the radiology section of CPT®: ordered and reviewed  Review and summarize past medical records: yes  Independent visualization of images, tracings, or specimens: yes    Risk of Complications, Morbidity, and/or Mortality  Presenting problems: high  Diagnostic procedures: high  Management options: moderate    Patient Progress  Patient progress: improved      Disposition  Final diagnoses:   Fall, initial encounter   Contusion of hip   Low back pain     Time reflects when diagnosis was documented in both MDM as applicable and the Disposition within this note     Time User Action Codes Description Comment    9/25/2021  8:04 PM Taniya Reynoso Add [W80  ANRU] Fall, initial encounter     9/25/2021  8:04 PM Landen Counter L Add [S70 00XA] Contusion of hip     9/25/2021  8:04 PM Taniya Reynoso Add [M54 5] Low back pain       ED Disposition     ED Disposition Condition Date/Time Comment    Discharge Stable Sat Sep 25, 2021  8:04 PM Candy Neumann discharge to home/self care              Follow-up Information     Follow up With Specialties Details Why [address]  [facility], DO Internal Medicine Schedule an appointment as soon as possible for a visit  Return to ED sooner if symptoms worsen or persist  [address]  [phone]            Discharge Medication List as of 9/25/2021  8:05 PM      CONTINUE these medications which have NOT CHANGED    Details   calcium-vitamin D 250-100 MG-UNIT per tablet Take 1 tablet by mouth 2 (two) times a day, Historical Med      Cholecalciferol (VITAMIN D3) 1000 units CAPS Take 1 capsule by mouth daily, Historical Med      cyanocobalamin (VITAMIN B-12) 1000 MCG tablet Take 1,000 mcg by mouth daily, Historical Med      cyclobenzaprine (FLEXERIL) 5 mg tablet Take 1 tablet (5 mg total) by mouth 3 (three) times a day as needed for muscle spasms First try at bedtime to see how you feel the next morning , Starting Mon 8/2/2021, Normal      hydrochlorothiazide (HYDRODIURIL) 12 5 mg tablet TAKE ONE TABLET EVERY DAY , Normal      losartan (COZAAR) 100 MG tablet TAKE ONE TABLET BY MOUTH EVERY DAY, Normal      multivitamin-minerals (CENTRUM ADULTS) tablet Take 1 tablet by mouth daily, Historical Med      omeprazole (PriLOSEC) 20 mg delayed release capsule Take 1 capsule (20 mg total) by mouth daily, Starting Fri 8/6/2021, Normal      simvastatin (ZOCOR) 20 mg tablet TAKE 1 TABLET BY MOUTH DAILY , Normal           No discharge procedures on file      PDMP Review     None          ED Provider  Electronically Signed by           Julio Barajas DO  09/27/21 0955

## 2021-10-03 DIAGNOSIS — K21.9 GASTROESOPHAGEAL REFLUX DISEASE WITHOUT ESOPHAGITIS: Primary | ICD-10-CM

## 2021-10-04 ENCOUNTER — PREPPED CHART (OUTPATIENT)
Dept: URBAN - METROPOLITAN AREA CLINIC 6 | Facility: CLINIC | Age: 76
End: 2021-10-04

## 2021-10-04 ENCOUNTER — FOLLOW UP (OUTPATIENT)
Dept: URBAN - METROPOLITAN AREA CLINIC 6 | Facility: CLINIC | Age: 76
End: 2021-10-04

## 2021-10-04 DIAGNOSIS — H25.813: ICD-10-CM

## 2021-10-04 DIAGNOSIS — H52.211: ICD-10-CM

## 2021-10-04 DIAGNOSIS — H11.041: ICD-10-CM

## 2021-10-04 PROCEDURE — G8427 DOCREV CUR MEDS BY ELIG CLIN: HCPCS

## 2021-10-04 PROCEDURE — 1036F TOBACCO NON-USER: CPT

## 2021-10-04 PROCEDURE — 92025 CPTRIZED CORNEAL TOPOGRAPHY: CPT

## 2021-10-04 PROCEDURE — 92012 INTRM OPH EXAM EST PATIENT: CPT

## 2021-10-04 PROCEDURE — 92136 OPHTHALMIC BIOMETRY: CPT

## 2021-10-04 ASSESSMENT — VISUAL ACUITY
OS_GLARE: 20/400
OD_GLARE: 20/400
OD_SC: 20/200
OU_CC: J2
OS_SC: 20/70-1
OD_PH: 20/80
OU_OTHER: @12""
OS_PH: 20/60

## 2021-10-04 ASSESSMENT — TONOMETRY
OD_IOP_MMHG: 16
OS_IOP_MMHG: 16

## 2021-10-05 RX ORDER — FAMOTIDINE 40 MG/1
TABLET, FILM COATED ORAL
Qty: 90 TABLET | Refills: 0 | Status: SHIPPED | OUTPATIENT
Start: 2021-10-05

## 2021-10-07 NOTE — TELEPHONE ENCOUNTER
Vm left for pt's daughter to help R/S her cysto   When pt calls back please schedule for next available cysto in [place]

## 2021-10-07 NOTE — TELEPHONE ENCOUNTER
Pt needs to reschedule 11/16 cysto due to cataract surgery she is also requesting a Mon if possible due to transportation

## 2021-10-19 ENCOUNTER — APPOINTMENT (OUTPATIENT)
Dept: LAB | Facility: CLINIC | Age: 76
End: 2021-10-19
Payer: COMMERCIAL

## 2021-10-19 DIAGNOSIS — E11.9 TYPE 2 DIABETES MELLITUS WITHOUT COMPLICATION, WITHOUT LONG-TERM CURRENT USE OF INSULIN (HCC): ICD-10-CM
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
## 2021-10-19 DIAGNOSIS — E78.5 DYSLIPIDEMIA: ICD-10-CM

## 2021-10-19 LAB
ALBUMIN SERPL BCP-MCNC: 3.9 G/DL (ref 3.5–5)
ALP SERPL-CCNC: 55 U/L (ref 46–116)
ALT SERPL W P-5'-P-CCNC: 28 U/L (ref 12–78)
ANION GAP SERPL CALCULATED.3IONS-SCNC: 11 MMOL/L (ref 4–13)
AST SERPL W P-5'-P-CCNC: 22 U/L (ref 5–45)
BILIRUB SERPL-MCNC: 0.48 MG/DL (ref 0.2–1)
BUN SERPL-MCNC: 21 MG/DL (ref 5–25)
CALCIUM SERPL-MCNC: 9.4 MG/DL (ref 8.3–10.1)
CHLORIDE SERPL-SCNC: 103 MMOL/L (ref 100–108)
CHOLEST SERPL-MCNC: 158 MG/DL (ref 50–200)
CO2 SERPL-SCNC: 29 MMOL/L (ref 21–32)
CREAT SERPL-MCNC: 0.73 MG/DL (ref 0.6–1.3)
EST. AVERAGE GLUCOSE BLD GHB EST-MCNC: 123 MG/DL
GFR SERPL CREATININE-BSD FRML MDRD: 80 ML/MIN/1.73SQ M
GLUCOSE P FAST SERPL-MCNC: 107 MG/DL (ref 65–99)
HBA1C MFR BLD: 5.9 %
HDLC SERPL-MCNC: 51 MG/DL
LDLC SERPL CALC-MCNC: 85 MG/DL (ref 0–100)
POTASSIUM SERPL-SCNC: 4.4 MMOL/L (ref 3.5–5.3)
PROT SERPL-MCNC: 7.7 G/DL (ref 6.4–8.2)
SODIUM SERPL-SCNC: 143 MMOL/L (ref 136–145)
TRIGL SERPL-MCNC: 109 MG/DL

## 2021-10-19 PROCEDURE — 36415 COLL VENOUS BLD VENIPUNCTURE: CPT

## 2021-10-19 PROCEDURE — 83036 HEMOGLOBIN GLYCOSYLATED A1C: CPT

## 2021-10-19 PROCEDURE — 80053 COMPREHEN METABOLIC PANEL: CPT

## 2021-10-19 PROCEDURE — 80061 LIPID PANEL: CPT

## 2021-10-19 PROCEDURE — 82746 ASSAY OF FOLIC ACID SERUM: CPT

## 2021-10-20 RX ORDER — LOTEPREDNOL ETABONATE 10 MG/ML
SUSPENSION TOPICAL
COMMUNITY
Start: 2021-10-05

## 2021-10-20 RX ORDER — BROMFENAC SODIUM 0.7 MG/ML
SOLUTION/ DROPS OPHTHALMIC
COMMUNITY
Start: 2021-10-05 | End: 2021-10-21

## 2021-10-20 RX ORDER — BESIFLOXACIN 6 MG/ML
SUSPENSION OPHTHALMIC
COMMUNITY
Start: 2021-10-05

## 2021-10-21 ENCOUNTER — CONSULT (OUTPATIENT)
Dept: INTERNAL MEDICINE CLINIC | Facility: CLINIC | Age: 76
End: 2021-10-21
Payer: COMMERCIAL

## 2021-10-21 VITALS
OXYGEN SATURATION: 97 % | WEIGHT: 146.6 LBS | DIASTOLIC BLOOD PRESSURE: 62 MMHG | BODY MASS INDEX: 26.98 KG/M2 | SYSTOLIC BLOOD PRESSURE: 112 MMHG | HEART RATE: 90 BPM | HEIGHT: 62 IN

## 2021-10-21 DIAGNOSIS — H25.013 CORTICAL AGE-RELATED CATARACT OF BOTH EYES: Primary | ICD-10-CM

## 2021-10-21 DIAGNOSIS — Z11.59 NEED FOR HEPATITIS C SCREENING TEST: ICD-10-CM

## 2021-10-21 DIAGNOSIS — Z79.4 TYPE 2 DIABETES MELLITUS WITHOUT COMPLICATION, WITH LONG-TERM CURRENT USE OF INSULIN (HCC): ICD-10-CM
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
## 2021-10-21 DIAGNOSIS — K21.9 GASTROESOPHAGEAL REFLUX DISEASE, UNSPECIFIED WHETHER ESOPHAGITIS PRESENT: ICD-10-CM

## 2021-10-21 DIAGNOSIS — E78.5 HYPERLIPIDEMIA, UNSPECIFIED HYPERLIPIDEMIA TYPE: ICD-10-CM

## 2021-10-21 DIAGNOSIS — Z23 FLU VACCINE NEED: ICD-10-CM

## 2021-10-21 DIAGNOSIS — I10 ESSENTIAL HYPERTENSION: ICD-10-CM

## 2021-10-21 DIAGNOSIS — E11.9 TYPE 2 DIABETES MELLITUS WITHOUT COMPLICATION, WITH LONG-TERM CURRENT USE OF INSULIN (HCC): ICD-10-CM
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
## 2021-10-21 LAB — FOLATE SERPL-MCNC: >20 NG/ML (ref 3.1–17.5)

## 2021-10-21 PROCEDURE — 3725F SCREEN DEPRESSION PERFORMED: CPT | Performed by: INTERNAL MEDICINE

## 2021-10-21 PROCEDURE — 99213 OFFICE O/P EST LOW 20 MIN: CPT | Performed by: INTERNAL MEDICINE

## 2021-10-21 PROCEDURE — 1036F TOBACCO NON-USER: CPT | Performed by: INTERNAL MEDICINE

## 2021-10-21 PROCEDURE — G0008 ADMIN INFLUENZA VIRUS VAC: HCPCS

## 2021-10-21 PROCEDURE — 90662 IIV NO PRSV INCREASED AG IM: CPT

## 2021-10-21 RX ORDER — HYDROCHLOROTHIAZIDE 12.5 MG/1
TABLET ORAL
Qty: 90 TABLET | Refills: 1 | Status: SHIPPED | OUTPATIENT
Start: 2021-10-21 | End: 2022-04-18

## 2021-10-21 RX ORDER — BROMFENAC SODIUM 0.7 MG/ML
1 SOLUTION/ DROPS OPHTHALMIC DAILY
Qty: 3 ML | Refills: 1 | Status: SHIPPED | OUTPATIENT
Start: 2021-10-21

## 2021-10-21 RX ORDER — SIMVASTATIN 20 MG
20 TABLET ORAL DAILY
Qty: 90 TABLET | Refills: 3 | Status: SHIPPED | OUTPATIENT
Start: 2021-10-21 | End: 2022-04-27 | Stop reason: SDUPTHER

## 2021-10-21 RX ORDER — OMEPRAZOLE 20 MG/1
20 CAPSULE, DELAYED RELEASE ORAL DAILY
Qty: 30 CAPSULE | Refills: 3 | Status: SHIPPED | OUTPATIENT
Start: 2021-10-21

## 2021-10-21 RX ORDER — LOSARTAN POTASSIUM 100 MG/1
100 TABLET ORAL DAILY
Qty: 90 TABLET | Refills: 1 | Status: SHIPPED | OUTPATIENT
Start: 2021-10-21 | End: 2022-01-19

## 2021-10-26 ENCOUNTER — SURGERY/PROCEDURE (OUTPATIENT)
Dept: URBAN - METROPOLITAN AREA SURGICAL CENTER 6 | Facility: SURGICAL CENTER | Age: 76
End: 2021-10-26

## 2021-10-26 DIAGNOSIS — H25.811: ICD-10-CM

## 2021-10-26 PROCEDURE — 66984 XCAPSL CTRC RMVL W/O ECP: CPT

## 2021-10-27 ENCOUNTER — 1 DAY POST-OP (OUTPATIENT)
Dept: URBAN - METROPOLITAN AREA CLINIC 6 | Facility: CLINIC | Age: 76
End: 2021-10-27

## 2021-10-27 DIAGNOSIS — Z96.1: ICD-10-CM

## 2021-10-27 PROCEDURE — G8427 DOCREV CUR MEDS BY ELIG CLIN: HCPCS

## 2021-10-27 PROCEDURE — 99024 POSTOP FOLLOW-UP VISIT: CPT

## 2021-10-27 PROCEDURE — 1036F TOBACCO NON-USER: CPT

## 2021-10-27 ASSESSMENT — TONOMETRY: OD_IOP_MMHG: 13

## 2021-10-27 ASSESSMENT — VISUAL ACUITY: OD_PH: 20/50

## 2021-11-03 ENCOUNTER — 1 WEEK POST-OP (OUTPATIENT)
Dept: URBAN - METROPOLITAN AREA CLINIC 6 | Facility: CLINIC | Age: 76
End: 2021-11-03

## 2021-11-03 DIAGNOSIS — Z96.1: ICD-10-CM

## 2021-11-03 DIAGNOSIS — H25.812: ICD-10-CM

## 2021-11-03 PROCEDURE — 1036F TOBACCO NON-USER: CPT

## 2021-11-03 PROCEDURE — 99024 POSTOP FOLLOW-UP VISIT: CPT

## 2021-11-03 PROCEDURE — G8427 DOCREV CUR MEDS BY ELIG CLIN: HCPCS

## 2021-11-03 PROCEDURE — 92136 OPHTHALMIC BIOMETRY: CPT | Mod: 26,LT

## 2021-11-03 ASSESSMENT — KERATOMETRY
OD_K1POWER_DIOPTERS: 43.75
OD_AXISANGLE_DEGREES: 173
OS_AXISANGLE2_DEGREES: 28
OS_K1POWER_DIOPTERS: 45.75
OS_K2POWER_DIOPTERS: 46.25
OS_AXISANGLE_DEGREES: 118
OD_K2POWER_DIOPTERS: 45.75
OD_AXISANGLE2_DEGREES: 83

## 2021-11-03 ASSESSMENT — VISUAL ACUITY
OS_CC: J5
OS_SC: 20/80+1
OD_SC: 20/200+1
OS_PH: 20/60
OD_CC: J3
OD_PH: 20/50+2

## 2021-11-03 ASSESSMENT — TONOMETRY
OS_IOP_MMHG: 15
OD_IOP_MMHG: 15

## 2021-11-16 ENCOUNTER — SURGERY/PROCEDURE (OUTPATIENT)
Dept: URBAN - METROPOLITAN AREA SURGICAL CENTER 6 | Facility: SURGICAL CENTER | Age: 76
End: 2021-11-16

## 2021-11-16 DIAGNOSIS — H25.812: ICD-10-CM

## 2021-11-16 PROCEDURE — 66984 XCAPSL CTRC RMVL W/O ECP: CPT | Mod: 79,LT

## 2021-11-17 ENCOUNTER — 1 DAY POST-OP (OUTPATIENT)
Dept: URBAN - METROPOLITAN AREA CLINIC 6 | Facility: CLINIC | Age: 76
End: 2021-11-17

## 2021-11-17 DIAGNOSIS — Z96.1: ICD-10-CM

## 2021-11-17 PROCEDURE — G8428 CUR MEDS NOT DOCUMENT: HCPCS

## 2021-11-17 PROCEDURE — 99024 POSTOP FOLLOW-UP VISIT: CPT

## 2021-11-17 PROCEDURE — 1036F TOBACCO NON-USER: CPT

## 2021-11-17 ASSESSMENT — KERATOMETRY
OS_AXISANGLE_DEGREES: 118
OD_K1POWER_DIOPTERS: 43.75
OS_K2POWER_DIOPTERS: 46.25
OD_AXISANGLE2_DEGREES: 83
OS_AXISANGLE2_DEGREES: 28
OD_K2POWER_DIOPTERS: 45.75
OS_K1POWER_DIOPTERS: 45.75
OD_AXISANGLE_DEGREES: 173

## 2021-11-17 ASSESSMENT — TONOMETRY
OS_IOP_MMHG: 12
OD_IOP_MMHG: 13

## 2021-11-17 ASSESSMENT — VISUAL ACUITY
OD_PH: 20/60+1
OS_SC: 20/40-1
OD_SC: 20/200

## 2021-11-22 ENCOUNTER — 1 WEEK POST-OP (OUTPATIENT)
Dept: URBAN - METROPOLITAN AREA CLINIC 6 | Facility: CLINIC | Age: 76
End: 2021-11-22

## 2021-11-22 DIAGNOSIS — H52.211: ICD-10-CM

## 2021-11-22 DIAGNOSIS — Z96.1: ICD-10-CM

## 2021-11-22 PROCEDURE — 1036F TOBACCO NON-USER: CPT

## 2021-11-22 PROCEDURE — 92015 DETERMINE REFRACTIVE STATE: CPT

## 2021-11-22 PROCEDURE — G8427 DOCREV CUR MEDS BY ELIG CLIN: HCPCS

## 2021-11-22 PROCEDURE — 99024 POSTOP FOLLOW-UP VISIT: CPT

## 2021-11-22 ASSESSMENT — VISUAL ACUITY
OD_SC: 20/400
OS_SC: 20/40
OU_SC: J5
OD_PH: 20/50-2
OU_SC: 20/30-2

## 2021-11-22 ASSESSMENT — KERATOMETRY
OD_AXISANGLE_DEGREES: 173
OD_K2POWER_DIOPTERS: 45.75
OD_AXISANGLE2_DEGREES: 83
OD_K1POWER_DIOPTERS: 43.75
OS_K1POWER_DIOPTERS: 45.75
OS_AXISANGLE_DEGREES: 118
OS_AXISANGLE2_DEGREES: 28
OS_K2POWER_DIOPTERS: 46.25

## 2021-11-22 ASSESSMENT — TONOMETRY
OD_IOP_MMHG: 12
OS_IOP_MMHG: 14

## 2021-11-29 ENCOUNTER — PROCEDURE VISIT (OUTPATIENT)
Dept: UROLOGY | Facility: CLINIC | Age: 76
End: 2021-11-29
Payer: COMMERCIAL

## 2021-11-29 VITALS
HEART RATE: 64 BPM | DIASTOLIC BLOOD PRESSURE: 76 MMHG | HEIGHT: 62 IN | BODY MASS INDEX: 27.42 KG/M2 | SYSTOLIC BLOOD PRESSURE: 130 MMHG | WEIGHT: 149 LBS

## 2021-11-29 DIAGNOSIS — C67.9 MALIGNANT NEOPLASM OF URINARY BLADDER, UNSPECIFIED SITE (HCC): Primary | ICD-10-CM

## 2021-11-29 DIAGNOSIS — R30.0 DYSURIA: ICD-10-CM

## 2021-11-29 LAB
SL AMB  POCT GLUCOSE, UA: NORMAL
SL AMB LEUKOCYTE ESTERASE,UA: NORMAL
SL AMB POCT BILIRUBIN,UA: NORMAL
SL AMB POCT BLOOD,UA: NORMAL
SL AMB POCT CLARITY,UA: CLEAR
SL AMB POCT COLOR,UA: YELLOW
SL AMB POCT KETONES,UA: NORMAL
SL AMB POCT NITRITE,UA: NORMAL
SL AMB POCT PH,UA: 6.5
SL AMB POCT SPECIFIC GRAVITY,UA: 1.01
SL AMB POCT URINE PROTEIN: NORMAL
SL AMB POCT UROBILINOGEN: 0.2

## 2021-11-29 PROCEDURE — 1036F TOBACCO NON-USER: CPT | Performed by: UROLOGY

## 2021-11-29 PROCEDURE — 52000 CYSTOURETHROSCOPY: CPT | Performed by: UROLOGY

## 2021-11-29 PROCEDURE — 99213 OFFICE O/P EST LOW 20 MIN: CPT | Performed by: UROLOGY

## 2021-11-29 PROCEDURE — 88112 CYTOPATH CELL ENHANCE TECH: CPT | Performed by: PATHOLOGY

## 2021-11-29 PROCEDURE — 1160F RVW MEDS BY RX/DR IN RCRD: CPT | Performed by: UROLOGY

## 2021-11-29 PROCEDURE — 81002 URINALYSIS NONAUTO W/O SCOPE: CPT | Performed by: UROLOGY

## 2021-12-16 ENCOUNTER — OFFICE VISIT (OUTPATIENT)
Dept: GASTROENTEROLOGY | Facility: CLINIC | Age: 76
End: 2021-12-16
Payer: COMMERCIAL

## 2021-12-16 VITALS
SYSTOLIC BLOOD PRESSURE: 130 MMHG | DIASTOLIC BLOOD PRESSURE: 74 MMHG | BODY MASS INDEX: 27.71 KG/M2 | HEIGHT: 62 IN | WEIGHT: 150.6 LBS | HEART RATE: 87 BPM

## 2021-12-16 DIAGNOSIS — K21.9 GASTROESOPHAGEAL REFLUX DISEASE, UNSPECIFIED WHETHER ESOPHAGITIS PRESENT: Primary | ICD-10-CM

## 2021-12-16 DIAGNOSIS — Z12.11 COLON CANCER SCREENING: ICD-10-CM

## 2021-12-16 DIAGNOSIS — Z87.11 HISTORY OF GASTRIC ULCER: ICD-10-CM

## 2021-12-16 DIAGNOSIS — K22.70 BARRETT'S ESOPHAGUS WITHOUT DYSPLASIA: ICD-10-CM

## 2021-12-16 PROCEDURE — 1160F RVW MEDS BY RX/DR IN RCRD: CPT | Performed by: INTERNAL MEDICINE

## 2021-12-16 PROCEDURE — 1036F TOBACCO NON-USER: CPT | Performed by: INTERNAL MEDICINE

## 2021-12-16 PROCEDURE — 99213 OFFICE O/P EST LOW 20 MIN: CPT | Performed by: INTERNAL MEDICINE

## 2021-12-16 RX ORDER — AMOXICILLIN 500 MG/1
CAPSULE ORAL
COMMUNITY
Start: 2021-12-09

## 2021-12-20 ENCOUNTER — 1 MONTH POST-OP (OUTPATIENT)
Dept: URBAN - METROPOLITAN AREA CLINIC 6 | Facility: CLINIC | Age: 76
End: 2021-12-20

## 2021-12-20 DIAGNOSIS — Z96.1: ICD-10-CM

## 2021-12-20 PROCEDURE — 1036F TOBACCO NON-USER: CPT

## 2021-12-20 PROCEDURE — G8427 DOCREV CUR MEDS BY ELIG CLIN: HCPCS

## 2021-12-20 PROCEDURE — 99024 POSTOP FOLLOW-UP VISIT: CPT

## 2021-12-20 ASSESSMENT — VISUAL ACUITY
OD_SC: 20/400
OS_SC: 20/40-1
OS_CC: 20/40-2
OD_PH: 20/50
OD_CC: 20/400
OU_SC: J7
OU_CC: J3

## 2021-12-20 ASSESSMENT — TONOMETRY
OD_IOP_MMHG: 16
OS_IOP_MMHG: 14

## 2021-12-20 ASSESSMENT — KERATOMETRY
OD_AXISANGLE2_DEGREES: 83
OS_AXISANGLE_DEGREES: 118
OS_AXISANGLE2_DEGREES: 28
OD_K2POWER_DIOPTERS: 45.75
OS_K1POWER_DIOPTERS: 45.75
OD_AXISANGLE_DEGREES: 173
OS_K2POWER_DIOPTERS: 46.25
OD_K1POWER_DIOPTERS: 43.75

## 2021-12-29 ENCOUNTER — OFFICE VISIT (OUTPATIENT)
Dept: OBGYN CLINIC | Facility: CLINIC | Age: 76
End: 2021-12-29
Payer: COMMERCIAL

## 2021-12-29 VITALS
BODY MASS INDEX: 29.64 KG/M2 | WEIGHT: 151 LBS | HEIGHT: 60 IN | SYSTOLIC BLOOD PRESSURE: 144 MMHG | DIASTOLIC BLOOD PRESSURE: 80 MMHG

## 2021-12-29 DIAGNOSIS — R10.2 PELVIC PAIN: Primary | ICD-10-CM

## 2021-12-29 PROBLEM — M46.1 SACROILIITIS (HCC): Status: ACTIVE | Noted: 2021-12-29

## 2021-12-29 PROCEDURE — 99213 OFFICE O/P EST LOW 20 MIN: CPT | Performed by: OBSTETRICS & GYNECOLOGY

## 2022-01-04 ENCOUNTER — HOSPITAL ENCOUNTER (OUTPATIENT)
Dept: ULTRASOUND IMAGING | Facility: HOSPITAL | Age: 77
Discharge: HOME | End: 2022-01-04
Attending: OBSTETRICS & GYNECOLOGY
Payer: COMMERCIAL

## 2022-01-04 ENCOUNTER — HOSPITAL ENCOUNTER (OUTPATIENT)
Dept: MAMMOGRAPHY | Facility: HOSPITAL | Age: 77
Discharge: HOME | End: 2022-01-04
Attending: OBSTETRICS & GYNECOLOGY
Payer: COMMERCIAL

## 2022-01-04 VITALS — WEIGHT: 151.01 LBS | BODY MASS INDEX: 29.65 KG/M2 | HEIGHT: 60 IN

## 2022-01-04 DIAGNOSIS — Z12.31 ENCOUNTER FOR SCREENING MAMMOGRAM FOR MALIGNANT NEOPLASM OF BREAST: ICD-10-CM

## 2022-01-04 DIAGNOSIS — R10.2 PELVIC PAIN: ICD-10-CM

## 2022-01-04 PROCEDURE — 76830 TRANSVAGINAL US NON-OB: CPT

## 2022-01-04 PROCEDURE — 76856 US EXAM PELVIC COMPLETE: CPT

## 2022-01-04 PROCEDURE — 77063 BREAST TOMOSYNTHESIS BI: CPT

## 2022-01-04 PROCEDURE — 77067 SCR MAMMO BI INCL CAD: CPT

## 2022-01-04 PROCEDURE — 76705 ECHO EXAM OF ABDOMEN: CPT

## 2022-01-10 ENCOUNTER — OFFICE VISIT (OUTPATIENT)
Dept: OBGYN CLINIC | Facility: CLINIC | Age: 77
End: 2022-01-10
Payer: COMMERCIAL

## 2022-01-10 VITALS
SYSTOLIC BLOOD PRESSURE: 142 MMHG | BODY MASS INDEX: 29.45 KG/M2 | DIASTOLIC BLOOD PRESSURE: 80 MMHG | WEIGHT: 150 LBS | HEIGHT: 60 IN

## 2022-01-10 DIAGNOSIS — Z79.4 TYPE 2 DIABETES MELLITUS WITHOUT COMPLICATION, WITH LONG-TERM CURRENT USE OF INSULIN (HCC): ICD-10-CM

## 2022-01-10 DIAGNOSIS — E11.9 TYPE 2 DIABETES MELLITUS WITHOUT COMPLICATION, WITH LONG-TERM CURRENT USE OF INSULIN (HCC): ICD-10-CM

## 2022-01-10 DIAGNOSIS — M46.1 SACROILIITIS (HCC): ICD-10-CM

## 2022-01-10 DIAGNOSIS — N83.201 CYST OF RIGHT OVARY: ICD-10-CM

## 2022-01-10 DIAGNOSIS — K40.90 LEFT INGUINAL HERNIA: Primary | ICD-10-CM

## 2022-01-10 PROCEDURE — 99213 OFFICE O/P EST LOW 20 MIN: CPT | Performed by: OBSTETRICS & GYNECOLOGY

## 2022-01-10 PROCEDURE — 1160F RVW MEDS BY RX/DR IN RCRD: CPT | Performed by: OBSTETRICS & GYNECOLOGY

## 2022-01-10 NOTE — PROGRESS NOTES
Patient and her daughter presents the office today to discuss ultrasound reports  When patient was seen in the office a left inguinal hernia was noted  This was confirmed by ultrasound  The opening of the hernia is 2 cm  5 x 4 4 x 3 5 fatty tissue was noted protruding through  She also was noted to have a 4 0 2 x 2 8 cm right simple cyst   I explained that this is nothing to worry about and we would follow it by repeating an ultrasound in 2 months  I explained that the hernia should be addressed prevent incarceration  A referral was placed for general surgery to evaluate and make recommendations  Patient and her daughter's questions were answered today  Impression:  Left inguinal hernia (symptomatic)  4 cm simple right ovarian cyst     Plan:  Refer to general surgery for evaluation of hernia  Request for follow-up ultrasound was given to patient to be done in 2 months

## 2022-01-10 NOTE — PROGRESS NOTES
The patient is here to discuss U/S results from 1/4/22  The patient has lower pelvic pain bilaterally

## 2022-01-17 ENCOUNTER — CONSULT (OUTPATIENT)
Dept: SURGERY | Facility: CLINIC | Age: 77
End: 2022-01-17
Payer: COMMERCIAL

## 2022-01-17 ENCOUNTER — PREP FOR PROCEDURE (OUTPATIENT)
Dept: SURGERY | Facility: CLINIC | Age: 77
End: 2022-01-17

## 2022-01-17 VITALS — HEIGHT: 60 IN | BODY MASS INDEX: 29.64 KG/M2 | WEIGHT: 151 LBS

## 2022-01-17 DIAGNOSIS — K40.90 INGUINAL HERNIA: Primary | ICD-10-CM

## 2022-01-17 DIAGNOSIS — K40.90 LEFT INGUINAL HERNIA: ICD-10-CM

## 2022-01-17 PROCEDURE — 99202 OFFICE O/P NEW SF 15 MIN: CPT | Performed by: SURGERY

## 2022-01-17 PROCEDURE — 1160F RVW MEDS BY RX/DR IN RCRD: CPT | Performed by: SURGERY

## 2022-01-17 PROCEDURE — 1036F TOBACCO NON-USER: CPT | Performed by: SURGERY

## 2022-01-17 NOTE — PROGRESS NOTES
Assessment/Plan:  Patient presents with a left inguinal hernia  She desires undergo repair  She has pain associated with lifting  Operative repair is offered  Risks and benefits described  All questions were answered  Diagnoses and all orders for this visit:    Left inguinal hernia  -     Ambulatory referral to General Surgery        Subjective:      Patient ID: Charletta Burkitt is a 67 y o  female  Patient presents for left inguinal hernia consult  States she has had a bulge and sharp pain LLQ for one month  Does not limit her activities  Ultrasound inguinal area 1/4/2022      IMPRESSION:  Corresponding to palpable abnormality, adipose-containing left lower quadrant 5 0 x 4 5 x 3 5 cm hernia with orifice of 2 cm        The following portions of the patient's history were reviewed and updated as appropriate:     She  has a past medical history of Arthritis, Back pain, Cancer ([address] ), GERD (gastroesophageal reflux disease), Hyperlipidemia, Hypertension, Seasonal allergies, and Wears partial dentures  She  has a past surgical history that includes Appendectomy; Vein ligation and stripping; pr cystourethroscopy,fulgur <0 5 cm lesn (N/A, 1/15/2020); pr instill anticancer agent in bladder (N/A, 1/15/2020); Colonoscopy; Upper gastrointestinal endoscopy; and Cystoscopy (11/29/2021)  Her family history includes Heart disease in her mother; Hypertension in her mother  She  reports that she has never smoked  She has never used smokeless tobacco  She reports that she does not drink alcohol and does not use drugs    Current Outpatient Medications   Medication Sig Dispense Refill    amoxicillin (AMOXIL) 500 mg capsule TAKE ONE CAPSULE EVERY EIGHT HOURS      Besivance 0 6 % SUSP       calcium-vitamin D 250-100 MG-UNIT per tablet Take 1 tablet by mouth 2 (two) times a day      Cholecalciferol (VITAMIN D3) 1000 units CAPS Take 1 capsule by mouth daily      cyanocobalamin (VITAMIN B-12) 1000 MCG tablet Take 1,000 mcg by mouth daily      cyclobenzaprine (FLEXERIL) 5 mg tablet Take 1 tablet (5 mg total) by mouth 3 (three) times a day as needed for muscle spasms First try at bedtime to see how you feel the next morning  30 tablet 0    famotidine (PEPCID) 40 MG tablet TAKE ONE TABLET EVERY DAY  90 tablet 0    hydrochlorothiazide (HYDRODIURIL) 12 5 mg tablet TAKE ONE TABLET BY MOUTH EVERY DAY 90 tablet 1    Inveltys 1 % SUSP   (Patient not taking: Reported on 12/16/2021 )      losartan (COZAAR) 100 MG tablet Take 1 tablet (100 mg total) by mouth daily 90 tablet 1    multivitamin-minerals (CENTRUM ADULTS) tablet Take 1 tablet by mouth daily      omeprazole (PriLOSEC) 20 mg delayed release capsule Take 1 capsule (20 mg total) by mouth daily 30 capsule 3    Prolensa 0 07 % SOLN Administer 1 drop to both eyes daily 3 mL 1    simvastatin (ZOCOR) 20 mg tablet Take 1 tablet (20 mg total) by mouth daily 90 tablet 3     No current facility-administered medications for this visit  She has No Known Allergies       Review of Systems   Constitutional: Negative  Negative for activity change  HENT: Negative  Eyes: Negative  Respiratory: Negative  Cardiovascular: Negative  Gastrointestinal: Positive for abdominal pain  Endocrine: Negative  Genitourinary: Negative  Musculoskeletal: Negative  Skin: Negative  Allergic/Immunologic: Negative  Neurological: Negative  Psychiatric/Behavioral: Negative for agitation, behavioral problems and confusion  The patient is not nervous/anxious  Objective:      Ht 5' (1 524 m)   Wt 68 5 kg (151 lb)   LMP  (LMP Unknown)   BMI 29 49 kg/m²          Physical Exam  Constitutional:       Appearance: Normal appearance  She is well-developed  HENT:      Head: Normocephalic and atraumatic  Nose: Nose normal    Eyes:      Extraocular Movements: Extraocular movements intact        Conjunctiva/sclera: Conjunctivae normal    Cardiovascular:      Rate and Rhythm: Normal rate and regular rhythm  Heart sounds: Normal heart sounds  Pulmonary:      Effort: Pulmonary effort is normal       Breath sounds: Normal breath sounds  Abdominal:      General: Abdomen is flat  Palpations: There is mass  Tenderness: There is abdominal tenderness  Hernia: A hernia (Left inguinal hernias present  This is not reducible) is present  Musculoskeletal:         General: Normal range of motion  Cervical back: Normal range of motion  Right lower leg: No edema  Left lower leg: No edema  Skin:     General: Skin is warm and dry  Neurological:      Mental Status: She is alert and oriented to person, place, and time     Psychiatric:         Mood and Affect: Mood normal

## 2022-01-17 NOTE — H&P (VIEW-ONLY)
Assessment/Plan:  Patient presents with a left inguinal hernia  She desires undergo repair  She has pain associated with lifting  Operative repair is offered  Risks and benefits described  All questions were answered  Diagnoses and all orders for this visit:    Left inguinal hernia  -     Ambulatory referral to General Surgery        Subjective:      Patient ID: Dianne Newman is a 67 y o  female  Patient presents for left inguinal hernia consult  States she has had a bulge and sharp pain LLQ for one month  Does not limit her activities  Ultrasound inguinal area 1/4/2022      IMPRESSION:  Corresponding to palpable abnormality, adipose-containing left lower quadrant 5 0 x 4 5 x 3 5 cm hernia with orifice of 2 cm        The following portions of the patient's history were reviewed and updated as appropriate:     She  has a past medical history of Arthritis, Back pain, Cancer ([address] ), GERD (gastroesophageal reflux disease), Hyperlipidemia, Hypertension, Seasonal allergies, and Wears partial dentures  She  has a past surgical history that includes Appendectomy; Vein ligation and stripping; pr cystourethroscopy,fulgur <0 5 cm lesn (N/A, 1/15/2020); pr instill anticancer agent in bladder (N/A, 1/15/2020); Colonoscopy; Upper gastrointestinal endoscopy; and Cystoscopy (11/29/2021)  Her family history includes Heart disease in her mother; Hypertension in her mother  She  reports that she has never smoked  She has never used smokeless tobacco  She reports that she does not drink alcohol and does not use drugs    Current Outpatient Medications   Medication Sig Dispense Refill    amoxicillin (AMOXIL) 500 mg capsule TAKE ONE CAPSULE EVERY EIGHT HOURS      Besivance 0 6 % SUSP       calcium-vitamin D 250-100 MG-UNIT per tablet Take 1 tablet by mouth 2 (two) times a day      Cholecalciferol (VITAMIN D3) 1000 units CAPS Take 1 capsule by mouth daily      cyanocobalamin (VITAMIN B-12) 1000 MCG tablet Take 1,000 mcg by mouth daily      cyclobenzaprine (FLEXERIL) 5 mg tablet Take 1 tablet (5 mg total) by mouth 3 (three) times a day as needed for muscle spasms First try at bedtime to see how you feel the next morning  30 tablet 0    famotidine (PEPCID) 40 MG tablet TAKE ONE TABLET EVERY DAY  90 tablet 0    hydrochlorothiazide (HYDRODIURIL) 12 5 mg tablet TAKE ONE TABLET BY MOUTH EVERY DAY 90 tablet 1    Inveltys 1 % SUSP   (Patient not taking: Reported on 12/16/2021 )      losartan (COZAAR) 100 MG tablet Take 1 tablet (100 mg total) by mouth daily 90 tablet 1    multivitamin-minerals (CENTRUM ADULTS) tablet Take 1 tablet by mouth daily      omeprazole (PriLOSEC) 20 mg delayed release capsule Take 1 capsule (20 mg total) by mouth daily 30 capsule 3    Prolensa 0 07 % SOLN Administer 1 drop to both eyes daily 3 mL 1    simvastatin (ZOCOR) 20 mg tablet Take 1 tablet (20 mg total) by mouth daily 90 tablet 3     No current facility-administered medications for this visit  She has No Known Allergies       Review of Systems   Constitutional: Negative  Negative for activity change  HENT: Negative  Eyes: Negative  Respiratory: Negative  Cardiovascular: Negative  Gastrointestinal: Positive for abdominal pain  Endocrine: Negative  Genitourinary: Negative  Musculoskeletal: Negative  Skin: Negative  Allergic/Immunologic: Negative  Neurological: Negative  Psychiatric/Behavioral: Negative for agitation, behavioral problems and confusion  The patient is not nervous/anxious  Objective:      Ht 5' (1 524 m)   Wt 68 5 kg (151 lb)   LMP  (LMP Unknown)   BMI 29 49 kg/m²          Physical Exam  Constitutional:       Appearance: Normal appearance  She is well-developed  HENT:      Head: Normocephalic and atraumatic  Nose: Nose normal    Eyes:      Extraocular Movements: Extraocular movements intact        Conjunctiva/sclera: Conjunctivae normal    Cardiovascular:      Rate and Rhythm: Normal rate and regular rhythm  Heart sounds: Normal heart sounds  Pulmonary:      Effort: Pulmonary effort is normal       Breath sounds: Normal breath sounds  Abdominal:      General: Abdomen is flat  Palpations: There is mass  Tenderness: There is abdominal tenderness  Hernia: A hernia (Left inguinal hernias present  This is not reducible) is present  Musculoskeletal:         General: Normal range of motion  Cervical back: Normal range of motion  Right lower leg: No edema  Left lower leg: No edema  Skin:     General: Skin is warm and dry  Neurological:      Mental Status: She is alert and oriented to person, place, and time     Psychiatric:         Mood and Affect: Mood normal

## 2022-01-19 DIAGNOSIS — I10 ESSENTIAL HYPERTENSION: ICD-10-CM

## 2022-01-19 RX ORDER — LOSARTAN POTASSIUM 100 MG/1
TABLET ORAL
Qty: 90 TABLET | Refills: 1 | Status: SHIPPED | OUTPATIENT
Start: 2022-01-19 | End: 2022-04-27 | Stop reason: SDUPTHER

## 2022-01-20 ENCOUNTER — ANESTHESIA EVENT (OUTPATIENT)
Dept: PERIOP | Facility: AMBULARY SURGERY CENTER | Age: 77
End: 2022-01-20
Payer: COMMERCIAL

## 2022-01-25 ENCOUNTER — OFFICE VISIT (OUTPATIENT)
Dept: LAB | Facility: CLINIC | Age: 77
End: 2022-01-25
Payer: COMMERCIAL

## 2022-01-25 ENCOUNTER — HOSPITAL ENCOUNTER (OUTPATIENT)
Dept: ULTRASOUND IMAGING | Facility: HOSPITAL | Age: 77
Discharge: HOME | End: 2022-01-25
Attending: OBSTETRICS & GYNECOLOGY

## 2022-01-25 ENCOUNTER — APPOINTMENT (OUTPATIENT)
Dept: LAB | Facility: CLINIC | Age: 77
End: 2022-01-25
Payer: COMMERCIAL

## 2022-01-25 DIAGNOSIS — K40.90 INGUINAL HERNIA: ICD-10-CM

## 2022-01-25 DIAGNOSIS — N83.201 CYST OF RIGHT OVARY: ICD-10-CM

## 2022-01-25 LAB
ALBUMIN SERPL BCP-MCNC: 3.8 G/DL (ref 3.5–5)
ALP SERPL-CCNC: 59 U/L (ref 46–116)
ALT SERPL W P-5'-P-CCNC: 27 U/L (ref 12–78)
ANION GAP SERPL CALCULATED.3IONS-SCNC: 8 MMOL/L (ref 4–13)
AST SERPL W P-5'-P-CCNC: 19 U/L (ref 5–45)
ATRIAL RATE: 78 BPM
BILIRUB SERPL-MCNC: 0.32 MG/DL (ref 0.2–1)
BUN SERPL-MCNC: 23 MG/DL (ref 5–25)
CALCIUM SERPL-MCNC: 9.3 MG/DL (ref 8.3–10.1)
CHLORIDE SERPL-SCNC: 100 MMOL/L (ref 100–108)
CO2 SERPL-SCNC: 28 MMOL/L (ref 21–32)
CREAT SERPL-MCNC: 0.7 MG/DL (ref 0.6–1.3)
ERYTHROCYTE [DISTWIDTH] IN BLOOD BY AUTOMATED COUNT: 13 % (ref 11.6–15.1)
GFR SERPL CREATININE-BSD FRML MDRD: 83 ML/MIN/1.73SQ M
GLUCOSE SERPL-MCNC: 95 MG/DL (ref 65–140)
HCT VFR BLD AUTO: 42 % (ref 34.8–46.1)
HGB BLD-MCNC: 13.6 G/DL (ref 11.5–15.4)
MCH RBC QN AUTO: 29.8 PG (ref 26.8–34.3)
MCHC RBC AUTO-ENTMCNC: 32.4 G/DL (ref 31.4–37.4)
MCV RBC AUTO: 92 FL (ref 82–98)
P AXIS: 62 DEGREES
PLATELET # BLD AUTO: 335 THOUSANDS/UL (ref 149–390)
PMV BLD AUTO: 9.2 FL (ref 8.9–12.7)
POTASSIUM SERPL-SCNC: 4.3 MMOL/L (ref 3.5–5.3)
PR INTERVAL: 162 MS
PROT SERPL-MCNC: 7.7 G/DL (ref 6.4–8.2)
QRS AXIS: 17 DEGREES
QRSD INTERVAL: 68 MS
QT INTERVAL: 380 MS
QTC INTERVAL: 433 MS
RBC # BLD AUTO: 4.56 MILLION/UL (ref 3.81–5.12)
SODIUM SERPL-SCNC: 136 MMOL/L (ref 136–145)
T WAVE AXIS: 42 DEGREES
VENTRICULAR RATE: 78 BPM
WBC # BLD AUTO: 10.35 THOUSAND/UL (ref 4.31–10.16)

## 2022-01-25 PROCEDURE — 36415 COLL VENOUS BLD VENIPUNCTURE: CPT

## 2022-01-25 PROCEDURE — 85027 COMPLETE CBC AUTOMATED: CPT

## 2022-01-25 PROCEDURE — 80053 COMPREHEN METABOLIC PANEL: CPT

## 2022-01-25 PROCEDURE — 93005 ELECTROCARDIOGRAM TRACING: CPT

## 2022-01-25 PROCEDURE — 93010 ELECTROCARDIOGRAM REPORT: CPT | Performed by: INTERNAL MEDICINE

## 2022-01-31 NOTE — DISCHARGE INSTRUCTIONS
Please call the office when you leave to schedule an appointment for 2 weeks  Please call [phone]                      [facility] [address], [place], [address]  [address] between [facility] and [facility]  Activity:    May lift 10 lb as many times as desired the 1st week,       20 lb in 2 weeks,       30 lb in 3 weeks  Walking is encouraged  Normal daily activities including climbing steps are okay  Do not engage in strenuous activity ( sit-ups or crutches) or contact sports for 4-6 weeks post-operatively    Return to Work:   Okay to return to work when you feel well if you desire  Diet:   You may return to your normal healthy diet  Wound Care: Your wound is closed with dissolvable stitches and glue  It is okay to shower  Wash incision gently with soap and water and pat dry  Do not soak incisions in bath water or swim for two weeks  Do not apply any creams or ointments  Pain Medication:   Please take as directed if needed  May use Advil or Motrin in addition  Recall, the pain medicine and anesthesia is associated with constipation  No driving while taking narcotic pain medications  Other: It is normal to developed a healing ridge / firm incision after surgery  This is your body making scar tissue  It is a good sign  Constipation is very common after general anesthesia  Please use milk of magnesia as needed in order to help prevent constipation  It is normal to get bruising after surgery  If you have questions after discharge please call the office      If you have increased pain, fever >101 5, increased drainage, redness or a bad smell at your surgery site, please call us immediately or come directly to the Emergency Room

## 2022-02-03 ENCOUNTER — ANESTHESIA (OUTPATIENT)
Dept: PERIOP | Facility: AMBULARY SURGERY CENTER | Age: 77
End: 2022-02-03
Payer: COMMERCIAL

## 2022-02-03 ENCOUNTER — HOSPITAL ENCOUNTER (OUTPATIENT)
Facility: AMBULARY SURGERY CENTER | Age: 77
Setting detail: OUTPATIENT SURGERY
Discharge: HOME | End: 2022-02-03
Attending: SURGERY | Admitting: SURGERY
Payer: COMMERCIAL

## 2022-02-03 VITALS
RESPIRATION RATE: 18 BRPM | HEIGHT: 60 IN | TEMPERATURE: 97.8 F | DIASTOLIC BLOOD PRESSURE: 60 MMHG | OXYGEN SATURATION: 94 % | SYSTOLIC BLOOD PRESSURE: 127 MMHG | WEIGHT: 151 LBS | BODY MASS INDEX: 29.64 KG/M2 | HEART RATE: 86 BPM

## 2022-02-03 DIAGNOSIS — K40.90 LEFT INGUINAL HERNIA: Primary | ICD-10-CM

## 2022-02-03 PROCEDURE — 49505 PRP I/HERN INIT REDUC >5 YR: CPT | Performed by: SURGERY

## 2022-02-03 PROCEDURE — C1781 MESH (IMPLANTABLE): HCPCS | Performed by: SURGERY

## 2022-02-03 DEVICE — MODIFIED ONFLEX MESH
Type: IMPLANTABLE DEVICE | Site: INGUINAL | Status: FUNCTIONAL
Brand: MODIFIED ONFLEX MESH

## 2022-02-03 RX ORDER — KETOROLAC TROMETHAMINE 30 MG/ML
INJECTION, SOLUTION INTRAMUSCULAR; INTRAVENOUS AS NEEDED
Status: DISCONTINUED | OUTPATIENT
Start: 2022-02-03 | End: 2022-02-03

## 2022-02-03 RX ORDER — PROPOFOL 10 MG/ML
INJECTION, EMULSION INTRAVENOUS AS NEEDED
Status: DISCONTINUED | OUTPATIENT
Start: 2022-02-03 | End: 2022-02-03

## 2022-02-03 RX ORDER — ALBUTEROL SULFATE 2.5 MG/3ML
2.5 SOLUTION RESPIRATORY (INHALATION) ONCE AS NEEDED
Status: DISCONTINUED | OUTPATIENT
Start: 2022-02-03 | End: 2022-02-03 | Stop reason: HOSPADM

## 2022-02-03 RX ORDER — OXYCODONE HYDROCHLORIDE AND ACETAMINOPHEN 5; 325 MG/1; MG/1
1 TABLET ORAL EVERY 4 HOURS PRN
Qty: 10 TABLET | Refills: 0 | Status: SHIPPED | OUTPATIENT
Start: 2022-02-03

## 2022-02-03 RX ORDER — ONDANSETRON 2 MG/ML
INJECTION INTRAMUSCULAR; INTRAVENOUS AS NEEDED
Status: DISCONTINUED | OUTPATIENT
Start: 2022-02-03 | End: 2022-02-03

## 2022-02-03 RX ORDER — FENTANYL CITRATE 50 UG/ML
INJECTION, SOLUTION INTRAMUSCULAR; INTRAVENOUS AS NEEDED
Status: DISCONTINUED | OUTPATIENT
Start: 2022-02-03 | End: 2022-02-03

## 2022-02-03 RX ORDER — BUPIVACAINE HYDROCHLORIDE 2.5 MG/ML
INJECTION, SOLUTION EPIDURAL; INFILTRATION; INTRACAUDAL AS NEEDED
Status: DISCONTINUED | OUTPATIENT
Start: 2022-02-03 | End: 2022-02-03 | Stop reason: HOSPADM

## 2022-02-03 RX ORDER — TRISODIUM CITRATE DIHYDRATE AND CITRIC ACID MONOHYDRATE 500; 334 MG/5ML; MG/5ML
30 SOLUTION ORAL ONCE
Status: COMPLETED | OUTPATIENT
Start: 2022-02-03 | End: 2022-02-03

## 2022-02-03 RX ORDER — MAGNESIUM HYDROXIDE 1200 MG/15ML
LIQUID ORAL AS NEEDED
Status: DISCONTINUED | OUTPATIENT
Start: 2022-02-03 | End: 2022-02-03 | Stop reason: HOSPADM

## 2022-02-03 RX ORDER — LIDOCAINE HYDROCHLORIDE 10 MG/ML
0.5 INJECTION, SOLUTION EPIDURAL; INFILTRATION; INTRACAUDAL; PERINEURAL ONCE AS NEEDED
Status: DISCONTINUED | OUTPATIENT
Start: 2022-02-03 | End: 2022-02-03 | Stop reason: HOSPADM

## 2022-02-03 RX ORDER — FENTANYL CITRATE/PF 50 MCG/ML
25 SYRINGE (ML) INJECTION
Status: DISCONTINUED | OUTPATIENT
Start: 2022-02-03 | End: 2022-02-03 | Stop reason: HOSPADM

## 2022-02-03 RX ORDER — DEXAMETHASONE SODIUM PHOSPHATE 4 MG/ML
INJECTION, SOLUTION INTRA-ARTICULAR; INTRALESIONAL; INTRAMUSCULAR; INTRAVENOUS; SOFT TISSUE AS NEEDED
Status: DISCONTINUED | OUTPATIENT
Start: 2022-02-03 | End: 2022-02-03

## 2022-02-03 RX ORDER — METOCLOPRAMIDE HYDROCHLORIDE 5 MG/ML
10 INJECTION INTRAMUSCULAR; INTRAVENOUS ONCE AS NEEDED
Status: DISCONTINUED | OUTPATIENT
Start: 2022-02-03 | End: 2022-02-03 | Stop reason: HOSPADM

## 2022-02-03 RX ORDER — CEFAZOLIN SODIUM 1 G/50ML
1000 SOLUTION INTRAVENOUS ONCE
Status: COMPLETED | OUTPATIENT
Start: 2022-02-03 | End: 2022-02-03

## 2022-02-03 RX ORDER — ONDANSETRON 2 MG/ML
4 INJECTION INTRAMUSCULAR; INTRAVENOUS ONCE AS NEEDED
Status: DISCONTINUED | OUTPATIENT
Start: 2022-02-03 | End: 2022-02-03 | Stop reason: HOSPADM

## 2022-02-03 RX ORDER — LIDOCAINE HYDROCHLORIDE 10 MG/ML
INJECTION, SOLUTION EPIDURAL; INFILTRATION; INTRACAUDAL; PERINEURAL AS NEEDED
Status: DISCONTINUED | OUTPATIENT
Start: 2022-02-03 | End: 2022-02-03

## 2022-02-03 RX ORDER — SODIUM CHLORIDE, SODIUM LACTATE, POTASSIUM CHLORIDE, CALCIUM CHLORIDE 600; 310; 30; 20 MG/100ML; MG/100ML; MG/100ML; MG/100ML
125 INJECTION, SOLUTION INTRAVENOUS CONTINUOUS
Status: DISCONTINUED | OUTPATIENT
Start: 2022-02-03 | End: 2022-02-03 | Stop reason: HOSPADM

## 2022-02-03 RX ADMIN — FENTANYL CITRATE 25 MCG: 50 INJECTION, SOLUTION INTRAMUSCULAR; INTRAVENOUS at 09:26

## 2022-02-03 RX ADMIN — FENTANYL CITRATE 25 MCG: 50 INJECTION, SOLUTION INTRAMUSCULAR; INTRAVENOUS at 10:40

## 2022-02-03 RX ADMIN — FENTANYL CITRATE 25 MCG: 50 INJECTION, SOLUTION INTRAMUSCULAR; INTRAVENOUS at 09:08

## 2022-02-03 RX ADMIN — PROPOFOL 50 MG: 10 INJECTION, EMULSION INTRAVENOUS at 09:04

## 2022-02-03 RX ADMIN — SODIUM CHLORIDE, SODIUM LACTATE, POTASSIUM CHLORIDE, AND CALCIUM CHLORIDE 125 ML/HR: .6; .31; .03; .02 INJECTION, SOLUTION INTRAVENOUS at 07:53

## 2022-02-03 RX ADMIN — KETOROLAC TROMETHAMINE 15 MG: 30 INJECTION, SOLUTION INTRAMUSCULAR at 09:19

## 2022-02-03 RX ADMIN — SODIUM CITRATE AND CITRIC ACID MONOHYDRATE 30 ML: 500; 334 SOLUTION ORAL at 08:18

## 2022-02-03 RX ADMIN — FENTANYL CITRATE 25 MCG: 50 INJECTION, SOLUTION INTRAMUSCULAR; INTRAVENOUS at 09:33

## 2022-02-03 RX ADMIN — FENTANYL CITRATE 25 MCG: 50 INJECTION, SOLUTION INTRAMUSCULAR; INTRAVENOUS at 10:30

## 2022-02-03 RX ADMIN — FENTANYL CITRATE 25 MCG: 50 INJECTION, SOLUTION INTRAMUSCULAR; INTRAVENOUS at 10:00

## 2022-02-03 RX ADMIN — FENTANYL CITRATE 25 MCG: 50 INJECTION, SOLUTION INTRAMUSCULAR; INTRAVENOUS at 09:45

## 2022-02-03 RX ADMIN — ONDANSETRON 4 MG: 2 INJECTION INTRAMUSCULAR; INTRAVENOUS at 09:02

## 2022-02-03 RX ADMIN — CEFAZOLIN SODIUM 1000 MG: 1 SOLUTION INTRAVENOUS at 09:00

## 2022-02-03 RX ADMIN — PROPOFOL 100 MG: 10 INJECTION, EMULSION INTRAVENOUS at 09:02

## 2022-02-03 RX ADMIN — DEXAMETHASONE SODIUM PHOSPHATE 8 MG: 4 INJECTION INTRA-ARTICULAR; INTRALESIONAL; INTRAMUSCULAR; INTRAVENOUS; SOFT TISSUE at 09:05

## 2022-02-03 RX ADMIN — FENTANYL CITRATE 25 MCG: 50 INJECTION, SOLUTION INTRAMUSCULAR; INTRAVENOUS at 10:15

## 2022-02-03 RX ADMIN — PROPOFOL 50 MG: 10 INJECTION, EMULSION INTRAVENOUS at 09:03

## 2022-02-03 RX ADMIN — LIDOCAINE HYDROCHLORIDE 50 MG: 10 INJECTION, SOLUTION EPIDURAL; INFILTRATION; INTRACAUDAL at 09:02

## 2022-02-03 NOTE — INTERVAL H&P NOTE
H&P reviewed  After examining the patient I find no changes in the patients condition since the H&P had been written      Vitals:    02/03/22 0744   BP: 160/86   Pulse: 101   Resp: 16   Temp: (!) 97 4 °F (36 3 °C)   SpO2: 96%

## 2022-02-03 NOTE — OP NOTE
PERATIVE REPORT  PATIENT NAME: Dianne Newman    :  1945  MRN: [id number]  Pt [facility]    SURGERY DATE: 2/3/2022    Surgeon(s) and Role:     * Parish Desai MD - Primary     * Jose Bansal MD - Assisting    Preop Diagnosis:  Inguinal hernia [K40 90]    Post-Op Diagnosis Codes:     * Inguinal hernia [K40 90]    Procedure(s) (LRB):  INGUINAL HERNIA REPAIR (Left)    Specimen(s):  * No specimens in log *    Estimated Blood Loss:   20 mL    Drains:  * No LDAs found *    Anesthesia Type:   General    Operative Indications:  Inguinal hernia [K40 90]    Independent, nonsmoker, ASA 2, wound class 1, BMI 30, weight 150, height 60  (+) Essential hypertension   (+) Hyperlipidemia       ENDO   (+) Type 2 diabetes mellitus without complication, with long-term current use of insulin (HCC)       GI/HEPATIC   (+) Gastroesophageal reflux disease without esophagitis       Other   (+) Left inguinal hernia   (+) Malignant neoplasm of urinary bladder (HCC)   (+) Seasonal allergies                       Complications:   None    Procedure and Technique:  Dianne Newman is a 67 y o  female was brought into the operative suite and identified visually and by arm band  The patient was placed in the supine position  Careful attention was made towards padding and positioning of the extremities  After sterile prep and drape, a timeout was completed  The prep was dry  Antibiotics were provided  Athrombic pumps in place  0 25% Marcaine with epinephrine was utilized throughout the procedure  An incision was made  within the left inguinal region  The incision was carried down through the skin and subcutaneous tissue  The superficial epigastric vein was clamped, ligated and  divided    The external oblique fibers were identified  The external ring was identified  The external oblique fibers were then split in the direction of their fibers  Hemostats were placed on the cut edges    A self-retaining retractor was then placed  Exploration of the inguinal canal commenced  The cremasteric fibers were then divided along the fiber direction of its fibers the cord structures  Identification of a indirect inguinal hernia was performed  High dissection was completed at the level of the internal ring  Preperitoneal dissection commenced identifying and preserving the inferior epigastric vessels  A preperitoneal mesh was then inserted  The branch of the genitofemoral nerve was divided in order to help prevent postoperative neuralgia  The inguinal floor was then repaired with interrupted figure-of-eight 0 Vicryl suture  The indirect inguinal ring was repositioned more superiorly while repairing the inguinal transversalis muscular floor  An onlay mesh was then secured to the tendon overlying the lateral pubic tubercle The external oblique fascia was closed with a running 3-0 PDS suture  Additional 0 25% Marcaine with epinephrine was injected over the ilioinguinal and iliohypogastric nerves  3-0 Vicryl subcutaneous suture was placed into the Raj's fascia   4-0 Monocryl suture was used for the subcuticular layer  Dermabond was then applied  The patient was then awakened from general anesthesia and transferred to the recovery room in stable condition  Sponge and instrument count correct ×2       I was present for the entire procedure    Patient Disposition:  PACU       SIGNATURE: Merari Gonzalez MD  DATE: February 3, 2022  TIME: 11:18 AM

## 2022-02-03 NOTE — ANESTHESIA PREPROCEDURE EVALUATION
Procedure:  INGUINAL HERNIA REPAIR (Left Groin)    Relevant Problems   CARDIO   (+) Essential hypertension   (+) Hyperlipidemia      ENDO   (+) Type 2 diabetes mellitus without complication, with long-term current use of insulin (HCC)      GI/HEPATIC   (+) Gastroesophageal reflux disease without esophagitis      Other   (+) Left inguinal hernia   (+) Malignant neoplasm of urinary bladder (HCC)   (+) Seasonal allergies      Physical Exam    Airway    Mallampati score: III  TM Distance: >3 FB  Neck ROM: full     Dental   No notable dental hx     Cardiovascular      Pulmonary      Other Findings       Lab Results   Component Value Date    WBC 10 35 (H) 01/25/2022    HGB 13 6 01/25/2022     01/25/2022     Lab Results   Component Value Date    SODIUM 136 01/25/2022    K 4 3 01/25/2022    BUN 23 01/25/2022    CREATININE 0 70 01/25/2022    EGFR 83 01/25/2022    GLUCOSE 111 08/07/2015     Lab Results   Component Value Date    HGBA1C 5 9 (H) 10/19/2021     Anesthesia Plan  ASA Score- 2     Anesthesia Type- general with ASA Monitors  Additional Monitors:   Airway Plan:     Comment: WIll give antacid now, if she is having GERD preprocedure will intubate  Plan Factors-Exercise tolerance (METS): >4 METS  Chart reviewed  Existing labs reviewed  Patient summary reviewed  Patient is not a current smoker  Induction- intravenous  Postoperative Plan-     Informed Consent- Anesthetic plan and risks discussed with patient and son  I personally reviewed this patient with the CRNA  Discussed and agreed on the Anesthesia Plan with the CRNA  Praveena Leung

## 2022-02-08 ENCOUNTER — TELEPHONE (OUTPATIENT)
Dept: GASTROENTEROLOGY | Facility: CLINIC | Age: 77
End: 2022-02-08

## 2022-02-08 NOTE — TELEPHONE ENCOUNTER
Patient's son called stating his mom had an inguinal hernia repair but prior to that Dr Kalyn Reynolds saw her in December and advised her to stop omeprazole and just use Pepcid OTC  He states that Pepcid isn't working since hernia surgery and his mom is asking to go back on omeprazole  Please call and let him know if she can return to omeprazole  Thank you!

## 2022-02-08 NOTE — TELEPHONE ENCOUNTER
Sent omeprazole 20 milligrams to pharmacy-spoke with patient and she states that Pepcid was not working since she switched so she will continue on omeprazole 20 milligrams daily

## 2022-02-21 ENCOUNTER — OFFICE VISIT (OUTPATIENT)
Dept: SURGERY | Facility: CLINIC | Age: 77
End: 2022-02-21

## 2022-02-21 DIAGNOSIS — Z48.89 POSTOPERATIVE VISIT: Primary | ICD-10-CM

## 2022-02-21 PROCEDURE — 99024 POSTOP FOLLOW-UP VISIT: CPT | Performed by: SURGERY

## 2022-02-21 NOTE — PROGRESS NOTES
Assessment/Plan:  Patient is post left inguinal hernia repair  She feels well  She has no complaints  Incisions clean healing well  All questions answered  Diagnoses and all orders for this visit:    Postoperative visit        Pathology: None sent      Postoperative restrictions reviewed  All questions answered  ______________________________________________________  HPI: Patient presents post operatively  Left inguinal hernia repair 2/3/2022  ROS:  General ROS: negative for - chills, fatigue, fever or night sweats, weight loss  Respiratory ROS: no cough, shortness of breath, or wheezing  Cardiovascular ROS: no chest pain or dyspnea on exertion  Genito-Urinary ROS: no dysuria, trouble voiding, or hematuria  Musculoskeletal ROS: negative for - gait disturbance, joint pain or muscle pain  Neurological ROS: no TIA or stroke symptoms  GI ROS: see HPI  Skin ROS: no new rashes or lesions   Lymphatic ROS: no new adenopathy noted by pt     GYN ROS: see HPI, no new GYN history or bleeding noted  Psy ROS: no new mental or behavioral disturbances         Patient Active Problem List   Diagnosis    Cough    Bronchitis    Hypertension    Dyslipidemia    Type 2 diabetes mellitus without complication, without long-term current use of insulin (HCC)    Lumbar radiculopathy    Encounter for screening mammogram for breast cancer    Intervertebral disc disorders with radiculopathy, lumbar region    Malignant neoplasm of urinary bladder ([facility] [address] )    Dysuria    Medicare annual wellness visit, subsequent    Gastroesophageal reflux disease without esophagitis    Hyperlipidemia    Type 2 diabetes mellitus without complication, with long-term current use of insulin (HCC)    Chronic pain of left knee    Chronic pain of right knee    Primary osteoarthritis of right knee    Primary osteoarthritis of left knee    Need for vaccination    Essential hypertension    Acute right-sided low back pain with right-sided sciatica    Colon polyps    Fall    Cortical age-related cataract of both eyes    Colon cancer screening    [address]roili[address] [address] )    Left inguinal hernia    Seasonal allergies    Postoperative visit       Allergies:  Patient has no known allergies        Current Outpatient Medications:     amoxicillin (AMOXIL) 500 mg capsule, TAKE ONE CAPSULE EVERY EIGHT HOURS, Disp: , Rfl:     Besivance 0 6 % SUSP, , Disp: , Rfl:     calcium-vitamin D 250-100 MG-UNIT per tablet, Take 1 tablet by mouth 2 (two) times a day, Disp: , Rfl:     Cholecalciferol (VITAMIN D3) 1000 units CAPS, Take 1 capsule by mouth daily, Disp: , Rfl:     cyanocobalamin (VITAMIN B-12) 1000 MCG tablet, Take 1,000 mcg by mouth daily, Disp: , Rfl:     cyclobenzaprine (FLEXERIL) 5 mg tablet, Take 1 tablet (5 mg total) by mouth 3 (three) times a day as needed for muscle spasms First try at bedtime to see how you feel the next morning , Disp: 30 tablet, Rfl: 0    famotidine (PEPCID) 40 MG tablet, TAKE ONE TABLET EVERY DAY , Disp: 90 tablet, Rfl: 0    hydrochlorothiazide (HYDRODIURIL) 12 5 mg tablet, TAKE ONE TABLET BY MOUTH EVERY DAY, Disp: 90 tablet, Rfl: 1    Inveltys 1 % SUSP,   (Patient not taking: Reported on 12/16/2021 ), Disp: , Rfl:     losartan (COZAAR) 100 MG tablet, TAKE ONE TABLET BY MOUTH EVERY DAY, Disp: 90 tablet, Rfl: 1    multivitamin-minerals (CENTRUM ADULTS) tablet, Take 1 tablet by mouth daily, Disp: , Rfl:     omeprazole (PriLOSEC) 20 mg delayed release capsule, Take 1 capsule (20 mg total) by mouth daily, Disp: 30 capsule, Rfl: 3    omeprazole (PriLOSEC) 20 mg delayed release capsule, Take 1 capsule (20 mg total) by mouth daily, Disp: 90 capsule, Rfl: 1    oxyCODONE-acetaminophen (PERCOCET) 5-325 mg per tablet, Take 1 tablet by mouth every 4 (four) hours as needed for moderate pain for up to 10 doses Max Daily Amount: 6 tablets, Disp: 10 tablet, Rfl: 0    Prolensa 0 07 % SOLN, Administer 1 drop to both eyes daily, Disp: 3 mL, Rfl: 1    simvastatin (ZOCOR) 20 mg tablet, Take 1 tablet (20 mg total) by mouth daily, Disp: 90 tablet, Rfl: 3    Past Medical History:   Diagnosis Date    Arthritis     Back pain     sciatic pain right hip and down right leg    Cancer ([address] )     bladder cancer ; had surgical scraping    GERD (gastroesophageal reflux disease)     Hyperlipidemia     Hypertension     Seasonal allergies     Wears partial dentures     1 small tooth removed and placed in cup        Past Surgical History:   Procedure Laterality Date    APPENDECTOMY      COLONOSCOPY      CYSTOSCOPY  11/29/2021    [place]    KS CYSTOURETHROSCOPY,FULGUR <0 5 CM LESN N/A 1/15/2020    Procedure: Cystoscopy TRANSURETHRAL RESECTION OF BLADDER TUMOR (TURBT); Surgeon: Carri Garza MD;  [facility];  Service: Urology    KS INSTILL ANTICANCER AGENT IN BLADDER N/A 1/15/2020    Procedure: Marlen Achille;  Surgeon: Carri Garza MD;  [facility];  Service: Urology    KS REPAIR [address] HERNIA,5+Y/O,REDUCIBL Left 2/3/2022    Procedure: INGUINAL HERNIA REPAIR;  Surgeon: Jordan Cockayne, MD;  [facility];  Service: General    UPPER GASTROINTESTINAL ENDOSCOPY      VEIN LIGATION AND [address]         Family History   Problem Relation Age of Onset    Heart disease Mother     Hypertension Mother         reports that she has never smoked  She has never used smokeless tobacco  She reports that she does not drink alcohol and does not use drugs      PHYSICAL EXAM    LMP  (LMP Unknown)     General: normal, cooperative, no distress  Abdominal: soft, nondistended or nontender  Incision: clean, dry, and intact and healing well      Jordan Cockayne, MD    Date: 2/21/2022 Time: 1:10 PM Additional Anesthesia Volume In Cc: 9

## 2022-03-04 ENCOUNTER — TELEPHONE (OUTPATIENT)
Dept: INTERNAL MEDICINE CLINIC | Facility: CLINIC | Age: 77
End: 2022-03-04

## 2022-03-04 NOTE — TELEPHONE ENCOUNTER
Patient has her AWV on 4/27/22 and she asked if you would put in orders for her yearly routine labs? Patient is going to [address] [place] [address] to have completed  Call once ready so she is aware      Please advise

## 2022-03-08 NOTE — TELEPHONE ENCOUNTER
Attention MA's please add orders with diagnoses so we may call patient to have completed  Or would you please put orders in Dr Letitia Urban?

## 2022-03-23 ENCOUNTER — HOSPITAL ENCOUNTER (OUTPATIENT)
Dept: ULTRASOUND IMAGING | Facility: HOSPITAL | Age: 77
Discharge: HOME | End: 2022-03-23
Attending: OBSTETRICS & GYNECOLOGY
Payer: COMMERCIAL

## 2022-03-23 PROCEDURE — 76830 TRANSVAGINAL US NON-OB: CPT

## 2022-03-23 PROCEDURE — 76856 US EXAM PELVIC COMPLETE: CPT

## 2022-04-04 ENCOUNTER — TELEPHONE (OUTPATIENT)
Dept: OBGYN CLINIC | Facility: CLINIC | Age: 77
End: 2022-04-04

## 2022-04-12 ENCOUNTER — RA CDI HCC (OUTPATIENT)
Dept: OTHER | Facility: HOSPITAL | Age: 77
End: 2022-04-12

## 2022-04-12 NOTE — PROGRESS NOTES
[facility]  coding opportunities       Chart reviewed, no opportunity found:   [address]        Patients Insurance     Medicare Insurance: [employer]

## 2022-04-22 ENCOUNTER — APPOINTMENT (OUTPATIENT)
Dept: LAB | Facility: CLINIC | Age: 77
End: 2022-04-22
Payer: COMMERCIAL

## 2022-04-22 DIAGNOSIS — Z79.4 TYPE 2 DIABETES MELLITUS WITHOUT COMPLICATION, WITH LONG-TERM CURRENT USE OF INSULIN (HCC): ICD-10-CM

## 2022-04-22 DIAGNOSIS — E11.9 TYPE 2 DIABETES MELLITUS WITHOUT COMPLICATION, WITH LONG-TERM CURRENT USE OF INSULIN (HCC): ICD-10-CM

## 2022-04-22 LAB
ALBUMIN SERPL BCP-MCNC: 3.5 G/DL (ref 3.5–5)
ALP SERPL-CCNC: 52 U/L (ref 46–116)
ALT SERPL W P-5'-P-CCNC: 46 U/L (ref 12–78)
ANION GAP SERPL CALCULATED.3IONS-SCNC: 10 MMOL/L (ref 4–13)
AST SERPL W P-5'-P-CCNC: 30 U/L (ref 5–45)
BILIRUB SERPL-MCNC: 0.47 MG/DL (ref 0.2–1)
BUN SERPL-MCNC: 9 MG/DL (ref 5–25)
CALCIUM SERPL-MCNC: 9.1 MG/DL (ref 8.3–10.1)
CHLORIDE SERPL-SCNC: 102 MMOL/L (ref 100–108)
CHOLEST SERPL-MCNC: 165 MG/DL
CO2 SERPL-SCNC: 30 MMOL/L (ref 21–32)
CREAT SERPL-MCNC: 0.72 MG/DL (ref 0.6–1.3)
EST. AVERAGE GLUCOSE BLD GHB EST-MCNC: 137 MG/DL
GFR SERPL CREATININE-BSD FRML MDRD: 81 ML/MIN/1.73SQ M
GLUCOSE P FAST SERPL-MCNC: 119 MG/DL (ref 65–99)
HBA1C MFR BLD: 6.4 %
HDLC SERPL-MCNC: 42 MG/DL
LDLC SERPL CALC-MCNC: 93 MG/DL (ref 0–100)
POTASSIUM SERPL-SCNC: 4.2 MMOL/L (ref 3.5–5.3)
PROT SERPL-MCNC: 7.2 G/DL (ref 6.4–8.2)
SODIUM SERPL-SCNC: 142 MMOL/L (ref 136–145)
TRIGL SERPL-MCNC: 150 MG/DL

## 2022-04-22 PROCEDURE — 36415 COLL VENOUS BLD VENIPUNCTURE: CPT

## 2022-04-22 PROCEDURE — 80061 LIPID PANEL: CPT

## 2022-04-22 PROCEDURE — 80053 COMPREHEN METABOLIC PANEL: CPT

## 2022-04-22 PROCEDURE — 83036 HEMOGLOBIN GLYCOSYLATED A1C: CPT

## 2022-04-26 ENCOUNTER — OFFICE VISIT (OUTPATIENT)
Dept: OBGYN CLINIC | Facility: CLINIC | Age: 77
End: 2022-04-26
Payer: COMMERCIAL

## 2022-04-26 VITALS
SYSTOLIC BLOOD PRESSURE: 130 MMHG | DIASTOLIC BLOOD PRESSURE: 76 MMHG | BODY MASS INDEX: 30.23 KG/M2 | WEIGHT: 154 LBS | HEIGHT: 60 IN

## 2022-04-26 DIAGNOSIS — F11.20 CONTINUOUS OPIOID DEPENDENCE (HCC): ICD-10-CM

## 2022-04-26 DIAGNOSIS — N83.201 CYST OF RIGHT OVARY: Primary | ICD-10-CM

## 2022-04-26 PROCEDURE — 99213 OFFICE O/P EST LOW 20 MIN: CPT | Performed by: OBSTETRICS & GYNECOLOGY

## 2022-04-26 PROCEDURE — 1036F TOBACCO NON-USER: CPT | Performed by: OBSTETRICS & GYNECOLOGY

## 2022-04-26 PROCEDURE — 1160F RVW MEDS BY RX/DR IN RCRD: CPT | Performed by: OBSTETRICS & GYNECOLOGY

## 2022-04-26 NOTE — PROGRESS NOTES
The patient is here to discuss pelvic U/S from 3/23/22  No pelvic pain or bleeding  The patient has no new concerns

## 2022-04-26 NOTE — PROGRESS NOTES
Patient returns to the office discussed ultrasound report  Patient had an ultrasound showing enlargement of the right ovarian simple cyst to 4 8 cm  It was 2 9 cm in 2016  It looks simple in nature  No suspicion of any carcinoma  Patient denies any pelvic pain or vaginal bleeding  Simple ovarian cyst were explained  Recommended following up on ultrasound in 6 months however she develops pain prior to that we will check the ultrasound earlier  Laparoscopic surgery was also discussed  Impression:  Enlarging right ovarian cyst   No pelvic pain  Plan:  Check pelvic ultrasound in 6 months    Return to office for yearly exam   Patient understands if she develops pelvic pain will check the ultrasound sooner

## 2022-04-27 ENCOUNTER — OFFICE VISIT (OUTPATIENT)
Dept: INTERNAL MEDICINE CLINIC | Facility: CLINIC | Age: 77
End: 2022-04-27
Payer: COMMERCIAL

## 2022-04-27 VITALS
WEIGHT: 154.6 LBS | BODY MASS INDEX: 30.35 KG/M2 | HEIGHT: 60 IN | DIASTOLIC BLOOD PRESSURE: 90 MMHG | SYSTOLIC BLOOD PRESSURE: 130 MMHG | HEART RATE: 84 BPM | OXYGEN SATURATION: 97 %

## 2022-04-27 DIAGNOSIS — M75.82 TENDINITIS OF BOTH ROTATOR CUFFS: ICD-10-CM

## 2022-04-27 DIAGNOSIS — E11.9 TYPE 2 DIABETES MELLITUS WITHOUT COMPLICATION, WITH LONG-TERM CURRENT USE OF INSULIN (HCC): ICD-10-CM

## 2022-04-27 DIAGNOSIS — E78.5 HYPERLIPIDEMIA, UNSPECIFIED HYPERLIPIDEMIA TYPE: ICD-10-CM

## 2022-04-27 DIAGNOSIS — Z11.59 NEED FOR HEPATITIS C SCREENING TEST: ICD-10-CM

## 2022-04-27 DIAGNOSIS — M75.80 ROTATOR CUFF TENDINITIS, UNSPECIFIED LATERALITY: ICD-10-CM

## 2022-04-27 DIAGNOSIS — Z13.820 SCREENING FOR OSTEOPOROSIS: ICD-10-CM

## 2022-04-27 DIAGNOSIS — I10 ESSENTIAL HYPERTENSION: ICD-10-CM

## 2022-04-27 DIAGNOSIS — M75.81 TENDINITIS OF BOTH ROTATOR CUFFS: ICD-10-CM

## 2022-04-27 DIAGNOSIS — Z00.00 MEDICARE ANNUAL WELLNESS VISIT, SUBSEQUENT: Primary | ICD-10-CM

## 2022-04-27 DIAGNOSIS — Z79.4 TYPE 2 DIABETES MELLITUS WITHOUT COMPLICATION, WITH LONG-TERM CURRENT USE OF INSULIN (HCC): ICD-10-CM

## 2022-04-27 PROCEDURE — 99213 OFFICE O/P EST LOW 20 MIN: CPT | Performed by: INTERNAL MEDICINE

## 2022-04-27 PROCEDURE — G0439 PPPS, SUBSEQ VISIT: HCPCS | Performed by: INTERNAL MEDICINE

## 2022-04-27 RX ORDER — SIMVASTATIN 20 MG
20 TABLET ORAL DAILY
Qty: 90 TABLET | Refills: 3 | Status: SHIPPED | OUTPATIENT
Start: 2022-04-27

## 2022-04-27 RX ORDER — LOSARTAN POTASSIUM 100 MG/1
100 TABLET ORAL DAILY
Qty: 90 TABLET | Refills: 1 | Status: SHIPPED | OUTPATIENT
Start: 2022-04-27

## 2022-04-27 RX ORDER — HYDROCHLOROTHIAZIDE 12.5 MG/1
12.5 TABLET ORAL DAILY
Qty: 90 TABLET | Refills: 3 | Status: SHIPPED | OUTPATIENT
Start: 2022-04-27

## 2022-04-27 NOTE — PROGRESS NOTES
Assessment and Plan:     Problem List Items Addressed This Visit        Endocrine    Type 2 diabetes mellitus without complication, with long-term current use of insulin ([address] )    Relevant Orders    Comprehensive metabolic panel    Hemoglobin A1C    Lipid Panel with Direct LDL reflex    Microalbumin / creatinine urine ratio       Cardiovascular and Mediastinum    Essential hypertension    Relevant Medications    losartan (COZAAR) 100 MG tablet    hydrochlorothiazide (HYDRODIURIL) 12 5 mg tablet       Other    Medicare annual wellness visit, subsequent - Primary     Assessment and plan 1  Medicare subsequent annual wellness examination overall the patient is clinically stable and doing well, we encouraged the patient to follow a healthy and balanced diet  We recommend that the patient exercise routinely approximately 30 minutes 5 times per week   We have reviewed the patient's vaccines and have made recommendations for updates if necessary        We will be ordering screening laboratories which are age appropriate  Return to the office in  6 months    call if any problems  Hyperlipidemia    Relevant Medications    simvastatin (ZOCOR) 20 mg tablet      Other Visit Diagnoses     Need for hepatitis C screening test        Screening for osteoporosis        Relevant Orders    DXA bone density spine hip and pelvis    Tendinitis of both rotator cuffs        Relevant Medications    Diclofenac Sodium (VOLTAREN) 1 %        BMI Counseling: Body mass index is 30 19 kg/m²  The BMI is above normal  Nutrition recommendations include decreasing portion sizes and moderation in carbohydrate intake  Exercise recommendations include exercising 3-5 times per week  Rationale for BMI follow-up plan is due to patient being overweight or obese  Depression Screening and Follow-up Plan: Patient was screened for depression during today's encounter   They screened negative with a PHQ-2 score of 1     bp at home 120-130/70-80  Preventive health issues were discussed with patient, and age appropriate screening tests were ordered as noted in patient's After Visit Summary  Personalized health advice and appropriate referrals for health education or preventive services given if needed, as noted in patient's After Visit Summary       History of Present Illness:     Patient presents for Medicare Annual Wellness visit    Patient Care Team:  Eleni Badillo DO as PCP - MD Eleni Brown DO Nathanial Bear, MD Sharyne Ban, MD     Problem List:     Patient Active Problem List   Diagnosis    Cough    Bronchitis    Hypertension    Dyslipidemia    Type 2 diabetes mellitus without complication, without long-term current use of insulin ([address] )    Lumbar radiculopathy    Encounter for screening mammogram for breast cancer    Intervertebral disc disorders with radiculopathy, lumbar region    Malignant neoplasm of urinary bladder ([address] )    Dysuria    Medicare annual wellness visit, subsequent    Gastroesophageal reflux disease without esophagitis    Hyperlipidemia    Type 2 diabetes mellitus without complication, with long-term current use of insulin (HCC)    Chronic pain of left knee    Chronic pain of right knee    Primary osteoarthritis of right knee    Primary osteoarthritis of left knee    Need for vaccination    Essential hypertension    Acute right-sided low back pain with right-sided sciatica    Colon polyps    Fall    Cortical age-related cataract of both eyes    Colon cancer screening    Sacroiliitis ([address] )    Left inguinal hernia    Seasonal allergies    Postoperative visit    Continuous opioid dependence ([address] )      Past Medical and Surgical History:     Past Medical History:   Diagnosis Date    Arthritis     Back pain     sciatic pain right hip and down right leg    Cancer ([address] )     bladder cancer ; had surgical scraping    GERD (gastroesophageal reflux disease)     Hyperlipidemia     Hypertension     Seasonal allergies     Wears partial dentures     1 small tooth removed and placed in cup      Past Surgical History:   Procedure Laterality Date    APPENDECTOMY      COLONOSCOPY      CYSTOSCOPY  11/29/2021    Robin    EYE SURGERY      HERNIA REPAIR      IN CYSTOURETHROSCOPY,FULGUR <0 5 CM LESN N/A 1/15/2020    Procedure: Cystoscopy TRANSURETHRAL RESECTION OF BLADDER TUMOR (TURBT);   Surgeon: Diana Faust MD;  Location: [facility];  Service: Urology    IN INSTILL ANTICANCER AGENT IN BLADDER N/A 1/15/2020    Procedure: INSTILLATION MITOMYCIN;  Surgeon: Diana Faust MD;  Location: [facility];  Service: Urology    IN REPAIR [address] HERNIA,5+Y/O,REDUCIBL Left 2/3/2022    Procedure: INGUINAL HERNIA REPAIR;  Surgeon: Ezra Valencia MD;  [facility];  Service: General    UPPER GASTROINTESTINAL ENDOSCOPY      VEIN LIGATION AND STRIPPING        Family History:     Family History   Problem Relation Age of Onset    Heart disease Mother     Hypertension Mother       Social History:     Social History     Socioeconomic History    Marital status: /Civil Union     Spouse name: None    Number of children: None    Years of education: None    Highest education level: None   Occupational History    None   Tobacco Use    Smoking status: Never Smoker    Smokeless tobacco: Never Used   Vaping Use    Vaping Use: Never used   Substance and Sexual Activity    Alcohol use: No    Drug use: No    Sexual activity: Not Currently   Other Topics Concern    None   Social History Narrative    None     Social Determinants of Health     Financial Resource Strain: Not on file   Food Insecurity: Not on file   Transportation Needs: Not on file   Physical Activity: Not on file   Stress: Not on file   Social Connections: Not on file   Intimate Partner Violence: Not on file   Housing Stability: Not on file      Medications and Allergies: Current Outpatient Medications   Medication Sig Dispense Refill    amoxicillin (AMOXIL) 500 mg capsule TAKE ONE CAPSULE EVERY EIGHT HOURS      calcium-vitamin D 250-100 MG-UNIT per tablet Take 1 tablet by mouth 2 (two) times a day      Cholecalciferol (VITAMIN D3) 1000 units CAPS Take 1 capsule by mouth daily      cyanocobalamin (VITAMIN B-12) 1000 MCG tablet Take 1,000 mcg by mouth daily      famotidine (PEPCID) 40 MG tablet TAKE ONE TABLET EVERY DAY  (Patient taking differently: Take 20 mg by mouth in the morning  ) 90 tablet 0    hydrochlorothiazide (HYDRODIURIL) 12 5 mg tablet Take 1 tablet (12 5 mg total) by mouth daily 90 tablet 3    losartan (COZAAR) 100 MG tablet Take 1 tablet (100 mg total) by mouth daily 90 tablet 1    multivitamin-minerals (CENTRUM ADULTS) tablet Take 1 tablet by mouth daily      oxyCODONE-acetaminophen (PERCOCET) 5-325 mg per tablet Take 1 tablet by mouth every 4 (four) hours as needed for moderate pain for up to 10 doses Max Daily Amount: 6 tablets 10 tablet 0    simvastatin (ZOCOR) 20 mg tablet Take 1 tablet (20 mg total) by mouth daily 90 tablet 3    Besivance 0 6 % SUSP  (Patient not taking: Reported on 4/26/2022 )      cyclobenzaprine (FLEXERIL) 5 mg tablet Take 1 tablet (5 mg total) by mouth 3 (three) times a day as needed for muscle spasms First try at bedtime to see how you feel the next morning   (Patient not taking: Reported on 4/27/2022 ) 30 tablet 0    Diclofenac Sodium (VOLTAREN) 1 % Apply 2 g topically 4 (four) times a day 1 g 2    Inveltys 1 % SUSP   (Patient not taking: Reported on 12/16/2021 )      omeprazole (PriLOSEC) 20 mg delayed release capsule Take 1 capsule (20 mg total) by mouth daily (Patient not taking: Reported on 4/27/2022 ) 30 capsule 3    omeprazole (PriLOSEC) 20 mg delayed release capsule Take 1 capsule (20 mg total) by mouth daily (Patient not taking: Reported on 4/27/2022 ) 90 capsule 1    Prolensa 0 07 % SOLN Administer 1 drop to both eyes daily (Patient not taking: Reported on 4/26/2022 ) 3 mL 1     No current facility-administered medications for this visit  No Known Allergies   Immunizations:     Immunization History   Administered Date(s) Administered    COVID-19 MODERNA VACC 0 5 ML IM 01/20/2021, 03/01/2021    Influenza Split High Dose Preservative Free IM 10/04/2014, 10/03/2015, 10/08/2016, 10/16/2017    Influenza, high dose seasonal 0 7 mL 10/13/2018, 10/12/2019, 09/24/2020, 10/21/2021    Influenza, seasonal, injectable 10/20/2012, 10/30/2013    Pneumococcal Conjugate 13-Valent 08/13/2015    Pneumococcal Polysaccharide PPV23 01/28/2010    TD (adult) Preservative Free 01/28/2007    Zoster 07/29/2017    Zoster Vaccine Recombinant 07/29/2017, 08/02/2017      Health Maintenance:         Topic Date Due    Hepatitis C Screening  Never done    Colorectal Cancer Screening  09/12/2022    Breast Cancer Screening: Mammogram  01/04/2023         Topic Date Due    COVID-19 Vaccine (3 - Booster for Moderna series) 08/01/2021      Medicare Health Risk Assessment:     /90   Pulse 84   Ht 5' (1 524 m)   Wt 70 1 kg (154 lb 9 6 oz)   LMP  (LMP Unknown)   SpO2 97%   BMI 30 19 kg/m²      Aziza Cerna is here for her Subsequent Wellness visit  Health Risk Assessment:   Patient rates overall health as good  Patient feels that their physical health rating is same  Patient is satisfied with their life  Eyesight was rated as same  Hearing was rated as same  Patient feels that their emotional and mental health rating is same  Patients states they are sometimes angry  Patient states they are always unusually tired/fatigued  Pain experienced in the last 7 days has been a lot  Patient's pain rating has been 7/10  Patient states that she has experienced no weight loss or gain in last 6 months  Depression Screening:   PHQ-2 Score: 1      Fall Risk Screening:    In the past year, patient has experienced: history of falling in past year Number of falls: 1  Injured during fall?: Yes    Feels unsteady when standing or walking?: No    Worried about falling?: Yes      Urinary Incontinence Screening:   Patient has not leaked urine accidently in the last six months  Home Safety:  Patient does not have trouble with stairs inside or outside of their home  Patient has working smoke alarms and has working carbon monoxide detector  Home safety hazards include: loose rugs on the floor and not having non-slip bath and/or shower mats  Nutrition:   Current diet is Regular  Medications:   Patient is currently taking over-the-counter supplements  OTC medications include: see medication list  Patient is able to manage medications  Activities of Daily Living (ADLs)/Instrumental Activities of Daily Living (IADLs):   Walk and transfer into and out of bed and chair?: Yes  Dress and groom yourself?: Yes    Bathe or shower yourself?: Yes    Feed yourself?  Yes  Do your laundry/housekeeping?: Yes  Manage your money, pay your bills and track your expenses?: Yes  Make your own meals?: Yes    Do your own shopping?: Yes    Previous Hospitalizations:   Any hospitalizations or ED visits within the last 12 months?: Yes    How many hospitalizations have you had in the last year?: 1-2    Advance Care Planning:   Living will: No    Durable POA for healthcare: No    Advanced directive: No    Five wishes given: No    Patient declined ACP directive: Yes      PREVENTIVE SCREENINGS      Cardiovascular Screening:    General: Screening Not Indicated and History Lipid Disorder      Diabetes Screening:     General: Screening Not Indicated and History Diabetes      Colorectal Cancer Screening:     General: Screening Current      Breast Cancer Screening:     General: Screening Current      Cervical Cancer Screening:    General: Screening Not Indicated      Lung Cancer Screening:     General: Screening Not Indicated    Screening, Brief Intervention, and Referral to Treatment (SBIRT)    Screening  Typical number of drinks in a day: 0  Typical number of drinks in a week: 0  Interpretation: Low risk drinking behavior      Single Item Drug Screening:  How often have you used an illegal drug (including marijuana) or a prescription medication for non-medical reasons in the past year? never    Single Item Drug Screen Score: 0  Interpretation: Negative screen for possible drug use disorder    Review of Current Opioid Use    Opioid Risk Tool (ORT) Interpretation: Complete Opioid Risk Tool (ORT)      Blas Mott, DO

## 2022-04-27 NOTE — PATIENT INSTRUCTIONS
Medicare Preventive Visit Patient Instructions  Thank you for completing your Welcome to Medicare Visit or Medicare Annual Wellness Visit today  Your next wellness visit will be due in one year (4/28/2023)  The screening/preventive services that you may require over the next 5-10 years are detailed below  Some tests may not apply to you based off risk factors and/or age  Screening tests ordered at today's visit but not completed yet may show as past due  Also, please note that scanned in results may not display below  Preventive Screenings:  Service Recommendations Previous Testing/Comments   Colorectal Cancer Screening  * Colonoscopy    * Fecal Occult Blood Test (FOBT)/Fecal Immunochemical Test (FIT)  * Fecal DNA/Cologuard Test  * Flexible Sigmoidoscopy Age: 54-65 years old   Colonoscopy: every 10 years (may be performed more frequently if at higher risk)  OR  FOBT/FIT: every 1 year  OR  Cologuard: every 3 years  OR  Sigmoidoscopy: every 5 years  Screening may be recommended earlier than age 48 if at higher risk for colorectal cancer  Also, an individualized decision between you and your healthcare provider will decide whether screening between the ages of 74-80 would be appropriate  Colonoscopy: 09/12/2017  FOBT/FIT: Not on file  Cologuard: Not on file  Sigmoidoscopy: Not on file          Breast Cancer Screening Age: 36 years old  Frequency: every 1-2 years  Not required if history of left and right mastectomy Mammogram: 01/04/2022        Cervical Cancer Screening Between the ages of 21-29, pap smear recommended once every 3 years  Between the ages of 33-67, can perform pap smear with HPV co-testing every 5 years     Recommendations may differ for women with a history of total hysterectomy, cervical cancer, or abnormal pap smears in past  Pap Smear: 09/21/2017        Hepatitis C Screening Once for adults born between 1945 and 1965  More frequently in patients at high risk for Hepatitis C Hep C Antibody: Not on file        Diabetes Screening 1-2 times per year if you're at risk for diabetes or have pre-diabetes Fasting glucose: 119 mg/dL   A1C: 6 4 %        Cholesterol Screening Once every 5 years if you don't have a lipid disorder  May order more often based on risk factors  Lipid panel: 04/22/2022          Other Preventive Screenings Covered by Medicare:  1  Abdominal Aortic Aneurysm (AAA) Screening: covered once if your at risk  You're considered to be at risk if you have a family history of AAA  2  Lung Cancer Screening: covers low dose CT scan once per year if you meet all of the following conditions: (1) Age 50-69; (2) No signs or symptoms of lung cancer; (3) Current smoker or have quit smoking within the last 15 years; (4) You have a tobacco smoking history of at least 30 pack years (packs per day multiplied by number of years you smoked); (5) You get a written order from a healthcare provider  3  Glaucoma Screening: covered annually if you're considered high risk: (1) You have diabetes OR (2) Family history of glaucoma OR (3)  aged 48 and older OR (3)  American aged 72 and older  3  Osteoporosis Screening: covered every 2 years if you meet one of the following conditions: (1) You're estrogen deficient and at risk for osteoporosis based off medical history and other findings; (2) Have a vertebral abnormality; (3) On glucocorticoid therapy for more than 3 months; (4) Have primary hyperparathyroidism; (5) On osteoporosis medications and need to assess response to drug therapy  · Last bone density test (DXA Scan): Not on file  5  HIV Screening: covered annually if you're between the age of 12-76  Also covered annually if you are younger than 13 and older than 72 with risk factors for HIV infection  For pregnant patients, it is covered up to 3 times per pregnancy      Immunizations:  Immunization Recommendations   Influenza Vaccine Annual influenza vaccination during flu season is recommended for all persons aged >= 6 months who do not have contraindications   Pneumococcal Vaccine (Prevnar and Pneumovax)  * Prevnar = PCV13  * Pneumovax = PPSV23   Adults 25-60 years old: 1-3 doses may be recommended based on certain risk factors  Adults 72 years old: Prevnar (PCV13) vaccine recommended followed by Pneumovax (PPSV23) vaccine  If already received PPSV23 since turning 65, then PCV13 recommended at least one year after PPSV23 dose  Hepatitis B Vaccine 3 dose series if at intermediate or high risk (ex: diabetes, end stage renal disease, liver disease)   Tetanus (Td) Vaccine - COST NOT COVERED BY MEDICARE PART B Following completion of primary series, a booster dose should be given every 10 years to maintain immunity against tetanus  Td may also be given as tetanus wound prophylaxis  Tdap Vaccine - COST NOT COVERED BY MEDICARE PART B Recommended at least once for all adults  For pregnant patients, recommended with each pregnancy  Shingles Vaccine (Shingrix) - COST NOT COVERED BY MEDICARE PART B  2 shot series recommended in those aged 48 and above     Health Maintenance Due:      Topic Date Due    Hepatitis C Screening  Never done    Colorectal Cancer Screening  09/12/2022    Breast Cancer Screening: Mammogram  01/04/2023     Immunizations Due:      Topic Date Due    COVID-19 Vaccine (3 - Booster for [place] series) 08/01/2021     Advance Directives   What are advance directives? Advance directives are legal documents that state your wishes and plans for medical care  These plans are made ahead of time in case you lose your ability to make decisions for yourself  Advance directives can apply to any medical decision, such as the treatments you want, and if you want to donate organs  What are the types of advance directives? There are many types of advance directives, and each state has rules about how to use them   You may choose a combination of any of the following:  · Living will: This is a written record of the treatment you want  You can also choose which treatments you do not want, which to limit, and which to stop at a certain time  This includes surgery, medicine, IV fluid, and tube feedings  · Durable power of  for healthcare [facility]): This is a written record that states who you want to make healthcare choices for you when you are unable to make them for yourself  This person, called a proxy, is usually a family member or a friend  You may choose more than 1 proxy  · Do not resuscitate (DNR) order:  A DNR order is used in case your heart stops beating or you stop breathing  It is a request not to have certain forms of treatment, such as CPR  A DNR order may be included in other types of advance directives  · Medical directive: This covers the care that you want if you are in a coma, near death, or unable to make decisions for yourself  You can list the treatments you want for each condition  Treatment may include pain medicine, surgery, blood transfusions, dialysis, IV or tube feedings, and a ventilator (breathing machine)  · Values history: This document has questions about your views, beliefs, and how you feel and think about life  This information can help others choose the care that you would choose  Why are advance directives important? An advance directive helps you control your care  Although spoken wishes may be used, it is better to have your wishes written down  Spoken wishes can be misunderstood, or not followed  Treatments may be given even if you do not want them  An advance directive may make it easier for your family to make difficult choices about your care  Weight Management   Why it is important to manage your weight:  Being overweight increases your risk of health conditions such as heart disease, high blood pressure, type 2 diabetes, and certain types of cancer   It can also increase your risk for osteoarthritis, sleep apnea, and other respiratory problems  Aim for a slow, steady weight loss  Even a small amount of weight loss can lower your risk of health problems  How to lose weight safely:  A safe and healthy way to lose weight is to eat fewer calories and get regular exercise  You can lose up about 1 pound a week by decreasing the number of calories you eat by 500 calories each day  Healthy meal plan for weight management:  A healthy meal plan includes a variety of foods, contains fewer calories, and helps you stay healthy  A healthy meal plan includes the following:  · Eat whole-grain foods more often  A healthy meal plan should contain fiber  Fiber is the part of grains, fruits, and vegetables that is not broken down by your body  Whole-grain foods are healthy and provide extra fiber in your diet  Some examples of whole-grain foods are whole-wheat breads and pastas, oatmeal, brown rice, and bulgur  · Eat a variety of vegetables every day  Include dark, leafy greens such as spinach, kale, amara greens, and mustard greens  Eat yellow and orange vegetables such as carrots, sweet potatoes, and winter squash  · Eat a variety of fruits every day  Choose fresh or canned fruit (canned in its own juice or light syrup) instead of juice  Fruit juice has very little or no fiber  · Eat low-fat dairy foods  Drink fat-free (skim) milk or 1% milk  Eat fat-free yogurt and low-fat cottage cheese  Try low-fat cheeses such as mozzarella and other reduced-fat cheeses  · Choose meat and other protein foods that are low in fat  Choose beans or other legumes such as split peas or lentils  Choose fish, skinless poultry (chicken or turkey), or lean cuts of red meat (beef or pork)  Before you cook meat or poultry, cut off any visible fat  · Use less fat and oil  Try baking foods instead of frying them  Add less fat, such as margarine, sour cream, regular salad dressing and mayonnaise to foods  Eat fewer high-fat foods   Some examples of high-fat foods include french fries, doughnuts, ice cream, and cakes  · Eat fewer sweets  Limit foods and drinks that are high in sugar  This includes candy, cookies, regular soda, and sweetened drinks  Exercise:  Exercise at least 30 minutes per day on most days of the week  Some examples of exercise include walking, biking, dancing, and swimming  You can also fit in more physical activity by taking the stairs instead of the elevator or parking farther away from stores  Ask your healthcare provider about the best exercise plan for you  © Copyright [employer] 2018 Information is for End User's use only and may not be sold, redistributed or otherwise used for commercial purposes  All illustrations and images included in CareNotes® are the copyrighted property of [employer]  or [address] Cuff Injury Exercises   WHAT YOU NEED TO KNOW:   What do I need to know about rotator cuff injury exercises? Exercises help improve shoulder movement and strength, and decrease pain  Your physical therapist or healthcare provider will tell you when to start doing the exercises  He or she will tell you how often to do them  You will need to start slowly to prevent more injury  You will move through several levels over time as you get stronger and more flexible  What safety guidelines do I need to follow? · Always warm up before you do the exercises  Walk or ride a stationary bike for 5 to 10 minutes to help you warm up  · Do not put your arm over your head until directed  You will need to wait until your injury has healed  The movement of some exercises could continue until your arm is over your head  You will need to stop the movement where directed  · Stop if you feel pain  You may feel some tight or stiff areas when you start  This should get better as you continue the exercises  You should not feel pain  Pain means you are not ready to do the exercise yet   Stop and call your physical therapist or healthcare provider right away  · Always work both rotator cuffs  Even if your injury is only on 1 side, it is important to do each exercise on both sides  This helps prevent injury and maintain balance in your shoulders and back  · Posture is important  Your physical therapist or healthcare provider will show you the proper posture for each exercise  You will be shown how to pull your shoulders back and down to engage the correct muscles  Remember not to let your shoulders shrug during an exercise unless it is part of the movement  How should I do stretching exercises? You will not feel every exercise in your shoulder area  You may feel some of the stretches in your back, side, or upper arm muscles  You need to work muscles in and around your rotator cuffs and down your arms  This helps stabilize your shoulders  Your physical therapist or healthcare provider will tell you how long to hold each stretch  He or she will also tell you how many times to repeat each stretch during an exercise session  You may be told to do only certain exercises, or to do them in a specific order  The following are general directions to help you remember the exercises you are taught:  · Pendulum swings:  Lean forward and rest your arm on a table  Do not round your back or lock your knees during the exercise  Let your other arm hang freely by your side  Gently swing your free arm forward and backward, side to side, and in circles  · Crossover arm stretch:  Relax your shoulders  Hold your upper arm with the opposite hand  Pull your arm across your chest until you feel a stretch  Hold the stretch  Return to the starting position  · Sleeper stretch:  Lie on your side on a firm, flat surface  Bend the elbow of your top arm 90°  Use your other hand to slowly push your arm down  Stop when you feel a stretch at the back of your shoulder  Hold the stretch  Slowly return to the starting position           · Shoulder movement, up and down: This exercise may also be called shoulder extension  Sit in a chair that has a back but no armrests  Raise your arm like you are reaching for the wall in front of you  Continue to raise the arm until it is over your head, or as high as directed  Bring your arm back down to your side  Bring it back as far as possible behind your body  Return your arm to the starting position  · Shoulder movement, side to side: These movements may be called flexion, internal rotation, and external rotation  Sit in a chair that has a back but no armrests  Raise your arm to the side and then up over your head as far as directed  Return your arm to your side  Bring your arm across the front of your body and reach for the opposite shoulder  Return your arm to the starting position  · Shoulder rolls:  Stand and raise both shoulders toward your ears  Lower them to the starting position  Relax your shoulders  Pull your shoulders back  Then relax them again  Roll your shoulders in a smooth Mcgrath  Then roll your shoulders in a smooth [place] in the other direction  · Wall reach exercise: This may also be called a flexion stretch  Stand facing a wall  Slowly walk your fingers up the wall until you feel a stretch  Hold the stretch  Return to the starting position  · Arm reach exercise:  Lie on your back with your legs straight  Reach your arms like you are trying to touch the ceiling  Reach as high as you can so you feel a stretch in the back of your arms  Hold the stretch  Then lower your arms to your sides  · Elbow bends:  Stand with your arms down to your sides  Keep your palm facing forward  Bend your elbow and try to touch your shoulder with your fingertips  Return your arm to the starting position  · Up the back stretch:  Stand and put both arms behind your back  Put one hand under the other  Move the bottom hand and slowly push the upper hand up toward your head  You should feel a stretch in the front of your arm and shoulder  Be careful not to push too hard  Hold the stretch  Then return to the starting position  · Triceps stretch:  Stand and drop your forearm down your back so your hand is pointing to the ground behind you  Your elbow should be pointing at the ceiling  Take your other hand and place it on your elbow  Gently and slowly push on the elbow until you feel a stretch in the back of your arm  Hold the stretch  Let go of your elbow and relax your arm  You may be shown how to do this stretch with a towel  The towel can be pulled gently with a hand behind your back at waist level  How should I do strengthening exercises? Strengthening exercises may include handheld weights or resistance bands  Your physical therapist or healthcare provider will tell you how much weight or resistance to use  The general guide is to use light weights or low resistance and to do a high number of repetitions  You may be told to do only certain exercises, or to do them in a specific order  The following are general directions to help you remember the exercises you are taught:  · Scapular squeeze:  Stand with your arms at your sides  Squeeze your shoulder blades together and hold this position  Then relax the muscles  Keep your shoulder back during the entire exercise  · Wall pushups: This exercise is similar to a pushup done on the ground  The goal is to use your back and shoulder muscles to bring your upper body toward and away from the wall  Stand facing a wall  Put your hands on the wall  Bend your elbows to bring your upper body toward the wall  Straighten your arms to return to the starting position  Keep your feet close enough to the wall that you do not take a step when you bend your elbows  · Standing row with exercise band:  Wrap the exercise band around a heavy, stable object at waist level   Make loop in the end of the band to create a handle, if needed  Hold the handle or loop and pull the band straight back toward your hip  Keep your shoulder down  Squeeze your shoulder blade  Hold this position  Then slowly return to the starting position  · External rotation with arm abducted 90 degrees:  Wrap the exercise band around a heavy, stable object at waist level  Make loop in the end of the band to create a handle, if needed  Stand and hold the handle or loop  Bend your elbow and raise your arm to shoulder height  Keep your arm in this position  Raise your hand like you are pointing at the ceiling  Slowly return to the starting position  You may also be shown how to do this exercise lying down and with a weight  · Shoulder abduction with weight:  Stand and hold a weight in your hand with your palm facing your body  Slowly raise your arm to the side with your thumb pointing up  Then raise your arm as far as you can without pain  Hold this position  Then return to the starting position  · Shoulder abduction with exercise band:  Wrap the exercise band around a heavy, stable object near your foot  Grab the band  Keep your arm straight  Slowly raise your arm to the side with your thumb pointing up  Then, slowly pull the band as far as you can without pain  Slowly return to the starting position  · Shoulder adduction with weight:  Lie on your back on a firm surface  Hold a weight in your hand at your shoulder  Slowly raise your arm toward the ceiling and straighten your elbow  Hold this position  Then slowly return to the starting position  · Shoulder adduction with exercise band:  Wrap the exercise band around a heavy, stable object  Stand and face away from where the band is anchored  Hold each end of the band in both hands with your elbows bent  Your elbows should not be behind your body  Keep your arms parallel to the floor and slowly straighten your elbows  Hold this position  Slowly return to the starting position  When should I call my doctor or physical therapist?   · You have sharp or worsening pain during exercise or at rest     · You have questions or concerns about your rotator cuff injury exercises  CARE AGREEMENT:   You have the right to help plan your care  Learn about your health condition and how it may be treated  Discuss treatment options with your healthcare providers to decide what care you want to receive  You always have the right to refuse treatment  The above information is an  only  It is not intended as medical advice for individual conditions or treatments  Talk to your doctor, nurse or pharmacist before following any medical regimen to see if it is safe and effective for you  © Copyright [employer] 2022 Information is for End User's use only and may not be sold, redistributed or otherwise used for commercial purposes   All illustrations and images included in CareNotes® are the copyrighted property of [employer]  or [address] [place] [address]

## 2022-05-01 PROBLEM — M75.80 ROTATOR CUFF TENDINITIS: Status: ACTIVE | Noted: 2022-05-01

## 2022-05-01 NOTE — ASSESSMENT & PLAN NOTE
Assessment and plan 1  Medicare subsequent annual wellness examination overall the patient is clinically stable and doing well, we encouraged the patient to follow a healthy and balanced diet  We recommend that the patient exercise routinely approximately 30 minutes 5 times per week   We have reviewed the patient's vaccines and have made recommendations for updates if necessary        We will be ordering screening laboratories which are age appropriate  Return to the office in  6 months    call if any problems

## 2022-05-01 NOTE — ASSESSMENT & PLAN NOTE
Mild rotator cuff tendinitis Rx for Voltaren gel q i d  p r n  range-of-motion exercises if symptoms not improve next 2-4 weeks please notify me for me formal consult for physical therapy

## 2022-05-01 NOTE — ASSESSMENT & PLAN NOTE
Lab Results   Component Value Date    HGBA1C 6 4 (H) 04/22/2022   I have counselled the pt to follow a healthy and balanced diet ,and recommend routine exercise  I will be ordering diabetic laboratories including comprehensive metabolic panel, hemoglobin A1c, urine microalbumin, lipid panel    Annual eye examination recommended

## 2022-05-01 NOTE — PROGRESS NOTES
Assessment/Plan:    Medicare annual wellness visit, subsequent  Assessment and plan 1  Medicare subsequent annual wellness examination overall the patient is clinically stable and doing well, we encouraged the patient to follow a healthy and balanced diet  We recommend that the patient exercise routinely approximately 30 minutes 5 times per week   We have reviewed the patient's vaccines and have made recommendations for updates if necessary        We will be ordering screening laboratories which are age appropriate  Return to the office in  6 months    call if any problems  Type 2 diabetes mellitus without complication, with long-term current use of insulin ([facility])    Lab Results   Component Value Date    HGBA1C 6 4 (H) 04/22/2022   I have counselled the pt to follow a healthy and balanced diet ,and recommend routine exercise  I will be ordering diabetic laboratories including comprehensive metabolic panel, hemoglobin A1c, urine microalbumin, lipid panel  Annual eye examination recommended    Essential hypertension  Hypertension -  I have counseled patient following healthy balance diet, I would like the patient reduce sodium, exercise routinely, I would like the patient continued the med current medical regiment and we will continue to monitor  For Gram weight loss and walking will continue monitor mildly elevated diastolic she does appear to be mildly nervous today    Hyperlipidemia  Hyperlipidemia controlled continue with current medical regiment recommend a low-cholesterol diet and recommend routine exercise we will continue to monitor the progress    Continue Zocor 20 mg once daily    Rotator cuff tendinitis  Mild rotator cuff tendinitis Rx for Voltaren gel q i d  p r n  range-of-motion exercises if symptoms not improve next 2-4 weeks please notify me for me formal consult for physical therapy         Problem List Items Addressed This Visit        Endocrine    Type 2 diabetes mellitus without complication, with long-term current use of insulin ([facility] [address] )       Lab Results   Component Value Date    HGBA1C 6 4 (H) 04/22/2022   I have counselled the pt to follow a healthy and balanced diet ,and recommend routine exercise  I will be ordering diabetic laboratories including comprehensive metabolic panel, hemoglobin A1c, urine microalbumin, lipid panel  Annual eye examination recommended         Relevant Orders    Comprehensive metabolic panel    Hemoglobin A1C    Lipid Panel with Direct LDL reflex    Microalbumin / creatinine urine ratio       Cardiovascular and Mediastinum    Essential hypertension     Hypertension -  I have counseled patient following healthy balance diet, I would like the patient reduce sodium, exercise routinely, I would like the patient continued the med current medical regiment and we will continue to monitor  For Gram weight loss and walking will continue monitor mildly elevated diastolic she does appear to be mildly nervous today         Relevant Medications    losartan (COZAAR) 100 MG tablet    hydrochlorothiazide (HYDRODIURIL) 12 5 mg tablet       Musculoskeletal and Integument    Rotator cuff tendinitis     Mild rotator cuff tendinitis Rx for Voltaren gel q i d  p r n  range-of-motion exercises if symptoms not improve next 2-4 weeks please notify me for me formal consult for physical therapy            Other    Medicare annual wellness visit, subsequent - Primary     Assessment and plan 1  Medicare subsequent annual wellness examination overall the patient is clinically stable and doing well, we encouraged the patient to follow a healthy and balanced diet  We recommend that the patient exercise routinely approximately 30 minutes 5 times per week   We have reviewed the patient's vaccines and have made recommendations for updates if necessary        We will be ordering screening laboratories which are age appropriate  Return to the office in  6 months    call if any problems  Hyperlipidemia     Hyperlipidemia controlled continue with current medical regiment recommend a low-cholesterol diet and recommend routine exercise we will continue to monitor the progress  Continue Zocor 20 mg once daily         Relevant Medications    simvastatin (ZOCOR) 20 mg tablet      Other Visit Diagnoses     Need for hepatitis C screening test        Screening for osteoporosis        Relevant Orders    DXA bone density spine hip and pelvis    Tendinitis of both rotator cuffs        Relevant Medications    Diclofenac Sodium (VOLTAREN) 1 %          RTO in 6 months call if any problems  Subjective:      Patient ID: Addie Cabrera is a 67 y o  female  HPI 69-year old female coming in for a follow up office visit regarding type 2 diabetes, hypertension, hyperlipidemia and rotator cuff tendinitis; The patient reports me compliant taking medications without untoward side effects the  The patient is here to review his medical condition, update me on the medical condition and the patient reports me no hospitalizations and no ER visits  No injuries no illnesses overall doing well here to review laboratories reports me bilateral shoulder pain with movement mild no injury    The following portions of the patient's history were reviewed and updated as appropriate: allergies, current medications, past family history, past medical history, past social history, past surgical history and problem list     Review of Systems   Constitutional: Negative for activity change, appetite change and unexpected weight change  Eyes: Negative for visual disturbance  Respiratory: Negative for cough and shortness of breath  Cardiovascular: Negative for chest pain  Gastrointestinal: Negative for abdominal pain, diarrhea, nausea and vomiting  Musculoskeletal:        Shoulder pain   Neurological: Negative for dizziness, light-headedness and headaches  Objective:    No follow-ups on file      No results found       No Known Allergies    Past Medical History:   Diagnosis Date    Arthritis     Back pain     sciatic pain right hip and down right leg    Cancer ([address] )     bladder cancer ; had surgical scraping    GERD (gastroesophageal reflux disease)     Hyperlipidemia     Hypertension     Seasonal allergies     Wears partial dentures     1 small tooth removed and placed in cup      Past Surgical History:   Procedure Laterality Date    APPENDECTOMY      COLONOSCOPY      CYSTOSCOPY  11/29/2021    Robin    EYE SURGERY      HERNIA REPAIR      MS CYSTOURETHROSCOPY,FULGUR <0 5 CM LESN N/A 1/15/2020    Procedure: Cystoscopy TRANSURETHRAL RESECTION OF BLADDER TUMOR (TURBT); Surgeon: Adela Merlos MD;  Location: [facility];  Service: Urology    MS INSTILL ANTICANCER AGENT IN BLADDER N/A 1/15/2020    Procedure: Pawan Chanel;  Surgeon: Adela Merlos MD;  Location: [facility];  Service: Urology    MS REPAIR ING HERNIA,5+Y/O,REDUCIBL Left 2/3/2022    Procedure: INGUINAL HERNIA REPAIR;  Surgeon: Arslan Toney MD;  [facility];  Service: General    UPPER GASTROINTESTINAL ENDOSCOPY      VEIN LIGATION AND STRIPPING       Current Outpatient Medications on File Prior to Visit   Medication Sig Dispense Refill    amoxicillin (AMOXIL) 500 mg capsule TAKE ONE CAPSULE EVERY EIGHT HOURS      calcium-vitamin D 250-100 MG-UNIT per tablet Take 1 tablet by mouth 2 (two) times a day      Cholecalciferol (VITAMIN D3) 1000 units CAPS Take 1 capsule by mouth daily      cyanocobalamin (VITAMIN B-12) 1000 MCG tablet Take 1,000 mcg by mouth daily      famotidine (PEPCID) 40 MG tablet TAKE ONE TABLET EVERY DAY   (Patient taking differently: Take 20 mg by mouth in the morning  ) 90 tablet 0    multivitamin-minerals (CENTRUM ADULTS) tablet Take 1 tablet by mouth daily      oxyCODONE-acetaminophen (PERCOCET) 5-325 mg per tablet Take 1 tablet by mouth every 4 (four) hours as needed for moderate pain for up to 10 doses Max Daily Amount: 6 tablets 10 tablet 0    Besivance 0 6 % SUSP  (Patient not taking: Reported on 4/26/2022 )      cyclobenzaprine (FLEXERIL) 5 mg tablet Take 1 tablet (5 mg total) by mouth 3 (three) times a day as needed for muscle spasms First try at bedtime to see how you feel the next morning  (Patient not taking: Reported on 4/27/2022 ) 30 tablet 0    Inveltys 1 % SUSP   (Patient not taking: Reported on 12/16/2021 )      omeprazole (PriLOSEC) 20 mg delayed release capsule Take 1 capsule (20 mg total) by mouth daily (Patient not taking: Reported on 4/27/2022 ) 30 capsule 3    omeprazole (PriLOSEC) 20 mg delayed release capsule Take 1 capsule (20 mg total) by mouth daily (Patient not taking: Reported on 4/27/2022 ) 90 capsule 1    Prolensa 0 07 % SOLN Administer 1 drop to both eyes daily (Patient not taking: Reported on 4/26/2022 ) 3 mL 1     No current facility-administered medications on file prior to visit       Family History   Problem Relation Age of Onset    Heart disease Mother     Hypertension Mother      Social History     Socioeconomic History    Marital status: /Civil Union     Spouse name: Not on file    Number of children: Not on file    Years of education: Not on file    Highest education level: Not on file   Occupational History    Not on file   Tobacco Use    Smoking status: Never Smoker    Smokeless tobacco: Never Used   Vaping Use    Vaping Use: Never used   Substance and Sexual Activity    Alcohol use: No    Drug use: No    Sexual activity: Not Currently   Other Topics Concern    Not on file   Social History Narrative    Not on file     Social Determinants of Health     Financial Resource Strain: Not on file   Food Insecurity: Not on file   Transportation Needs: Not on file   Physical Activity: Not on file   Stress: Not on file   Social Connections: Not on file   Intimate Partner Violence: Not on file   Housing Stability: Not on file Vitals:    04/27/22 1322   BP: 130/90   Pulse: 84   SpO2: 97%   Weight: 70 1 kg (154 lb 9 6 oz)   Height: 5' (1 524 m)     Results for orders placed or performed in visit on 04/22/22   Comprehensive metabolic panel   Result Value Ref Range    Sodium 142 136 - 145 mmol/L    Potassium 4 2 3 5 - 5 3 mmol/L    Chloride 102 100 - 108 mmol/L    CO2 30 21 - 32 mmol/L    ANION GAP 10 4 - 13 mmol/L    BUN 9 5 - 25 mg/dL    Creatinine 0 72 0 60 - 1 30 mg/dL    Glucose, Fasting 119 (H) 65 - 99 mg/dL    Calcium 9 1 8 3 - 10 1 mg/dL    AST 30 5 - 45 U/L    ALT 46 12 - 78 U/L    Alkaline Phosphatase 52 46 - 116 U/L    Total Protein 7 2 6 4 - 8 2 g/dL    Albumin 3 5 3 5 - 5 0 g/dL    Total Bilirubin 0 47 0 20 - 1 00 mg/dL    eGFR 81 ml/min/1 73sq m   Hemoglobin A1C   Result Value Ref Range    Hemoglobin A1C 6 4 (H) Normal 3 8-5 6%; PreDiabetic 5 7-6 4%; Diabetic >=6 5%; Glycemic control for adults with diabetes <7 0% %     mg/dl   Lipid Panel with Direct LDL reflex   Result Value Ref Range    Cholesterol 165 See Comment mg/dL    Triglycerides 150 See Comment mg/dL    HDL, Direct 42 (L) >=50 mg/dL    LDL Calculated 93 0 - 100 mg/dL     Weight (last 2 days)     None        Body mass index is 30 19 kg/m²  BP      Temp      Pulse     Resp      SpO2        Vitals:    04/27/22 1322   Weight: 70 1 kg (154 lb 9 6 oz)     Vitals:    04/27/22 1322   Weight: 70 1 kg (154 lb 9 6 oz)       /90   Pulse 84   Ht 5' (1 524 m)   Wt 70 1 kg (154 lb 9 6 oz)   LMP  (LMP Unknown)   SpO2 97%   BMI 30 19 kg/m²          Physical Exam  Constitutional:       Appearance: She is well-developed  HENT:      Head: Normocephalic  Eyes:      General: No scleral icterus  Right eye: No discharge  Left eye: No discharge  Conjunctiva/sclera: Conjunctivae normal       Pupils: Pupils are equal, round, and reactive to light  Cardiovascular:      Rate and Rhythm: Normal rate and regular rhythm        Heart sounds: Normal heart sounds  No murmur heard  No friction rub  No gallop  Pulmonary:      Effort: No respiratory distress  Breath sounds: Normal breath sounds  No wheezing or rales  Abdominal:      General: Bowel sounds are normal  There is no distension  Palpations: Abdomen is soft  There is no mass  Tenderness: There is no abdominal tenderness  There is no guarding or rebound  Musculoskeletal:         General: No deformity  Cervical back: Neck supple  Lymphadenopathy:      Cervical: No cervical adenopathy  Neurological:      Mental Status: She is alert        Coordination: Coordination normal        full range of motion bilateral shoulders mild tenderness with internal external rotation

## 2022-05-01 NOTE — ASSESSMENT & PLAN NOTE
Hypertension -  I have counseled patient following healthy balance diet, I would like the patient reduce sodium, exercise routinely, I would like the patient continued the med current medical regiment and we will continue to monitor    For Gram weight loss and walking will continue monitor mildly elevated diastolic she does appear to be mildly nervous today

## 2022-06-23 ENCOUNTER — 6 MONTH FOLLOW UP (OUTPATIENT)
Dept: URBAN - METROPOLITAN AREA CLINIC 6 | Facility: CLINIC | Age: 77
End: 2022-06-23

## 2022-06-23 DIAGNOSIS — H11.041: ICD-10-CM

## 2022-06-23 DIAGNOSIS — H52.211: ICD-10-CM

## 2022-06-23 DIAGNOSIS — H35.371: ICD-10-CM

## 2022-06-23 DIAGNOSIS — Z96.1: ICD-10-CM

## 2022-06-23 DIAGNOSIS — H43.813: ICD-10-CM

## 2022-06-23 PROCEDURE — 92014 COMPRE OPH EXAM EST PT 1/>: CPT

## 2022-06-23 ASSESSMENT — KERATOMETRY
OD_K1POWER_DIOPTERS: 43.75
OS_AXISANGLE_DEGREES: 118
OD_AXISANGLE_DEGREES: 173
OS_K1POWER_DIOPTERS: 45.75
OD_K2POWER_DIOPTERS: 45.75
OS_K2POWER_DIOPTERS: 46.25
OS_AXISANGLE2_DEGREES: 28
OD_AXISANGLE2_DEGREES: 83

## 2022-06-23 ASSESSMENT — TONOMETRY
OD_IOP_MMHG: 12
OS_IOP_MMHG: 14

## 2022-06-23 ASSESSMENT — VISUAL ACUITY
OD_PH: 20/40
OD_SC: 20/80
OU_SC: J5
OU_SC: 20/30
OS_SC: 20/40

## 2022-08-15 DIAGNOSIS — K21.9 GASTROESOPHAGEAL REFLUX DISEASE, UNSPECIFIED WHETHER ESOPHAGITIS PRESENT: ICD-10-CM

## 2022-08-15 RX ORDER — OMEPRAZOLE 20 MG/1
CAPSULE, DELAYED RELEASE ORAL
Qty: 90 CAPSULE | Refills: 1 | Status: SHIPPED | OUTPATIENT
Start: 2022-08-15

## 2022-09-04 ENCOUNTER — HOSPITAL ENCOUNTER (OUTPATIENT)
Dept: ULTRASOUND IMAGING | Facility: HOSPITAL | Age: 77
Discharge: HOME | End: 2022-09-04
Attending: OBSTETRICS & GYNECOLOGY
Payer: COMMERCIAL

## 2022-09-04 DIAGNOSIS — N83.201 CYST OF RIGHT OVARY: ICD-10-CM

## 2022-09-04 PROCEDURE — 76830 TRANSVAGINAL US NON-OB: CPT

## 2022-09-04 PROCEDURE — 76856 US EXAM PELVIC COMPLETE: CPT

## 2022-09-12 ENCOUNTER — CLINICAL SUPPORT (OUTPATIENT)
Dept: INTERNAL MEDICINE CLINIC | Facility: CLINIC | Age: 77
End: 2022-09-12
Payer: COMMERCIAL

## 2022-09-12 DIAGNOSIS — Z23 NEED FOR VACCINATION: Primary | ICD-10-CM

## 2022-09-12 PROCEDURE — 90662 IIV NO PRSV INCREASED AG IM: CPT

## 2022-09-12 PROCEDURE — G0008 ADMIN INFLUENZA VIRUS VAC: HCPCS

## 2022-10-04 ENCOUNTER — HOSPITAL ENCOUNTER (OUTPATIENT)
Dept: RADIOLOGY | Age: 77
Discharge: HOME | End: 2022-10-04
Payer: COMMERCIAL

## 2022-10-04 DIAGNOSIS — Z13.820 SCREENING FOR OSTEOPOROSIS: ICD-10-CM

## 2022-10-04 PROCEDURE — 77080 DXA BONE DENSITY AXIAL: CPT

## 2022-10-12 PROBLEM — Z12.11 COLON CANCER SCREENING: Status: RESOLVED | Noted: 2021-12-16 | Resolved: 2022-10-12

## 2022-11-05 ENCOUNTER — APPOINTMENT (OUTPATIENT)
Dept: LAB | Facility: CLINIC | Age: 77
End: 2022-11-05

## 2022-11-05 DIAGNOSIS — E11.9 TYPE 2 DIABETES MELLITUS WITHOUT COMPLICATION, WITH LONG-TERM CURRENT USE OF INSULIN (HCC): ICD-10-CM

## 2022-11-05 DIAGNOSIS — Z79.4 TYPE 2 DIABETES MELLITUS WITHOUT COMPLICATION, WITH LONG-TERM CURRENT USE OF INSULIN (HCC): ICD-10-CM

## 2022-11-05 LAB
ALBUMIN SERPL BCP-MCNC: 4.2 G/DL (ref 3.5–5)
ALP SERPL-CCNC: 49 U/L (ref 34–104)
ALT SERPL W P-5'-P-CCNC: 18 U/L (ref 7–52)
ANION GAP SERPL CALCULATED.3IONS-SCNC: 6 MMOL/L (ref 4–13)
AST SERPL W P-5'-P-CCNC: 17 U/L (ref 13–39)
BILIRUB SERPL-MCNC: 0.53 MG/DL (ref 0.2–1)
BUN SERPL-MCNC: 22 MG/DL (ref 5–25)
CALCIUM SERPL-MCNC: 9.6 MG/DL (ref 8.4–10.2)
CHLORIDE SERPL-SCNC: 103 MMOL/L (ref 96–108)
CHOLEST SERPL-MCNC: 179 MG/DL
CO2 SERPL-SCNC: 32 MMOL/L (ref 21–32)
CREAT SERPL-MCNC: 0.62 MG/DL (ref 0.6–1.3)
CREAT UR-MCNC: 111 MG/DL
EST. AVERAGE GLUCOSE BLD GHB EST-MCNC: 137 MG/DL
GFR SERPL CREATININE-BSD FRML MDRD: 86 ML/MIN/1.73SQ M
GLUCOSE P FAST SERPL-MCNC: 120 MG/DL (ref 65–99)
HBA1C MFR BLD: 6.4 %
HDLC SERPL-MCNC: 45 MG/DL
LDLC SERPL CALC-MCNC: 103 MG/DL (ref 0–100)
MICROALBUMIN UR-MCNC: 14.4 MG/L (ref 0–20)
MICROALBUMIN/CREAT 24H UR: 13 MG/G CREATININE (ref 0–30)
POTASSIUM SERPL-SCNC: 4.2 MMOL/L (ref 3.5–5.3)
PROT SERPL-MCNC: 7.2 G/DL (ref 6.4–8.4)
SODIUM SERPL-SCNC: 141 MMOL/L (ref 135–147)
TRIGL SERPL-MCNC: 156 MG/DL

## 2022-11-08 ENCOUNTER — TELEPHONE (OUTPATIENT)
Dept: INTERNAL MEDICINE CLINIC | Facility: CLINIC | Age: 77
End: 2022-11-08

## 2022-11-08 ENCOUNTER — OFFICE VISIT (OUTPATIENT)
Dept: INTERNAL MEDICINE CLINIC | Facility: CLINIC | Age: 77
End: 2022-11-08

## 2022-11-08 VITALS
HEIGHT: 60 IN | WEIGHT: 154.6 LBS | DIASTOLIC BLOOD PRESSURE: 80 MMHG | OXYGEN SATURATION: 96 % | SYSTOLIC BLOOD PRESSURE: 124 MMHG | BODY MASS INDEX: 30.35 KG/M2 | RESPIRATION RATE: 16 BRPM | HEART RATE: 84 BPM

## 2022-11-08 DIAGNOSIS — K21.9 GASTROESOPHAGEAL REFLUX DISEASE, UNSPECIFIED WHETHER ESOPHAGITIS PRESENT: ICD-10-CM

## 2022-11-08 DIAGNOSIS — M51.16 INTERVERTEBRAL DISC DISORDERS WITH RADICULOPATHY, LUMBAR REGION: ICD-10-CM

## 2022-11-08 DIAGNOSIS — E11.9 TYPE 2 DIABETES MELLITUS WITHOUT COMPLICATION, WITH LONG-TERM CURRENT USE OF INSULIN (HCC): ICD-10-CM

## 2022-11-08 DIAGNOSIS — C67.9 MALIGNANT NEOPLASM OF URINARY BLADDER, UNSPECIFIED SITE (HCC): ICD-10-CM

## 2022-11-08 DIAGNOSIS — E78.5 DYSLIPIDEMIA: ICD-10-CM

## 2022-11-08 DIAGNOSIS — Z11.59 NEED FOR HEPATITIS C SCREENING TEST: ICD-10-CM

## 2022-11-08 DIAGNOSIS — K21.9 GASTROESOPHAGEAL REFLUX DISEASE WITHOUT ESOPHAGITIS: ICD-10-CM

## 2022-11-08 DIAGNOSIS — I10 ESSENTIAL HYPERTENSION: ICD-10-CM

## 2022-11-08 DIAGNOSIS — Z12.31 ENCOUNTER FOR SCREENING MAMMOGRAM FOR BREAST CANCER: ICD-10-CM

## 2022-11-08 DIAGNOSIS — Z79.4 TYPE 2 DIABETES MELLITUS WITHOUT COMPLICATION, WITH LONG-TERM CURRENT USE OF INSULIN (HCC): ICD-10-CM

## 2022-11-08 DIAGNOSIS — Z12.11 ENCOUNTER FOR SCREENING COLONOSCOPY: Primary | ICD-10-CM

## 2022-11-08 RX ORDER — LOSARTAN POTASSIUM 100 MG/1
100 TABLET ORAL DAILY
Qty: 90 TABLET | Refills: 1 | Status: SHIPPED | OUTPATIENT
Start: 2022-11-08

## 2022-11-08 RX ORDER — OMEPRAZOLE 20 MG/1
20 CAPSULE, DELAYED RELEASE ORAL DAILY
Qty: 90 CAPSULE | Refills: 1 | Status: SHIPPED | OUTPATIENT
Start: 2022-11-08

## 2022-11-08 RX ORDER — HYDROCHLOROTHIAZIDE 12.5 MG/1
12.5 TABLET ORAL DAILY
Qty: 90 TABLET | Refills: 1 | Status: SHIPPED | OUTPATIENT
Start: 2022-11-08

## 2022-11-08 RX ORDER — FAMOTIDINE 40 MG/1
40 TABLET, FILM COATED ORAL DAILY
Qty: 90 TABLET | Refills: 1 | Status: SHIPPED | OUTPATIENT
Start: 2022-11-08

## 2022-11-08 NOTE — PROGRESS NOTES
Patient's shoes and socks removed  Right Foot/Ankle   Right Foot Inspection  Skin Exam: skin normal and skin intact  No dry skin, no warmth, no callus, no erythema, no maceration, no abnormal color, no pre-ulcer, no ulcer and no callus  Toe Exam: ROM and strength within normal limits  Sensory   Monofilament testing: intact    Vascular  Capillary refills: < 3 seconds  The right DP pulse is 2+  The right PT pulse is 2+  Left Foot/Ankle  Left Foot Inspection  Skin Exam: skin normal and skin intact  No dry skin, no warmth, no erythema, no maceration, normal color, no pre-ulcer, no ulcer and no callus  Toe Exam: ROM and strength within normal limits  Sensory   Monofilament testing: intact    Vascular  Capillary refills: < 3 seconds  The left PT pulse is 2+  Assign Risk Category  No deformity present  No loss of protective sensation  No weak pulses  Risk: 0       Assessment/Plan:    Gastroesophageal reflux disease without esophagitis  Clinically stable and doing well continue the current medical regiment will continue monitor  Type 2 diabetes mellitus without complication, with long-term current use of insulin ([facility] )    Lab Results   Component Value Date    HGBA1C 6 4 (H) 11/05/2022   I have counselled the pt to follow a healthy and balanced diet ,and recommend routine exercise  I will be ordering diabetic laboratories including comprehensive metabolic panel, hemoglobin A1c, urine microalbumin, lipid panel  Annual eye examination recommend foot examination completed today    Essential hypertension  Hypertension - controlled, I have counseled patient following healthy balance diet, I would like the patient reduce sodium, exercise routinely, I would like the patient continued the med current medical regiment and we will continue to monitor    Blood pressure today 124/80    Intervertebral disc disorders with radiculopathy, lumbar region  Currently stable undergoing acupuncture doing pretty well she will continue    Malignant neoplasm of urinary bladder (HCC)  Currently stable doing well patient will continue follow-up with urology    Dyslipidemia  Hyperlipidemia controlled continue with current medical regiment recommend a low-cholesterol diet and recommend routine exercise we will continue to monitor the progress  Encounter for screening mammogram for breast cancer  Counseled check screening mammography         Problem List Items Addressed This Visit        Digestive    Gastroesophageal reflux disease without esophagitis     Clinically stable and doing well continue the current medical regiment will continue monitor  Relevant Medications    famotidine (PEPCID) 40 MG tablet    omeprazole (PriLOSEC) 20 mg delayed release capsule       Endocrine    Type 2 diabetes mellitus without complication, with long-term current use of insulin ([facility])       Lab Results   Component Value Date    HGBA1C 6 4 (H) 11/05/2022   I have counselled the pt to follow a healthy and balanced diet ,and recommend routine exercise  I will be ordering diabetic laboratories including comprehensive metabolic panel, hemoglobin A1c, urine microalbumin, lipid panel  Annual eye examination recommend foot examination completed today         Relevant Orders    Comprehensive metabolic panel    Hemoglobin A1C    Lipid Panel with Direct LDL reflex    Microalbumin / creatinine urine ratio       Cardiovascular and Mediastinum    Essential hypertension     Hypertension - controlled, I have counseled patient following healthy balance diet, I would like the patient reduce sodium, exercise routinely, I would like the patient continued the med current medical regiment and we will continue to monitor    Blood pressure today 124/80         Relevant Medications    hydrochlorothiazide (HYDRODIURIL) 12 5 mg tablet    losartan (COZAAR) 100 MG tablet       Nervous and Auditory    Intervertebral disc disorders with radiculopathy, lumbar region Currently stable undergoing acupuncture doing pretty well  she will continue            Genitourinary    Malignant neoplasm of urinary bladder (HCC)     Currently stable doing well patient will continue follow-up with urology            Other    Dyslipidemia     Hyperlipidemia controlled continue with current medical regiment recommend a low-cholesterol diet and recommend routine exercise we will continue to monitor the progress  Encounter for screening mammogram for breast cancer     Counseled check screening mammography           Other Visit Diagnoses     Encounter for screening colonoscopy    -  Primary    Relevant Orders    Ambulatory referral for colonoscopy    Need for hepatitis C screening test        Relevant Orders    Hepatitis C Antibody ([facility])    Gastroesophageal reflux disease, unspecified whether esophagitis present        Relevant Medications    famotidine (PEPCID) 40 MG tablet    omeprazole (PriLOSEC) 20 mg delayed release capsule          RTO in 6 months call if any problems  Subjective:      Patient ID: Kimberly Douglas is a 65 y o  female  HPI 67-year old female coming in for a follow up office visit regarding GERD, bladder cancer, lumbar radiculopathy, type 2 diabetes, hyperlipidemia and rotator cuff tendinitis; The patient reports me compliant taking medications without untoward side effects the  The patient is here to review his medical condition, update me on the medical condition and the patient reports me no hospitalizations and no ER visits  Overall patient is doing well she is clinically stable her back pain has improved with acupuncture    She is trying to follow healthy/balance diet and remains active here to review laboratories in detail    The following portions of the patient's history were reviewed and updated as appropriate: allergies, current medications, past family history, past medical history, past social history, past surgical history and problem list     Review of Systems   Constitutional: Negative for activity change, appetite change and unexpected weight change  HENT: Negative for congestion and postnasal drip  Eyes: Negative for visual disturbance  Respiratory: Negative for cough and shortness of breath  Cardiovascular: Negative for chest pain  Gastrointestinal: Negative for abdominal pain, diarrhea, nausea and vomiting  Neurological: Negative for dizziness, light-headedness and headaches  Hematological: Negative for adenopathy  Objective:    Return in about 6 months (around 5/8/2023)  No results found  No Known Allergies    Past Medical History:   Diagnosis Date   • Arthritis    • Back pain     sciatic pain right hip and down right leg   • Cancer ([address] )     bladder cancer ; had surgical scraping   • GERD (gastroesophageal reflux disease)    • Hyperlipidemia    • Hypertension    • Seasonal allergies    • Wears partial dentures     1 small tooth removed and placed in cup      Past Surgical History:   Procedure Laterality Date   • APPENDECTOMY     • COLONOSCOPY     • CYSTOSCOPY  11/29/2021    [facility]   • EYE SURGERY     • HERNIA REPAIR     • RI CYSTOURETHROSCOPY,FULGUR <0 5 CM LESN N/A 1/15/2020    Procedure: Cystoscopy TRANSURETHRAL RESECTION OF BLADDER TUMOR (TURBT);   Surgeon: Sugey Brooks MD;  Location: [facility];  Service: Urology   • RI INSTILL ANTICANCER AGENT IN BLADDER N/A 1/15/2020    Procedure: Ruben Downey;  Surgeon: Sugey Brooks MD;  Location: [facility];  Service: Urology   • RI REPAIR [address] HERNIA,5+Y/O,REDUCIBL Left 2/3/2022    Procedure: INGUINAL HERNIA REPAIR;  Surgeon: Tsering Donahue MD;  Location: [facility];  Service: General   • UPPER GASTROINTESTINAL ENDOSCOPY     • VEIN LIGATION AND STRIPPING       Current Outpatient Medications on File Prior to Visit   Medication Sig Dispense Refill   • amoxicillin (AMOXIL) 500 mg capsule TAKE ONE CAPSULE EVERY EIGHT HOURS     • calcium-vitamin D 250-100 MG-UNIT per tablet Take 1 tablet by mouth 2 (two) times a day     • Cholecalciferol (VITAMIN D3) 1000 units CAPS Take 1 capsule by mouth daily     • cyanocobalamin (VITAMIN B-12) 1000 MCG tablet Take 1,000 mcg by mouth daily     • Diclofenac Sodium (VOLTAREN) 1 % Apply 2 g topically 4 (four) times a day 1 g 2   • multivitamin-minerals (CENTRUM ADULTS) tablet Take 1 tablet by mouth daily     • oxyCODONE-acetaminophen (PERCOCET) 5-325 mg per tablet Take 1 tablet by mouth every 4 (four) hours as needed for moderate pain for up to 10 doses Max Daily Amount: 6 tablets 10 tablet 0   • simvastatin (ZOCOR) 20 mg tablet Take 1 tablet (20 mg total) by mouth daily 90 tablet 3   • [DISCONTINUED] famotidine (PEPCID) 40 MG tablet TAKE ONE TABLET EVERY DAY  (Patient taking differently: Take 20 mg by mouth in the morning) 90 tablet 0   • [DISCONTINUED] hydrochlorothiazide (HYDRODIURIL) 12 5 mg tablet Take 1 tablet (12 5 mg total) by mouth daily 90 tablet 3   • [DISCONTINUED] losartan (COZAAR) 100 MG tablet Take 1 tablet (100 mg total) by mouth daily 90 tablet 1   • [DISCONTINUED] omeprazole (PriLOSEC) 20 mg delayed release capsule TAKE 1 CAPSULE BY MOUTH DAILY 90 capsule 1   • Besivance 0 6 % SUSP  (Patient not taking: No sig reported)     • cyclobenzaprine (FLEXERIL) 5 mg tablet Take 1 tablet (5 mg total) by mouth 3 (three) times a day as needed for muscle spasms First try at bedtime to see how you feel the next morning  (Patient not taking: No sig reported) 30 tablet 0   • Inveltys 1 % SUSP   (Patient not taking: No sig reported)     • Prolensa 0 07 % SOLN Administer 1 drop to both eyes daily (Patient not taking: No sig reported) 3 mL 1   • [DISCONTINUED] omeprazole (PriLOSEC) 20 mg delayed release capsule Take 1 capsule (20 mg total) by mouth daily (Patient not taking: No sig reported) 30 capsule 3     No current facility-administered medications on file prior to visit       Family History   Problem Relation Age of Onset   • Heart disease Mother    • Hypertension Mother      Social History     Socioeconomic History   • Marital status:      Spouse name: Not on file   • Number of children: Not on file   • Years of education: Not on file   • Highest education level: Not on file   Occupational History   • Not on file   Tobacco Use   • Smoking status: Never Smoker   • Smokeless tobacco: Never Used   Vaping Use   • Vaping Use: Never used   Substance and Sexual Activity   • Alcohol use: No   • Drug use: No   • Sexual activity: Not Currently   Other Topics Concern   • Not on file   Social History Narrative   • Not on file     Social Determinants of Health     Financial Resource Strain: Not on file   Food Insecurity: Not on file   Transportation Needs: Not on file   Physical Activity: Not on file   Stress: Not on file   Social Connections: Not on file   Intimate Partner Violence: Not on file   Housing Stability: Not on file     Vitals:    11/08/22 1002   BP: 124/80   Pulse: 84   Resp: 16   SpO2: 96%   Weight: 70 1 kg (154 lb 9 6 oz)   Height: 5' (1 524 m)     Results for orders placed or performed in visit on 11/05/22   Comprehensive metabolic panel   Result Value Ref Range    Sodium 141 135 - 147 mmol/L    Potassium 4 2 3 5 - 5 3 mmol/L    Chloride 103 96 - 108 mmol/L    CO2 32 21 - 32 mmol/L    ANION GAP 6 4 - 13 mmol/L    BUN 22 5 - 25 mg/dL    Creatinine 0 62 0 60 - 1 30 mg/dL    Glucose, Fasting 120 (H) 65 - 99 mg/dL    Calcium 9 6 8 4 - 10 2 mg/dL    AST 17 13 - 39 U/L    ALT 18 7 - 52 U/L    Alkaline Phosphatase 49 34 - 104 U/L    Total Protein 7 2 6 4 - 8 4 g/dL    Albumin 4 2 3 5 - 5 0 g/dL    Total Bilirubin 0 53 0 20 - 1 00 mg/dL    eGFR 86 ml/min/1 73sq m   Hemoglobin A1C   Result Value Ref Range    Hemoglobin A1C 6 4 (H) Normal 3 8-5 6%; PreDiabetic 5 7-6 4%;  Diabetic >=6 5%; Glycemic control for adults with diabetes <7 0% %     mg/dl   Lipid Panel with Direct LDL reflex   Result Value Ref Range    Cholesterol 179 See Comment mg/dL    Triglycerides 156 (H) See Comment mg/dL    HDL, Direct 45 (L) >=50 mg/dL    LDL Calculated 103 (H) 0 - 100 mg/dL   Microalbumin / creatinine urine ratio   Result Value Ref Range    Creatinine, Ur 111 0 mg/dL    Microalbum  ,U,Random 14 4 0 0 - 20 0 mg/L    Microalb Creat Ratio 13 0 - 30 mg/g creatinine     Weight (last 2 days)     Date/Time Weight    11/08/22 1002 70 1 (154 6)        Body mass index is 30 19 kg/m²  BP      Temp      Pulse     Resp      SpO2        Vitals:    11/08/22 1002   Weight: 70 1 kg (154 lb 9 6 oz)     Vitals:    11/08/22 1002   Weight: 70 1 kg (154 lb 9 6 oz)       /80   Pulse 84   Resp 16   Ht 5' (1 524 m)   Wt 70 1 kg (154 lb 9 6 oz)   LMP  (LMP Unknown)   SpO2 96%   BMI 30 19 kg/m²          Physical Exam  Constitutional:       Appearance: She is well-developed  HENT:      Head: Normocephalic  Eyes:      General: No scleral icterus  Right eye: No discharge  Left eye: No discharge  Conjunctiva/sclera: Conjunctivae normal       Pupils: Pupils are equal, round, and reactive to light  Cardiovascular:      Rate and Rhythm: Normal rate and regular rhythm  Pulses: no weak pulses          Dorsalis pedis pulses are 2+ on the right side  Posterior tibial pulses are 2+ on the right side and 2+ on the left side  Heart sounds: Normal heart sounds  No murmur heard  No friction rub  No gallop  Pulmonary:      Effort: No respiratory distress  Breath sounds: Normal breath sounds  No wheezing or rales  Abdominal:      General: Bowel sounds are normal  There is no distension  Palpations: Abdomen is soft  There is no mass  Tenderness: There is no abdominal tenderness  There is no guarding or rebound  Musculoskeletal:         General: No deformity  Cervical back: Neck supple     Feet:      Right foot:      Skin integrity: No ulcer, skin breakdown, erythema, warmth, callus or dry skin       Left foot:      Skin integrity: No ulcer, skin breakdown, erythema, warmth, callus or dry skin  Lymphadenopathy:      Cervical: No cervical adenopathy  Neurological:      Mental Status: She is alert        Coordination: Coordination normal

## 2022-11-08 NOTE — TELEPHONE ENCOUNTER
Pt requesting lab order to be entered so she may have done prior to her appt in May, once entered mail to pt

## 2022-11-09 NOTE — ASSESSMENT & PLAN NOTE
Hypertension - controlled, I have counseled patient following healthy balance diet, I would like the patient reduce sodium, exercise routinely, I would like the patient continued the med current medical regiment and we will continue to monitor    Blood pressure today 124/80

## 2022-11-09 NOTE — ASSESSMENT & PLAN NOTE
Lab Results   Component Value Date    HGBA1C 6 4 (H) 11/05/2022   I have counselled the pt to follow a healthy and balanced diet ,and recommend routine exercise  I will be ordering diabetic laboratories including comprehensive metabolic panel, hemoglobin A1c, urine microalbumin, lipid panel    Annual eye examination recommend foot examination completed today

## 2022-11-23 ENCOUNTER — PROCEDURE VISIT (OUTPATIENT)
Dept: UROLOGY | Facility: CLINIC | Age: 77
End: 2022-11-23

## 2022-11-23 VITALS
HEART RATE: 80 BPM | SYSTOLIC BLOOD PRESSURE: 138 MMHG | DIASTOLIC BLOOD PRESSURE: 80 MMHG | HEIGHT: 60 IN | WEIGHT: 155 LBS | BODY MASS INDEX: 30.43 KG/M2

## 2022-11-23 DIAGNOSIS — R30.0 DYSURIA: Primary | ICD-10-CM

## 2022-11-23 DIAGNOSIS — R82.89 URINE CYTOLOGY ABNORMAL: ICD-10-CM

## 2022-11-23 DIAGNOSIS — C67.9 MALIGNANT NEOPLASM OF URINARY BLADDER, UNSPECIFIED SITE (HCC): ICD-10-CM

## 2022-11-23 LAB
SL AMB  POCT GLUCOSE, UA: ABNORMAL
SL AMB LEUKOCYTE ESTERASE,UA: ABNORMAL
SL AMB POCT BILIRUBIN,UA: ABNORMAL
SL AMB POCT BLOOD,UA: ABNORMAL
SL AMB POCT CLARITY,UA: CLEAR
SL AMB POCT COLOR,UA: YELLOW
SL AMB POCT KETONES,UA: ABNORMAL
SL AMB POCT NITRITE,UA: ABNORMAL
SL AMB POCT PH,UA: 7.5
SL AMB POCT SPECIFIC GRAVITY,UA: 1
SL AMB POCT URINE PROTEIN: ABNORMAL
SL AMB POCT UROBILINOGEN: 0.2

## 2022-11-23 NOTE — PROGRESS NOTES
Cystoscopy     Date/Time 11/23/2022 10:05 AM     Performed by  Ivy Hernandez MD     Authorized by Ivy Hernandez MD      Universal Protocol:  Timeout called at: 11/23/2022 10:05 AM       Procedure Details:  Procedure type: cystoscopy    Patient tolerance: Patient tolerated the procedure well with no immediate complications    Additional Procedure Details:   Cystoscopy Procedure note    Risk and benefits of flexible cystoscopy were discussed  Informed consent was obtained  A urine dip is adequate for cystoscopy  The patient was placed into the modified supine position  Her genitalia was prepped with Betadine and draped in a sterile fashion  Viscous 2% lidocaine was instilled into the urethra and allowed dwell time for local anesthesia  Flexible cystoscopy was then performed with a 16F scope  Urethra was inspected on entrance and exit of the scope  The bladder was thoroughly inspected in a 360 degree fashion  Both ureteral orifices were visualized in their orthotopic location with clear efflux of urine  The bladder mucosa was thoroughly inspected  There was no evidence of mucosal abnormalities, stones, or lesions  Retroflexion for evaluation of the bladder neck was normal   Patient was noted to have some extrinsic compression of the left lateral wall from the known ovarian cyst that is being followed by gyn  The    Overall this was a negative cystoscopy  A female office staff member was present throughout the entire procedure

## 2022-11-23 NOTE — PROGRESS NOTES
UROLOGY FOLLOWUP NOTE     CHIEF COMPLAINT   Christophe Baldwin is a 65 y o  female with a complaint of   Chief Complaint   Patient presents with   • Cystoscopy       History of Present Illness:     65 y o  female with a history of high-grade TA bladder cancer diagnosed and resected 9/27/2017  She has been undergoing routine surveillance cystoscopy at [facility]  Her last cystoscopy was in April of 2019  Given the difficulty with transportation, she was referred for more local cystoscopic surveillance  Patient underwent bladder biopsy and resection in January of 2020 given concern on office cystoscope  Pathology was negative for cancer recurrence  Returns today at the 5 year carissa for surveillance  No new symptoms of concern  Past Medical History:     Past Medical History:   Diagnosis Date   • Arthritis    • Back pain     sciatic pain right hip and down right leg   • Cancer ([address] )     bladder cancer ; had surgical scraping   • GERD (gastroesophageal reflux disease)    • Hyperlipidemia    • Hypertension    • Seasonal allergies    • Wears partial dentures     1 small tooth removed and placed in cup        PAST SURGICAL HISTORY:     Past Surgical History:   Procedure Laterality Date   • APPENDECTOMY     • COLONOSCOPY     • CYSTOSCOPY  11/29/2021    Robin   • EYE SURGERY     • HERNIA REPAIR     • IN CYSTOURETHROSCOPY,FULGUR <0 5 CM LESN N/A 1/15/2020    Procedure: Cystoscopy TRANSURETHRAL RESECTION OF BLADDER TUMOR (TURBT);   Surgeon: Diana Faust MD;  Location: [facility];  Service: Urology   • IN INSTILL ANTICANCER AGENT IN BLADDER N/A 1/15/2020    Procedure: Yvrose Tang;  Surgeon: Diana Faust MD;  Location: [facility];  Service: Urology   • IN REPAIR ING HERNIA,5+Y/O,REDUCIBL Left 2/3/2022    Procedure: INGUINAL HERNIA REPAIR;  Surgeon: Ezra Valencia MD;  [facility];  Service: General   • UPPER GASTROINTESTINAL ENDOSCOPY     • VEIN LIGATION AND STRIPPING         CURRENT MEDICATIONS:     Current Outpatient Medications   Medication Sig Dispense Refill   • amoxicillin (AMOXIL) 500 mg capsule TAKE ONE CAPSULE EVERY EIGHT HOURS     • Besivance 0 6 % SUSP  (Patient not taking: No sig reported)     • calcium-vitamin D 250-100 MG-UNIT per tablet Take 1 tablet by mouth 2 (two) times a day     • Cholecalciferol (VITAMIN D3) 1000 units CAPS Take 1 capsule by mouth daily     • cyanocobalamin (VITAMIN B-12) 1000 MCG tablet Take 1,000 mcg by mouth daily     • cyclobenzaprine (FLEXERIL) 5 mg tablet Take 1 tablet (5 mg total) by mouth 3 (three) times a day as needed for muscle spasms First try at bedtime to see how you feel the next morning  (Patient not taking: No sig reported) 30 tablet 0   • Diclofenac Sodium (VOLTAREN) 1 % Apply 2 g topically 4 (four) times a day 1 g 2   • famotidine (PEPCID) 40 MG tablet Take 1 tablet (40 mg total) by mouth daily 90 tablet 1   • hydrochlorothiazide (HYDRODIURIL) 12 5 mg tablet Take 1 tablet (12 5 mg total) by mouth daily 90 tablet 1   • Inveltys 1 % SUSP   (Patient not taking: No sig reported)     • losartan (COZAAR) 100 MG tablet Take 1 tablet (100 mg total) by mouth daily 90 tablet 1   • multivitamin-minerals (CENTRUM ADULTS) tablet Take 1 tablet by mouth daily     • omeprazole (PriLOSEC) 20 mg delayed release capsule Take 1 capsule (20 mg total) by mouth daily 90 capsule 1   • oxyCODONE-acetaminophen (PERCOCET) 5-325 mg per tablet Take 1 tablet by mouth every 4 (four) hours as needed for moderate pain for up to 10 doses Max Daily Amount: 6 tablets 10 tablet 0   • Prolensa 0 07 % SOLN Administer 1 drop to both eyes daily (Patient not taking: No sig reported) 3 mL 1   • simvastatin (ZOCOR) 20 mg tablet Take 1 tablet (20 mg total) by mouth daily 90 tablet 3     No current facility-administered medications for this visit         ALLERGIES:     No Known Allergies    SOCIAL HISTORY:     Social History     Socioeconomic History   • Marital status:      Spouse name: Not on file   • Number of children: Not on file   • Years of education: Not on file   • Highest education level: Not on file   Occupational History   • Not on file   Tobacco Use   • Smoking status: Never   • Smokeless tobacco: Never   Vaping Use   • Vaping Use: Never used   Substance and Sexual Activity   • Alcohol use: No   • Drug use: No   • Sexual activity: Not Currently   Other Topics Concern   • Not on file   Social History Narrative   • Not on file     Social Determinants of Health     Financial Resource Strain: Not on file   Food Insecurity: Not on file   Transportation Needs: Not on file   Physical Activity: Not on file   Stress: Not on file   Social Connections: Not on file   Intimate Partner Violence: Not on file   Housing Stability: Not on file       SOCIAL HISTORY:     Family History   Problem Relation Age of Onset   • Heart disease Mother    • Hypertension Mother        REVIEW OF SYSTEMS:     Review of Systems   Constitutional: Negative  Respiratory: Negative  Cardiovascular: Negative  Gastrointestinal: Negative  Genitourinary: Negative for dysuria and hematuria  Musculoskeletal: Negative  Skin: Negative  Psychiatric/Behavioral: Negative  PHYSICAL EXAM:     Ht 5' (1 524 m)   Wt 70 3 kg (155 lb)   LMP  (LMP Unknown)   BMI 30 27 kg/m²     General:  Healthy appearing female in no acute distress  They have a normal affect  There is not appear to be any gross neurologic defects or abnormalities  HEENT:  Normocephalic, atraumatic  Neck is supple without any palpable lymphadenopathy  Cardiovascular:  Patient has normal palpable distal radial pulses  There is no significant peripheral edema  No JVD is noted  Respiratory:  Patient has unlabored respirations  There is no audible wheeze or rhonchi  Abdomen:  Abdomen is irdmjd2h surgical scars  Abdomen is soft and nontender  There is no tympany    Inguinal and umbilical hernia are not appreciated  Genitourinary:   Normal external female genitalia, slightly atrophic vaginal introitus    Musculoskeletal:  Patient does not have significant CVA tenderness in the  flank with palpation or percussion  They full range of motion in all 4 extremities  Strength in all 4 extremities appears congruent  Patient is able to ambulate without assistance or difficulty  Dermatologic:  Patient has no skin abnormalities or rashes  LABS:     CBC:   Lab Results   Component Value Date    WBC 10 35 (H) 2022    HGB 13 6 2022    HCT 42 0 2022    MCV 92 2022     2022       BMP:   Lab Results   Component Value Date    GLUCOSE 111 2015    CALCIUM 9 6 2022     2015    K 4 2 2022    CO2 32 2022     2022    BUN 22 2022    CREATININE 0 62 2022       IMAGIN/13/19  CT ABDOMEN AND PELVIS WITH IV CONTRAST     INDICATION:   Bilateral lower abdominal pain, significant left lower quadrant tenderness, dysuria      COMPARISON:  2017 CT, 2017 MRI     TECHNIQUE:  CT examination of the abdomen and pelvis was performed  Axial, sagittal, and coronal 2D reformatted images were created from the source data and submitted for interpretation      Radiation dose length product (DLP) for this visit:  390 mGy-cm   This examination, like all CT scans performed in the [facility], was performed utilizing techniques to minimize radiation dose exposure, including the use of iterative   reconstruction and automated exposure control      IV Contrast:  100 mL of iohexol (OMNIPAQUE)  Enteric Contrast:  Enteric contrast was not administered      FINDINGS:     ABDOMEN     LOWER CHEST:  No clinically significant abnormality identified in the visualized lower chest      LIVER/BILIARY TREE:  Fatty liver noted   Tiny left hepatic dome hypodensity on , previously characterized as a simple cyst      GALLBLADDER:  No calcified gallstones  No pericholecystic inflammatory change      SPLEEN:  Unremarkable      PANCREAS:  Unremarkable      ADRENAL GLANDS:  Unremarkable      KIDNEYS/URETERS:  One or more sharply circumscribed subcentimeter renal hypodensities are noted  These lesions are too small to accurately characterize, but are statistically most likely to represent benign cortical renal cyst(s)  According to the   guidelines published in the [facility] Paper of the ACR Incidental Findings Committee (Radiology 2010), no further workup of these lesions is recommended      STOMACH AND BOWEL:  Small sliding-type hiatal hernia      APPENDIX:  No findings to suggest appendicitis      ABDOMINOPELVIC CAVITY:  No ascites or free intraperitoneal air  No lymphadenopathy      VESSELS:  Unremarkable for patient's age      PELVIS     REPRODUCTIVE ORGANS:  Right adnexal cyst noted, also seen on MRI dated 8/21/2017 and CT dated 8/9/2017     URINARY BLADDER:  Unremarkable      ABDOMINAL WALL/INGUINAL REGIONS:  Left inguinal fat only containing hernia      OSSEOUS STRUCTURES:  No acute fracture or destructive osseous lesion      IMPRESSION:  No acute findings  No findings to account for lower abdominal pain        PATHOLOGY:     1/15/20  Final Diagnosis    A  Urinary bladder right ureteral orifice biopsy:  - Somewhat polypoid mucosa with edema and chronic inflammation  - No dysplasia nor malignancy seen     B  Right lateral trigone biopsy:  - Somewhat polypoid and edematous mucosa with mild chronic and focal acute inflammation  - No dysplasia nor malignancy is seen  9/2017  Final Diagnosis:  1  Bladder tumor near orifice, transurethral resection:  Non-invasive high-grade papillary urothelial carcinoma  - Muscularis propria is identified and uninvolved by carcinoma  2  Posterior bladder wall, biopsy:  Benign urothelium with no tumor seen  - Muscularis propria is identified      3  Lateral bladder wall, biopsy:  Benign urothelium with no tumor seen  - Muscularis propria is identified  DBF/wf/mm    CYTOLOGY:     Final Diagnosis   A  Urine, Clean Catch:  Negative for high grade urothelial carcinoma ([address]) - see comment  Degenerated benign urothelial cells, squamous cells, few mixed inflammatory cells and red blood cells      Satisfactory for evaluation  PROCEDURE:     SEE NOTE    ASSESSMENT:     65 y o  female with history of HGTa bladder cancer    PLAN:     No signs of recurrence  Patient 5 years from diagnosis  NCCN guidelines reviewed for Intermediate Risk NMIBC  Cystoscopy and urine testing as clinically indication years 5-10  Will see in one year for urine testing  If urine remains unremarkable and patient has no change to her symptoms, would consider releasing to her primary care service who can follow yearly urine studies until 2027

## 2022-11-29 ENCOUNTER — TELEPHONE (OUTPATIENT)
Dept: GASTROENTEROLOGY | Facility: CLINIC | Age: 77
End: 2022-11-29

## 2022-11-29 ENCOUNTER — TELEPHONE (OUTPATIENT)
Dept: UROLOGY | Facility: CLINIC | Age: 77
End: 2022-11-29

## 2022-11-29 NOTE — TELEPHONE ENCOUNTER
Called and spoke with pt reviewed all information and confirmed the need for the fish test and CT   Pt state she will get these done at [address]

## 2022-11-29 NOTE — TELEPHONE ENCOUNTER
Patient called saying that she is returning a phone call that she had got from the office regarding her urine cytology results, patient is asking if the office can give her a call back regarding the matter

## 2022-11-29 NOTE — TELEPHONE ENCOUNTER
Called the patient and left a voicemail asking to please call the office to review urine cytology results

## 2022-11-29 NOTE — TELEPHONE ENCOUNTER
----- Message from Bessie Bernal MD sent at 11/29/2022  7:57 AM EST -----  Patient's urine cytology returned atypical   Given her normal cystoscopy, I am going to recommend an FISH urine test for confirmation  I am also going to recommend upper tract imaging with a CT urogram   Patient's last imaging in 2019  Assuming these are normal, will follow-up as planned in 1 year

## 2022-12-19 ENCOUNTER — TELEPHONE (OUTPATIENT)
Dept: OTHER | Facility: OTHER | Age: 77
End: 2022-12-19

## 2022-12-19 ENCOUNTER — HOSPITAL ENCOUNTER (OUTPATIENT)
Dept: CT IMAGING | Facility: HOSPITAL | Age: 77
Discharge: HOME | End: 2022-12-19
Attending: UROLOGY

## 2022-12-19 DIAGNOSIS — C67.9 MALIGNANT NEOPLASM OF URINARY BLADDER, UNSPECIFIED SITE (HCC): ICD-10-CM

## 2022-12-19 DIAGNOSIS — R82.89 URINE CYTOLOGY ABNORMAL: ICD-10-CM

## 2022-12-19 RX ADMIN — IOHEXOL 100 ML: 350 INJECTION, SOLUTION INTRAVENOUS at 15:03

## 2022-12-19 NOTE — TELEPHONE ENCOUNTER
Patients daughter in law is requesting a call back  Patient recently had labs drawn ordered by PCP office  Family stated that since the labs for urology were close in date with PCP orders that this is not covered under insurance  Please call back to address

## 2022-12-20 ENCOUNTER — VBI (OUTPATIENT)
Dept: ADMINISTRATIVE | Facility: OTHER | Age: 77
End: 2022-12-20

## 2022-12-20 NOTE — TELEPHONE ENCOUNTER
Pt daughter in law called and left  requesting a c/b regarding pt labs     Pt call [phone] Brent De La Vega (daughter in law)

## 2022-12-21 NOTE — TELEPHONE ENCOUNTER
Called and spoke with patient's daughter-in-law Casi  Patient thinks she is due for bloodwork this year and PCP ordered blood work  Patient concerned the insurance would not cover the cost   Explained to Casi patient's follow up is in one year with urinalysis prior to appointment  CT obtained on 12/19/2022  Not finalized  Will call Casi with the results

## 2022-12-27 ENCOUNTER — TELEPHONE (OUTPATIENT)
Dept: UROLOGY | Facility: MEDICAL CENTER | Age: 77
End: 2022-12-27

## 2022-12-27 NOTE — TELEPHONE ENCOUNTER
CT renal study results as below:    IMPRESSION:     1   Redemonstrated small 9 mm right anterior exophytic interpolar region nodule  This is stable in size from an MRI in 2017, however follow-up MRI was recommended at that time given indeterminate MRI signal characteristics  Consider follow-up MRI if it   would change clinical management  Other small renal lesions previously characterized as cysts on MRI are stable to minimally increased in size  2   No additional findings to explain the patient's abnormal urine cytology  I do not see follow up scheduled   Will CC Dr Boyd Garber for his decision/request for follow up basaed on pt hx

## 2023-01-03 ENCOUNTER — TELEPHONE (OUTPATIENT)
Dept: OTHER | Facility: OTHER | Age: 78
End: 2023-01-03

## 2023-01-03 NOTE — TELEPHONE ENCOUNTER
Patient daughter in-law is requesting a call back from the office to discuss her mother in-law test results  Patient daughter in-law stated that she had the office about a week ago regarding the result and have not heard back from the office

## 2023-01-04 DIAGNOSIS — C67.9 MALIGNANT NEOPLASM OF URINARY BLADDER, UNSPECIFIED SITE (HCC): Primary | ICD-10-CM

## 2023-01-04 NOTE — TELEPHONE ENCOUNTER
Arun Andrade MD  to Dylan Ocampo, [facility]      7:25 PM   Small sub-cm lesion/cyst on kidney stable for more than 5 years is not concerning given its small size and stability  Would not fito with MRI  CT was ordered given atypical urine cytology-no upper tract urothelial issues noted on CT  FISH test also ordered and is pending  We need to make sure patient is getting this test completed  Once FISH results complete, will help to determine plan for continued observation versus operative selective cytology/random biopsies  I will contact patient when I have results of FISH       FISH test was ordered

## 2023-01-05 ENCOUNTER — APPOINTMENT (OUTPATIENT)
Dept: LAB | Facility: CLINIC | Age: 78
End: 2023-01-05

## 2023-01-05 DIAGNOSIS — C67.9 MALIGNANT NEOPLASM OF URINARY BLADDER, UNSPECIFIED SITE (HCC): Primary | ICD-10-CM

## 2023-01-08 ENCOUNTER — HOSPITAL ENCOUNTER (OUTPATIENT)
Dept: ULTRASOUND IMAGING | Facility: HOSPITAL | Age: 78
Discharge: HOME | End: 2023-01-08
Attending: OBSTETRICS & GYNECOLOGY

## 2023-01-08 DIAGNOSIS — N83.201 RIGHT OVARIAN CYST: ICD-10-CM

## 2023-01-09 ENCOUNTER — APPOINTMENT (OUTPATIENT)
Dept: LAB | Facility: CLINIC | Age: 78
End: 2023-01-09

## 2023-01-09 DIAGNOSIS — C67.9 MALIGNANT NEOPLASM OF URINARY BLADDER, UNSPECIFIED SITE (HCC): ICD-10-CM

## 2023-01-10 ENCOUNTER — TELEPHONE (OUTPATIENT)
Dept: UROLOGY | Facility: CLINIC | Age: 78
End: 2023-01-10

## 2023-01-10 LAB — SCAN RESULT: NORMAL

## 2023-01-10 NOTE — TELEPHONE ENCOUNTER
----- Message from Gordon Rios MD sent at 1/10/2023 10:46 AM EST -----  Patient's test was insufficient for result given low volume  Needs to be repeated please

## 2023-01-11 ENCOUNTER — APPOINTMENT (OUTPATIENT)
Dept: LAB | Facility: CLINIC | Age: 78
End: 2023-01-11

## 2023-01-11 DIAGNOSIS — R82.89 URINE CYTOLOGY ABNORMAL: Primary | ICD-10-CM

## 2023-01-11 LAB
BACTERIA UR QL AUTO: NORMAL /HPF
BILIRUB UR QL STRIP: NEGATIVE
CLARITY UR: CLEAR
COLOR UR: NORMAL
GLUCOSE UR STRIP-MCNC: NEGATIVE MG/DL
HGB UR QL STRIP.AUTO: NEGATIVE
KETONES UR STRIP-MCNC: NEGATIVE MG/DL
LEUKOCYTE ESTERASE UR QL STRIP: NEGATIVE
NITRITE UR QL STRIP: NEGATIVE
NON-SQ EPI CELLS URNS QL MICRO: NORMAL /HPF
PH UR STRIP.AUTO: 6.5 [PH]
PROT UR STRIP-MCNC: NEGATIVE MG/DL
RBC #/AREA URNS AUTO: NORMAL /HPF
SP GR UR STRIP.AUTO: 1 (ref 1–1.03)
UROBILINOGEN UR STRIP-ACNC: <2 MG/DL
WBC #/AREA URNS AUTO: NORMAL /HPF

## 2023-02-10 DIAGNOSIS — K21.9 GASTROESOPHAGEAL REFLUX DISEASE, UNSPECIFIED WHETHER ESOPHAGITIS PRESENT: ICD-10-CM

## 2023-02-10 RX ORDER — OMEPRAZOLE 20 MG/1
CAPSULE, DELAYED RELEASE ORAL
Qty: 90 CAPSULE | Refills: 1 | Status: SHIPPED | OUTPATIENT
Start: 2023-02-10

## 2023-02-17 ENCOUNTER — TELEPHONE (OUTPATIENT)
Dept: GASTROENTEROLOGY | Facility: CLINIC | Age: 78
End: 2023-02-17

## 2023-02-17 NOTE — TELEPHONE ENCOUNTER
I called Dilan Last and left a message for her to call back and reschedule her 2 17 2023 appointment with Dr Sukumar Sullivan

## 2023-03-18 ENCOUNTER — HOSPITAL ENCOUNTER (OUTPATIENT)
Dept: MAMMOGRAPHY | Facility: HOSPITAL | Age: 78
Discharge: HOME | End: 2023-03-18

## 2023-03-18 VITALS — BODY MASS INDEX: 30.43 KG/M2 | WEIGHT: 154.98 LBS | HEIGHT: 60 IN

## 2023-03-18 DIAGNOSIS — Z12.31 SCREENING MAMMOGRAM FOR HIGH-RISK PATIENT: ICD-10-CM

## 2023-04-24 ENCOUNTER — RA CDI HCC (OUTPATIENT)
Dept: OTHER | Facility: HOSPITAL | Age: 78
End: 2023-04-24

## 2023-04-24 NOTE — PROGRESS NOTES
[address]  coding opportunities       Chart reviewed, no opportunity found: CHART REVIEWED, NO OPPORTUNITY FOUND        Patients Insurance     Medicare Insurance: Medicare

## 2023-04-27 ENCOUNTER — APPOINTMENT (OUTPATIENT)
Dept: LAB | Facility: CLINIC | Age: 78
End: 2023-04-27

## 2023-04-27 DIAGNOSIS — E11.9 TYPE 2 DIABETES MELLITUS WITHOUT COMPLICATION, WITH LONG-TERM CURRENT USE OF INSULIN (HCC): ICD-10-CM

## 2023-04-27 DIAGNOSIS — Z11.59 NEED FOR HEPATITIS C SCREENING TEST: ICD-10-CM

## 2023-04-27 DIAGNOSIS — Z79.4 TYPE 2 DIABETES MELLITUS WITHOUT COMPLICATION, WITH LONG-TERM CURRENT USE OF INSULIN (HCC): ICD-10-CM

## 2023-04-27 LAB
ALBUMIN SERPL BCP-MCNC: 4.3 G/DL (ref 3.5–5)
ALP SERPL-CCNC: 52 U/L (ref 34–104)
ALT SERPL W P-5'-P-CCNC: 16 U/L (ref 7–52)
ANION GAP SERPL CALCULATED.3IONS-SCNC: 6 MMOL/L (ref 4–13)
AST SERPL W P-5'-P-CCNC: 16 U/L (ref 13–39)
BILIRUB SERPL-MCNC: 0.51 MG/DL (ref 0.2–1)
BUN SERPL-MCNC: 20 MG/DL (ref 5–25)
CALCIUM SERPL-MCNC: 9.5 MG/DL (ref 8.4–10.2)
CHLORIDE SERPL-SCNC: 101 MMOL/L (ref 96–108)
CHOLEST SERPL-MCNC: 176 MG/DL
CO2 SERPL-SCNC: 31 MMOL/L (ref 21–32)
CREAT SERPL-MCNC: 0.68 MG/DL (ref 0.6–1.3)
CREAT UR-MCNC: 38.2 MG/DL
GFR SERPL CREATININE-BSD FRML MDRD: 84 ML/MIN/1.73SQ M
GLUCOSE P FAST SERPL-MCNC: 123 MG/DL (ref 65–99)
HCV AB SER QL: NORMAL
HDLC SERPL-MCNC: 47 MG/DL
LDLC SERPL CALC-MCNC: 97 MG/DL (ref 0–100)
MICROALBUMIN UR-MCNC: 9.6 MG/L (ref 0–20)
MICROALBUMIN/CREAT 24H UR: 25 MG/G CREATININE (ref 0–30)
POTASSIUM SERPL-SCNC: 4.3 MMOL/L (ref 3.5–5.3)
PROT SERPL-MCNC: 7.4 G/DL (ref 6.4–8.4)
SODIUM SERPL-SCNC: 138 MMOL/L (ref 135–147)
TRIGL SERPL-MCNC: 161 MG/DL

## 2023-05-01 ENCOUNTER — OFFICE VISIT (OUTPATIENT)
Dept: INTERNAL MEDICINE CLINIC | Facility: CLINIC | Age: 78
End: 2023-05-01

## 2023-05-01 VITALS
BODY MASS INDEX: 29.96 KG/M2 | WEIGHT: 152.6 LBS | DIASTOLIC BLOOD PRESSURE: 78 MMHG | SYSTOLIC BLOOD PRESSURE: 138 MMHG | HEART RATE: 93 BPM | OXYGEN SATURATION: 95 % | HEIGHT: 60 IN

## 2023-05-01 DIAGNOSIS — M54.41 ACUTE RIGHT-SIDED LOW BACK PAIN WITH RIGHT-SIDED SCIATICA: ICD-10-CM

## 2023-05-01 DIAGNOSIS — Z59.9 FINANCIAL DIFFICULTIES: Primary | ICD-10-CM

## 2023-05-01 DIAGNOSIS — Z00.00 MEDICARE ANNUAL WELLNESS VISIT, SUBSEQUENT: ICD-10-CM

## 2023-05-01 DIAGNOSIS — R25.1 TREMOR: ICD-10-CM

## 2023-05-01 DIAGNOSIS — C67.9 MALIGNANT NEOPLASM OF URINARY BLADDER, UNSPECIFIED SITE (HCC): ICD-10-CM

## 2023-05-01 DIAGNOSIS — I10 ESSENTIAL HYPERTENSION: ICD-10-CM

## 2023-05-01 DIAGNOSIS — E78.5 HYPERLIPIDEMIA, UNSPECIFIED HYPERLIPIDEMIA TYPE: ICD-10-CM

## 2023-05-01 DIAGNOSIS — E11.9 TYPE 2 DIABETES MELLITUS WITHOUT COMPLICATION, WITHOUT LONG-TERM CURRENT USE OF INSULIN (HCC): ICD-10-CM

## 2023-05-01 LAB
EST. AVERAGE GLUCOSE BLD GHB EST-MCNC: 131 MG/DL
HBA1C MFR BLD: 6.2 %

## 2023-05-01 SDOH — ECONOMIC STABILITY - INCOME SECURITY: PROBLEM RELATED TO HOUSING AND ECONOMIC CIRCUMSTANCES, UNSPECIFIED: Z59.9

## 2023-05-01 NOTE — PROGRESS NOTES
Assessment and Plan:     Problem List Items Addressed This Visit        Endocrine    Type 2 diabetes mellitus without complication, without long-term current use of insulin ([facility] [address] )       Lab Results   Component Value Date    HGBA1C 6 2 (H) 04/27/2023   I have counselled the pt to follow a healthy and balanced diet ,and recommend routine exercise  I will be ordering diabetic laboratories including comprehensive metabolic panel, hemoglobin A1c, urine microalbumin, lipid panel  Annual eye examination recommended         Relevant Orders    Hemoglobin A1C    Comprehensive metabolic panel    Lipid Panel with Direct LDL reflex    Microalbumin / creatinine urine ratio       Cardiovascular and Mediastinum    Essential hypertension      hypertension - controlled, I have counseled patient following healthy balance diet, I would like the patient reduce sodium, exercise routinely, I would like the patient continued the med current medical regiment and we will continue to monitor  Blood pressure today 138/78            Nervous and Auditory    Acute right-sided low back pain with right-sided sciatica     Patient reports me chronic and intermittent uses Tylenol at times worsened with change in temperature or bariatric pressure at this point time her symptoms are relatively stable            Genitourinary    Malignant neoplasm of urinary bladder ([facility] [address] )     Clinically the patient is stable and doing well she will continue ongoing follow-up with urology            Other    Medicare annual wellness visit, subsequent     Assessment and plan 1  Medicare subsequent annual wellness examination overall the patient is clinically stable and doing well, we encouraged the patient to follow a healthy and balanced diet  We recommend that the patient exercise routinely approximately 30 minutes 5 times per week   We have reviewed the patient's vaccines and have made recommendations for updates if necessary   annual flu shot       We will be ordering screening laboratories which are age appropriate  Return to the office in   6 months   call if any problems  Hyperlipidemia     Hyperlipidemia controlled continue with current medical regiment recommend a low-cholesterol diet and recommend routine exercise we will continue to monitor the progress  Continue Zocor 20 mg once daily         Tremor     Chin tremor, mild bradykinesia and mild decreased blink no cogwheeling no rigidity, no fenestrating gait differential diagnoses essential tremor versus early Parkinson's we will have patient meet with neurology         Relevant Orders    Ambulatory Referral to Neurology   Other Visit Diagnoses     Financial difficulties    -  Primary    Relevant Orders    Ambulatory referral to social work care management program      RTO in 6 months call if any problems  BMI Counseling: Body mass index is 29 57 kg/m²  The BMI is above normal  Nutrition recommendations include decreasing portion sizes and moderation in carbohydrate intake  Exercise recommendations include exercising 3-5 times per week  Rationale for BMI follow-up plan is due to patient being overweight or obese  Depression Screening and Follow-up Plan: Patient was screened for depression during today's encounter  They screened negative with a PHQ-2 score of 0  Preventive health issues were discussed with patient, and age appropriate screening tests were ordered as noted in patient's After Visit Summary  Personalized health advice and appropriate referrals for health education or preventive services given if needed, as noted in patient's After Visit Summary       History of Present Illness:     Patient presents for a Medicare Wellness Visit    HPI 67-year old female coming in for a follow up office visit regarding type 2 diabetes, chin tremor, bladder cancer, essential hypertension, chronic sciatica right lower back, hyperlipidemia; the patient reports me compliant taking medications without untoward side effects the  The patient is here to review his medical condition, update me on the medical condition and the patient reports me no hospitalizations and no ER visits  No injuries no illnesses here to review laboratories in detail she reports me intermittent lower back pain with radiation down the right lower extremity which has been chronic for many years unchanged she reports me that her symptoms worsen when there is a change in weather or rain; also she does see urology routinely regarding the bladder tumor  Recently family members have noticed a mild tremor up to her chin  Here to review laboratories in detail reports me that her grandson and her son have noticed a little bit of shaking of her chin region  Patient Care Team:  Kathryn August DO as PCP - MD Kathryn Hendrickson DO Dayna Seltzer, MD Fredricka Lusty, MD     Review of Systems:     Review of Systems   Constitutional: Negative for activity change, appetite change and unexpected weight change  HENT: Negative for congestion  Eyes: Negative for visual disturbance  Respiratory: Negative for cough and shortness of breath  Cardiovascular: Negative for chest pain  Gastrointestinal: Negative for abdominal pain, diarrhea, nausea and vomiting  Neurological: Negative for dizziness, light-headedness and headaches     Chin tremor     Problem List:     Patient Active Problem List   Diagnosis    Cough    Bronchitis    Hypertension    Dyslipidemia    Type 2 diabetes mellitus without complication, without long-term current use of insulin ([facility] [address] )    Lumbar radiculopathy    Encounter for screening mammogram for breast cancer    Intervertebral disc disorders with radiculopathy, lumbar region    Malignant neoplasm of urinary bladder ([facility] [address] )    Dysuria    Medicare annual wellness visit, subsequent    Gastroesophageal reflux disease without esophagitis    Hyperlipidemia    Type 2 diabetes mellitus without complication, with long-term current use of insulin (HCC)    Chronic pain of left knee    Chronic pain of right knee    Primary osteoarthritis of right knee    Primary osteoarthritis of left knee    Need for vaccination    Essential hypertension    Acute right-sided low back pain with right-sided sciatica    Colon polyps    Fall    Cortical age-related cataract of both eyes    Sacroiliitis ([facility] [address] )    Left inguinal hernia    Seasonal allergies    Postoperative visit    [facility]    Rotator cuff tendinitis    Tremor      Past Medical and Surgical History:     Past Medical History:   Diagnosis Date    Arthritis     Back pain     sciatic pain right hip and down right leg    Cancer ([facility] [address] )     bladder cancer ; had surgical scraping    GERD (gastroesophageal reflux disease)     Hyperlipidemia     Hypertension     Seasonal allergies     Wears partial dentures     1 small tooth removed and placed in cup      Past Surgical History:   Procedure Laterality Date    APPENDECTOMY      COLONOSCOPY      [facility]    EYE SURGERY      HERNIA REPAIR      TN BLADDER INSTILLATION ANTICARCINOGENIC AGENT N/A 1/15/2020    Procedure: INSTILLATION MITOMYCIN;  Surgeon: Patria Burgess MD;  Location: [facility];  Service: Urology    TN CYSTO W/REMOVAL OF LESIONS SMALL N/A 1/15/2020    Procedure: Cystoscopy TRANSURETHRAL RESECTION OF BLADDER TUMOR (TURBT);   Surgeon: Patria Burgess MD;  Location[facility];  Service: Urology    TN RPR 1ST Roxy Handler AGE 5 YRS/> REDUCIBLE Left 2/3/2022    Procedure: INGUINAL HERNIA REPAIR;  Surgeon: Jaison Zuniga MD;  [facility];  Service: General    UPPER GASTROINTESTINAL ENDOSCOPY      VEIN LIGATION AND STRIPPING        Family History:     Family History   Problem Relation Age of Onset    Heart disease Mother     Hypertension Mother     No Known Problems Son     No Known Problems Son Social History:     Social History     Socioeconomic History    Marital status:      Spouse name: None    Number of children: None    Years of education: None    Highest education level: None   Occupational History    None   Tobacco Use    Smoking status: Never    Smokeless tobacco: Never   Vaping Use    Vaping Use: Never used   Substance and Sexual Activity    Alcohol use: No    Drug use: No    Sexual activity: Not Currently   Other Topics Concern    None   Social History Narrative    None     Social Determinants of Health     Financial Resource Strain: Medium Risk    Difficulty of Paying Living Expenses: Somewhat hard   Food Insecurity: Not on file   Transportation Needs: No Transportation Needs    Lack of Transportation (Medical): No    Lack of Transportation (Non-Medical):  No   Physical Activity: Not on file   Stress: Not on file   Social Connections: Not on file   Intimate Partner Violence: Not on file   Housing Stability: Not on file      Medications and Allergies:     Current Outpatient Medications   Medication Sig Dispense Refill    calcium-vitamin D 250-100 MG-UNIT per tablet Take 1 tablet by mouth 2 (two) times a day      Cholecalciferol (VITAMIN D3) 1000 units CAPS Take 1 capsule by mouth daily      cyanocobalamin (VITAMIN B-12) 1000 MCG tablet Take 1,000 mcg by mouth daily      famotidine (PEPCID) 40 MG tablet Take 1 tablet (40 mg total) by mouth daily 90 tablet 1    hydrochlorothiazide (HYDRODIURIL) 12 5 mg tablet Take 1 tablet (12 5 mg total) by mouth daily 90 tablet 1    losartan (COZAAR) 100 MG tablet Take 1 tablet (100 mg total) by mouth daily 90 tablet 1    multivitamin-minerals (CENTRUM ADULTS) tablet Take 1 tablet by mouth daily      omeprazole (PriLOSEC) 20 mg delayed release capsule TAKE 1 CAPSULE BY MOUTH DAILY 90 capsule 1    simvastatin (ZOCOR) 20 mg tablet Take 1 tablet (20 mg total) by mouth daily 90 tablet 3    amoxicillin (AMOXIL) 500 mg capsule TAKE ONE CAPSULE EVERY EIGHT HOURS (Patient not taking: Reported on 11/23/2022)      Besivance 0 6 % SUSP  (Patient not taking: Reported on 4/26/2022)      cyclobenzaprine (FLEXERIL) 5 mg tablet Take 1 tablet (5 mg total) by mouth 3 (three) times a day as needed for muscle spasms First try at bedtime to see how you feel the next morning  (Patient not taking: Reported on 4/27/2022) 30 tablet 0    Diclofenac Sodium (VOLTAREN) 1 % Apply 2 g topically 4 (four) times a day (Patient not taking: Reported on 11/23/2022) 1 g 2    Inveltys 1 % SUSP   (Patient not taking: Reported on 12/16/2021)      oxyCODONE-acetaminophen (PERCOCET) 5-325 mg per tablet Take 1 tablet by mouth every 4 (four) hours as needed for moderate pain for up to 10 doses Max Daily Amount: 6 tablets (Patient not taking: Reported on 11/23/2022) 10 tablet 0    Prolensa 0 07 % SOLN Administer 1 drop to both eyes daily (Patient not taking: Reported on 4/26/2022) 3 mL 1     No current facility-administered medications for this visit       No Known Allergies   Immunizations:     Immunization History   Administered Date(s) Administered    COVID-19 MODERNA VACC 0 5 ML IM 01/20/2021, 03/01/2021    INFLUENZA 09/12/2022    Influenza Split High Dose Preservative Free IM 10/04/2014, 10/03/2015, 10/08/2016, 10/16/2017    Influenza, high dose seasonal 0 7 mL 10/13/2018, 10/12/2019, 09/24/2020, 10/21/2021, 09/12/2022    Influenza, seasonal, injectable 10/20/2012, 10/30/2013    Pneumococcal Conjugate 13-Valent 08/13/2015    Pneumococcal Polysaccharide PPV23 01/28/2010    TD (adult) Preservative Free 01/28/2007    Zoster 07/29/2017    Zoster Vaccine Recombinant 07/29/2017, 08/02/2017      Health Maintenance:         Topic Date Due    Colorectal Cancer Screening  09/12/2022    Breast Cancer Screening: Mammogram  03/18/2024    Hepatitis C Screening  Completed         Topic Date Due    COVID-19 Vaccine (3 - Booster for Vickki Olives series) 04/26/2021      Medicare Screening Tests and Risk Assessments:     Monet Thurman is here for her Subsequent Wellness visit  Health Risk Assessment:   Patient rates overall health as very good  Patient feels that their physical health rating is same  Patient is very satisfied with their life  Eyesight was rated as same  Hearing was rated as same  Patient feels that their emotional and mental health rating is same  Patients states they are never, rarely angry  Patient states they are always unusually tired/fatigued  Pain experienced in the last 7 days has been some  Patient's pain rating has been 6/10  Patient states that she has experienced no weight loss or gain in last 6 months  Tired secondary to age' lower back pain chronic    Depression Screening:   PHQ-2 Score: 0      Fall Risk Screening: In the past year, patient has experienced: no history of falling in past year      Urinary Incontinence Screening:   Patient has not leaked urine accidently in the last six months  Home Safety:  Patient does not have trouble with stairs inside or outside of their home  Patient has working smoke alarms and has working carbon monoxide detector  Home safety hazards include: none  Nutrition:   Current diet is Regular  Medications:   Patient is currently taking over-the-counter supplements  OTC medications include: tylenol  Patient is able to manage medications  Activities of Daily Living (ADLs)/Instrumental Activities of Daily Living (IADLs):   Walk and transfer into and out of bed and chair?: Yes  Dress and groom yourself?: Yes    Bathe or shower yourself?: Yes    Feed yourself? Yes  Do your laundry/housekeeping?: Yes  Manage your money, pay your bills and track your expenses?: Yes  Make your own meals?: Yes    Do your own shopping?: Yes    Previous Hospitalizations:   Any hospitalizations or ED visits within the last 12 months?: No      Advance Care Planning:   Living will: Yes    Durable POA for healthcare: Yes    Advanced directive:  Yes "    Comments: Sons poa    Cognitive Screening:   Provider or family/friend/caregiver concerned regarding cognition?: No    PREVENTIVE SCREENINGS      Cardiovascular Screening:    General: Screening Not Indicated and History Lipid Disorder      Diabetes Screening:     General: Screening Not Indicated and History Diabetes      Breast Cancer Screening:     General: Screening Current      Cervical Cancer Screening:    General: Screening Not Indicated      Lung Cancer Screening:     General: Screening Not Indicated      Hepatitis C Screening:    General: Screening Current    Screening, Brief Intervention, and Referral to Treatment (SBIRT)    Screening  Typical number of drinks in a day: 0  Typical number of drinks in a week: 0  Interpretation: Low risk drinking behavior  Single Item Drug Screening:  How often have you used an illegal drug (including marijuana) or a prescription medication for non-medical reasons in the past year? never    Single Item Drug Screen Score: 0  Interpretation: Negative screen for possible drug use disorder    No results found  Physical Exam:     /78   Pulse 93   Ht 5' 0 24\" (1 53 m)   Wt 69 2 kg (152 lb 9 6 oz)   LMP  (LMP Unknown)   SpO2 95%   BMI 29 57 kg/m²     Physical Exam  Vitals and nursing note reviewed  Constitutional:       General: She is not in acute distress  Appearance: Normal appearance  She is well-developed  She is not ill-appearing, toxic-appearing or diaphoretic  HENT:      Head: Normocephalic and atraumatic  Right Ear: External ear normal       Left Ear: External ear normal       Nose: Nose normal    Eyes:      Pupils: Pupils are equal, round, and reactive to light  Cardiovascular:      Rate and Rhythm: Normal rate and regular rhythm  Heart sounds: Normal heart sounds  No murmur heard  Pulmonary:      Effort: Pulmonary effort is normal       Breath sounds: Normal breath sounds  Abdominal:      General: There is no distension        " Palpations: Abdomen is soft  Tenderness: There is no abdominal tenderness  There is no guarding  Neurological:      Mental Status: She is alert       Chin tremor, bradykinesia of the facial muscles mild, decreased blink reflex, no cogwheeling, no fenestrating gait    Ana Maria Adrian DO

## 2023-05-01 NOTE — ASSESSMENT & PLAN NOTE
Chin tremor, mild bradykinesia and mild decreased blink no cogwheeling no rigidity, no fenestrating gait differential diagnoses essential tremor versus early Parkinson's we will have patient meet with neurology

## 2023-05-01 NOTE — ASSESSMENT & PLAN NOTE
Lab Results   Component Value Date    HGBA1C 6 2 (H) 04/27/2023   I have counselled the pt to follow a healthy and balanced diet ,and recommend routine exercise  I will be ordering diabetic laboratories including comprehensive metabolic panel, hemoglobin A1c, urine microalbumin, lipid panel    Annual eye examination recommended

## 2023-05-01 NOTE — PATIENT INSTRUCTIONS
Type 2 Diabetes Management for Adults   AMBULATORY CARE:   Type 2 diabetes  is a disease that affects how your body uses glucose (sugar)  Either your body cannot make enough insulin, or it cannot use the insulin correctly  It is important to keep diabetes controlled to prevent damage to your heart, blood vessels, and other organs  Management will help you feel well and enjoy your daily activities  Your diabetes care team providers can help you make a plan to fit diabetes care into your schedule  Your plan can change over time to fit your needs and your family's needs  Have someone call your local emergency number (911 in the [address] [facility] [address]) if:    You cannot be woken   You have signs of diabetic ketoacidosis:     ? confusion, fatigue    ? vomiting    ? rapid heartbeat    ? fruity smelling breath    ? extreme thirst    ? dry mouth and skin     You have any of the following signs of a heart attack:      ? Squeezing, pressure, or pain in your chest    ? You may  also have any of the following:     - Discomfort or pain in your back, neck, jaw, stomach, or arm    - Shortness of breath    - Nausea or vomiting    - Lightheadedness or a sudden cold sweat     You have any of the following signs of a stroke:      ? Numbness or drooping on one side of your face     ? Weakness in an arm or leg    ? Confusion or difficulty speaking    ? Dizziness, a severe headache, or vision loss    Call your doctor or diabetes care team provider if:    You have a sore or wound that will not heal      You have a change in the amount you urinate   Your blood sugar levels are higher than your target goals   You often have lower blood sugar levels than your target goals   Your skin is red, dry, warm, or swollen   You have trouble coping with diabetes, or you feel anxious or depressed   You have questions or concerns about your condition or care      What you need to know about high blood sugar levels:  High blood sugar levels may not cause any symptoms  You may feel more thirsty or urinate more often than usual  Over time, high blood sugar levels can damage your nerves, blood vessels, tissues, and organs  The following can increase your blood sugar levels:   Large meals or large amounts of carbohydrates at one time     Less physical activity     Stress     Illness     A lower dose of diabetes medicine or insulin, or a late dose    What you need to know about low blood sugar levels:  Symptoms include feeling shaky, dizzy, irritable, or confused  You can prevent symptoms by keeping your blood sugar levels from going too low   Treat a low blood sugar level right away:      ? Drink 4 ounces of juice or have 1 tube of glucose gel  ? Check your blood sugar level again 10 to 15 minutes later  ? When the level goes back to normal, eat a meal or snack to prevent another decrease   Keep glucose gel, raisins, or hard candy with you at all times to treat a low blood sugar level   Your blood sugar level can get too low if you take diabetes medicine or insulin and do not eat enough food   If you use insulin, check your blood sugar level before you exercise  ? If your blood sugar level is below 100 mg/dL, eat 4 crackers or 2 ounces of raisins, or drink 4 ounces of juice  ? Check your level every 30 minutes if you exercise longer than 1 hour  ? You may need a snack during or after exercise  What you can do to manage your blood sugar levels:    Check your blood sugar levels as directed and as needed  Several items are available to use to check your levels  You may need to check by testing a drop of blood in a glucose monitor  You may instead be given a continuous glucose monitoring (CGM) device  The device is worn at all times  The CGM checks your blood sugar level every 5 minutes  It sends results to an electronic device such as a smart phone  A CGM can be used with or without an insulin pump   You and your diabetes care team providers will decide on the best method for you  The goal for blood sugar levels before meals  is between 80 and 130 mg/dL and 2 hours after eating  is lower than 180 mg/dL   Make healthy food choices  Work with a dietitian to develop a meal plan that works for you and your schedule  A dietitian can help you learn how to eat the right amount of carbohydrates during your meals and snacks  Carbohydrates can raise your blood sugar level if you eat too many at one time  Examples of foods that contain carbohydrates are breads, cereals, rice, pasta, and sweets   Eat high-fiber foods as directed  Fiber helps improve blood sugar levels  Fiber also lowers your risk for heart disease and other problems diabetes can cause  Examples of high-fiber foods include vegetables, whole-grain bread, and beans such as beltrán beans  Your dietitian can tell you how much fiber to have each day   Get regular physical activity  Physical activity can help you get to your target blood sugar level goal and manage your weight  Get at least 150 minutes of moderate to vigorous aerobic physical activity each week  Do not miss more than 2 days in a row  Do not sit longer than 30 minutes at a time  Your healthcare provider can help you create an activity plan  The plan can include the best activities for you and can help you build your strength and endurance   Maintain a healthy weight  Ask your team what a healthy weight is for you  A healthy weight can help you control diabetes and prevent heart disease  Ask your team to help you create a weight loss plan, if needed  Weight loss can help make a difference in managing diabetes  Your team will help you set a weight-loss goal, such as 10 to 15 pounds, or 5% of your extra weight  Together you and your team can set manageable weight loss goals   Take your diabetes medicine or insulin as directed    You may need diabetes medicine, insulin, or both to help control your blood sugar levels  Your healthcare provider will teach you how and when to take your diabetes medicine or insulin  You will also be taught about side effects oral diabetes medicine can cause  Insulin may be injected or given through a pump or pen  You and your providers will decide on the best method for you:    ? An insulin pump  is an implanted device that gives your insulin 24 hours a day  An insulin pump prevents the need for multiple insulin injections in a day  ? An insulin pen  is a device prefilled with the right amount of insulin  ? You and your family members will be taught how to draw up and give insulin  if this is the best method for you  Your providers will also teach you how to dispose of needles and syringes  ? You will learn how much insulin you need  and when to give it  You will be taught when not to give insulin  You will also be taught what to do if your blood sugar level drops too low  This may happen if you take insulin and do not eat the right amount of carbohydrates  More ways to manage type 2 diabetes:    Wear medical alert identification  Wear medical alert jewelry or carry a card that says you have diabetes  Ask your provider where to get these items   Do not smoke  Nicotine and other chemicals in cigarettes and cigars can cause lung and blood vessel damage  It also makes it more difficult to manage your diabetes  Ask your provider for information if you currently smoke and need help to quit  Do not use e-cigarettes or smokeless tobacco in place of cigarettes or to help you quit  They still contain nicotine   Check your feet each day for cuts, scratches, calluses, or other wounds  Look for redness and swelling, and feel for warmth  Wear shoes that fit well  Check your shoes for rocks or other objects that can hurt your feet  Do not walk barefoot or wear shoes without socks   Wear cotton socks to help keep your feet dry   Ask about vaccines you may need  You have a higher risk for serious illness if you get the flu, pneumonia, COVID-19, or hepatitis  Ask your provider if you should get vaccines to prevent these or other diseases, and when to get the vaccines   Talk to your provider if you become stressed about diabetes care  Sometimes being able to fit diabetes care into your life can cause increased stress  The stress can cause you not to take care of yourself properly  Your care team providers can help by offering tips about self-care  Your providers may suggest you talk to a mental health provider who can listen and offer help with self-care issues   Have your A1c checked as directed  Your provider may check your A1c every 3 months, or 2 times each year if your diabetes is controlled  An A1c test shows the average amount of sugar in your blood over the past 2 to 3 months  Your provider will tell you what your A1c level should be   Have screening tests as directed  Your provider may recommend screening for complications of diabetes and other conditions that may develop  Some screenings may begin right away and some may happen within the first 5 years of diagnosis:    ? Examples of diabetes complications  include kidney problems, high cholesterol, high blood pressure, blood vessel problems, eye problems, and sleep apnea  ? You may be screened for a low vitamin B level  if you take oral diabetes medicine for a long time  ? Women of childbearing years may be screened  for polycystic ovarian syndrome (PCOS)  Follow up with your doctor or diabetes care team providers as directed: You may need to have blood tests done before your follow-up visit  The test results will show if changes need to be made in your treatment or self-care  Talk to your provider if you cannot afford your medicine  Write down your questions so you remember to ask them during your visits    © Copyright [employer] 2022 Information is for End User's use only and may not be sold, redistributed or otherwise used for commercial purposes  The above information is an  only  It is not intended as medical advice for individual conditions or treatments  Talk to your doctor, nurse or pharmacist before following any medical regimen to see if it is safe and effective for you  Type 2 Diabetes Management for Adults   AMBULATORY CARE:   Type 2 diabetes  is a disease that affects how your body uses glucose (sugar)  Either your body cannot make enough insulin, or it cannot use the insulin correctly  It is important to keep diabetes controlled to prevent damage to your heart, blood vessels, and other organs  Management will help you feel well and enjoy your daily activities  Your diabetes care team providers can help you make a plan to fit diabetes care into your schedule  Your plan can change over time to fit your needs and your family's needs  Have someone call your local emergency number (911 in the [address] [facility]) if:    You cannot be woken   You have signs of diabetic ketoacidosis:     ? confusion, fatigue    ? vomiting    ? rapid heartbeat    ? fruity smelling breath    ? extreme thirst    ? dry mouth and skin     You have any of the following signs of a heart attack:      ? Squeezing, pressure, or pain in your chest    ? You may  also have any of the following:     - Discomfort or pain in your back, neck, jaw, stomach, or arm    - Shortness of breath    - Nausea or vomiting    - Lightheadedness or a sudden cold sweat     You have any of the following signs of a stroke:      ? Numbness or drooping on one side of your face     ? Weakness in an arm or leg    ? Confusion or difficulty speaking    ? Dizziness, a severe headache, or vision loss    Call your doctor or diabetes care team provider if:    You have a sore or wound that will not heal      You have a change in the amount you urinate       Your blood sugar levels are higher than your target goals   You often have lower blood sugar levels than your target goals   Your skin is red, dry, warm, or swollen   You have trouble coping with diabetes, or you feel anxious or depressed   You have questions or concerns about your condition or care  What you need to know about high blood sugar levels:  High blood sugar levels may not cause any symptoms  You may feel more thirsty or urinate more often than usual  Over time, high blood sugar levels can damage your nerves, blood vessels, tissues, and organs  The following can increase your blood sugar levels:   Large meals or large amounts of carbohydrates at one time     Less physical activity     Stress     Illness     A lower dose of diabetes medicine or insulin, or a late dose    What you need to know about low blood sugar levels:  Symptoms include feeling shaky, dizzy, irritable, or confused  You can prevent symptoms by keeping your blood sugar levels from going too low   Treat a low blood sugar level right away:      ? Drink 4 ounces of juice or have 1 tube of glucose gel  ? Check your blood sugar level again 10 to 15 minutes later  ? When the level goes back to normal, eat a meal or snack to prevent another decrease   Keep glucose gel, raisins, or hard candy with you at all times to treat a low blood sugar level   Your blood sugar level can get too low if you take diabetes medicine or insulin and do not eat enough food   If you use insulin, check your blood sugar level before you exercise  ? If your blood sugar level is below 100 mg/dL, eat 4 crackers or 2 ounces of raisins, or drink 4 ounces of juice  ? Check your level every 30 minutes if you exercise longer than 1 hour  ? You may need a snack during or after exercise  What you can do to manage your blood sugar levels:    Check your blood sugar levels as directed and as needed    Several items are available to use to check your levels  You may need to check by testing a drop of blood in a glucose monitor  You may instead be given a continuous glucose monitoring (CGM) device  The device is worn at all times  The CGM checks your blood sugar level every 5 minutes  It sends results to an electronic device such as a smart phone  A CGM can be used with or without an insulin pump  You and your diabetes care team providers will decide on the best method for you  The goal for blood sugar levels before meals  is between 80 and 130 mg/dL and 2 hours after eating  is lower than 180 mg/dL   Make healthy food choices  Work with a dietitian to develop a meal plan that works for you and your schedule  A dietitian can help you learn how to eat the right amount of carbohydrates during your meals and snacks  Carbohydrates can raise your blood sugar level if you eat too many at one time  Examples of foods that contain carbohydrates are breads, cereals, rice, pasta, and sweets   Eat high-fiber foods as directed  Fiber helps improve blood sugar levels  Fiber also lowers your risk for heart disease and other problems diabetes can cause  Examples of high-fiber foods include vegetables, whole-grain bread, and beans such as beltrán beans  Your dietitian can tell you how much fiber to have each day   Get regular physical activity  Physical activity can help you get to your target blood sugar level goal and manage your weight  Get at least 150 minutes of moderate to vigorous aerobic physical activity each week  Do not miss more than 2 days in a row  Do not sit longer than 30 minutes at a time  Your healthcare provider can help you create an activity plan  The plan can include the best activities for you and can help you build your strength and endurance   Maintain a healthy weight  Ask your team what a healthy weight is for you  A healthy weight can help you control diabetes and prevent heart disease   Ask your team to help you create a weight loss plan, if needed  Weight loss can help make a difference in managing diabetes  Your team will help you set a weight-loss goal, such as 10 to 15 pounds, or 5% of your extra weight  Together you and your team can set manageable weight loss goals   Take your diabetes medicine or insulin as directed  You may need diabetes medicine, insulin, or both to help control your blood sugar levels  Your healthcare provider will teach you how and when to take your diabetes medicine or insulin  You will also be taught about side effects oral diabetes medicine can cause  Insulin may be injected or given through a pump or pen  You and your providers will decide on the best method for you:    ? An insulin pump  is an implanted device that gives your insulin 24 hours a day  An insulin pump prevents the need for multiple insulin injections in a day  ? An insulin pen  is a device prefilled with the right amount of insulin  ? You and your family members will be taught how to draw up and give insulin  if this is the best method for you  Your providers will also teach you how to dispose of needles and syringes  ? You will learn how much insulin you need  and when to give it  You will be taught when not to give insulin  You will also be taught what to do if your blood sugar level drops too low  This may happen if you take insulin and do not eat the right amount of carbohydrates  More ways to manage type 2 diabetes:    Wear medical alert identification  Wear medical alert jewelry or carry a card that says you have diabetes  Ask your provider where to get these items   Do not smoke  Nicotine and other chemicals in cigarettes and cigars can cause lung and blood vessel damage  It also makes it more difficult to manage your diabetes  Ask your provider for information if you currently smoke and need help to quit   Do not use e-cigarettes or smokeless tobacco in place of cigarettes or to help you quit  They still contain nicotine   Check your feet each day for cuts, scratches, calluses, or other wounds  Look for redness and swelling, and feel for warmth  Wear shoes that fit well  Check your shoes for rocks or other objects that can hurt your feet  Do not walk barefoot or wear shoes without socks  Wear cotton socks to help keep your feet dry   Ask about vaccines you may need  You have a higher risk for serious illness if you get the flu, pneumonia, COVID-19, or hepatitis  Ask your provider if you should get vaccines to prevent these or other diseases, and when to get the vaccines   Talk to your provider if you become stressed about diabetes care  Sometimes being able to fit diabetes care into your life can cause increased stress  The stress can cause you not to take care of yourself properly  Your care team providers can help by offering tips about self-care  Your providers may suggest you talk to a mental health provider who can listen and offer help with self-care issues   Have your A1c checked as directed  Your provider may check your A1c every 3 months, or 2 times each year if your diabetes is controlled  An A1c test shows the average amount of sugar in your blood over the past 2 to 3 months  Your provider will tell you what your A1c level should be   Have screening tests as directed  Your provider may recommend screening for complications of diabetes and other conditions that may develop  Some screenings may begin right away and some may happen within the first 5 years of diagnosis:    ? Examples of diabetes complications  include kidney problems, high cholesterol, high blood pressure, blood vessel problems, eye problems, and sleep apnea  ? You may be screened for a low vitamin B level  if you take oral diabetes medicine for a long time  ? Women of childbearing years may be screened  for polycystic ovarian syndrome (PCOS)      Follow up with your doctor or diabetes care team providers as directed: You may need to have blood tests done before your follow-up visit  The test results will show if changes need to be made in your treatment or self-care  Talk to your provider if you cannot afford your medicine  Write down your questions so you remember to ask them during your visits  © Copyright [facility] 2022 Information is for End User's use only and may not be sold, redistributed or otherwise used for commercial purposes  The above information is an  only  It is not intended as medical advice for individual conditions or treatments  Talk to your doctor, nurse or pharmacist before following any medical regimen to see if it is safe and effective for you

## 2023-05-01 NOTE — ASSESSMENT & PLAN NOTE
Patient reports me chronic and intermittent uses Tylenol at times worsened with change in temperature or bariatric pressure at this point time her symptoms are relatively stable

## 2023-05-01 NOTE — ASSESSMENT & PLAN NOTE
hypertension - controlled, I have counseled patient following healthy balance diet, I would like the patient reduce sodium, exercise routinely, I would like the patient continued the med current medical regiment and we will continue to monitor    Blood pressure today 138/78

## 2023-05-01 NOTE — ASSESSMENT & PLAN NOTE
Assessment and plan 1  Medicare subsequent annual wellness examination overall the patient is clinically stable and doing well, we encouraged the patient to follow a healthy and balanced diet  We recommend that the patient exercise routinely approximately 30 minutes 5 times per week   We have reviewed the patient's vaccines and have made recommendations for updates if necessary   annual flu shot     We will be ordering screening laboratories which are age appropriate  Return to the office in   6 months   call if any problems

## 2023-05-02 ENCOUNTER — PATIENT OUTREACH (OUTPATIENT)
Dept: INTERNAL MEDICINE CLINIC | Facility: CLINIC | Age: 78
End: 2023-05-02

## 2023-05-02 NOTE — PROGRESS NOTES
SW received SDOH referral from PCP office for financial difficulties  Chart review completed  Phone call placed to patient  Patient was not available at time of phone call  SW left message with reason for call and contact information  Awaiting return call from patient

## 2023-05-15 ENCOUNTER — PATIENT OUTREACH (OUTPATIENT)
Dept: INTERNAL MEDICINE CLINIC | Facility: CLINIC | Age: 78
End: 2023-05-15

## 2023-05-15 NOTE — LETTER
[address] [facility] [id number]    Re: Tiffanie Sharp to reach   5/15/2023       Dear Reyes Putty,    We tried to reach you by phone and were unfortunately unable to reach you  It is important that you contact the [facility] at [phone]      Sincerely,         Mauricio Parker

## 2023-06-01 ENCOUNTER — COMPLETE EYE EXAM (OUTPATIENT)
Dept: URBAN - METROPOLITAN AREA CLINIC 6 | Facility: CLINIC | Age: 78
End: 2023-06-01

## 2023-06-01 DIAGNOSIS — H43.813: ICD-10-CM

## 2023-06-01 DIAGNOSIS — H26.493: ICD-10-CM

## 2023-06-01 DIAGNOSIS — H11.041: ICD-10-CM

## 2023-06-01 DIAGNOSIS — H35.371: ICD-10-CM

## 2023-06-01 DIAGNOSIS — Z96.1: ICD-10-CM

## 2023-06-01 PROCEDURE — 92134 CPTRZ OPH DX IMG PST SGM RTA: CPT

## 2023-06-01 PROCEDURE — 92014 COMPRE OPH EXAM EST PT 1/>: CPT

## 2023-06-01 ASSESSMENT — KERATOMETRY
OD_K1POWER_DIOPTERS: 43.75
OS_AXISANGLE_DEGREES: 118
OS_K2POWER_DIOPTERS: 46.25
OS_AXISANGLE2_DEGREES: 28
OD_AXISANGLE2_DEGREES: 83
OS_K1POWER_DIOPTERS: 45.75
OD_AXISANGLE_DEGREES: 173
OD_K2POWER_DIOPTERS: 45.75

## 2023-06-01 ASSESSMENT — VISUAL ACUITY
OD_SC: 20/70+1
OD_PH: 20/50
OS_SC: 20/40-2

## 2023-06-01 ASSESSMENT — TONOMETRY
OS_IOP_MMHG: 16
OD_IOP_MMHG: 14

## 2023-06-06 DIAGNOSIS — E78.5 HYPERLIPIDEMIA, UNSPECIFIED HYPERLIPIDEMIA TYPE: ICD-10-CM

## 2023-06-06 RX ORDER — SIMVASTATIN 20 MG
TABLET ORAL
Qty: 90 TABLET | Refills: 3 | Status: SHIPPED | OUTPATIENT
Start: 2023-06-06

## 2023-06-15 ENCOUNTER — OFFICE VISIT (OUTPATIENT)
Dept: GASTROENTEROLOGY | Facility: CLINIC | Age: 78
End: 2023-06-15
Payer: COMMERCIAL

## 2023-06-15 VITALS
DIASTOLIC BLOOD PRESSURE: 69 MMHG | SYSTOLIC BLOOD PRESSURE: 121 MMHG | HEIGHT: 60 IN | WEIGHT: 153.8 LBS | BODY MASS INDEX: 30.19 KG/M2 | HEART RATE: 92 BPM

## 2023-06-15 DIAGNOSIS — Z86.010 HX OF COLONIC POLYPS: Primary | ICD-10-CM

## 2023-06-15 DIAGNOSIS — Z12.11 COLON CANCER SCREENING: ICD-10-CM

## 2023-06-15 DIAGNOSIS — K21.9 GASTROESOPHAGEAL REFLUX DISEASE, UNSPECIFIED WHETHER ESOPHAGITIS PRESENT: ICD-10-CM

## 2023-06-15 PROBLEM — Z86.0100 HX OF COLONIC POLYPS: Status: ACTIVE | Noted: 2023-06-15

## 2023-06-15 PROCEDURE — 99214 OFFICE O/P EST MOD 30 MIN: CPT | Performed by: NURSE PRACTITIONER

## 2023-06-15 RX ORDER — OMEPRAZOLE 20 MG/1
20 CAPSULE, DELAYED RELEASE ORAL DAILY
Qty: 90 CAPSULE | Refills: 3 | Status: SHIPPED | OUTPATIENT
Start: 2023-06-15 | End: 2023-09-13

## 2023-06-15 RX ORDER — FAMOTIDINE 40 MG/1
40 TABLET, FILM COATED ORAL DAILY
Qty: 90 TABLET | Refills: 3 | Status: SHIPPED | OUTPATIENT
Start: 2023-06-15 | End: 2023-06-15

## 2023-06-15 NOTE — PROGRESS NOTES
[facility] - Outpatient Follow-up Note  Edd Patton 65 y o  female MRN: [id number]  Encounter: [id number]          ASSESSMENT AND PLAN:      1  Gastroesophageal reflux disease, unspecified whether esophagitis present  Patient reports GERD symptoms are currently well controlled on current medication  Patient denies acid reflux, heartburn, nausea, vomiting, epigastric or abdominal pain  EGD done 8/6/2021 showed intestinal metaplasia of the lower esophagus not greater than 1 cm above the EG junction, mild antritis, red plaque questionable significance  - omeprazole (PriLOSEC) 20 mg delayed release capsule; Take 1 capsule (20 mg total) by mouth daily Take 1/2 hour  prior to breakfast  Dispense: 90 capsule; Refill: 3  -Due for repeat EGD 8/2026  -Continue antireflux diet and measures    2  Hx of colonic polyps  3  Colon cancer screening  Up to date on colon cancer screening  Positive history of colon polyps  Patient denies any lower GI issues   -Patient due for colonoscopy 2024  Follow-up 1 year  ______________________________________________________________________    SUBJECTIVE: Is a 42-year-old female who presents to office for follow-up for GERD and history of colon polyps  Patient currently denies any GI symptoms  Patient denies nausea, vomiting, acid reflux, heartburn, epigastric or abdominal pain  Patient denies blood in stool, blood from rectal area, or black tarry stool  Bowel patterns are regular  Abdomen exam benign  Patient reports GERD symptoms are well controlled on current medication of omeprazole  EGD done 8/6/2021 showed intestinal metaplasia of the lower esophagus not greater than 1 cm above the EG junction, mild antritis, red plaque questionable significance  Biopsy showed lower esophagus no intestinal goblet cell metaplasia  No malignancy  Gastric antrum biopsy showed mild chronic inactive gastritis  Negative H  pylori    Duodenum biopsy mild active chronic duodenitis  Benign duodenal mucosa without specific histopathological abnormality  Colonoscopy done 2017 showed colon polyps and internal hemorrhoids  Colon polyps benign  Patient does not smoke  No family history of gastric or colon  REVIEW OF SYSTEMS IS OTHERWISE NEGATIVE  Historical Information   Past Medical History:   Diagnosis Date   • Arthritis    • Back pain     sciatic pain right hip and down right leg   • Cancer ([address] )     bladder cancer ; had surgical scraping   • GERD (gastroesophageal reflux disease)    • Hyperlipidemia    • Hypertension    • Seasonal allergies    • Wears partial dentures     1 small tooth removed and placed in cup      Past Surgical History:   Procedure Laterality Date   • APPENDECTOMY     • COLONOSCOPY     • CYSTOSCOPY  11/29/2021    Robin   • EYE SURGERY     • HERNIA REPAIR     • [place] BLADDER INSTILLATION ANTICARCINOGENIC AGENT N/A 1/15/2020    Procedure: INSTILLATION MITOMYCIN;  Surgeon: Rene Hagan MD;  [facility];  Service: Urology   • NV CYSTO W/REMOVAL OF LESIONS SMALL N/A 1/15/2020    Procedure: Cystoscopy TRANSURETHRAL RESECTION OF BLADDER TUMOR (TURBT);   Surgeon: Rene Hagan MD;  [facility];  Service: Urology   • NV RPR 1ST INGUN HRNA AGE 5 YRS/> REDUCIBLE Left 2/3/2022    Procedure: INGUINAL HERNIA REPAIR;  Surgeon: Sandro Contreras MD;  [facility];  Service: General   • UPPER GASTROINTESTINAL ENDOSCOPY     • VEIN LIGATION AND STRIPPING       Social History   Social History     Substance and Sexual Activity   Alcohol Use Never     Social History     Substance and Sexual Activity   Drug Use Never     Social History     Tobacco Use   Smoking Status Never   Smokeless Tobacco Never     Family History   Problem Relation Age of Onset   • Heart disease Mother    • Hypertension Mother    • No Known Problems Son    • No Known Problems Son        Meds/Allergies       Current Outpatient Medications:   • calcium-vitamin D 250-100 MG-UNIT per tablet  •  Cholecalciferol (VITAMIN D3) 1000 units CAPS  •  cyanocobalamin (VITAMIN B-12) 1000 MCG tablet  •  losartan (COZAAR) 100 MG tablet  •  multivitamin-minerals (CENTRUM ADULTS) tablet  •  omeprazole (PriLOSEC) 20 mg delayed release capsule  •  hydrochlorothiazide (HYDRODIURIL) 12 5 mg tablet  •  simvastatin (ZOCOR) 20 mg tablet    No Known Allergies        Objective     Blood pressure 121/69, pulse 92, height 5' (1 524 m), weight 69 8 kg (153 lb 12 8 oz), currently breastfeeding  Body mass index is 30 04 kg/m²  PHYSICAL EXAM:      General Appearance:   Alert, cooperative, no distress   HEENT:   Normocephalic, atraumatic, anicteric      Neck:  Supple, symmetrical, trachea midline   Lungs:   Clear to auscultation bilaterally; no rales, rhonchi or wheezing; respirations unlabored    Heart[de-identified]   Regular rate and rhythm; no murmur, rub, or gallop  Abdomen:   Soft, non-tender, non-distended; normal bowel sounds; no masses, no organomegaly    Genitalia:   Deferred    Rectal:   Deferred    Extremities:  No cyanosis, clubbing or edema    Pulses:  2+ and symmetric    Skin:  No jaundice, rashes, or lesions    Lymph nodes:  No palpable cervical lymphadenopathy        Lab Results:   No visits with results within 1 Day(s) from this visit     Latest known visit with results is:   Appointment on 04/27/2023   Component Date Value   • Hepatitis C Ab 04/27/2023 Non-reactive    • Sodium 04/27/2023 138    • Potassium 04/27/2023 4 3    • Chloride 04/27/2023 101    • CO2 04/27/2023 31    • ANION GAP 04/27/2023 6    • BUN 04/27/2023 20    • Creatinine 04/27/2023 0 68    • Glucose, Fasting 04/27/2023 123 (H)    • Calcium 04/27/2023 9 5    • AST 04/27/2023 16    • ALT 04/27/2023 16    • Alkaline Phosphatase 04/27/2023 52    • Total Protein 04/27/2023 7 4    • Albumin 04/27/2023 4 3    • Total Bilirubin 04/27/2023 0 51    • eGFR 04/27/2023 84    • Hemoglobin A1C 04/27/2023 6 2 (H)    • EAG 04/27/2023 131    • Cholesterol 04/27/2023 176    • Triglycerides 04/27/2023 161 (H)    • HDL, Direct 04/27/2023 47 (L)    • LDL Calculated 04/27/2023 97    • Creatinine, Ur 04/27/2023 38 2    • Albumin,U,Random 04/27/2023 9 6    • Albumin Creat Ratio 04/27/2023 25          Radiology Results:   No results found

## 2023-08-03 DIAGNOSIS — I10 ESSENTIAL HYPERTENSION: ICD-10-CM

## 2023-08-03 RX ORDER — HYDROCHLOROTHIAZIDE 12.5 MG/1
12.5 TABLET ORAL DAILY
Qty: 90 TABLET | Refills: 1 | Status: SHIPPED | OUTPATIENT
Start: 2023-08-03

## 2023-08-14 PROBLEM — Z12.11 COLON CANCER SCREENING: Status: RESOLVED | Noted: 2021-12-16 | Resolved: 2023-08-14

## 2023-08-23 ENCOUNTER — TELEPHONE (OUTPATIENT)
Age: 78
End: 2023-08-23

## 2023-08-23 DIAGNOSIS — C67.9 MALIGNANT NEOPLASM OF URINARY BLADDER, UNSPECIFIED SITE (HCC): ICD-10-CM

## 2023-08-23 DIAGNOSIS — R82.89 URINE CYTOLOGY ABNORMAL: Primary | ICD-10-CM

## 2023-08-29 ENCOUNTER — HOSPITAL ENCOUNTER (OUTPATIENT)
Dept: ULTRASOUND IMAGING | Facility: HOSPITAL | Age: 78
Discharge: HOME | End: 2023-08-29
Attending: OBSTETRICS & GYNECOLOGY
Payer: COMMERCIAL

## 2023-08-29 DIAGNOSIS — N83.201 RIGHT OVARIAN CYST: ICD-10-CM

## 2023-08-29 PROCEDURE — 76856 US EXAM PELVIC COMPLETE: CPT

## 2023-08-29 PROCEDURE — 76830 TRANSVAGINAL US NON-OB: CPT

## 2023-09-11 ENCOUNTER — OFFICE VISIT (OUTPATIENT)
Dept: GYNECOLOGY | Facility: CLINIC | Age: 78
End: 2023-09-11
Payer: COMMERCIAL

## 2023-09-11 VITALS
BODY MASS INDEX: 29.45 KG/M2 | HEIGHT: 60 IN | WEIGHT: 150 LBS | DIASTOLIC BLOOD PRESSURE: 80 MMHG | SYSTOLIC BLOOD PRESSURE: 140 MMHG

## 2023-09-11 DIAGNOSIS — M46.1 SACROILIITIS (HCC): ICD-10-CM

## 2023-09-11 DIAGNOSIS — F11.20 CONTINUOUS OPIOID DEPENDENCE (HCC): ICD-10-CM

## 2023-09-11 DIAGNOSIS — N83.201 CYST OF RIGHT OVARY: Primary | ICD-10-CM

## 2023-09-11 PROCEDURE — 99212 OFFICE O/P EST SF 10 MIN: CPT | Performed by: OBSTETRICS & GYNECOLOGY

## 2023-09-11 RX ORDER — MAGNESIUM GLUCONATE 30 MG(550)
30 TABLET ORAL 2 TIMES DAILY
COMMUNITY

## 2023-09-11 NOTE — PROGRESS NOTES
Patient presented today with her daughter to discuss ultrasound findings. Patient has a known simple right ovarian cyst dating back to 2016. At that time it was 2.9 cm. January 2023 it was 4.5 x 3 x 2.5 cm. Simple in nature. Repeat ultrasound August 2023 showed the cyst to be 5.2 x 3.4 x 3.2 cm again simple in nature. Radiology thought this may just be different technique accounting for the slight increase in size. Patient has no pelvic pain. Denies any vaginal bleeding. Simple ovarian cyst were discussed and it was agreed that since she is not having any discomfort to follow-up in ultrasound for annual visit scheduled April 2024. Also agreed to call the office sooner for an appointment if she develops any persistent pelvic pain. Impression: Slight increase in simple right ovarian cyst.  Asymptomatic.     Plan: Repeat ultrasound several weeks prior to her April 2024 scheduled annual exam.  Sooner if she develops persistent pelvic pain

## 2023-09-23 ENCOUNTER — OFFICE VISIT (OUTPATIENT)
Dept: URGENT CARE | Age: 78
End: 2023-09-23
Payer: COMMERCIAL

## 2023-09-23 ENCOUNTER — HOSPITAL ENCOUNTER (OUTPATIENT)
Facility: HOSPITAL | Age: 78
Setting detail: OBSERVATION
Discharge: HOME | End: 2023-09-24
Attending: EMERGENCY MEDICINE | Admitting: INTERNAL MEDICINE
Payer: COMMERCIAL

## 2023-09-23 ENCOUNTER — APPOINTMENT (EMERGENCY)
Dept: CT IMAGING | Facility: HOSPITAL | Age: 78
End: 2023-09-23
Payer: COMMERCIAL

## 2023-09-23 VITALS
SYSTOLIC BLOOD PRESSURE: 190 MMHG | TEMPERATURE: 97.6 F | HEART RATE: 92 BPM | RESPIRATION RATE: 20 BRPM | DIASTOLIC BLOOD PRESSURE: 94 MMHG | OXYGEN SATURATION: 97 %

## 2023-09-23 DIAGNOSIS — I16.0 HYPERTENSIVE URGENCY: Primary | ICD-10-CM

## 2023-09-23 DIAGNOSIS — I16.0 HYPERTENSIVE URGENCY: ICD-10-CM

## 2023-09-23 DIAGNOSIS — R51.9 HEADACHE: ICD-10-CM

## 2023-09-23 DIAGNOSIS — I10 ESSENTIAL HYPERTENSION: ICD-10-CM

## 2023-09-23 DIAGNOSIS — I10 HYPERTENSION: Primary | ICD-10-CM

## 2023-09-23 PROBLEM — G20.A1 PARKINSON DISEASE: Status: ACTIVE | Noted: 2023-09-23

## 2023-09-23 PROBLEM — G20 PARKINSON DISEASE: Status: ACTIVE | Noted: 2023-09-23

## 2023-09-23 PROBLEM — G43.909 MIGRAINE: Status: ACTIVE | Noted: 2023-09-23

## 2023-09-23 LAB
2HR DELTA HS TROPONIN: -1 NG/L
AMORPH URATE CRY URNS QL MICRO: ABNORMAL
ANION GAP SERPL CALCULATED.3IONS-SCNC: 8 MMOL/L
BACTERIA UR QL AUTO: ABNORMAL /HPF
BASOPHILS # BLD AUTO: 0.07 THOUSANDS/ÂΜL (ref 0–0.1)
BASOPHILS NFR BLD AUTO: 1 % (ref 0–1)
BILIRUB UR QL STRIP: NEGATIVE
BUN SERPL-MCNC: 13 MG/DL (ref 5–25)
CALCIUM SERPL-MCNC: 8.9 MG/DL (ref 8.4–10.2)
CARDIAC TROPONIN I PNL SERPL HS: 6 NG/L
CARDIAC TROPONIN I PNL SERPL HS: 7 NG/L
CHLORIDE SERPL-SCNC: 101 MMOL/L (ref 96–108)
CHOLEST SERPL-MCNC: 162 MG/DL
CLARITY UR: CLEAR
CO2 SERPL-SCNC: 30 MMOL/L (ref 21–32)
COLOR UR: ABNORMAL
CREAT SERPL-MCNC: 0.61 MG/DL (ref 0.6–1.3)
EOSINOPHIL # BLD AUTO: 0.16 THOUSAND/ÂΜL (ref 0–0.61)
EOSINOPHIL NFR BLD AUTO: 2 % (ref 0–6)
ERYTHROCYTE [DISTWIDTH] IN BLOOD BY AUTOMATED COUNT: 12.7 % (ref 11.6–15.1)
GFR SERPL CREATININE-BSD FRML MDRD: 87 ML/MIN/1.73SQ M
GLUCOSE P FAST SERPL-MCNC: 122 MG/DL (ref 65–99)
GLUCOSE SERPL-MCNC: 122 MG/DL (ref 65–140)
GLUCOSE UR STRIP-MCNC: NEGATIVE MG/DL
HCT VFR BLD AUTO: 42.7 % (ref 34.8–46.1)
HDLC SERPL-MCNC: 45 MG/DL
HGB BLD-MCNC: 14.1 G/DL (ref 11.5–15.4)
HGB UR QL STRIP.AUTO: NEGATIVE
IMM GRANULOCYTES # BLD AUTO: 0.03 THOUSAND/UL (ref 0–0.2)
IMM GRANULOCYTES NFR BLD AUTO: 0 % (ref 0–2)
KETONES UR STRIP-MCNC: ABNORMAL MG/DL
LDLC SERPL CALC-MCNC: 93 MG/DL (ref 0–100)
LEUKOCYTE ESTERASE UR QL STRIP: ABNORMAL
LYMPHOCYTES # BLD AUTO: 1.97 THOUSANDS/ÂΜL (ref 0.6–4.47)
LYMPHOCYTES NFR BLD AUTO: 20 % (ref 14–44)
MCH RBC QN AUTO: 29.4 PG (ref 26.8–34.3)
MCHC RBC AUTO-ENTMCNC: 33 G/DL (ref 31.4–37.4)
MCV RBC AUTO: 89 FL (ref 82–98)
MONOCYTES # BLD AUTO: 0.8 THOUSAND/ÂΜL (ref 0.17–1.22)
MONOCYTES NFR BLD AUTO: 8 % (ref 4–12)
NEUTROPHILS # BLD AUTO: 6.66 THOUSANDS/ÂΜL (ref 1.85–7.62)
NEUTS SEG NFR BLD AUTO: 69 % (ref 43–75)
NITRITE UR QL STRIP: NEGATIVE
NON-SQ EPI CELLS URNS QL MICRO: ABNORMAL /HPF
NONHDLC SERPL-MCNC: 117 MG/DL
NRBC BLD AUTO-RTO: 0 /100 WBCS
PH UR STRIP.AUTO: 8 [PH]
PLATELET # BLD AUTO: 310 THOUSANDS/UL (ref 149–390)
PMV BLD AUTO: 9 FL (ref 8.9–12.7)
POTASSIUM SERPL-SCNC: 3.6 MMOL/L (ref 3.5–5.3)
PROT UR STRIP-MCNC: NEGATIVE MG/DL
RBC # BLD AUTO: 4.79 MILLION/UL (ref 3.81–5.12)
RBC #/AREA URNS AUTO: ABNORMAL /HPF
SODIUM SERPL-SCNC: 139 MMOL/L (ref 135–147)
SP GR UR STRIP.AUTO: 1.01 (ref 1–1.03)
T4 FREE SERPL-MCNC: 0.67 NG/DL (ref 0.61–1.12)
TRIGL SERPL-MCNC: 121 MG/DL
TSH SERPL DL<=0.05 MIU/L-ACNC: 4.8 UIU/ML (ref 0.45–4.5)
UROBILINOGEN UR STRIP-ACNC: <2 MG/DL
WBC # BLD AUTO: 9.69 THOUSAND/UL (ref 4.31–10.16)
WBC #/AREA URNS AUTO: ABNORMAL /HPF

## 2023-09-23 PROCEDURE — 83036 HEMOGLOBIN GLYCOSYLATED A1C: CPT

## 2023-09-23 PROCEDURE — 99213 OFFICE O/P EST LOW 20 MIN: CPT | Performed by: STUDENT IN AN ORGANIZED HEALTH CARE EDUCATION/TRAINING PROGRAM

## 2023-09-23 PROCEDURE — 93005 ELECTROCARDIOGRAM TRACING: CPT

## 2023-09-23 PROCEDURE — 99284 EMERGENCY DEPT VISIT MOD MDM: CPT

## 2023-09-23 PROCEDURE — 80061 LIPID PANEL: CPT

## 2023-09-23 PROCEDURE — 96365 THER/PROPH/DIAG IV INF INIT: CPT

## 2023-09-23 PROCEDURE — 70450 CT HEAD/BRAIN W/O DYE: CPT

## 2023-09-23 PROCEDURE — 99223 1ST HOSP IP/OBS HIGH 75: CPT | Performed by: HOSPITALIST

## 2023-09-23 PROCEDURE — 81001 URINALYSIS AUTO W/SCOPE: CPT

## 2023-09-23 PROCEDURE — 99285 EMERGENCY DEPT VISIT HI MDM: CPT | Performed by: EMERGENCY MEDICINE

## 2023-09-23 PROCEDURE — 96376 TX/PRO/DX INJ SAME DRUG ADON: CPT

## 2023-09-23 PROCEDURE — G1004 CDSM NDSC: HCPCS

## 2023-09-23 PROCEDURE — 84484 ASSAY OF TROPONIN QUANT: CPT

## 2023-09-23 PROCEDURE — 84439 ASSAY OF FREE THYROXINE: CPT

## 2023-09-23 PROCEDURE — 80048 BASIC METABOLIC PNL TOTAL CA: CPT

## 2023-09-23 PROCEDURE — 85025 COMPLETE CBC W/AUTO DIFF WBC: CPT

## 2023-09-23 PROCEDURE — 84443 ASSAY THYROID STIM HORMONE: CPT

## 2023-09-23 PROCEDURE — 96375 TX/PRO/DX INJ NEW DRUG ADDON: CPT

## 2023-09-23 RX ORDER — METOCLOPRAMIDE HYDROCHLORIDE 5 MG/ML
10 INJECTION INTRAMUSCULAR; INTRAVENOUS EVERY 8 HOURS SCHEDULED
Status: DISCONTINUED | OUTPATIENT
Start: 2023-09-23 | End: 2023-09-24 | Stop reason: HOSPADM

## 2023-09-23 RX ORDER — SUMATRIPTAN 6 MG/.5ML
6 INJECTION, SOLUTION SUBCUTANEOUS
Status: DISCONTINUED | OUTPATIENT
Start: 2023-09-23 | End: 2023-09-24 | Stop reason: HOSPADM

## 2023-09-23 RX ORDER — PANTOPRAZOLE SODIUM 40 MG/1
40 TABLET, DELAYED RELEASE ORAL
Status: DISCONTINUED | OUTPATIENT
Start: 2023-09-24 | End: 2023-09-24 | Stop reason: HOSPADM

## 2023-09-23 RX ORDER — KETOROLAC TROMETHAMINE 30 MG/ML
15 INJECTION, SOLUTION INTRAMUSCULAR; INTRAVENOUS ONCE
Status: COMPLETED | OUTPATIENT
Start: 2023-09-23 | End: 2023-09-23

## 2023-09-23 RX ORDER — TIZANIDINE 2 MG/1
2 TABLET ORAL 3 TIMES DAILY
Status: DISCONTINUED | OUTPATIENT
Start: 2023-09-23 | End: 2023-09-24 | Stop reason: HOSPADM

## 2023-09-23 RX ORDER — TIZANIDINE 2 MG/1
4 TABLET ORAL EVERY 8 HOURS PRN
Status: DISCONTINUED | OUTPATIENT
Start: 2023-09-23 | End: 2023-09-23

## 2023-09-23 RX ORDER — MAGNESIUM SULFATE HEPTAHYDRATE 40 MG/ML
2 INJECTION, SOLUTION INTRAVENOUS ONCE
Status: COMPLETED | OUTPATIENT
Start: 2023-09-23 | End: 2023-09-23

## 2023-09-23 RX ORDER — POLYETHYLENE GLYCOL 3350 17 G/17G
17 POWDER, FOR SOLUTION ORAL DAILY
Status: DISCONTINUED | OUTPATIENT
Start: 2023-09-24 | End: 2023-09-24 | Stop reason: HOSPADM

## 2023-09-23 RX ORDER — DOCUSATE SODIUM 100 MG/1
100 CAPSULE, LIQUID FILLED ORAL DAILY
Status: DISCONTINUED | OUTPATIENT
Start: 2023-09-23 | End: 2023-09-24 | Stop reason: HOSPADM

## 2023-09-23 RX ORDER — LOSARTAN POTASSIUM 50 MG/1
100 TABLET ORAL DAILY
Status: DISCONTINUED | OUTPATIENT
Start: 2023-09-24 | End: 2023-09-24 | Stop reason: HOSPADM

## 2023-09-23 RX ORDER — DIPHENHYDRAMINE HYDROCHLORIDE 50 MG/ML
12.5 INJECTION INTRAMUSCULAR; INTRAVENOUS ONCE
Status: COMPLETED | OUTPATIENT
Start: 2023-09-23 | End: 2023-09-23

## 2023-09-23 RX ORDER — DEXAMETHASONE SODIUM PHOSPHATE 10 MG/ML
10 INJECTION, SOLUTION INTRAMUSCULAR; INTRAVENOUS ONCE
Status: COMPLETED | OUTPATIENT
Start: 2023-09-23 | End: 2023-09-23

## 2023-09-23 RX ORDER — DIPHENHYDRAMINE HYDROCHLORIDE 50 MG/ML
25 INJECTION INTRAMUSCULAR; INTRAVENOUS EVERY 8 HOURS SCHEDULED
Status: DISCONTINUED | OUTPATIENT
Start: 2023-09-23 | End: 2023-09-24 | Stop reason: HOSPADM

## 2023-09-23 RX ORDER — HYDROCHLOROTHIAZIDE 12.5 MG/1
12.5 TABLET ORAL DAILY
Status: DISCONTINUED | OUTPATIENT
Start: 2023-09-24 | End: 2023-09-24 | Stop reason: HOSPADM

## 2023-09-23 RX ORDER — HYDRALAZINE HYDROCHLORIDE 20 MG/ML
5 INJECTION INTRAMUSCULAR; INTRAVENOUS ONCE
Status: COMPLETED | OUTPATIENT
Start: 2023-09-23 | End: 2023-09-23

## 2023-09-23 RX ORDER — UREA 10 %
500 LOTION (ML) TOPICAL 2 TIMES DAILY
Status: DISCONTINUED | OUTPATIENT
Start: 2023-09-23 | End: 2023-09-24 | Stop reason: HOSPADM

## 2023-09-23 RX ORDER — MAGNESIUM SULFATE HEPTAHYDRATE 40 MG/ML
2 INJECTION, SOLUTION INTRAVENOUS
Status: DISCONTINUED | OUTPATIENT
Start: 2023-09-24 | End: 2023-09-24 | Stop reason: HOSPADM

## 2023-09-23 RX ORDER — PRAVASTATIN SODIUM 40 MG
40 TABLET ORAL
Status: DISCONTINUED | OUTPATIENT
Start: 2023-09-23 | End: 2023-09-24 | Stop reason: HOSPADM

## 2023-09-23 RX ORDER — METOCLOPRAMIDE HYDROCHLORIDE 5 MG/ML
5 INJECTION INTRAMUSCULAR; INTRAVENOUS ONCE
Status: COMPLETED | OUTPATIENT
Start: 2023-09-23 | End: 2023-09-23

## 2023-09-23 RX ORDER — PANTOPRAZOLE SODIUM 40 MG/1
40 TABLET, DELAYED RELEASE ORAL
Status: DISCONTINUED | OUTPATIENT
Start: 2023-09-24 | End: 2023-09-23 | Stop reason: SDUPTHER

## 2023-09-23 RX ORDER — SENNOSIDES 8.6 MG
1 TABLET ORAL
Status: DISCONTINUED | OUTPATIENT
Start: 2023-09-23 | End: 2023-09-24 | Stop reason: HOSPADM

## 2023-09-23 RX ORDER — OMEGA-3S/DHA/EPA/FISH OIL/D3 300MG-1000
400 CAPSULE ORAL DAILY
Status: DISCONTINUED | OUTPATIENT
Start: 2023-09-24 | End: 2023-09-24 | Stop reason: HOSPADM

## 2023-09-23 RX ORDER — HEPARIN SODIUM 5000 [USP'U]/ML
5000 INJECTION, SOLUTION INTRAVENOUS; SUBCUTANEOUS EVERY 8 HOURS SCHEDULED
Status: DISCONTINUED | OUTPATIENT
Start: 2023-09-23 | End: 2023-09-24 | Stop reason: HOSPADM

## 2023-09-23 RX ORDER — ACETAMINOPHEN 325 MG/1
650 TABLET ORAL EVERY 6 HOURS PRN
Status: DISCONTINUED | OUTPATIENT
Start: 2023-09-23 | End: 2023-09-24 | Stop reason: HOSPADM

## 2023-09-23 RX ADMIN — DOCUSATE SODIUM 100 MG: 100 CAPSULE, LIQUID FILLED ORAL at 20:08

## 2023-09-23 RX ADMIN — SODIUM CHLORIDE 1000 ML: 0.9 INJECTION, SOLUTION INTRAVENOUS at 11:57

## 2023-09-23 RX ADMIN — ACETAMINOPHEN 650 MG: 325 TABLET, FILM COATED ORAL at 20:07

## 2023-09-23 RX ADMIN — SENNOSIDES 8.6 MG: 8.6 TABLET, FILM COATED ORAL at 20:08

## 2023-09-23 RX ADMIN — HYDRALAZINE HYDROCHLORIDE 5 MG: 20 INJECTION, SOLUTION INTRAMUSCULAR; INTRAVENOUS at 14:59

## 2023-09-23 RX ADMIN — DEXAMETHASONE SODIUM PHOSPHATE 10 MG: 10 INJECTION, SOLUTION INTRAMUSCULAR; INTRAVENOUS at 20:40

## 2023-09-23 RX ADMIN — HEPARIN SODIUM 5000 UNITS: 5000 INJECTION INTRAVENOUS; SUBCUTANEOUS at 21:59

## 2023-09-23 RX ADMIN — Medication 500 MG: at 20:08

## 2023-09-23 RX ADMIN — METOCLOPRAMIDE 10 MG: 5 INJECTION, SOLUTION INTRAMUSCULAR; INTRAVENOUS at 22:01

## 2023-09-23 RX ADMIN — TIZANIDINE 2 MG: 2 TABLET ORAL at 20:08

## 2023-09-23 RX ADMIN — MAGNESIUM SULFATE HEPTAHYDRATE 2 G: 40 INJECTION, SOLUTION INTRAVENOUS at 12:08

## 2023-09-23 RX ADMIN — PRAVASTATIN SODIUM 40 MG: 40 TABLET ORAL at 20:07

## 2023-09-23 RX ADMIN — SUMATRIPTAN 6 MG: 6 INJECTION, SOLUTION SUBCUTANEOUS at 20:09

## 2023-09-23 RX ADMIN — HYDRALAZINE HYDROCHLORIDE 5 MG: 20 INJECTION, SOLUTION INTRAMUSCULAR; INTRAVENOUS at 15:39

## 2023-09-23 RX ADMIN — CARBIDOPA AND LEVODOPA 1 TABLET: 25; 100 TABLET ORAL at 21:59

## 2023-09-23 RX ADMIN — METOCLOPRAMIDE 5 MG: 5 INJECTION, SOLUTION INTRAMUSCULAR; INTRAVENOUS at 12:03

## 2023-09-23 RX ADMIN — KETOROLAC TROMETHAMINE 15 MG: 30 INJECTION, SOLUTION INTRAMUSCULAR; INTRAVENOUS at 12:02

## 2023-09-23 RX ADMIN — DIPHENHYDRAMINE HYDROCHLORIDE 25 MG: 50 INJECTION, SOLUTION INTRAMUSCULAR; INTRAVENOUS at 22:01

## 2023-09-23 RX ADMIN — DIPHENHYDRAMINE HYDROCHLORIDE 12.5 MG: 50 INJECTION, SOLUTION INTRAMUSCULAR; INTRAVENOUS at 12:03

## 2023-09-23 NOTE — H&P
[address] [facility]  H&P  Name: Cornelius Luque 65 y.o. female I MRN: [id number]  Unit/Bed#: W -01 I Date of Admission: 9/23/2023   Date of Service: 9/23/2023 I Hospital Day: 0      Assessment/Plan   * Hypertensive urgency  Assessment & Plan  Present on admission headaches, dizziness-chronic elevated blood pressure for the past  At the ED systolic blood pressure 039/27  No blurred vision, nausea with no vomiting. No focal deficit  CT head without intracranial abnormality or bleed  S/p IV hydralazine 10 mg    Plan   Systolic blood pressure goal reduction 25% in 24-48h  We will resume home losartan 100 mg, hydrochlorothiazide 12.5 tomorrow  Close BP monitoring, checks every shift  Follow-up A1c, lipid, UA, TSH,  Neurochecks every shift  Pain medication-associated migraine cocktail    Migraine  Assessment & Plan  History of prior migraines every  We will add migraine cocktail-Decadron, Benadryl, Reglan, magnesium        Parkinson disease  Assessment & Plan  Continue Sinemet  times daily    Dyslipidemia  Assessment & Plan  Atorvastatin 40 mg  Check lipid panel       VTE Pharmacologic Prophylaxis: VTE Score: 3 Moderate Risk (Score 3-4) - Pharmacological DVT Prophylaxis Ordered: enoxaparin (Lovenox). Code Status: Level 1 - Full Code   Discussion with family: Updated  (daughter) at bedside. Anticipated Length of Stay: Patient will be admitted on an inpatient basis with an anticipated length of stay of greater than 2 midnights secondary to Hypertensive urgency. Chief Complaint: Headaches  History of Present Illness:  Cornelius Luque is a 65 y.o. female with a PMH of Parkinson's disease, hypertension, hyperlipidemia, GERD who presents with . Headaches and dizziness. She reports for the past 2 weeks has been experiencing headaches initially gradual onset occipital but now has become more generalized.   Patient states headaches worse in the evening has been taking ibuprofen, with no significant relief. Also does report significant elevated blood pressure systolics above 114G. For the past 24 hours has been experiencing severe headaches, generalized, associated with nausea, dizziness and a sensation of passing out. Also states she felt unsteady on her feet. Denies any vertigo or spinning, no blurred vision, chest pain, shortness of breath, palpitations, orthopnea, weakness,  She reports compliance to BP meds,  In fact took losartan and hydrochlorothiazide today. No alcohol or tobacco use, no known coronary artery disease  At the ED blood pressure 181/86 saturating normally on room air. Patient conscious and alert, no focal deficits  Stat CT of the head without intracranial abnormality. Blood work unremarkable. At the ED was given 2 doses of IV hydralazine 5 mg as well as migraine cocktail. And admitted for hypertensive urgency    Review of Systems:  Review of Systems   Constitutional: Negative for chills and fever. HENT: Negative for ear pain and sore throat. Eyes: Negative for pain and visual disturbance. Respiratory: Negative for cough and shortness of breath. Cardiovascular: Negative for chest pain and palpitations. Gastrointestinal: Positive for constipation and nausea. Negative for abdominal pain and vomiting. Genitourinary: Negative for dysuria and hematuria. Musculoskeletal: Negative for arthralgias and back pain. Skin: Negative for color change and rash. Neurological: Positive for dizziness. Negative for tremors, seizures, syncope and weakness. All other systems reviewed and are negative.       Past Medical and Surgical History:   Past Medical History:   Diagnosis Date   • Arthritis    • Back pain     sciatic pain right hip and down right leg   • Cancer ([address])     bladder cancer ; had surgical scraping   • GERD (gastroesophageal reflux disease)    • Hyperlipidemia    • Hypertension    • Seasonal allergies    • Wears partial dentures     1 small tooth removed and placed in cup        Past Surgical History:   Procedure Laterality Date   • APPENDECTOMY     • COLONOSCOPY     • CYSTOSCOPY  11/29/2021    Robin   • EYE SURGERY     • HERNIA REPAIR     • ID BLADDER INSTILLATION ANTICARCINOGENIC AGENT N/A 1/15/2020    Procedure: INSTILLATION MITOMYCIN;  Surgeon: Jenna Louie MD;  [facility];  Service: Urology   • ID CYSTO W/REMOVAL OF LESIONS SMALL N/A 1/15/2020    Procedure: Cystoscopy TRANSURETHRAL RESECTION OF BLADDER TUMOR (TURBT); Surgeon: Jenna Louie MD;  [facility];  Service: Urology   • ID RPR 1ST INGUN HRNA AGE 5 YRS/> REDUCIBLE Left 2/3/2022    Procedure: INGUINAL HERNIA REPAIR;  Surgeon: Alexandra Saha MD;  [facility];  Service: General   • UPPER GASTROINTESTINAL ENDOSCOPY     • VEIN LIGATION AND STRIPPING         Meds/Allergies:  Prior to Admission medications    Medication Sig Start Date End Date Taking?  Authorizing Provider   calcium-vitamin D 250-100 MG-UNIT per tablet Take 1 tablet by mouth 2 (two) times a day    Historical Provider, MD   carbidopa-levodopa (SINEMET)  mg per tablet Take 1 tablet by mouth 3 (three) times a day 7/7/23   Historical Provider, MD   Cholecalciferol (VITAMIN D3) 1000 units CAPS Take 1 capsule by mouth daily    Historical Provider, MD   cyanocobalamin (VITAMIN B-12) 1000 MCG tablet Take 1,000 mcg by mouth daily    Historical Provider, MD   hydrochlorothiazide (HYDRODIURIL) 12.5 mg tablet TAKE ONE TABLET BY MOUTH EVERY DAY 8/3/23   Graeme Dwyer DO   losartan (COZAAR) 100 MG tablet Take 1 tablet (100 mg total) by mouth daily 11/8/22   Graeme DO Reymundo   Magnesium Gluconate 550 MG TABS Take 30 mg by mouth 2 (two) times a day    Historical Provider, MD   multivitamin-minerals (CENTRUM ADULTS) tablet Take 1 tablet by mouth daily    Historical Provider, MD   omeprazole (PriLOSEC) 20 mg delayed release capsule Take 1 capsule (20 mg total) by mouth daily Take 1/2 hour  prior to breakfast 6/15/23 9/13/23  MATHEUS Martinez   simvastatin (ZOCOR) 20 mg tablet TAKE 1 TABLET BY MOUTH DAILY 6/6/23   Tatiana Harding DO     I have reviewed home medications with patient personally. Allergies: No Known Allergies    Social History:  Marital Status:    Occupation: none  Patient Pre-hospital Living Situation: Home  Patient Pre-hospital Level of Mobility: walks  Patient Pre-hospital Diet Restrictions:   Substance Use History:   Social History     Substance and Sexual Activity   Alcohol Use Never     Social History     Tobacco Use   Smoking Status Never   Smokeless Tobacco Never     Social History     Substance and Sexual Activity   Drug Use Never       Family History:  Family History   Problem Relation Age of Onset   • Heart disease Mother    • Hypertension Mother    • No Known Problems Son    • No Known Problems Son        Physical Exam:     Vitals:   Blood Pressure: 125/64 (09/23/23 1713)  Pulse: 81 (09/23/23 1713)  Temperature: 98.1 °F (36.7 °C) (09/23/23 1713)  Temp Source: Oral (09/23/23 1713)  Respirations: 18 (09/23/23 1713)  Height: 5' 1" (154.9 cm) (09/23/23 1713)  Weight - Scale: 68 kg (150 lb) (09/23/23 1713)  SpO2: 94 % (09/23/23 1713)    Physical Exam  Vitals and nursing note reviewed. Constitutional:       General: She is not in acute distress. Appearance: She is well-developed. Comments: Pleasant, comfortably resting in her bed. Reports headaches   HENT:      Head: Normocephalic and atraumatic. Eyes:      Conjunctiva/sclera: Conjunctivae normal.   Cardiovascular:      Rate and Rhythm: Normal rate and regular rhythm. Heart sounds: No murmur heard. Pulmonary:      Effort: Pulmonary effort is normal. No respiratory distress. Breath sounds: Normal breath sounds. Abdominal:      Palpations: Abdomen is soft. Tenderness: There is no abdominal tenderness. Musculoskeletal:         General: No swelling. Cervical back: Neck supple. Skin:     General: Skin is warm and dry. Capillary Refill: Capillary refill takes less than 2 seconds. Neurological:      Mental Status: She is alert. Psychiatric:         Mood and Affect: Mood normal.          Additional Data:     Lab Results:  Results from last 7 days   Lab Units 09/23/23  1845   WBC Thousand/uL 9.69   HEMOGLOBIN g/dL 14.1   HEMATOCRIT % 42.7   PLATELETS Thousands/uL 310   NEUTROS PCT % 69   LYMPHS PCT % 20   MONOS PCT % 8   EOS PCT % 2     Results from last 7 days   Lab Units 09/23/23  1845   SODIUM mmol/L 139   POTASSIUM mmol/L 3.6   CHLORIDE mmol/L 101   CO2 mmol/L 30   BUN mg/dL 13   CREATININE mg/dL 0.61   ANION GAP mmol/L 8   CALCIUM mg/dL 8.9   GLUCOSE RANDOM mg/dL 122                       Lines/Drains:  Invasive Devices     Peripheral Intravenous Line  Duration           Peripheral IV 09/23/23 Left Antecubital <1 day                    Imaging: Reviewed radiology reports from this admission including: CT head  CT head without contrast   Final Result by Brad Angel MD (09/23 4715)      No acute intracranial abnormality. Workstation performed: [id number]             EKG and Other Studies Reviewed on Admission:   · EKG: NONE    ** Please Note: This note has been constructed using a voice recognition system.  **

## 2023-09-23 NOTE — LETTER
Dear Dr. Rashaad Wilson,    Thank you for allowing us to participate in the care of your patient, Annabelle Meigs, who was hospitalized from 9/23/2023 through 9/24/2023 with the admitting diagnosis of hypertensive urgency. Patient presented to our emergency department with a blood pressure of 181/86 with associated nausea, dizziness, and headache. Patient was given IV hydralazine 10 mg in the emergency department and resumed on her home medications of losartan 100 mg and HCTZ 12.5 mg.  Imaging and blood work were unrevealing. The next morning her blood pressure have improved to 156/91 and she was tachycardic at 111 bpm. Given her history of migraine, uncontrolled blood pressure, and tremor, she was trialed on metoprolol tartrate 25 mg BID. This showed benefit in improving her BP to 137/80 and HR to 79 bpm.  Patient's headache resolved and she was discharged with metoprolol tartrate 25 mg twice daily and instructed to follow-up with her PCP within 1 week. If you have any additional questions or would like to discuss further, please feel free to contact me.     Mike Montero, [address] [facility] Internal Medicine, Hospitalist  [phone]

## 2023-09-23 NOTE — ASSESSMENT & PLAN NOTE
Present on admission headaches, dizziness-chronic elevated blood pressure for the past  At the ED systolic blood pressure 726/53  No blurred vision, nausea with no vomiting.   No focal deficit  CT head without intracranial abnormality or bleed  S/p IV hydralazine 10 mg    Plan   Systolic blood pressure goal reduction 25% in 24-48h  We will resume home losartan 100 mg, hydrochlorothiazide 12.5 tomorrow  Close BP monitoring, checks every shift  Follow-up A1c, lipid, UA, TSH,  Neurochecks every shift  Pain medication-associated migraine cocktail

## 2023-09-23 NOTE — ASSESSMENT & PLAN NOTE
History of prior migraines every  We will add migraine cocktail-Decadron, Benadryl, Reglan, magnesium

## 2023-09-23 NOTE — PROGRESS NOTES
[facility]        NAME: Lani Clark is a 65 y.o. female  : 1945    MRN: [id number]  DATE: 2023  TIME: 10:34 AM    Assessment and Orders   Hypertensive urgency [I16.0]  1. Hypertensive urgency  Transfer to other facility            Plan and Discussion      64 yo F presenting with worsening headache x2 weeks. Dizziness started today. Blood pressure has been uncontrolled for the past 2 weeks. BP at home this morning was 198/104. Blood pressure in the clinic today is 190/94. Patient referred to emergency room for further evaluation and treatment. Discussed ED precautions including (but not limited to)  • Difficultly breathing or shortness of breath  • Chest pain  • Acutely worsening symptoms. Risks and benefits discussed. Patient understands and agrees with the plan. Follow up with PCP. Chief Complaint     Chief Complaint   Patient presents with   • Hypertension   • Headache   • Dizziness     Pt states B/P has been running high for the past 2 weeks, also having headache over entire head for 2 weeks, today feels dizzy. Ambulated from waiting room, gait steady. Pt did not contact PCP, took usual meds this am. B/P at home today was 198/104         History of Present Illness       Patient is a 65-year-old female who presents with headache. Headache for started 2 weeks ago and seems to be increasing in intensity. This morning she woke up with new onset of dizziness. She also states she feels very weak. She has had increasing blood pressures over the last 2 weeks. She has not reached out to her PCP. She has been taking her medication as prescribed. Blood pressure at home this morning was 198/102, which prompted visit. Besides dizziness, she has no other vision changes. Hypertension  Associated symptoms include headaches. Risk factors for coronary artery disease include diabetes mellitus. Headache  Dizziness  Associated symptoms include headaches. Review of Systems   Review of Systems   Neurological: Positive for dizziness and headaches.          Current Medications       Current Outpatient Medications:   •  calcium-vitamin D 250-100 MG-UNIT per tablet, Take 1 tablet by mouth 2 (two) times a day, Disp: , Rfl:   •  carbidopa-levodopa (SINEMET)  mg per tablet, Take 1 tablet by mouth 3 (three) times a day, Disp: , Rfl:   •  Cholecalciferol (VITAMIN D3) 1000 units CAPS, Take 1 capsule by mouth daily, Disp: , Rfl:   •  cyanocobalamin (VITAMIN B-12) 1000 MCG tablet, Take 1,000 mcg by mouth daily, Disp: , Rfl:   •  hydrochlorothiazide (HYDRODIURIL) 12.5 mg tablet, TAKE ONE TABLET BY MOUTH EVERY DAY, Disp: 90 tablet, Rfl: 1  •  losartan (COZAAR) 100 MG tablet, Take 1 tablet (100 mg total) by mouth daily, Disp: 90 tablet, Rfl: 1  •  Magnesium Gluconate 550 MG TABS, Take 30 mg by mouth 2 (two) times a day, Disp: , Rfl:   •  multivitamin-minerals (CENTRUM ADULTS) tablet, Take 1 tablet by mouth daily, Disp: , Rfl:   •  omeprazole (PriLOSEC) 20 mg delayed release capsule, Take 1 capsule (20 mg total) by mouth daily Take 1/2 hour  prior to breakfast, Disp: 90 capsule, Rfl: 3  •  simvastatin (ZOCOR) 20 mg tablet, TAKE 1 TABLET BY MOUTH DAILY, Disp: 90 tablet, Rfl: 3    Current Allergies     Allergies as of 09/23/2023   • (No Known Allergies)            The following portions of the patient's history were reviewed and updated as appropriate: allergies, current medications, past family history, past medical history, past social history, past surgical history and problem list.     Past Medical History:   Diagnosis Date   • Arthritis    • Back pain     sciatic pain right hip and down right leg   • Cancer ([address])     bladder cancer ; had surgical scraping   • GERD (gastroesophageal reflux disease)    • Hyperlipidemia    • Hypertension    • Seasonal allergies    • Wears partial dentures     1 small tooth removed and placed in cup        Past Surgical History:   Procedure Laterality Date   • APPENDECTOMY     • COLONOSCOPY     • CYSTOSCOPY  11/29/2021    Robin   • EYE SURGERY     • HERNIA REPAIR     • [place] BLADDER INSTILLATION ANTICARCINOGENIC AGENT N/A 1/15/2020    Procedure: INSTILLATION MITOMYCIN;  Surgeon: Hira Clark MD;  [facility];  Service: Urology   • [place] CYSTO W/REMOVAL OF LESIONS SMALL N/A 1/15/2020    Procedure: Cystoscopy TRANSURETHRAL RESECTION OF BLADDER TUMOR (TURBT); Surgeon: Hira Clark MD;  [facility];  Service: Urology   • WA RPR 1ST INGUN HRNA AGE 5 YRS/> REDUCIBLE Left 2/3/2022    Procedure: INGUINAL HERNIA REPAIR;  Surgeon: Giacomo King MD;  [facility];  Service: General   • UPPER GASTROINTESTINAL ENDOSCOPY     • VEIN LIGATION AND STRIPPING         Family History   Problem Relation Age of Onset   • Heart disease Mother    • Hypertension Mother    • No Known Problems Son    • No Known Problems Son          Medications have been verified. Objective   BP (!) 190/94 Comment: manual  Pulse 92   Temp 97.6 °F (36.4 °C)   Resp 20   LMP  (LMP Unknown)   SpO2 97%   No LMP recorded (lmp unknown). Patient is postmenopausal.       Physical Exam     Physical Exam  Constitutional:       General: She is not in acute distress. Appearance: She is not ill-appearing or toxic-appearing. Eyes:      Extraocular Movements: Extraocular movements intact. Pupils: Pupils are equal, round, and reactive to light. Cardiovascular:      Rate and Rhythm: Normal rate and regular rhythm. Pulmonary:      Effort: No respiratory distress. Breath sounds: No rhonchi. Neurological:      General: No focal deficit present. Mental Status: She is alert and oriented to person, place, and time. Cranial Nerves: No cranial nerve deficit. Motor: No weakness.       Gait: Gait normal.   Psychiatric:         Mood and Affect: Mood normal.         Behavior: Behavior normal.               Yokasta Barron DO

## 2023-09-23 NOTE — ED PROVIDER NOTES
History  Chief Complaint   Patient presents with   • Hypertension     Htn, dizziness, headache, nausea, constipation x2 weeks     HPI     67-year-old female with history of hypertension, hyperlipidemia, Parkinson's disease, and GERD presenting for evaluation of 2 weeks of headache accompanied by hypertension, nausea, and dizziness. The patient reports experiencing almost daily headaches for years, but states that 2 weeks ago she developed a gradual in onset headache at the top of her head that is worse than her typical headaches. She took her blood pressure after the onset of the headache and found it to be elevated to as high as 190/90. Prior to onset of the headache 2 weeks ago she would intermittently take her blood pressure at home and states that it was within normal limits, at around 342 systolic. Since onset of the headache, patient reports nausea without vomiting and dizziness which she describes as "unsteadiness."  She denies sensation of the room spinning and has not had difficulty walking. Denies feeling like she is going to pass out. Over the last 2 weeks she has had some constipation, last had a bowel movement 3 days ago and denies blood in her stool or black tarry stools. No abdominal pain or diarrhea. Denies chest pain or shortness of breath. No vision changes. She has never been evaluated by a neurologist for her headaches. She takes losartan daily (she did take it this morning) and reports compliance with this medication. Prior to Admission Medications   Prescriptions Last Dose Informant Patient Reported? Taking?    Cholecalciferol (VITAMIN D3) 1000 units CAPS  Self Yes No   Sig: Take 1 capsule by mouth daily   Magnesium Gluconate 550 MG TABS   Yes No   Sig: Take 30 mg by mouth 2 (two) times a day   calcium-vitamin D 250-100 MG-UNIT per tablet  Self Yes No   Sig: Take 1 tablet by mouth 2 (two) times a day   carbidopa-levodopa (SINEMET)  mg per tablet   Yes No   Sig: Take 1 tablet by mouth 3 (three) times a day   cyanocobalamin (VITAMIN B-12) 1000 MCG tablet  Self Yes No   Sig: Take 1,000 mcg by mouth daily   hydrochlorothiazide (HYDRODIURIL) 12.5 mg tablet   No No   Sig: TAKE ONE TABLET BY MOUTH EVERY DAY   losartan (COZAAR) 100 MG tablet  Self No No   Sig: Take 1 tablet (100 mg total) by mouth daily   multivitamin-minerals (CENTRUM ADULTS) tablet  Self Yes No   Sig: Take 1 tablet by mouth daily   omeprazole (PriLOSEC) 20 mg delayed release capsule   No No   Sig: Take 1 capsule (20 mg total) by mouth daily Take 1/2 hour  prior to breakfast   simvastatin (ZOCOR) 20 mg tablet   No No   Sig: TAKE 1 TABLET BY MOUTH DAILY      Facility-Administered Medications: None       Past Medical History:   Diagnosis Date   • Arthritis    • Back pain     sciatic pain right hip and down right leg   • Cancer (HCC)     bladder cancer ; had surgical scraping   • GERD (gastroesophageal reflux disease)    • Hyperlipidemia    • Hypertension    • Seasonal allergies    • Wears partial dentures     1 small tooth removed and placed in cup        Past Surgical History:   Procedure Laterality Date   • APPENDECTOMY     • COLONOSCOPY     • CYSTOSCOPY  11/29/2021    [facility]   • EYE SURGERY     • HERNIA REPAIR     • PA BLADDER INSTILLATION ANTICARCINOGENIC AGENT N/A 1/15/2020    Procedure: INSTILLATION MITOMYCIN;  Surgeon: Henry Manning MD;  Location: [facility];  Service: Urology   • PA CYSTO W/REMOVAL OF LESIONS SMALL N/A 1/15/2020    Procedure: Cystoscopy TRANSURETHRAL RESECTION OF BLADDER TUMOR (TURBT);   Surgeon: Henry Manning MD;  Location: [facility];  Service: Urology   • PA RPR 1ST INGUN HRNA AGE 5 YRS/> REDUCIBLE Left 2/3/2022    Procedure: INGUINAL HERNIA REPAIR;  Surgeon: Abdias Ny MD;  Location: [facility];  Service: General   • UPPER GASTROINTESTINAL ENDOSCOPY     • VEIN LIGATION AND STRIPPING         Family History   Problem Relation Age of Onset   • Heart disease Mother    • Hypertension Mother    • No Known Problems Son    • No Known Problems Son      I have reviewed and agree with the history as documented. E-Cigarette/Vaping   • E-Cigarette Use Never User      E-Cigarette/Vaping Substances   • Nicotine No    • THC No    • CBD No    • Flavoring No    • Other No    • Unknown No      Social History     Tobacco Use   • Smoking status: Never   • Smokeless tobacco: Never   Vaping Use   • Vaping Use: Never used   Substance Use Topics   • Alcohol use: Never   • Drug use: Never       Review of Systems   Constitutional: Negative for chills and fever. HENT: Negative for congestion. Eyes: Negative for visual disturbance. Respiratory: Negative for cough and shortness of breath. Cardiovascular: Negative for chest pain and leg swelling. Gastrointestinal: Positive for constipation and nausea. Negative for abdominal pain, blood in stool, diarrhea and vomiting. Genitourinary: Negative for dysuria and frequency. Musculoskeletal: Negative for arthralgias, back pain, neck pain and neck stiffness. Skin: Negative for rash. Neurological: Positive for dizziness and headaches. Negative for weakness, light-headedness and numbness. Psychiatric/Behavioral: Negative for agitation, behavioral problems and confusion. All other systems reviewed and are negative. Physical Exam  Physical Exam  Vitals and nursing note reviewed. Constitutional:       General: She is not in acute distress. Appearance: She is well-developed. She is not ill-appearing, toxic-appearing or diaphoretic. HENT:      Head: Normocephalic and atraumatic. Right Ear: External ear normal.      Left Ear: External ear normal.      Nose: Nose normal.   Eyes:      Extraocular Movements: Extraocular movements intact. Conjunctiva/sclera: Conjunctivae normal.      Pupils: Pupils are equal, round, and reactive to light. Cardiovascular:      Rate and Rhythm: Normal rate and regular rhythm.       Pulses: Normal pulses. Heart sounds: Normal heart sounds. No murmur heard. No friction rub. No gallop. Pulmonary:      Effort: Pulmonary effort is normal. No respiratory distress. Breath sounds: Normal breath sounds. No wheezing or rales. Abdominal:      General: Bowel sounds are normal. There is no distension. Palpations: Abdomen is soft. Tenderness: There is no abdominal tenderness (abdomen benign to deep palpation). There is no guarding. Musculoskeletal:         General: No deformity. Normal range of motion. Cervical back: Normal range of motion and neck supple. Right lower leg: No edema. Left lower leg: No edema. Comments: No calf swelling or tenderness   Skin:     General: Skin is warm and dry. Neurological:      Mental Status: She is alert and oriented to person, place, and time. Motor: No abnormal muscle tone. Comments: Please see ED course above for details of the Medical Decision Making.       Psychiatric:         Mood and Affect: Mood normal.         Vital Signs  ED Triage Vitals [09/23/23 1128]   Temperature Pulse Respirations Blood Pressure SpO2   97.5 °F (36.4 °C) 83 20 (!) 181/86 96 %      Temp Source Heart Rate Source Patient Position - Orthostatic VS BP Location FiO2 (%)   Oral Monitor Sitting -- --      Pain Score       9           Vitals:    09/23/23 1530 09/23/23 1539 09/23/23 1600 09/23/23 1643   BP: 154/67 144/67 161/70 96/60   Pulse:    85   Patient Position - Orthostatic VS:             Visual Acuity      ED Medications  Medications   ketorolac (TORADOL) injection 15 mg (15 mg Intravenous Given 9/23/23 1202)   metoclopramide (REGLAN) injection 5 mg (5 mg Intravenous Given 9/23/23 1203)   diphenhydrAMINE (BENADRYL) injection 12.5 mg (12.5 mg Intravenous Given 9/23/23 1203)   magnesium sulfate 2 g/50 mL IVPB (premix) 2 g (0 g Intravenous Stopped 9/23/23 1308)   sodium chloride 0.9 % bolus 1,000 mL (0 mL Intravenous Stopped 9/23/23 1324)   hydrALAZINE (APRESOLINE) injection 5 mg (5 mg Intravenous Given 9/23/23 1459)   hydrALAZINE (APRESOLINE) injection 5 mg (5 mg Intravenous Given 9/23/23 1539)       Diagnostic Studies  Results Reviewed     None                 CT head without contrast   Final Result by Silvina Restrepo MD (09/23 1308)      No acute intracranial abnormality. Workstation performed: [id number]                    Procedures  Procedures         ED Course  ED Course as of 09/23/23 1655   Sat Sep 23, 2023   1159 I personally interpreted the patient's EKG which reveals normal rate, normal sinus rhythm, normal axis, normal intervals, no ischemic changes. 1359 CT head with NAICA. Pt declines thunderclap onset of her headache. Not consistent with [facility].    1359 Patient reports improvement in her headache from 9 out of 10 to 7 out of 10 following migraine cocktail however blood pressure remains elevated at 157/75. Will give dose of IV hydralazine and assess for improvement. 1547 BP is improved following hydralazine, also with improvement in headache. Suspect hypertensive urgency-like picture as the cause of the pt's symptoms. Will admit to [facility] for further BP management. Medical Decision Making  Please see ED course above for details of the Medical Decision Making. Headache: acute illness or injury  Hypertension: acute illness or injury  Amount and/or Complexity of Data Reviewed  Radiology: ordered. Risk  Prescription drug management. Decision regarding hospitalization.           Disposition  Final diagnoses:   Hypertension   Headache     Time reflects when diagnosis was documented in both MDM as applicable and the Disposition within this note     Time User Action Codes Description Comment    9/23/2023  3:59 PM Yakov Johnson [I10] Hypertension     9/23/2023  3:59 PM Yakov Johnson [R51.9] Headache       ED Disposition     ED Disposition   Admit    Condition Stable    Date/Time   Sat Sep 23, 2023  3:59 PM    Comment   Case was discussed with ELIZABETH and the patient's admission status was agreed to be Admission Status: observation status to the service of Dr. Shaheen Person. Follow-up Information    None         Current Discharge Medication List      CONTINUE these medications which have NOT CHANGED    Details   calcium-vitamin D 250-100 MG-UNIT per tablet Take 1 tablet by mouth 2 (two) times a day      carbidopa-levodopa (SINEMET)  mg per tablet Take 1 tablet by mouth 3 (three) times a day      Cholecalciferol (VITAMIN D3) 1000 units CAPS Take 1 capsule by mouth daily      cyanocobalamin (VITAMIN B-12) 1000 MCG tablet Take 1,000 mcg by mouth daily      hydrochlorothiazide (HYDRODIURIL) 12.5 mg tablet TAKE ONE TABLET BY MOUTH EVERY DAY  Qty: 90 tablet, Refills: 1    Associated Diagnoses: Essential hypertension      losartan (COZAAR) 100 MG tablet Take 1 tablet (100 mg total) by mouth daily  Qty: 90 tablet, Refills: 1    Associated Diagnoses: Essential hypertension      Magnesium Gluconate 550 MG TABS Take 30 mg by mouth 2 (two) times a day      multivitamin-minerals (CENTRUM ADULTS) tablet Take 1 tablet by mouth daily      omeprazole (PriLOSEC) 20 mg delayed release capsule Take 1 capsule (20 mg total) by mouth daily Take 1/2 hour  prior to breakfast  Qty: 90 capsule, Refills: 3    Associated Diagnoses: Gastroesophageal reflux disease, unspecified whether esophagitis present      simvastatin (ZOCOR) 20 mg tablet TAKE 1 TABLET BY MOUTH DAILY  Qty: 90 tablet, Refills: 3    Associated Diagnoses: Hyperlipidemia, unspecified hyperlipidemia type             No discharge procedures on file.     PDMP Review     None          ED Provider  Electronically Signed by           Villa Beaulieu MD  09/23/23 9651

## 2023-09-24 VITALS
OXYGEN SATURATION: 97 % | SYSTOLIC BLOOD PRESSURE: 137 MMHG | DIASTOLIC BLOOD PRESSURE: 80 MMHG | HEART RATE: 79 BPM | WEIGHT: 150 LBS | BODY MASS INDEX: 28.32 KG/M2 | TEMPERATURE: 97.7 F | HEIGHT: 61 IN | RESPIRATION RATE: 18 BRPM

## 2023-09-24 LAB
4HR DELTA HS TROPONIN: -3 NG/L
ANION GAP SERPL CALCULATED.3IONS-SCNC: 10 MMOL/L
BUN SERPL-MCNC: 13 MG/DL (ref 5–25)
CALCIUM SERPL-MCNC: 9.1 MG/DL (ref 8.4–10.2)
CARDIAC TROPONIN I PNL SERPL HS: 4 NG/L
CHLORIDE SERPL-SCNC: 102 MMOL/L (ref 96–108)
CO2 SERPL-SCNC: 26 MMOL/L (ref 21–32)
CREAT SERPL-MCNC: 0.61 MG/DL (ref 0.6–1.3)
ERYTHROCYTE [DISTWIDTH] IN BLOOD BY AUTOMATED COUNT: 13 % (ref 11.6–15.1)
EST. AVERAGE GLUCOSE BLD GHB EST-MCNC: 134 MG/DL
GFR SERPL CREATININE-BSD FRML MDRD: 87 ML/MIN/1.73SQ M
GLUCOSE P FAST SERPL-MCNC: 172 MG/DL (ref 65–99)
GLUCOSE SERPL-MCNC: 172 MG/DL (ref 65–140)
HBA1C MFR BLD: 6.3 %
HCT VFR BLD AUTO: 44.2 % (ref 34.8–46.1)
HGB BLD-MCNC: 14.7 G/DL (ref 11.5–15.4)
MAGNESIUM SERPL-MCNC: 2.5 MG/DL (ref 1.9–2.7)
MCH RBC QN AUTO: 29.7 PG (ref 26.8–34.3)
MCHC RBC AUTO-ENTMCNC: 33.3 G/DL (ref 31.4–37.4)
MCV RBC AUTO: 89 FL (ref 82–98)
PHOSPHATE SERPL-MCNC: 3.1 MG/DL (ref 2.3–4.1)
PLATELET # BLD AUTO: 332 THOUSANDS/UL (ref 149–390)
PMV BLD AUTO: 9.2 FL (ref 8.9–12.7)
POTASSIUM SERPL-SCNC: 3.6 MMOL/L (ref 3.5–5.3)
RBC # BLD AUTO: 4.95 MILLION/UL (ref 3.81–5.12)
SODIUM SERPL-SCNC: 138 MMOL/L (ref 135–147)
WBC # BLD AUTO: 7.82 THOUSAND/UL (ref 4.31–10.16)

## 2023-09-24 PROCEDURE — 84484 ASSAY OF TROPONIN QUANT: CPT

## 2023-09-24 PROCEDURE — 84100 ASSAY OF PHOSPHORUS: CPT

## 2023-09-24 PROCEDURE — 93005 ELECTROCARDIOGRAM TRACING: CPT

## 2023-09-24 PROCEDURE — 83735 ASSAY OF MAGNESIUM: CPT

## 2023-09-24 PROCEDURE — 99239 HOSP IP/OBS DSCHRG MGMT >30: CPT | Performed by: HOSPITALIST

## 2023-09-24 PROCEDURE — 85027 COMPLETE CBC AUTOMATED: CPT

## 2023-09-24 PROCEDURE — 80048 BASIC METABOLIC PNL TOTAL CA: CPT

## 2023-09-24 RX ADMIN — Medication 500 MG: at 17:16

## 2023-09-24 RX ADMIN — CYANOCOBALAMIN TAB 500 MCG 1000 MCG: 500 TAB at 09:24

## 2023-09-24 RX ADMIN — METOCLOPRAMIDE 10 MG: 5 INJECTION, SOLUTION INTRAMUSCULAR; INTRAVENOUS at 05:57

## 2023-09-24 RX ADMIN — PANTOPRAZOLE SODIUM 40 MG: 40 TABLET, DELAYED RELEASE ORAL at 05:57

## 2023-09-24 RX ADMIN — DOCUSATE SODIUM 100 MG: 100 CAPSULE, LIQUID FILLED ORAL at 09:23

## 2023-09-24 RX ADMIN — MULTIPLE VITAMINS W/ MINERALS TAB 1 TABLET: TAB ORAL at 09:23

## 2023-09-24 RX ADMIN — CARBIDOPA AND LEVODOPA 1 TABLET: 25; 100 TABLET ORAL at 09:23

## 2023-09-24 RX ADMIN — METOPROLOL TARTRATE 25 MG: 25 TABLET, FILM COATED ORAL at 11:52

## 2023-09-24 RX ADMIN — DIPHENHYDRAMINE HYDROCHLORIDE 25 MG: 50 INJECTION, SOLUTION INTRAMUSCULAR; INTRAVENOUS at 17:17

## 2023-09-24 RX ADMIN — METOCLOPRAMIDE 10 MG: 5 INJECTION, SOLUTION INTRAMUSCULAR; INTRAVENOUS at 17:17

## 2023-09-24 RX ADMIN — MAGNESIUM SULFATE HEPTAHYDRATE 2 G: 40 INJECTION, SOLUTION INTRAVENOUS at 11:49

## 2023-09-24 RX ADMIN — TIZANIDINE 2 MG: 2 TABLET ORAL at 09:24

## 2023-09-24 RX ADMIN — LOSARTAN POTASSIUM 100 MG: 50 TABLET, FILM COATED ORAL at 09:24

## 2023-09-24 RX ADMIN — POLYETHYLENE GLYCOL 3350 17 G: 17 POWDER, FOR SOLUTION ORAL at 09:23

## 2023-09-24 RX ADMIN — CHOLECALCIFEROL TAB 10 MCG (400 UNIT) 400 UNITS: 10 TAB at 09:23

## 2023-09-24 RX ADMIN — DIPHENHYDRAMINE HYDROCHLORIDE 25 MG: 50 INJECTION, SOLUTION INTRAMUSCULAR; INTRAVENOUS at 05:57

## 2023-09-24 RX ADMIN — CARBIDOPA AND LEVODOPA 1 TABLET: 25; 100 TABLET ORAL at 17:16

## 2023-09-24 RX ADMIN — PRAVASTATIN SODIUM 40 MG: 40 TABLET ORAL at 17:16

## 2023-09-24 RX ADMIN — HYDROCHLOROTHIAZIDE 12.5 MG: 12.5 TABLET ORAL at 09:24

## 2023-09-24 RX ADMIN — Medication 500 MG: at 09:23

## 2023-09-24 RX ADMIN — TIZANIDINE 2 MG: 2 TABLET ORAL at 17:16

## 2023-09-24 RX ADMIN — HEPARIN SODIUM 5000 UNITS: 5000 INJECTION INTRAVENOUS; SUBCUTANEOUS at 05:57

## 2023-09-24 NOTE — DISCHARGE SUMMARY
[address] [facility]  Discharge- Carolynn Mccormick 1945, 65 y.o. female MRN: [id number]  Unit/Bed#: W -01 Encounter: [id number]  Primary Care Provider: Reine Goldmann, DO   Date and time admitted to hospital: 9/23/2023 11:23 AM    * Hypertensive urgency  Assessment & Plan  · Presented /86 with associated headache and dizziness. States she takes her home losartan and HCTZ daily as prescribed. · Elevated blood pressure for the past 2 weeks  · Denies blurred vision, nausea, vomiting, Has no focal deficits  · Workup  · CT head without intracranial abnormality or bleed  · Receieved IV hydralazine 10 mg  · Urine studies show small luekocytes, trace ketones, and 10-20 WBC. Patient has no urinary symptoms. · Hgb A1c 6.3%,   · TSH 4.8 and T4 0.67 consistent with subclinical hypothyroidism  · Systolic blood pressure goal reduction 25% in 24-48h  · Home antihypertensives were continued and migraine cocktail was initiated. Her blood pressure have improved to 156/91 and she is tachycardic at 111 bpm. Given her history of migraine and uncontrolled blood pressure, patient was trialed on metoprolol tartrate 25 mg BID which showed benefit in improving her BP to 137/80 and HR to 79 bpm. Patient denies headache.     Plan   · Started on metoprolol tartrate 25 mg twice daily  · Continue home losartan 100 mg and HCTZ 12.5     Parkinson disease  Assessment & Plan  Continue Sinemet  times daily    Migraine  Assessment & Plan  · History of prior migraines every  · Scheduled Decadron, Benadryl, Reglan, magnesium and as needed tylenol and Imitrex while admitted    Plan  - Started metoprolol tartrate 25 mg twice daily for blood pressure control and migraine prevention    Dyslipidemia  Assessment & Plan  · Takes home Atorvastatin 40 mg  · , Tri 121, HDL 45, LDL 93    Plan  - Continue home atorvastatin 40 mg once daily      Medical Problems     Resolved Problems  Date Reviewed: 9/24/2023 None       Discharging Resident: Vera العراقي DO  Discharging Attending: Pavel Cisneros MD  PCP: Martha Coreas DO  Admission Date:   Admission Orders (From admission, onward)     Ordered        09/23/23 1600  Place in Observation  Once                      Discharge Date: 09/24/23    Consultations During Hospital Stay:  · None    Procedures Performed:   · None    Significant Findings / Test Results:   CT head without contrast   Final Result by Paula Partida MD (09/23 1308)      No acute intracranial abnormality. Workstation performed: [id number]           Incidental Findings:   · None     Test Results Pending at Discharge (will require follow up): · None     Outpatient Tests Requested:  · None    Complications:  None    Reason for Admission: Hypertensive urgency    Hospital Course:   Aleksandar Romano is a 65 y.o. female patient who originally presented to the hospital on 9/23/2023 due to hypertensive urgency. Patient has a past medical history of Parkinson's disease, hypertension, hyperlipidemia, and GERD. For the past 2 weeks she has been experiencing gradual onset occipital headaches that have become more generalized over time. She states that for the past 24 hours she has also been experiencing more severe headaches, nausea, dizziness, and sensation of passing out. Patient reports that she is compliant with taking all of her medications as prescribed. In the emergency department her BP was 181/86, CT head was negative, CMP and CBC were unrevealing, and urine studies showed small leukocytes with 10-20 WBC however patient had no urinary symptoms. Patient was given IV hydralazine 10 mg and started on a migraine cocktail of Decadron, Benadryl, Reglan, magnesium, and as needed Tylenol and Imitrex.   The next morning her pressures improved to 156/91 and headache improved to about a 5/10 severity, however she was tachycardic at 111 bpm.  Patient was trialed on metoprolol tartrate 25 mg twice daily which showed to be beneficial dropping her pressure to 137/80 and improving her heart rate to 79 bpm.  On reevaluation patient states she feels well and in no longer has a headache. Patient is instructed to follow-up with her primary care physician within 1 week. She was prescribed metoprolol tartrate 25 mg twice daily. Please see above list of diagnoses and related plan for additional information. Condition at Discharge: stable    Discharge Day Visit / Exam:   Subjective: Patient was seen at the bedside today and had no acute overnight events. She reports that she continues to have a headache of about 5/10 in severity but denies nausea, vomiting, or dizziness. Patient has no new or worsening concerns at this time. Vitals: Blood Pressure: 137/80 (09/24/23 1525)  Pulse: 79 (09/24/23 1525)  Temperature: 97.7 °F (36.5 °C) (09/24/23 1525)  Temp Source: Oral (09/23/23 2123)  Respirations: 18 (09/24/23 1525)  Height: 5' 1" (154.9 cm) (09/23/23 1713)  Weight - Scale: 68 kg (150 lb) (09/23/23 1713)  SpO2: 97 % (09/24/23 1525)  Exam:   Physical Exam  Vitals and nursing note reviewed. Constitutional:       General: She is not in acute distress. Appearance: She is well-developed. HENT:      Head: Normocephalic and atraumatic. Right Ear: External ear normal.      Left Ear: External ear normal.      Nose: Nose normal.   Eyes:      Conjunctiva/sclera: Conjunctivae normal.   Cardiovascular:      Rate and Rhythm: Normal rate and regular rhythm. Heart sounds: No murmur heard. Pulmonary:      Effort: Pulmonary effort is normal. No respiratory distress. Breath sounds: Normal breath sounds. No wheezing, rhonchi or rales. Abdominal:      General: Bowel sounds are normal. There is no distension. Palpations: Abdomen is soft. Tenderness: There is no abdominal tenderness. Musculoskeletal:         General: No swelling. Cervical back: Neck supple.       Right lower leg: Edema present. Left lower leg: Edema present. Skin:     General: Skin is warm and dry. Capillary Refill: Capillary refill takes less than 2 seconds. Neurological:      Mental Status: She is alert. Cranial Nerves: No dysarthria or facial asymmetry. Psychiatric:         Mood and Affect: Mood normal.        Discussion with Family: Updated  (son) at bedside. Discharge instructions/Information to patient and family:   See after visit summary for information provided to patient and family. Provisions for Follow-Up Care:  See after visit summary for information related to follow-up care and any pertinent home health orders. Disposition:   Home    Planned Readmission: No    Discharge Medications:  See after visit summary for reconciled discharge medications provided to patient and/or family.       **Please Note: This note may have been constructed using a voice recognition system**

## 2023-09-24 NOTE — ASSESSMENT & PLAN NOTE
· Takes home Atorvastatin 40 mg  · , Tri 121, HDL 45, LDL 93    Plan  - Continue home atorvastatin 40 mg once daily

## 2023-09-24 NOTE — UTILIZATION REVIEW
Initial Clinical Review    Admission: Date/Time/Statement:   Admission Orders (From admission, onward)       Ordered        09/23/23 1600  Place in Observation  Once                          Orders Placed This Encounter   Procedures    Place in Observation     Standing Status:   Standing     Number of Occurrences:   1     Order Specific Question:   Level of Care     Answer:   Med Surg [16]     ED Arrival Information       Expected   9/23/2023     Arrival   9/23/2023 11:03    Acuity   Urgent              Means of arrival   Walk-In    Escorted by   Family Member    Service   Hospitalist    Admission type   Emergency              Arrival complaint   Hypertensive urgency             Chief Complaint   Patient presents with    Hypertension     Htn, dizziness, headache, nausea, constipation x2 weeks       Initial Presentation: 65 y.o. female to the Ed from home with complaints of dizziness, headache, nausea, constipation for 2 weeks. Admitted under observation for hypertensive urgency, migraine. H/o Parkinson's disease, hypertension, hyperlipidemia, GERD . Arrives with elevated bp, gcs 15, CT head shows no acute findings.      Date: 9/24   Day 2:      ED Triage Vitals   Temperature Pulse Respirations Blood Pressure SpO2   09/23/23 1128 09/23/23 1128 09/23/23 1128 09/23/23 1128 09/23/23 1128   97.5 °F (36.4 °C) 83 20 (!) 181/86 96 %      Temp Source Heart Rate Source Patient Position - Orthostatic VS BP Location FiO2 (%)   09/23/23 1128 09/23/23 1128 09/23/23 1128 09/23/23 2123 --   Oral Monitor Sitting Right arm       Pain Score       09/23/23 1128       9          Wt Readings from Last 1 Encounters:   09/23/23 68 kg (150 lb)     Additional Vital Signs:   /Time Temp Pulse Resp BP MAP (mmHg) SpO2 O2 Device Patient Position - Orthostatic VS   09/24/23 0808 98 °F (36.7 °C) 114 Abnormal  -- 156/91 113 95 % -- --   09/24/23 08:07:51 98 °F (36.7 °C) 108 Abnormal  -- 156/91 113 95 % -- --   09/23/23 21:33:04 98 °F (36.7 °C) 87 -- -- -- 93 % -- --   09/23/23 21:23:48 96.9 °F (36.1 °C) Abnormal   86 18 164/86 112 95 % None (Room air) Lying   Temp: nurse notified at 09/23/23 2123 09/23/23 17:13:30 98.1 °F (36.7 °C) 81 18 125/64 84 94 % -- --   09/23/23 16:43:59 98.1 °F (36.7 °C) 85 18 96/60 72 94 % -- --   09/23/23 1600 -- -- -- 161/70 101 -- -- --   09/23/23 1539 -- -- -- 144/67 -- -- -- --   09/23/23 1530 -- -- 20 154/67 97 -- -- --   09/23/23 1430 -- -- -- 150/72 104 -- -- --   09/23/23 1330 -- -- -- 157/75 110 -- -- --   09/23/23 1315 -- 73 20 157/92 114 94 % None (Room air) --   09/23/23 1215 -- 78 20 157/79 111 94 % None (Room air) --   09/23/23 1128 97.5 °F (36.4 °C) 83 20 181/86 Abnormal  123 96 % None (Room air) Sitting     Pertinent Labs/Diagnostic Test Results:   CT head without contrast   Final Result by Bari Dancer, MD (09/23 1308)      No acute intracranial abnormality.                   Workstation performed: [id number]               Results from last 7 days   Lab Units 09/24/23  0621 09/23/23  1845   WBC Thousand/uL 7.82 9.69   HEMOGLOBIN g/dL 14.7 14.1   HEMATOCRIT % 44.2 42.7   PLATELETS Thousands/uL 332 310   NEUTROS ABS Thousands/µL  --  6.66         Results from last 7 days   Lab Units 09/24/23  0621 09/23/23  1845   SODIUM mmol/L 138 139   POTASSIUM mmol/L 3.6 3.6   CHLORIDE mmol/L 102 101   CO2 mmol/L 26 30   ANION GAP mmol/L 10 8   BUN mg/dL 13 13   CREATININE mg/dL 0.61 0.61   EGFR ml/min/1.73sq m 87 87   CALCIUM mg/dL 9.1 8.9   MAGNESIUM mg/dL 2.5  --    PHOSPHORUS mg/dL 3.1  --              Results from last 7 days   Lab Units 09/24/23  0621 09/23/23  1845   GLUCOSE RANDOM mg/dL 172* 122         Results from last 7 days   Lab Units 09/23/23  1845   HEMOGLOBIN A1C % 6.3*   EAG mg/dl 134     No results found for: "BETA-HYDROXYBUTYRATE"                   Results from last 7 days   Lab Units 09/24/23 0621 09/23/23 2044 09/23/23 1845   HS TNI 0HR ng/L  --   --  7   HS TNI 2HR ng/L  --  6  --    HSTNI D2 ng/L --  -1  --    HS TNI 4HR ng/L 4  --   --    HSTNI D4 ng/L -3  --   --              Results from last 7 days   Lab Units 09/23/23  1845   TSH 3RD GENERATON uIU/mL 4.801*                                                         Results from last 7 days   Lab Units 09/23/23  2044   CLARITY UA  Clear   COLOR UA  Light Yellow   SPEC GRAV UA  1.010   PH UA  8.0   GLUCOSE UA mg/dl Negative   KETONES UA mg/dl Trace*   BLOOD UA  Negative   PROTEIN UA mg/dl Negative   NITRITE UA  Negative   BILIRUBIN UA  Negative   UROBILINOGEN UA (BE) mg/dl <2.0   LEUKOCYTES UA  Small*   WBC UA /hpf 10-20*   RBC UA /hpf 1-2   BACTERIA UA /hpf Occasional   EPITHELIAL CELLS WET PREP /hpf Occasional                                                   ED Treatment:   Medication Administration from 09/23/2023 1035 to 09/23/2023 1634         Date/Time Order Dose Route Action Action by Comments     09/23/2023 1202 EDT ketorolac (TORADOL) injection 15 mg 15 mg Intravenous Given Joe Franco, RN --     09/23/2023 1203 EDT metoclopramide (REGLAN) injection 5 mg 5 mg Intravenous Given Joe Salvador, RN --     09/23/2023 1203 EDT diphenhydrAMINE (BENADRYL) injection 12.5 mg 12.5 mg Intravenous Given Joe Salvador, RN --     09/23/2023 1308 EDT magnesium sulfate 2 g/50 mL IVPB (premix) 2 g 0 g Intravenous Stopped Joe Salvador, RN --     09/23/2023 1208 EDT magnesium sulfate 2 g/50 mL IVPB (premix) 2 g 2 g Intravenous New Bag Troy Hilario, RN --     09/23/2023 1324 EDT sodium chloride 0.9 % bolus 1,000 mL 0 mL Intravenous Stopped Joe Salvador, RN --     09/23/2023 1157 EDT sodium chloride 0.9 % bolus 1,000 mL 1,000 mL Intravenous New Bag Joe Franco, RN --     09/23/2023 1459 EDT hydrALAZINE (APRESOLINE) injection 5 mg 5 mg Intravenous Given Joe Salvador, RN --     09/23/2023 1539 EDT hydrALAZINE (APRESOLINE) injection 5 mg 5 mg Intravenous Given Tyrone Choudhary, RAYMUNDO --          Past Medical History:   Diagnosis Date    Arthritis     Back pain     sciatic pain right hip and down right leg    Cancer ([address])     bladder cancer ; had surgical scraping    GERD (gastroesophageal reflux disease)     Hyperlipidemia     Hypertension     Seasonal allergies     Wears partial dentures     1 small tooth removed and placed in cup      Present on Admission:   Dyslipidemia      Admitting Diagnosis: Hypertension [I10]  Hypertensive urgency [I16.0]  Headache [R51.9]  Age/Sex: 65 y.o. female  Admission Orders:  Scheduled Medications:  carbidopa-levodopa, 1 tablet, Oral, TID  cholecalciferol, 400 Units, Oral, Daily  cyanocobalamin, 1,000 mcg, Oral, Daily  diphenhydrAMINE, 25 mg, Intravenous, Q8H [facility]  docusate sodium, 100 mg, Oral, Daily  heparin (porcine), 5,000 Units, Subcutaneous, Q8H [facility]  hydrochlorothiazide, 12.5 mg, Oral, Daily  losartan, 100 mg, Oral, Daily  magnesium gluconate, 500 mg, Oral, BID  magnesium sulfate, 2 g, Intravenous, Q24H [facility]  metoclopramide, 10 mg, Intravenous, Q8H [facility]  multivitamin-minerals, 1 tablet, Oral, Daily  pantoprazole, 40 mg, Oral, Early Morning  polyethylene glycol, 17 g, Oral, Daily  pravastatin, 40 mg, Oral, Daily With Dinner  senna, 1 tablet, Oral, HS  tiZANidine, 2 mg, Oral, TID      Continuous IV Infusions:     PRN Meds:  acetaminophen, 650 mg, Oral, Q6H PRN  SUMAtriptan, 6 mg, Subcutaneous, Q1H PRN        None    Network Utilization Review Department  ATTENTION: Please call with any questions or concerns to [phone] and carefully listen to the prompts so that you are directed to the right person. All voicemails are confidential.  Joellen Boss all requests for admission clinical reviews, approved or denied determinations and any other requests to dedicated fax number below belonging to the campus where the patient is receiving treatment.  List of dedicated fax numbers for the Facilities:  FACILITY NAME UR FAX NUMBER   ADMISSION DENIALS (Administrative/Medical Necessity) [address] (Maternity/NICU/Pediatrics) [address] [facility] [phone] 1505 [facility] [address] [address] [facility] [address] [phone]

## 2023-09-24 NOTE — ASSESSMENT & PLAN NOTE
· History of prior migraines every  · Scheduled Decadron, Benadryl, Reglan, magnesium and as needed tylenol and Imitrex while admitted    Plan  - Started metoprolol tartrate 25 mg twice daily for blood pressure control and migraine prevention

## 2023-09-24 NOTE — DISCHARGE INSTR - AVS FIRST PAGE
Dear Hira Cox,     It was our pleasure to care for you here at [facility]. It is our hope that we were always able to exceed the expected standards for your care during your stay. You were hospitalized due to hypertensive urgency. You were cared for on the fourth floor by Seth Lund DO under the service of Svitlana Parra MD with the [facility] who covers for your primary care physician (PCP), Servando Nieves DO, while you were hospitalized. If you have any questions or concerns related to this hospitalization, you may contact us at [phone]. For follow up as well as any medication refills, we recommend that you follow up with your primary care physician. A registered nurse will reach out to you by phone within a few days after your discharge to answer any additional questions that you may have after going home. However, at this time we provide for you here, the most important instructions / recommendations at discharge:     Notable Medication Adjustments -   Prescribed metoprolol tartrate 25 mg. Yaa take 1 tablet by mouth every 12 hours starting at 9 PM tonight. Testing Required after Discharge -   None  ** Please contact your PCP to request testing orders for any of the testing recommended here **  Important follow up information -   Please follow-up with your primary care physician within 1 week upon discharge  Other Instructions -   If you experience debilitating migraines, chest pain, shortness of breath, blood in urine, blood in stool, vision changes, or difficulty walking please seek care at your nearest emergency department. Please review this entire after visit summary as additional general instructions including medication list, appointments, activity, diet, any pertinent wound care, and other additional recommendations from your care team that may be provided for you.       Sincerely,     Seth Lund DO

## 2023-09-24 NOTE — ASSESSMENT & PLAN NOTE
· Presented /86 with associated headache and dizziness. States she takes her home losartan and HCTZ daily as prescribed. · Elevated blood pressure for the past 2 weeks  · Denies blurred vision, nausea, vomiting, Has no focal deficits  · Workup  · CT head without intracranial abnormality or bleed  · Receieved IV hydralazine 10 mg  · Urine studies show small luekocytes, trace ketones, and 10-20 WBC. Patient has no urinary symptoms. · Hgb A1c 6.3%,   · TSH 4.8 and T4 0.67 consistent with subclinical hypothyroidism  · Systolic blood pressure goal reduction 25% in 24-48h  · Home antihypertensives were continued and migraine cocktail was initiated. Her blood pressure have improved to 156/91 and she is tachycardic at 111 bpm. Given her history of migraine and uncontrolled blood pressure, patient was trialed on metoprolol tartrate 25 mg BID which showed benefit in improving her BP to 137/80 and HR to 79 bpm. Patient denies headache.     Plan   · Started on metoprolol tartrate 25 mg twice daily  · Continue home losartan 100 mg and HCTZ 12.5

## 2023-09-24 NOTE — PLAN OF CARE
Problem: MOBILITY - ADULT  Goal: Maintain or return to baseline ADL function  Description: INTERVENTIONS:  -  Assess patient's ability to carry out ADLs; assess patient's baseline for ADL function and identify physical deficits which impact ability to perform ADLs (bathing, care of mouth/teeth, toileting, grooming, dressing, etc.)  - Assess/evaluate cause of self-care deficits   - Assess range of motion  - Assess patient's mobility; develop plan if impaired  - Assess patient's need for assistive devices and provide as appropriate  - Encourage maximum independence but intervene and supervise when necessary  - Involve family in performance of ADLs  - Assess for home care needs following discharge   - Consider OT consult to assist with ADL evaluation and planning for discharge  - Provide patient education as appropriate  Outcome: Progressing  Goal: Maintains/Returns to pre admission functional level  Description: INTERVENTIONS:  - Perform BMAT or MOVE assessment daily.   - Set and communicate daily mobility goal to care team and patient/family/caregiver.    - Collaborate with rehabilitation services on mobility goals if consulted  - Stand patient 2 times a day  - Ambulate patient 2 times a day  - Out of bed to chair 2 times a day   - Out of bed for meals 2 times a day  - Out of bed for toileting  - Record patient progress and toleration of activity level   Outcome: Progressing

## 2023-09-25 LAB
ATRIAL RATE: 103 BPM
ATRIAL RATE: 103 BPM
ATRIAL RATE: 82 BPM
P AXIS: 58 DEGREES
P AXIS: 74 DEGREES
P AXIS: 76 DEGREES
PR INTERVAL: 172 MS
PR INTERVAL: 174 MS
PR INTERVAL: 176 MS
QRS AXIS: 50 DEGREES
QRS AXIS: 50 DEGREES
QRS AXIS: 6 DEGREES
QRSD INTERVAL: 76 MS
QRSD INTERVAL: 78 MS
QRSD INTERVAL: 80 MS
QT INTERVAL: 362 MS
QT INTERVAL: 374 MS
QT INTERVAL: 414 MS
QTC INTERVAL: 474 MS
QTC INTERVAL: 483 MS
QTC INTERVAL: 489 MS
T WAVE AXIS: 65 DEGREES
T WAVE AXIS: 7 DEGREES
T WAVE AXIS: 74 DEGREES
VENTRICULAR RATE: 103 BPM
VENTRICULAR RATE: 103 BPM
VENTRICULAR RATE: 82 BPM

## 2023-09-25 PROCEDURE — 93010 ELECTROCARDIOGRAM REPORT: CPT | Performed by: INTERNAL MEDICINE

## 2023-09-29 LAB
ATRIAL RATE: 81 BPM
P AXIS: 70 DEGREES
PR INTERVAL: 172 MS
QRS AXIS: 21 DEGREES
QRSD INTERVAL: 78 MS
QT INTERVAL: 390 MS
QTC INTERVAL: 453 MS
T WAVE AXIS: 54 DEGREES
VENTRICULAR RATE: 81 BPM

## 2023-09-29 PROCEDURE — 93010 ELECTROCARDIOGRAM REPORT: CPT | Performed by: INTERNAL MEDICINE

## 2023-10-02 ENCOUNTER — TELEPHONE (OUTPATIENT)
Dept: NEUROLOGY | Facility: CLINIC | Age: 78
End: 2023-10-02

## 2023-10-02 ENCOUNTER — TELEPHONE (OUTPATIENT)
Age: 78
End: 2023-10-02

## 2023-10-02 NOTE — TELEPHONE ENCOUNTER
Patient daughter-in-law called to cancel the patients appointment due to finding a provider in another network that can see them sooner

## 2023-10-02 NOTE — TELEPHONE ENCOUNTER
Patient daughter in law calling to schedule a TCM appointment for patient. Patient was admitted and would like to follow up with Dr. Cole Pozo.     Please call Casi at [phone]

## 2023-10-04 DIAGNOSIS — I10 ESSENTIAL HYPERTENSION: ICD-10-CM

## 2023-10-04 RX ORDER — LOSARTAN POTASSIUM 100 MG/1
100 TABLET ORAL DAILY
Qty: 90 TABLET | Refills: 1 | Status: SHIPPED | OUTPATIENT
Start: 2023-10-04

## 2023-10-05 ENCOUNTER — OFFICE VISIT (OUTPATIENT)
Dept: INTERNAL MEDICINE CLINIC | Facility: CLINIC | Age: 78
End: 2023-10-05
Payer: COMMERCIAL

## 2023-10-05 VITALS
SYSTOLIC BLOOD PRESSURE: 136 MMHG | HEIGHT: 61 IN | OXYGEN SATURATION: 97 % | BODY MASS INDEX: 27.98 KG/M2 | HEART RATE: 66 BPM | WEIGHT: 148.2 LBS | DIASTOLIC BLOOD PRESSURE: 72 MMHG

## 2023-10-05 DIAGNOSIS — I10 ESSENTIAL HYPERTENSION: Primary | ICD-10-CM

## 2023-10-05 DIAGNOSIS — Z79.4 TYPE 2 DIABETES MELLITUS WITHOUT COMPLICATION, WITH LONG-TERM CURRENT USE OF INSULIN (HCC): ICD-10-CM

## 2023-10-05 DIAGNOSIS — E78.5 HYPERLIPIDEMIA, UNSPECIFIED HYPERLIPIDEMIA TYPE: ICD-10-CM

## 2023-10-05 DIAGNOSIS — Z23 IMMUNIZATION DUE: ICD-10-CM

## 2023-10-05 DIAGNOSIS — E11.9 TYPE 2 DIABETES MELLITUS WITHOUT COMPLICATION, WITH LONG-TERM CURRENT USE OF INSULIN (HCC): ICD-10-CM

## 2023-10-05 DIAGNOSIS — Z59.9 FINANCIAL DIFFICULTIES: ICD-10-CM

## 2023-10-05 PROCEDURE — 90662 IIV NO PRSV INCREASED AG IM: CPT

## 2023-10-05 PROCEDURE — 99214 OFFICE O/P EST MOD 30 MIN: CPT

## 2023-10-05 PROCEDURE — G0008 ADMIN INFLUENZA VIRUS VAC: HCPCS

## 2023-10-05 SDOH — ECONOMIC STABILITY - INCOME SECURITY: PROBLEM RELATED TO HOUSING AND ECONOMIC CIRCUMSTANCES, UNSPECIFIED: Z59.9

## 2023-10-05 NOTE — ASSESSMENT & PLAN NOTE
· Patient came in after her recent hospitalization secondary to hypertensive urgency and headache, during hospitalization CT scan was ordered came negative and she was started on migraine cocktail for possible migraine. · On discharge she was put on metoprolol 25 mg twice daily along with her PTA losartan 100 mg daily and hydrochlorothiazide 12.5 mg daily. · She was doing good since her recent episode of hypertensive urgency and she was also compliant with the medications, there could be a possibility of secondary hypertension: Renal artery stenosis vs pheochromocytoma. On physical examination there was no abdominal bruit.     Plan:  Ordered renal artery Doppler and metanephrine  Continue PTA losartan and HCTZ along with metoprolol  Monitor BP at home regularly

## 2023-10-05 NOTE — ASSESSMENT & PLAN NOTE
Lab Results   Component Value Date    HGBA1C 6.3 (H) 09/23/2023   I have counselled the pt to follow a healthy and balanced diet ,and recommend routine exercise. I will be ordering diabetic laboratories including comprehensive metabolic panel, hemoglobin A1c, urine microalbumin, lipid panel. Diabetic foot examination is done on this visit.   Annual eye examination recommended

## 2023-10-05 NOTE — PROGRESS NOTES
Diabetic Foot Exam    Patient's shoes and socks removed. Right Foot/Ankle   Right Foot Inspection  Skin Exam: skin intact and warmth. No callus, no erythema, no ulcer and no callus. Toe Exam: Erythema    Sensory   Monofilament testing: intact    Vascular  The right DP pulse is 2+. Left Foot/Ankle  Left Foot Inspection  Skin Exam: skin intact and warmth. No erythema, no ulcer and no callus. Toe Exam: No left toe deformity. Sensory   Monofilament testing: intact    Vascular  The left DP pulse is 2+. Assign Risk Category  No weak pulses      Name: Kerry Mercer      : 1945      MRN: [id number]  Encounter Provider: Mireya Roberto MD  Encounter Date: 10/5/2023   Encounter department: [facility]    Assessment & Plan     1. Essential hypertension  Assessment & Plan:  · Patient came in after her recent hospitalization secondary to hypertensive urgency and headache, during hospitalization CT scan was ordered came negative and she was started on migraine cocktail for possible migraine. · On discharge she was put on metoprolol 25 mg twice daily along with her PTA losartan 100 mg daily and hydrochlorothiazide 12.5 mg daily. · She was doing good since her recent episode of hypertensive urgency and she was also compliant with the medications, there could be a possibility of secondary hypertension: Renal artery stenosis vs pheochromocytoma. On physical examination there was no abdominal bruit. Plan:  Ordered renal artery Doppler and metanephrine  Continue PTA losartan and HCTZ along with metoprolol  Monitor BP at home regularly    Orders:  -     VAS renal artery complete; Future; Expected date: 10/05/2023  -     Metanephrine, Fractionated Plasma Free; Future    2.  Type 2 diabetes mellitus without complication, with long-term current use of insulin [facility])  Assessment & Plan:    Lab Results   Component Value Date    HGBA1C 6.3 (H) 2023   I have counselled the pt to follow a healthy and balanced diet ,and recommend routine exercise. I will be ordering diabetic laboratories including comprehensive metabolic panel, hemoglobin A1c, urine microalbumin, lipid panel. Diabetic foot examination is done on this visit. Annual eye examination recommended      3. Financial difficulties  -     Ambulatory Referral to Social Work Care Management Program; Future    4. Immunization due  -     FLUZONE HIGH-DOSE: influenza vaccine, high-dose, preservative-free 0.7 mL    5. Hyperlipidemia, unspecified hyperlipidemia type  Assessment & Plan:  Hyperlipidemia controlled continue with current medical regiment recommend a low-cholesterol diet and recommend routine exercise we will continue to monitor the progress. Continue Zocor 20 mg once daily           Subjective     76-year-old female with PMH of hypertension hyperlipidemia and type II DM came in after a recent hospitalization secondary to hypertensive urgency and headache. She was treated with migraine cocktail and was discharged on additional antihypertensive metoprolol 25 mg twice daily to PTA antihypertensives. She denies any recent increased intake of caffeine or stress and she is also compliant with her medications. On this visit she denies any chest pain, abdominal pain. Review of Systems   Constitutional: Negative for chills and fever. HENT: Negative for ear pain and sore throat. Eyes: Negative for pain and visual disturbance. Respiratory: Negative for cough and shortness of breath. Cardiovascular: Negative for chest pain and palpitations. Gastrointestinal: Negative for abdominal pain and vomiting. Genitourinary: Negative for dysuria and hematuria. Musculoskeletal: Negative for arthralgias and back pain. Skin: Negative for color change and rash. Neurological: Positive for headaches (Intermittent). Negative for seizures and syncope. All other systems reviewed and are negative.       Past Medical History: Diagnosis Date   • Arthritis    • Back pain     sciatic pain right hip and down right leg   • Cancer (HCC)     bladder cancer ; had surgical scraping   • GERD (gastroesophageal reflux disease)    • Hyperlipidemia    • Hypertension    • Seasonal allergies    • Wears partial dentures     1 small tooth removed and placed in cup      Past Surgical History:   Procedure Laterality Date   • APPENDECTOMY     • COLONOSCOPY     • CYSTOSCOPY  11/29/2021    Robin   • EYE SURGERY     • HERNIA REPAIR     • NV BLADDER INSTILLATION ANTICARCINOGENIC AGENT N/A 1/15/2020    Procedure: INSTILLATION MITOMYCIN;  Surgeon: Odalis Flood MD;  Location: [facility];  Service: Urology   • NV CYSTO W/REMOVAL OF LESIONS SMALL N/A 1/15/2020    Procedure: Cystoscopy TRANSURETHRAL RESECTION OF BLADDER TUMOR (TURBT); Surgeon: Odalis Flood MD;  Location: [facility];  Service: Urology   • NV RPR 1ST INGUN HRNA AGE 5 YRS/> REDUCIBLE Left 2/3/2022    Procedure: INGUINAL HERNIA REPAIR;  Surgeon: Hayden Niño MD;  [facility];  Service: General   • UPPER GASTROINTESTINAL ENDOSCOPY     • VEIN LIGATION AND STRIPPING       Family History   Problem Relation Age of Onset   • Heart disease Mother    • Hypertension Mother    • No Known Problems Son    • No Known Problems Son      Social History     Socioeconomic History   • Marital status:       Spouse name: None   • Number of children: None   • Years of education: None   • Highest education level: None   Occupational History   • None   Tobacco Use   • Smoking status: Never   • Smokeless tobacco: Never   Vaping Use   • Vaping Use: Never used   Substance and Sexual Activity   • Alcohol use: Never   • Drug use: Never   • Sexual activity: Not Currently   Other Topics Concern   • None   Social History Narrative   • None     Social Determinants of Health     Financial Resource Strain: Medium Risk (5/1/2023)    Overall Financial Resource Strain (CARDIA)    • Difficulty of Paying Living Expenses: Somewhat hard   Food Insecurity: Not on file   Transportation Needs: No Transportation Needs (5/1/2023)    PRAPARE - Transportation    • Lack of Transportation (Medical): No    • Lack of Transportation (Non-Medical):  No   Physical Activity: Not on file   Stress: Not on file   Social Connections: Not on file   Intimate Partner Violence: Not on file   Housing Stability: Not on file     Current Outpatient Medications on File Prior to Visit   Medication Sig   • carbidopa-levodopa (SINEMET)  mg per tablet Take 1 tablet by mouth 3 (three) times a day   • Cholecalciferol (VITAMIN D3) 1000 units CAPS Take 1 capsule by mouth daily   • cyanocobalamin (VITAMIN B-12) 1000 MCG tablet Take 1,000 mcg by mouth daily   • hydrochlorothiazide (HYDRODIURIL) 12.5 mg tablet TAKE ONE TABLET BY MOUTH EVERY DAY   • losartan (COZAAR) 100 MG tablet TAKE ONE TABLET BY MOUTH EVERY DAY   • Magnesium Gluconate 550 MG TABS Take 30 mg by mouth 2 (two) times a day   • metoprolol tartrate (LOPRESSOR) 25 mg tablet Take 1 tablet (25 mg total) by mouth every 12 (twelve) hours   • multivitamin-minerals (CENTRUM ADULTS) tablet Take 1 tablet by mouth daily   • simvastatin (ZOCOR) 20 mg tablet TAKE 1 TABLET BY MOUTH DAILY   • omeprazole (PriLOSEC) 20 mg delayed release capsule Take 1 capsule (20 mg total) by mouth daily Take 1/2 hour  prior to breakfast     No Known Allergies  Immunization History   Administered Date(s) Administered   • COVID-19 MODERNA VACC 0.5 ML IM 01/20/2021, 03/01/2021   • INFLUENZA 09/12/2022   • Influenza Split High Dose Preservative Free IM 10/04/2014, 10/03/2015, 10/08/2016, 10/16/2017   • Influenza, high dose seasonal 0.7 mL 10/13/2018, 10/12/2019, 09/24/2020, 10/21/2021, 09/12/2022, 10/05/2023   • Influenza, seasonal, injectable 10/20/2012, 10/30/2013   • Pneumococcal Conjugate 13-Valent 08/13/2015   • Pneumococcal Polysaccharide PPV23 01/28/2010   • TD (adult) Preservative Free 01/28/2007   • Zoster 2017   • Zoster Vaccine Recombinant 2017, 2017       Objective     /72 (BP Location: Left arm, Patient Position: Sitting, Cuff Size: Standard)   Pulse 66   Ht 5' 1" (1.549 m)   Wt 67.2 kg (148 lb 3.2 oz)   LMP  (LMP Unknown)   SpO2 97%   BMI 28.00 kg/m²     Physical Exam  HENT:      Head: Normocephalic. Mouth/Throat:      Mouth: Mucous membranes are moist.   Cardiovascular:      Rate and Rhythm: Normal rate and regular rhythm. Pulses: no weak pulses          Dorsalis pedis pulses are 2+ on the right side and 2+ on the left side. Heart sounds: Normal heart sounds. No murmur heard. No gallop. Pulmonary:      Breath sounds: Normal breath sounds. No wheezing, rhonchi or rales. Abdominal:      General: Abdomen is flat. Palpations: Abdomen is soft. Tenderness: There is no abdominal tenderness. There is no guarding. Musculoskeletal:      Right lower le+ Edema present. Left lower le+ Edema present. Feet:      Right foot:      Skin integrity: Warmth present. No ulcer, erythema or callus. Left foot:      Skin integrity: Warmth present. No ulcer, erythema or callus. Skin:     General: Skin is warm. Neurological:      Mental Status: She is alert and oriented to person, place, and time.        Gracie Noyola MD

## 2023-10-05 NOTE — ASSESSMENT & PLAN NOTE
Hyperlipidemia controlled continue with current medical regiment recommend a low-cholesterol diet and recommend routine exercise we will continue to monitor the progress.   Continue Zocor 20 mg once daily

## 2023-10-16 ENCOUNTER — PATIENT OUTREACH (OUTPATIENT)
Dept: INTERNAL MEDICINE CLINIC | Facility: CLINIC | Age: 78
End: 2023-10-16

## 2023-10-16 DIAGNOSIS — Z59.9 FINANCIAL DIFFICULTIES: Primary | ICD-10-CM

## 2023-10-16 SDOH — ECONOMIC STABILITY - INCOME SECURITY: PROBLEM RELATED TO HOUSING AND ECONOMIC CIRCUMSTANCES, UNSPECIFIED: Z59.9

## 2023-10-16 NOTE — PROGRESS NOTES
Referral received from PCP for Outpatient Social Work Care Manager (OP [facility]) to outreach patient and assist with financial resources. Call placed to patient to assess needs. Patient requested OP SWCM speak with her son Brian Marie who can better understand OP SWCM's questions due to patient's language barrier. Offered for OP SWCM to call patient with interpretor. Patient prefers to have her son speak with OP SWCM. Provided contact information for OP SWCM. She will provide to Brian Marie and ask him to call OP SWCM. States Monday's work best for his schedule. Will await call from Brian Marie.

## 2023-10-16 NOTE — PROGRESS NOTES
Call received from patient's son Pal Garcia. Pal Garcia states patient lives alone and is independent with ADLs. Patient drives herself to appts or he help drive if further distance. Patient receives $900/month through [employer]. No other form of income at this time. Patient owns her home; does not have mortgage payment. Pal Garcia states patient experiences financial difficulties due to limited SS income. Patient needs assistance with utility costs. Will have [address] to discuss/assist with applications for:  [employer]  [employer] assistance  [employer] assistance program  [address] [facility] (cable/WiFi)  SNAP    Pal Garcia states patient's medications are affordable but she has difficulty paying for office visit copays and OOP bills after insurance coverage. Referred to Financial Counselors and requested ayah care application be mailed for Pal Garcia to complete. Pal Garcia denied any additional needs at this time. Requested [facility] outreach next Monday to assist with applications due to his work schedule. Will follow.

## 2023-10-23 ENCOUNTER — PATIENT OUTREACH (OUTPATIENT)
Dept: CASE MANAGEMENT | Facility: OTHER | Age: 78
End: 2023-10-23

## 2023-10-23 DIAGNOSIS — I16.0 HYPERTENSIVE URGENCY: ICD-10-CM

## 2023-10-23 DIAGNOSIS — I10 ESSENTIAL HYPERTENSION: ICD-10-CM

## 2023-10-23 NOTE — PROGRESS NOTES
received referral from [facility] to outreach patients son, Checo Maciel. Referral came in 10/6 but per Checo Maciel his work schedule he requested a call on 10/23. CMOC called patients son introduced myself and role with a message to return call.      Next outreach 10/25

## 2023-11-03 ENCOUNTER — PATIENT OUTREACH (OUTPATIENT)
Dept: INTERNAL MEDICINE CLINIC | Facility: CLINIC | Age: 78
End: 2023-11-03

## 2023-11-03 NOTE — PROGRESS NOTES
Update received from [facility] stating message was left for patient's son Gregorio Valle on 10/23. She is awaiting return call from Ashley Leonard to assist with financial assistance programs. Spoke with Chandarosalba Ribeiroll who states his voicemail has not been working properly so he did not see/receive [employer]. Provided him with Jeri's phone number. He plans to call [facility] on Monday 11/6 to discuss utility assistance program applications. No other needs at this time.

## 2023-11-06 ENCOUNTER — PATIENT OUTREACH (OUTPATIENT)
Dept: CASE MANAGEMENT | Facility: OTHER | Age: 78
End: 2023-11-06

## 2023-11-06 NOTE — PROGRESS NOTES
received return call from patients son. Son supplied [employer] Account Number [de-identified]. Did advise the bill is still in Fathers name. CMOC advised this could be a problem with assistance programs. Son asked to apply of behalf of patient for [place], [place], [employer] assistance, [employer] Cap, Direct Connect for [facility]. CMOC advised needed account numbers for [employer] and [employer] before I could apply for any assistance programs. Son advised patient owns home with no mortgage. Only receives  for Income $908.00 monthly. Son will email copies of all bills that [address] [place] [address] will need for verification.      Next Outreach 11/10

## 2023-11-17 ENCOUNTER — PATIENT OUTREACH (OUTPATIENT)
Dept: INTERNAL MEDICINE CLINIC | Facility: CLINIC | Age: 78
End: 2023-11-17

## 2023-11-17 NOTE — PROGRESS NOTES
Update received from [facility] who is assisting patient/patient's son with [employer], SNAP, [employer], [employer] Cap, and [employer] Affordable Connectivity Program applications. She is awaiting email from patient's son of bills that are needed for verification. Will follow.

## 2023-11-20 ENCOUNTER — OFFICE VISIT (OUTPATIENT)
Dept: INTERNAL MEDICINE CLINIC | Facility: CLINIC | Age: 78
End: 2023-11-20

## 2023-11-20 ENCOUNTER — APPOINTMENT (OUTPATIENT)
Dept: LAB | Facility: CLINIC | Age: 78
End: 2023-11-20
Payer: COMMERCIAL

## 2023-11-20 VITALS
RESPIRATION RATE: 16 BRPM | SYSTOLIC BLOOD PRESSURE: 128 MMHG | OXYGEN SATURATION: 95 % | DIASTOLIC BLOOD PRESSURE: 72 MMHG | BODY MASS INDEX: 27.98 KG/M2 | HEART RATE: 72 BPM | WEIGHT: 148.2 LBS | HEIGHT: 61 IN
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
## 2023-11-20 DIAGNOSIS — R82.89 URINE CYTOLOGY ABNORMAL: ICD-10-CM

## 2023-11-20 DIAGNOSIS — C67.9 MALIGNANT NEOPLASM OF URINARY BLADDER, UNSPECIFIED SITE (HCC): ICD-10-CM

## 2023-11-20 DIAGNOSIS — E11.9 TYPE 2 DIABETES MELLITUS WITHOUT COMPLICATION, WITHOUT LONG-TERM CURRENT USE OF INSULIN (HCC): ICD-10-CM

## 2023-11-20 LAB
BACTERIA UR QL AUTO: ABNORMAL /HPF
BILIRUB UR QL STRIP: NEGATIVE
CLARITY UR: CLEAR
COLOR UR: ABNORMAL
GLUCOSE UR STRIP-MCNC: NEGATIVE MG/DL
HGB UR QL STRIP.AUTO: ABNORMAL
KETONES UR STRIP-MCNC: NEGATIVE MG/DL
LEUKOCYTE ESTERASE UR QL STRIP: ABNORMAL
NITRITE UR QL STRIP: NEGATIVE
NON-SQ EPI CELLS URNS QL MICRO: ABNORMAL /HPF
PH UR STRIP.AUTO: 6 [PH]
PROT UR STRIP-MCNC: NEGATIVE MG/DL
RBC #/AREA URNS AUTO: ABNORMAL /HPF
SP GR UR STRIP.AUTO: 1.02 (ref 1–1.03)
TRANS CELLS #/AREA URNS HPF: PRESENT /[HPF]
UROBILINOGEN UR STRIP-ACNC: <2 MG/DL
WBC #/AREA URNS AUTO: ABNORMAL /HPF

## 2023-11-20 PROCEDURE — 88112 CYTOPATH CELL ENHANCE TECH: CPT | Performed by: STUDENT IN AN ORGANIZED HEALTH CARE EDUCATION/TRAINING PROGRAM

## 2023-11-20 RX ORDER — METHOCARBAMOL 500 MG/1
500 TABLET, FILM COATED ORAL
Qty: 1 TABLET | Refills: 0 | Status: SHIPPED | OUTPATIENT
Start: 2023-11-20

## 2023-11-20 NOTE — PROGRESS NOTES
Name: Fabian Severin      : 1945      MRN: [id number]  Encounter Provider: Brenna Reynolds MD  Encounter Date: 2023   [facility]: [facility]    Assessment & Plan     1. Fall (on) (from) other stairs and steps, initial encounter  Assessment & Plan:  Patient has  Parkinson disease which increased the risk of fall. Patient fell two and half  weeks ago  Patient fell while coming down 5 flights of steps  She fell to the side and was able grasp herself before falling forward. Patient hit the back of head, sides of both arm and lower back but Landed on her buttocks . She had No fractures  She went to the [facility] emergency Department but was not admitted. Today, patient is having pain all over and she has trouble moving her head. Patient is having headache, blurred vision sometimes  Patient lives alone. Patient has been taking advil and tylenol for pain. Pain comes and go. Patient has a cervicals headache    Plan:   Ambulatory referral physical therapy  Amatory referral to social work management to help with physical therapy cost  Robaxin 500 mg tablet at bedtime  Voltaren gel    Requested documentation from [facility]             Orders:  -     methocarbamol (ROBAXIN) 500 mg tablet; Take 1 tablet (500 mg total) by mouth daily at bedtime  -     Ambulatory Referral to Social Work Care Management Program; Future  -     Ambulatory Referral to Physical Therapy; Future  -     Ambulatory referral to clinical pharmacy; Future  -     Diclofenac Sodium (VOLTAREN) 1 %; Apply 2 g topically 4 (four) times a day for 10 days           Subjective      79-year-old female with PMH of hypertension, hyperlipidemia and type II DM came in for pain all over her body and headache. Patient had a fall 2 and half weeks ago at her son's house. Patient stated that she was attempting to climb down the stairs, and tripped.   At that time she landed on buttocks, and hit the side of her head and right arm. Patient was able to grasp her fall and landed on the opposite side before stabilizing her self. She went to the emergency room at [facility] where she stated that there were no fractures at that time. Since her recent fall, patient has been having pain all over her body. Her pain  mainly on the right with headache that originates from lower portion of the occipital down to her right neck. Patient has also been having some hip pain mostly on the right. She ambulates with a cane. She denies abdominal pain, sob, dizziness/lightheaded, changes in bowel or bladder, fever and chills. Review of Systems   Constitutional:  Negative for chills and fever. HENT:  Negative for ear pain and sore throat. Eyes:  Negative for pain and visual disturbance (Sometimes has Blurred vision). Respiratory:  Negative for cough and shortness of breath. Cardiovascular:  Negative for chest pain and palpitations. Gastrointestinal:  Negative for abdominal pain and vomiting. Genitourinary:  Negative for dysuria and hematuria. Musculoskeletal:  Positive for arthralgias, back pain, myalgias, neck pain and neck stiffness. Skin:  Negative for color change and rash. Neurological:  Negative for seizures and syncope. All other systems reviewed and are negative.       Current Outpatient Medications on File Prior to Visit   Medication Sig    carbidopa-levodopa (SINEMET)  mg per tablet Take 1 tablet by mouth 3 (three) times a day    Cholecalciferol (VITAMIN D3) 1000 units CAPS Take 1 capsule by mouth daily    cyanocobalamin (VITAMIN B-12) 1000 MCG tablet Take 1,000 mcg by mouth daily    hydrochlorothiazide (HYDRODIURIL) 12.5 mg tablet TAKE ONE TABLET BY MOUTH EVERY DAY    losartan (COZAAR) 100 MG tablet TAKE ONE TABLET BY MOUTH EVERY DAY    Magnesium Gluconate 550 MG TABS Take 30 mg by mouth 2 (two) times a day    metoprolol tartrate (LOPRESSOR) 25 mg tablet Take 1 tablet (25 mg total) by mouth every 12 (twelve) hours    multivitamin-minerals (CENTRUM ADULTS) tablet Take 1 tablet by mouth daily    simvastatin (ZOCOR) 20 mg tablet TAKE 1 TABLET BY MOUTH DAILY    omeprazole (PriLOSEC) 20 mg delayed release capsule Take 1 capsule (20 mg total) by mouth daily Take 1/2 hour  prior to breakfast       Objective     /72   Pulse 72   Resp 16   Ht 5' 1" (1.549 m)   Wt 67.2 kg (148 lb 3.2 oz)   LMP  (LMP Unknown)   SpO2 95%   BMI 28.00 kg/m²     Physical Exam  Constitutional:       Appearance: Normal appearance. HENT:      Head: Normocephalic and atraumatic. Mouth/Throat:      Mouth: Mucous membranes are moist.   Abdominal:      General: Abdomen is flat. Bowel sounds are normal.      Palpations: Abdomen is soft. Musculoskeletal:         General: Tenderness present. Right lower leg: Edema present. Left lower leg: Edema present. Skin:     General: Skin is warm. Neurological:      Mental Status: She is oriented to person, place, and time.    Psychiatric:         Mood and Affect: Mood normal.         Behavior: Behavior normal.       Nicolette Dueñas MD

## 2023-11-20 NOTE — ASSESSMENT & PLAN NOTE
Patient has history of Parkinson disease.   Patient admits to having difficulty purchasing her medication    Plan:  Ambulatory referral to pharmacy to help with medication cost

## 2023-11-20 NOTE — ASSESSMENT & PLAN NOTE
Patient has  Parkinson disease which increased the risk of fall. Patient fell two and half  weeks ago  Patient fell while coming down 5 flights of steps  She fell to the side and was able grasp herself before falling forward. Patient hit the back of head, sides of both arm and lower back but Landed on her buttocks . She had No fractures  She went to the [facility] emergency Department but was not admitted. Today, patient is having pain all over and she has trouble moving her head. Patient is having headache, blurred vision sometimes  Patient lives alone. Patient has been taking advil and tylenol for pain. Pain comes and go.    Patient has a cervicals headache    Plan:   Ambulatory referral physical therapy  Amatory referral to social work management to help with physical therapy cost  Robaxin 500 mg tablet at bedtime  Voltaren gel    Requested documentation from [facility]

## 2023-11-21 ENCOUNTER — EVALUATION (OUTPATIENT)
Dept: PHYSICAL THERAPY | Facility: CLINIC | Age: 78
End: 2023-11-21
Payer: COMMERCIAL
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
## 2023-11-21 PROCEDURE — 97112 NEUROMUSCULAR REEDUCATION: CPT

## 2023-11-21 PROCEDURE — 97161 PT EVAL LOW COMPLEX 20 MIN: CPT

## 2023-11-21 PROCEDURE — 97140 MANUAL THERAPY 1/> REGIONS: CPT

## 2023-11-21 NOTE — PROGRESS NOTES
PT Evaluation     Today's date: 2023  Patient name: Marilee Epperson  : 1945  MRN: [id number]  Referring provider: Jason Alcaraz MD  Dx:   Encounter Diagnosis     ICD-10-CM    1. Fall (on) (from) other stairs and steps, initial encounter  W10. 8XXA Ambulatory Referral to Physical Therapy                     Assessment  Assessment details: Mary Paula is a 78 yo female presenting with complaints of R sided neck pain, LBP and imbalance. Pt demonstrates decreased and painful c/s AROM in all planes, decreased non painful c/s PROM in all planes, hypertonicity and trigger points of c/s paraspinals, poor posture including upper c/s extension at rest, and decreased neuromuscular control leading to limitations with standing, walking, driving, sleeping and ADLs. Pt would benefit from skilled physical therapy interventions in order to address the stated impairments, decrease pain with function and increase activity tolerance in order to improve quality of life. No further referral appears necessary at this time based on examination results. Prognosis is good given HEP compliance and PT 1-2/wk. Positive prognostic indicators include positive attitude toward recovery. Please contact me if you have any questions or recommendations. Thank you for the opportunity to share in Marina's care.     Impairments: abnormal coordination, abnormal gait, abnormal muscle firing, abnormal muscle tone, abnormal or restricted ROM, activity intolerance, impaired balance, impaired physical strength, lacks appropriate home exercise program, pain with function, scapular dyskinesis, weight-bearing intolerance, poor posture  and poor body mechanics    Symptom irritability: moderateBarriers to therapy: Copay  Understanding of Dx/Px/POC: good   Prognosis: good    Goals  Short Term Goals:  Pt will report decreased levels of pain by at least 2 subjective ratings in 4 weeks  Pt will demonstrate improved ROM by at least 10 degrees in 4 weeks  Pt will demonstrate improved strength by ½ grade in 4 weeks  Pt will be able to sit with good posture for 2 hours in 4 weeks    Long Term Goals:  Pt will be independent with HEP in 8 weeks  Pt will be pain free with ADL's in 8 weeks  Pt will be able to sleep undisturbed for 6 hours in 8 weeks      Plan  Patient would benefit from: skilled physical therapy  Referral necessary: No  Planned modality interventions: low level laser therapy  Planned therapy interventions: abdominal trunk stabilization, IASTM, joint mobilization, manual therapy, nerve gliding, neuromuscular re-education, balance, balance/weight bearing training, body mechanics training, breathing training, patient education, postural training, strengthening, stretching, therapeutic activities, therapeutic exercise, functional ROM exercises, gait training, graded activity, graded exercise and home exercise program  Frequency: 1x week  Duration in weeks: 12  Plan of Care beginning date: 11/21/2023  Plan of Care expiration date: 2/13/2024  Treatment plan discussed with: patient        Subjective Evaluation    History of Present Illness  Date of onset: 9/23/2023  Mechanism of injury: Pt had a fall down the steps. She is not sure how it happened. It was on the steps outside of her sons house. She lives alone in her home and does have steps in the home but she does not go upstairs as she does not need to. Pt reports she falls somewhat frequently. She has had 2 falls this year, both without injury. Since her fall she has had severe R sided neck pain from her shoulder to her R suboccipital muscles. Denies pain radiating down the arm. Pain is worse with head movements and better with rest, advil and "cream". Pt reports she had LBP prior to the fall with RLE radicular symptoms, which has since worsened since the fall. Pt states the neck pain bothers her more.            Not a recurrent problem   Quality of life: fair    Patient Goals  Patient goals for therapy: decreased pain, increased motion, increased strength, independence with ADLs/IADLs and return to sport/leisure activities    Pain  Current pain ratin  At best pain ratin  At worst pain ratin  Quality: sharp    Treatments  No previous or current treatments        Objective     Postural Observations  Seated posture: poor  Standing posture: poor    Additional Postural Observation Details  Significant upper cervical extension    Palpation     Right   Hypertonic in the cervical paraspinals, suboccipitals and upper trapezius. Tenderness of the cervical paraspinals, suboccipitals and upper trapezius. Trigger point to scalenes.      Neurological Testing     Additional Neurological Details  UE dermatomes, myotomes, reflexes symmetrical throughout    Active Range of Motion     Additional Active Range of Motion Details  Flexion: 60  Extension: 40  L SBin  R SBin  L rotation: 55  R rotation: 65    All planes painful and limited    Passive Range of Motion     Additional Passive Range of Motion Details  Similar measurements to AROM; however non painful  Mechanical Assessment    Cervical    Seated retraction: repeated movements   Pain intensity: worse    Thoracic      Lumbar      Tests   Cervical     Left   Negative cervical flexion-rotation test.     Right   Positive cervical flexion-rotation test.              Precautions: DM Type II, HTN, Parkinson Disease    Clinical dx: neck pain with movement impairments  Manuals             SOR 4 min            C/s UT and scalenes STM 4 min                                      Neuro Re-Ed             C/s retraction 2x10            Scap retraction 2x10            SLS             Biodex PS                                                    Ther Ex             B c/s rotation pain free 1x10 ea            L/s mechanical assessment nv            Rows                                                                              Ther Activity Gait Training                                       Modalities

## 2023-11-22 ENCOUNTER — TELEPHONE (OUTPATIENT)
Dept: INTERNAL MEDICINE CLINIC | Facility: CLINIC | Age: 78
End: 2023-11-22

## 2023-11-22 PROCEDURE — 88112 CYTOPATH CELL ENHANCE TECH: CPT | Performed by: STUDENT IN AN ORGANIZED HEALTH CARE EDUCATION/TRAINING PROGRAM

## 2023-11-27 ENCOUNTER — OFFICE VISIT (OUTPATIENT)
Dept: UROLOGY | Facility: CLINIC | Age: 78
End: 2023-11-27
Payer: COMMERCIAL

## 2023-11-27 VITALS
SYSTOLIC BLOOD PRESSURE: 134 MMHG | HEART RATE: 74 BPM | WEIGHT: 151 LBS | OXYGEN SATURATION: 97 % | BODY MASS INDEX: 28.51 KG/M2 | HEIGHT: 61 IN | DIASTOLIC BLOOD PRESSURE: 88 MMHG

## 2023-11-27 DIAGNOSIS — C67.9 MALIGNANT NEOPLASM OF URINARY BLADDER, UNSPECIFIED SITE (HCC): Primary | ICD-10-CM

## 2023-11-27 PROCEDURE — 99213 OFFICE O/P EST LOW 20 MIN: CPT | Performed by: PHYSICIAN ASSISTANT

## 2023-11-27 NOTE — PROGRESS NOTES
UROLOGY FOLLOWUP NOTE     CHIEF COMPLAINT     Chief Complaint   Patient presents with    Follow-up     1yr f/u dysuria        History of Present Illness:     77 y.o. female with a history of high-grade TA bladder cancer diagnosed and resected 9/27/2017. She has been undergoing routine surveillance cystoscopy at [facility]. Her last cystoscopy was in April of 2019. Given the difficulty with transportation, she was referred for more local cystoscopic surveillance. Patient underwent bladder biopsy and resection in January of 2020 given concern on office cystoscope. Pathology was negative for cancer recurrence. 5 year cystoscopic surveillance completed 11/2023 with Dr. Jimmy Adorno and negative for abnormal findings. Urine cytology from November 2023 revealed atypical cells. A CT urogram was performed and was negative for any upper tract urothelial findings. Stable renal nodule from 2017. FISH test performed with   inadequate specimen, was recommended to be repeat however was never performed. Recent urinalysis with microscopy (11/20/2023) with 2-4 RBC per high-power field. Cytology was negative.     Past Medical History:     Past Medical History:   Diagnosis Date    Arthritis     Back pain     sciatic pain right hip and down right leg    Cancer ([address])     bladder cancer ; had surgical scraping    GERD (gastroesophageal reflux disease)     Hyperlipidemia     Hypertension     Seasonal allergies     Wears partial dentures     1 small tooth removed and placed in cup        PAST SURGICAL HISTORY:     Past Surgical History:   Procedure Laterality Date    APPENDECTOMY      COLONOSCOPY      CYSTOSCOPY  11/29/2021    Jimmy Adorno    EYE SURGERY      HERNIA REPAIR      WI BLADDER INSTILLATION ANTICARCINOGENIC AGENT N/A 1/15/2020    Procedure: INSTILLATION MITOMYCIN;  Surgeon: Stefany Carreno MD;  Location: [facility];  Service: Urology    WI CYSTO W/REMOVAL OF LESIONS SMALL N/A 1/15/2020 Procedure: Cystoscopy TRANSURETHRAL RESECTION OF BLADDER TUMOR (TURBT); Surgeon: Radha Hutchison MD;  [facility];  Service: Urology    MI RPR 1ST INGUN HRNA AGE 5 YRS/> REDUCIBLE Left 2/3/2022    Procedure: INGUINAL HERNIA REPAIR;  Surgeon: Damaso Lopez MD;  [facility];  Service: General    UPPER GASTROINTESTINAL ENDOSCOPY      VEIN LIGATION AND STRIPPING         CURRENT MEDICATIONS:     Current Outpatient Medications   Medication Sig Dispense Refill    carbidopa-levodopa (SINEMET)  mg per tablet Take 1 tablet by mouth 3 (three) times a day      Cholecalciferol (VITAMIN D3) 1000 units CAPS Take 1 capsule by mouth daily      cyanocobalamin (VITAMIN B-12) 1000 MCG tablet Take 1,000 mcg by mouth daily      Diclofenac Sodium (VOLTAREN) 1 % Apply 2 g topically 4 (four) times a day for 10 days 80 g 0    hydrochlorothiazide (HYDRODIURIL) 12.5 mg tablet TAKE ONE TABLET BY MOUTH EVERY DAY 90 tablet 1    losartan (COZAAR) 100 MG tablet TAKE ONE TABLET BY MOUTH EVERY DAY 90 tablet 1    Magnesium Gluconate 550 MG TABS Take 30 mg by mouth 2 (two) times a day      methocarbamol (ROBAXIN) 500 mg tablet Take 1 tablet (500 mg total) by mouth daily at bedtime 1 tablet 0    metoprolol tartrate (LOPRESSOR) 25 mg tablet Take 1 tablet (25 mg total) by mouth every 12 (twelve) hours 60 tablet 5    multivitamin-minerals (CENTRUM ADULTS) tablet Take 1 tablet by mouth daily      simvastatin (ZOCOR) 20 mg tablet TAKE 1 TABLET BY MOUTH DAILY 90 tablet 3    omeprazole (PriLOSEC) 20 mg delayed release capsule Take 1 capsule (20 mg total) by mouth daily Take 1/2 hour  prior to breakfast 90 capsule 3     No current facility-administered medications for this visit. ALLERGIES:     No Known Allergies    SOCIAL HISTORY:     Social History     Socioeconomic History    Marital status:       Spouse name: None    Number of children: None    Years of education: None    Highest education level: None Occupational History    None   Tobacco Use    Smoking status: Never    Smokeless tobacco: Never   Vaping Use    Vaping Use: Never used   Substance and Sexual Activity    Alcohol use: Never    Drug use: Never    Sexual activity: Not Currently   Other Topics Concern    None   Social History Narrative    None     Social Determinants of Health     Financial Resource Strain: Medium Risk (5/1/2023)    Overall Financial Resource Strain (CARDIA)     Difficulty of Paying Living Expenses: Somewhat hard   Food Insecurity: Not on file   Transportation Needs: No Transportation Needs (5/1/2023)    PRAPARE - Transportation     Lack of Transportation (Medical): No     Lack of Transportation (Non-Medical): No   Physical Activity: Not on file   Stress: Not on file   Social Connections: Not on file   Intimate Partner Violence: Not on file   Housing Stability: Not on file       SOCIAL HISTORY:     Family History   Problem Relation Age of Onset    Heart disease Mother     Hypertension Mother     No Known Problems Son     No Known Problems Son        REVIEW OF SYSTEMS:     Review of Systems   Constitutional: Negative. Respiratory: Negative. Cardiovascular: Negative. Gastrointestinal: Negative. Genitourinary:  Negative for dysuria and hematuria. Musculoskeletal: Negative. Skin: Negative. Psychiatric/Behavioral: Negative. PHYSICAL EXAM:     /88 (BP Location: Left arm, Patient Position: Sitting, Cuff Size: Adult)   Pulse 74   Ht 5' 1" (1.549 m)   Wt 68.5 kg (151 lb)   LMP  (LMP Unknown)   SpO2 97%   BMI 28.53 kg/m²     General:  Healthy appearing female in no acute distress. They have a normal affect. There is not appear to be any gross neurologic defects or abnormalities. HEENT:  Normocephalic, atraumatic. Neck is supple without any palpable lymphadenopathy  Cardiovascular:  Patient has normal palpable distal radial pulses. There is no significant peripheral edema.  No JVD is noted.  Respiratory:  Patient has unlabored respirations. There is no audible wheeze or rhonchi. Abdomen:  Abdomen is [id number] surgical scars. Abdomen is soft and nontender. There is no tympany. Inguinal and umbilical hernia are not appreciated. Genitourinary:   Normal external female genitalia, slightly atrophic vaginal introitus    Musculoskeletal:  Patient does not have significant CVA tenderness in the  flank with palpation or percussion. They full range of motion in all 4 extremities. Strength in all 4 extremities appears congruent. Patient is able to ambulate without assistance or difficulty. Dermatologic:  Patient has no skin abnormalities or rashes. LABS:     CBC:   Lab Results   Component Value Date    WBC 7.82 2023    HGB 14.7 2023    HCT 44.2 2023    MCV 89 2023     2023       BMP:   Lab Results   Component Value Date    GLUCOSE 111 2015    CALCIUM 9.1 2023     2015    K 3.6 2023    CO2 26 2023     2023    BUN 13 2023    CREATININE 0.61 2023       IMAGIN/13/19  CT ABDOMEN AND PELVIS WITH IV CONTRAST     INDICATION:   Bilateral lower abdominal pain, significant left lower quadrant tenderness, dysuria. COMPARISON:  2017 CT, 2017 MRI     TECHNIQUE:  CT examination of the abdomen and pelvis was performed. Axial, sagittal, and coronal 2D reformatted images were created from the source data and submitted for interpretation. Radiation dose length product (DLP) for this visit:  390 mGy-cm . This examination, like all CT scans performed in the [facility], was performed utilizing techniques to minimize radiation dose exposure, including the use of iterative   reconstruction and automated exposure control. IV Contrast:  100 mL of iohexol (OMNIPAQUE)  Enteric Contrast:  Enteric contrast was not administered.      FINDINGS:     ABDOMEN     LOWER CHEST:  No clinically significant abnormality identified in the visualized lower chest.     LIVER/BILIARY TREE:  Fatty liver noted. Tiny left hepatic dome hypodensity on 2/16, previously characterized as a simple cyst.     GALLBLADDER:  No calcified gallstones. No pericholecystic inflammatory change. SPLEEN:  Unremarkable. PANCREAS:  Unremarkable. ADRENAL GLANDS:  Unremarkable. KIDNEYS/URETERS:  One or more sharply circumscribed subcentimeter renal hypodensities are noted. These lesions are too small to accurately characterize, but are statistically most likely to represent benign cortical renal cyst(s). According to the   guidelines published in the [facility] Paper of the ACR Incidental Findings Committee (Radiology 2010), no further workup of these lesions is recommended. STOMACH AND BOWEL:  Small sliding-type hiatal hernia. APPENDIX:  No findings to suggest appendicitis. ABDOMINOPELVIC CAVITY:  No ascites or free intraperitoneal air. No lymphadenopathy. VESSELS:  Unremarkable for patient's age. PELVIS     REPRODUCTIVE ORGANS:  Right adnexal cyst noted, also seen on MRI dated 8/21/2017 and CT dated 8/9/2017     URINARY BLADDER:  Unremarkable. ABDOMINAL WALL/INGUINAL REGIONS:  Left inguinal fat only containing hernia. OSSEOUS STRUCTURES:  No acute fracture or destructive osseous lesion. IMPRESSION:  No acute findings. No findings to account for lower abdominal pain. PATHOLOGY:     1/15/20  Final Diagnosis    A. Urinary bladder right ureteral orifice biopsy:  - Somewhat polypoid mucosa with edema and chronic inflammation  - No dysplasia nor malignancy seen     B. Right lateral trigone biopsy:  - Somewhat polypoid and edematous mucosa with mild chronic and focal acute inflammation  - No dysplasia nor malignancy is seen. 9/2017  Final Diagnosis:  1. Bladder tumor near orifice, transurethral resection:  Non-invasive high-grade papillary urothelial carcinoma.   - Muscularis propria is identified and uninvolved by carcinoma. 2. Posterior bladder wall, biopsy:  Benign urothelium with no tumor seen. - Muscularis propria is identified. 3. Lateral bladder wall, biopsy:  Benign urothelium with no tumor seen. - Muscularis propria is identified. DBF/wf/mm    CYTOLOGY:     Final Diagnosis   A. Urine, Clean Catch:  Negative for high grade urothelial carcinoma ([address]) - see comment. Degenerated benign urothelial cells, squamous cells, few mixed inflammatory cells and red blood cells. Satisfactory for evaluation. PROCEDURE:     SEE NOTE    ASSESSMENT:     77 y.o. female with history of HGTa bladder cancer    PLAN:     No signs of recurrence. Patient 6 years from diagnosis. NCCN guidelines reviewed for Intermediate Risk NMIBC. Cystoscopy and urine testing as clinically indication years 5-10. Repeat FISH testing. Will see in one year for urine testing. If urine remains unremarkable and patient has no change to her symptoms, would consider releasing to her primary care service who can follow yearly urine studies until 2027.

## 2023-11-28 ENCOUNTER — OFFICE VISIT (OUTPATIENT)
Dept: PHYSICAL THERAPY | Facility: CLINIC | Age: 78
End: 2023-11-28
Payer: COMMERCIAL
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
## 2023-11-28 PROCEDURE — 97110 THERAPEUTIC EXERCISES: CPT

## 2023-11-28 PROCEDURE — 97140 MANUAL THERAPY 1/> REGIONS: CPT

## 2023-11-28 PROCEDURE — 97112 NEUROMUSCULAR REEDUCATION: CPT

## 2023-11-28 NOTE — PROGRESS NOTES
Daily Note     Today's date: 2023  Patient name: Romina Gomez  : 1945  MRN: [id number]  Referring provider: Corina Kinsey MD  Dx:   Encounter Diagnosis     ICD-10-CM    1. Fall (on) (from) other stairs and steps, initial encounter  W10. 8XXA                      Subjective: Pt reports worse pain in her low back today. Objective: See treatment diary below      Assessment: Attempted mechanical assessment but patient was unable to complete because of worsening LBP locally in R sided lumbar spine. We then performed laser to help calm down high irritability symptoms, patient did have great relief with this. Pt did require cueing to avoid upper c/s extension during retractions. Good carryover of correction noted. Continue to progress as able to improve DNF and core stability in order to improve function with ADLs. Plan: Continue per plan of care. Progress treatment as tolerated.        Precautions: DM Type II, HTN, Parkinson Disease    Clinical dx: neck pain with movement impairments  Manuals            SOR 4 min            C/s UT and scalenes STM 4 min 8 min           L/s contact laser  6 min                        Neuro Re-Ed             C/s retraction 2x10 3x10           Scap retraction 2x10 3x10           SLS             Biodex PS                                                    Ther Ex             B c/s rotation pain free 1x10 ea 1x10 ea           L/s mechanical assessment nv 10 mins           Rows                                                                              Ther Activity                                       Gait Training                                       Modalities

## 2023-11-29 ENCOUNTER — PATIENT OUTREACH (OUTPATIENT)
Dept: INTERNAL MEDICINE CLINIC | Facility: CLINIC | Age: 78
End: 2023-11-29

## 2023-11-29 NOTE — PROGRESS NOTES
Update received from PCP office stating patient is concerned about copay cost of her OP PT sessions. Patient has [employer]. Patient's income is $900/month through [employer]. Call placed to patient's son Dara Galarza to discuss Medical Assistance to see if patient qualifies for MA as secondary insurance. Dara Galarza agreed to [address] next Monday when he is off work to assist with MA application. Reminded Dara Galarza that referral was placed for [address] [place] [address] [employer] to send a ayah care application; this was to be mailed. He will look for this. Dara Galarza asked OP [facility] to inform Bradly Huddleston that he emailed pictures of patient's utility bills the other day for her to review and upload with utility assistance program applications. Will route to Bradly Huddleston to inform.

## 2023-11-29 NOTE — PROGRESS NOTES
emailed patients son again, with the verification list from [facility]. [address] is waiting on son to supply some documents for BUCK verification. According to [employer] website patient is approved for [facility] and SNAP Benefits, pending receipt of verification documents.      Per chart review from [facility] patients son would like a call Monday 12/4    Next Outreach 12/4

## 2023-11-30 ENCOUNTER — PATIENT OUTREACH (OUTPATIENT)
Dept: INTERNAL MEDICINE CLINIC | Facility: CLINIC | Age: 78
End: 2023-11-30

## 2023-11-30 NOTE — PROGRESS NOTES
emailed patients son about [employer] Program.     Patient does qualify an application was done with the [facility], Application # [id number]. [facility] used [facility] email address to create application. Password to account is November20! [facility] advised patients son that can be changed. [facility] tried to enroll patient with [employer] but the application wanted a 16 digit account number. There are only 10 digits on the bill [facility] has. [facility] advised patients son, where to start and gave the link with application number and password to account with the [facility]. [facility] is going to call patients son on his day off 12/4.      Next Outreach 12/4

## 2023-12-04 ENCOUNTER — OFFICE VISIT (OUTPATIENT)
Dept: PHYSICAL THERAPY | Facility: CLINIC | Age: 78
End: 2023-12-04
Payer: COMMERCIAL

## 2023-12-04 ENCOUNTER — PATIENT OUTREACH (OUTPATIENT)
Dept: INTERNAL MEDICINE CLINIC | Facility: CLINIC | Age: 78
End: 2023-12-04
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
## 2023-12-04 PROCEDURE — 97112 NEUROMUSCULAR REEDUCATION: CPT

## 2023-12-04 PROCEDURE — 97110 THERAPEUTIC EXERCISES: CPT

## 2023-12-04 NOTE — PROGRESS NOTES
called patients son to advise [facility] will apply for MA as back up insurance per chart note from [facility]. Patients son very appreciative of all the help. [facility] advised via email to patients son will need copy of bank statements. Application #[id number].  Applied under current case record ID # [id number]    Next Outreach 12/11

## 2023-12-04 NOTE — PROGRESS NOTES
Daily Note     Today's date: 2023  Patient name: Alicia Munoz  : 1945  MRN: [id number]  Referring provider: Char Olivia MD  Dx:   Encounter Diagnosis     ICD-10-CM    1. Fall (on) (from) other stairs and steps, initial encounter  W10. 8XXA                      Subjective: Pt reports feeling a little sore today. Objective: See treatment diary below      Assessment: Pt was able to progress postural strengthening today. Pt demonstrated limited SLS balance, decreased pain following manuals. Plan: Continue per plan of care. Progress treatment as tolerated.        Precautions: DM Type II, HTN, Parkinson Disease    Clinical dx: neck pain with movement impairments  Manuals           SOR 4 min            C/s UT and scalenes STM 4 min 8 min 8 min          L/s contact laser  6 min 6000J                       Neuro Re-Ed             C/s retraction 2x10 3x10 3x10          Scap retraction 2x10 3x10 3x10          SLS   3x10" ea          Biodex PS   L8 1x60"                                                  Ther Ex             B c/s rotation pain free 1x10 ea 1x10 ea 1x10           L/s mechanical assessment nv 10 mins           Rows   RTB  2x10                                                                           Ther Activity                                       Gait Training                                       Modalities

## 2023-12-05 ENCOUNTER — APPOINTMENT (OUTPATIENT)
Dept: PHYSICAL THERAPY | Facility: CLINIC | Age: 78
End: 2023-12-05
Payer: COMMERCIAL

## 2023-12-05 ENCOUNTER — PATIENT OUTREACH (OUTPATIENT)
Dept: INTERNAL MEDICINE CLINIC | Facility: CLINIC | Age: 78
End: 2023-12-05

## 2023-12-05 ENCOUNTER — HOSPITAL ENCOUNTER (OUTPATIENT)
Dept: ULTRASOUND IMAGING | Facility: HOSPITAL | Age: 78
Discharge: HOME | End: 2023-12-05
Attending: OBSTETRICS & GYNECOLOGY
Payer: COMMERCIAL

## 2023-12-05 DIAGNOSIS — N83.201 CYST OF RIGHT OVARY: ICD-10-CM

## 2023-12-05 PROCEDURE — 76830 TRANSVAGINAL US NON-OB: CPT

## 2023-12-05 PROCEDURE — 76856 US EXAM PELVIC COMPLETE: CPT

## 2023-12-05 NOTE — PROGRESS NOTES
received email from Constance with bank statement for [employer] V#[id number]. Under Record Case # [id number]    [facility] saved document and uploaded to Record Case via [address].

## 2023-12-06 ENCOUNTER — APPOINTMENT (OUTPATIENT)
Dept: LAB | Facility: CLINIC | Age: 78
End: 2023-12-06
Payer: COMMERCIAL

## 2023-12-06 LAB
BACTERIA UR QL AUTO: ABNORMAL /HPF
BILIRUB UR QL STRIP: NEGATIVE
CLARITY UR: CLEAR
COLOR UR: ABNORMAL
GLUCOSE UR STRIP-MCNC: NEGATIVE MG/DL
HGB UR QL STRIP.AUTO: NEGATIVE
HYALINE CASTS #/AREA URNS LPF: ABNORMAL /LPF
KETONES UR STRIP-MCNC: NEGATIVE MG/DL
LEUKOCYTE ESTERASE UR QL STRIP: ABNORMAL
NITRITE UR QL STRIP: NEGATIVE
NON-SQ EPI CELLS URNS QL MICRO: ABNORMAL /HPF
PH UR STRIP.AUTO: 7 [PH]
PROT UR STRIP-MCNC: NEGATIVE MG/DL
RBC #/AREA URNS AUTO: ABNORMAL /HPF
SP GR UR STRIP.AUTO: 1.01 (ref 1–1.03)
UROBILINOGEN UR STRIP-ACNC: <2 MG/DL
WBC #/AREA URNS AUTO: ABNORMAL /HPF

## 2023-12-06 PROCEDURE — 81001 URINALYSIS AUTO W/SCOPE: CPT | Performed by: PHYSICIAN ASSISTANT

## 2023-12-06 PROCEDURE — 88121 CYTP URINE 3-5 PROBES CMPTR: CPT | Performed by: PHYSICIAN ASSISTANT

## 2023-12-11 DIAGNOSIS — N83.201 CYST OF RIGHT OVARY: Primary | ICD-10-CM

## 2023-12-12 ENCOUNTER — OFFICE VISIT (OUTPATIENT)
Dept: PHYSICAL THERAPY | Facility: CLINIC | Age: 78
End: 2023-12-12
Payer: COMMERCIAL
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
## 2023-12-12 PROCEDURE — 97112 NEUROMUSCULAR REEDUCATION: CPT

## 2023-12-12 PROCEDURE — 97140 MANUAL THERAPY 1/> REGIONS: CPT

## 2023-12-12 PROCEDURE — 97110 THERAPEUTIC EXERCISES: CPT

## 2023-12-12 NOTE — PROGRESS NOTES
Daily Note     Today's date: 2023  Patient name: Kaleb Villafuerte  : 1945  MRN: [id number]  Referring provider: Teena Padgett MD  Dx:   Encounter Diagnosis     ICD-10-CM    1. Fall (on) (from) other stairs and steps, initial encounter  W10. 8XXA                      Subjective: Pt reports her pain is pretty much the same. Hurts most with turning her head to the L. R sided suboccipital pain. Objective: See treatment diary below      Assessment: Pt will be traveling for the holidays over the next month, so will DC PT at this time. Pt provided updated HEP with progressed DNF strengthening and c/s retraction exercises. Pt demonstrates very poor neuromuscular control and very poor c/s retraction ROM, so form with exercises is only fair. Pt will call to resume PT upon return if she still feels limited functionally.        Plan: D/c to HEP     Precautions: DM Type II, HTN, Parkinson Disease    Clinical dx: neck pain with movement impairments  Manuals          SOR 4 min   4 min         C/s UT and scalenes STM 4 min 8 min 8 min 6 min         L/s contact laser  6 min 6000J 6000J                      Neuro Re-Ed             C/s retraction 2x10 3x10 3x10 3x10 4x/day         Scap retraction 2x10 3x10 3x10 3x10         SLS   3x10" ea          Biodex PS   L8 1x60"                                                  Ther Ex             B c/s rotation pain free 1x10 ea 1x10 ea 1x10  1x10 ea         L/s mechanical assessment nv 10 mins           Rows   RTB  2x10 RTB  3x10                                                                          Ther Activity                                       Gait Training                                       Modalities

## 2023-12-14 ENCOUNTER — CLINICAL SUPPORT (OUTPATIENT)
Dept: INTERNAL MEDICINE CLINIC | Facility: CLINIC | Age: 78
End: 2023-12-14
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
## 2023-12-14 NOTE — PROGRESS NOTES
[address]      ASSESSMENT/PLAN                                                                                                               Will have SW call Arnel Good (son) to review PACE vs PAP for Sinemet. Encouraged patient to contact [facility] neurology to review Sinemet dose and see if she would benefit from dose change. Follow-Up: if needed in future    SUBJECTIVE                                                                                                                        Medication list reviewed with patient, reports the following discrepancies/problems:  carbidopa-levodopa: cost is $37/3-months supply. Uncertain about medication efficacy, has noticed some fatigue since starting (no dizziness). Reports medication worked best when she started taking but feels effects have lessened. cyanocobalamin  hydrochlorothiazide  losartan  Magnesium Gluconate Tabs  methocarbamol  metoprolol tartrate  multivitamin-minerals  simvastatin  Vitamin D3 Caps    Income is $906/month; reports cost for carbidopa is somewhat affordable but long term could cause some financial strain. Manages medications herself; lives alone. Son helps patient and lives locally. # of times per day patient takes meds: 3      Pharmacist Tracking Tool  Reason For Outreach: Embedded Pharmacist  Demographics:  Intervention Method:  In Person  Type of Intervention: New  Topics Addressed: Polypharmacy  Pharmacologic Interventions: Med Rec  Non-Pharmacologic Interventions: Cost  Time:  Direct Patient Care:  15  mins   Care Coordination:  10  mins  Recommendation Recipient: Patient/Caregiver  Outcome: Accepted

## 2023-12-15 ENCOUNTER — PATIENT OUTREACH (OUTPATIENT)
Dept: INTERNAL MEDICINE CLINIC | Facility: CLINIC | Age: 78
End: 2023-12-15

## 2023-12-15 NOTE — PROGRESS NOTES
Request received from [facility] for OP [facility] to discuss PACE/PACENET and/or Sinemet PAP with patient's son. [facility] prescribes Sinemet. Researched Sinemet PAPs. [employer] PAP no longer works with this medication. The Assistance Fund is closed at this time. No other known patient assistance program available. Will request Meri Roca assist patient's son with PACE/PACENET application on Monday (Monday's are best outreach day for patient's son). Will follow.

## 2023-12-18 ENCOUNTER — PATIENT OUTREACH (OUTPATIENT)
Dept: INTERNAL MEDICINE CLINIC | Facility: CLINIC | Age: 78
End: 2023-12-18

## 2023-12-18 NOTE — PROGRESS NOTES
Per in basket note from RITA Troy assist patient's son with pace net application.     CMOC emailed patient's son the pace net application for prescription drug assistance. CMOC explained the application and if patients son could fill out and let CMOC know when it is ready it could be sent in directly in or sent to [facility] in PDF form and CMOC can return.     CMOC advised any questions to contact [facility].     Next Outreach 1/17

## 2023-12-29 ENCOUNTER — PATIENT OUTREACH (OUTPATIENT)
Dept: INTERNAL MEDICINE CLINIC | Facility: CLINIC | Age: 78
End: 2023-12-29

## 2023-12-29 NOTE — PROGRESS NOTES
Update received from CMOC Jeri who provided PACENET application to patient's son to complete.    Call placed to PACE to ask if application was submitted.  No new application on file.    Spoke with patient's son David.  He was not able to complete application yet and requested email be resent.  OP SWCM provided information for PACE to David via email.  He will have his sister-in-law assist with application.  Reminded David OPCM team can assist with completing as well if needed.  David is aware application can be sent in via mail, online, or over the phone to PACE.  Will follow.

## 2024-01-15 ENCOUNTER — PATIENT OUTREACH (OUTPATIENT)
Dept: INTERNAL MEDICINE CLINIC | Facility: CLINIC | Age: 79
End: 2024-01-15

## 2024-01-15 NOTE — PROGRESS NOTES
Spoke with patient's son David who states his sister-in-law helped patient complete PACENET application online last week.  Status is unknown at this time.    Call placed to PACENET.  Office closed due to holiday.  Will attempt outreach at later date.    David denied any additional needs at this time.  Will follow-up regarding PACENET determination.

## 2024-01-18 ENCOUNTER — PATIENT OUTREACH (OUTPATIENT)
Dept: INTERNAL MEDICINE CLINIC | Facility: CLINIC | Age: 79
End: 2024-01-18

## 2024-01-18 DIAGNOSIS — I10 ESSENTIAL HYPERTENSION: ICD-10-CM

## 2024-01-18 RX ORDER — HYDROCHLOROTHIAZIDE 12.5 MG/1
12.5 TABLET ORAL DAILY
Qty: 90 TABLET | Refills: 1 | Status: SHIPPED | OUTPATIENT
Start: 2024-01-18

## 2024-01-18 NOTE — PROGRESS NOTES
Call placed to PACE to ask if patient's application was received/processed.  Spoke with Rep Griffith who states patient is approved for PACE, effective 1/17.  Card was sent out to patient via mail.    ID # [id number]  BIN [id number]  [facility] [id number]  GROUP PACE    Call placed to patient's son David to inform.  David appreciative and confirmed patient's preferred pharmacy is [facility].  David denied any additional needs.    Call placed to patient's [facility] to provide ID information to place on file.  Information added to account.  Will close case at this time.

## 2024-02-05 PROBLEM — M54.50 LOW BACK PAIN: Status: ACTIVE | Noted: 2024-02-05

## 2024-02-05 PROBLEM — R51.9 HEADACHE: Status: ACTIVE | Noted: 2024-02-05

## 2024-02-06 PROBLEM — R26.2 AMBULATORY DYSFUNCTION: Status: ACTIVE | Noted: 2021-10-21

## 2024-02-08 ENCOUNTER — RA CDI HCC (OUTPATIENT)
Dept: OTHER | Facility: HOSPITAL | Age: 79
End: 2024-02-08

## 2024-02-12 ENCOUNTER — APPOINTMENT (OUTPATIENT)
Dept: LAB | Facility: CLINIC | Age: 79
End: 2024-02-12
Payer: COMMERCIAL

## 2024-02-12 DIAGNOSIS — I10 ESSENTIAL HYPERTENSION: ICD-10-CM

## 2024-02-12 LAB
ALBUMIN SERPL BCP-MCNC: 4.3 G/DL (ref 3.5–5)
ALP SERPL-CCNC: 49 U/L (ref 34–104)
ALT SERPL W P-5'-P-CCNC: 15 U/L (ref 7–52)
ANION GAP SERPL CALCULATED.3IONS-SCNC: 5 MMOL/L
AST SERPL W P-5'-P-CCNC: 15 U/L (ref 13–39)
BILIRUB SERPL-MCNC: 0.51 MG/DL (ref 0.2–1)
BUN SERPL-MCNC: 23 MG/DL (ref 5–25)
CALCIUM SERPL-MCNC: 9.6 MG/DL (ref 8.4–10.2)
CHLORIDE SERPL-SCNC: 102 MMOL/L (ref 96–108)
CHOLEST SERPL-MCNC: 171 MG/DL
CO2 SERPL-SCNC: 31 MMOL/L (ref 21–32)
CREAT SERPL-MCNC: 0.73 MG/DL (ref 0.6–1.3)
CREAT UR-MCNC: 65.9 MG/DL
EST. AVERAGE GLUCOSE BLD GHB EST-MCNC: 140 MG/DL
GFR SERPL CREATININE-BSD FRML MDRD: 78 ML/MIN/1.73SQ M
GLUCOSE P FAST SERPL-MCNC: 121 MG/DL (ref 65–99)
HBA1C MFR BLD: 6.5 %
HDLC SERPL-MCNC: 48 MG/DL
LDLC SERPL CALC-MCNC: 88 MG/DL (ref 0–100)
MICROALBUMIN UR-MCNC: <7 MG/L
MICROALBUMIN/CREAT 24H UR: <11 MG/G CREATININE (ref 0–30)
POTASSIUM SERPL-SCNC: 4.5 MMOL/L (ref 3.5–5.3)
PROT SERPL-MCNC: 7.4 G/DL (ref 6.4–8.4)
SODIUM SERPL-SCNC: 138 MMOL/L (ref 135–147)
TRIGL SERPL-MCNC: 177 MG/DL

## 2024-02-12 PROCEDURE — 80061 LIPID PANEL: CPT

## 2024-02-12 PROCEDURE — 80053 COMPREHEN METABOLIC PANEL: CPT

## 2024-02-12 PROCEDURE — 83036 HEMOGLOBIN GLYCOSYLATED A1C: CPT

## 2024-02-12 PROCEDURE — 82043 UR ALBUMIN QUANTITATIVE: CPT

## 2024-02-12 PROCEDURE — 82570 ASSAY OF URINE CREATININE: CPT

## 2024-02-12 PROCEDURE — 83835 ASSAY OF METANEPHRINES: CPT

## 2024-02-14 ENCOUNTER — OFFICE VISIT (OUTPATIENT)
Dept: INTERNAL MEDICINE CLINIC | Facility: CLINIC | Age: 79
End: 2024-02-14
Payer: COMMERCIAL

## 2024-02-14 DIAGNOSIS — E11.9 TYPE 2 DIABETES MELLITUS WITHOUT COMPLICATION, WITH LONG-TERM CURRENT USE OF INSULIN (HCC): ICD-10-CM

## 2024-02-14 DIAGNOSIS — G20.A1 PARKINSON'S DISEASE WITHOUT FLUCTUATING MANIFESTATIONS, UNSPECIFIED WHETHER DYSKINESIA PRESENT: Primary | ICD-10-CM

## 2024-02-14 DIAGNOSIS — E78.5 HYPERLIPIDEMIA, UNSPECIFIED HYPERLIPIDEMIA TYPE: ICD-10-CM

## 2024-02-14 DIAGNOSIS — I10 PRIMARY HYPERTENSION: ICD-10-CM

## 2024-02-14 DIAGNOSIS — C67.9 MALIGNANT NEOPLASM OF URINARY BLADDER, UNSPECIFIED SITE (HCC): ICD-10-CM

## 2024-02-14 DIAGNOSIS — M46.1 SACROILIITIS (HCC): ICD-10-CM

## 2024-02-14 DIAGNOSIS — Z79.4 TYPE 2 DIABETES MELLITUS WITHOUT COMPLICATION, WITH LONG-TERM CURRENT USE OF INSULIN (HCC): ICD-10-CM

## 2024-02-14 DIAGNOSIS — I10 ESSENTIAL HYPERTENSION: ICD-10-CM

## 2024-02-14 PROBLEM — F11.20 CONTINUOUS OPIOID DEPENDENCE (HCC): Status: RESOLVED | Noted: 2022-04-26 | Resolved: 2024-02-14

## 2024-02-14 PROCEDURE — 99214 OFFICE O/P EST MOD 30 MIN: CPT | Performed by: INTERNAL MEDICINE

## 2024-02-14 NOTE — PATIENT INSTRUCTIONS

## 2024-02-14 NOTE — PROGRESS NOTES
Assessment/Plan:    Type 2 diabetes mellitus without complication, with long-term current use of insulin ([facility])    Lab Results   Component Value Date    HGBA1C 6.5 (H) 02/12/2024   I have counselled the pt to follow a healthy and balanced diet ,and recommend routine exercise.  Continue to optimize diet I will be ordering diabetic laboratories including comprehensive metabolic panel, hemoglobin A1c, urine microalbumin, lipid panel.  A1c well-controlled annual eye examination recommended    Parkinson disease  Working with neurology currently on Sinemet patient will be obtaining second opinion at [place] in the near future await second opinion recommend strengthening of the muscles    Malignant neoplasm of urinary bladder (HCC)  Currently stable doing well working with urology    Hypertension  Hypertension - controlled, I have counseled patient following healthy balance diet, I would like the patient reduce sodium, exercise routinely, I would like the patient continued the med current medical regiment and we will continue to monitor.    Hyperlipidemia  Hyperlipidemia controlled continue with current medical regiment recommend a low-cholesterol diet and recommend routine exercise we will continue to monitor the progress.  Continue Zocor 20 mg once daily    Essential hypertension  Hypertension - controlled, I have counseled patient following healthy balance diet, I would like the patient reduce sodium, exercise routinely, I would like the patient continued the med current medical regiment and we will continue to monitor.  Blood pressure today 130/88 continue Lopressor/HCTZ, Cozaar    Sacroiliitis (HCC)  Clinically stable will continue monitor         Problem List Items Addressed This Visit        Endocrine    Type 2 diabetes mellitus without complication, with long-term current use of insulin (HCC)       Lab Results   Component Value Date    HGBA1C 6.5 (H) 02/12/2024   I have counselled the pt to follow a healthy  and balanced diet ,and recommend routine exercise.  Continue to optimize diet I will be ordering diabetic laboratories including comprehensive metabolic panel, hemoglobin A1c, urine microalbumin, lipid panel.  A1c well-controlled annual eye examination recommended         Relevant Orders    Comprehensive metabolic panel    Hemoglobin A1C    Albumin / creatinine urine ratio    Hemoglobin A1c (w/out EAG) ([facility] ONLY)       Cardiovascular and Mediastinum    Hypertension     Hypertension - controlled, I have counseled patient following healthy balance diet, I would like the patient reduce sodium, exercise routinely, I would like the patient continued the med current medical regiment and we will continue to monitor.         Essential hypertension     Hypertension - controlled, I have counseled patient following healthy balance diet, I would like the patient reduce sodium, exercise routinely, I would like the patient continued the med current medical regiment and we will continue to monitor.  Blood pressure today 130/88 continue Lopressor/HCTZ, Cozaar            Nervous and Auditory    Parkinson disease - Primary     Working with neurology currently on Sinemet patient will be obtaining second opinion at [place] in the near future await second opinion recommend strengthening of the muscles            Musculoskeletal and Integument    Sacroiliitis (HCC)     Clinically stable will continue monitor            Genitourinary    Malignant neoplasm of urinary bladder (HCC)     Currently stable doing well working with urology            Other    Hyperlipidemia     Hyperlipidemia controlled continue with current medical regiment recommend a low-cholesterol diet and recommend routine exercise we will continue to monitor the progress.  Continue Zocor 20 mg once daily         RTO in 6 months call if any problems    Subjective:      Patient ID: Marina Jasso is a 78 y.o. female.    HPI 78-year old female coming in for a follow  up office visit regarding hypertension, Parkinson's disease, type 2 diabetes, hyperlipidemia and bladder cancer; the patient reports me compliant taking medications without untoward side effects the.  The patient is here to review his medical condition, update me on the medical condition and the patient reports me no hospitalizations and no ER visits.  No injuries no illnesses.  Reviewed laboratories in detail reports me working with neurology regarding Parkinson's disease will be going to [facility] for second opinion.  Try to follow healthy and balanced diet reports patient is doing okay at home able to do her ADLs she does have the support of her family.      The following portions of the patient's history were reviewed and updated as appropriate: allergies, current medications, past family history, past medical history, past social history, past surgical history and problem list.    Review of Systems   Constitutional:  Negative for activity change, appetite change and unexpected weight change.   HENT:  Negative for congestion and postnasal drip.    Eyes:  Negative for visual disturbance.   Respiratory:  Negative for cough and shortness of breath.    Cardiovascular:  Negative for chest pain.   Gastrointestinal:  Negative for abdominal pain, diarrhea, nausea and vomiting.   Neurological:  Negative for dizziness, light-headedness and headaches.   Hematological:  Negative for adenopathy.         Objective:    Return in about 6 months (around 8/14/2024).    No results found.      No Known Allergies    Past Medical History:   Diagnosis Date   • Arthritis    • Back pain     sciatic pain right hip and down right leg   • Cancer (HCC)     bladder cancer ; had surgical scraping   • GERD (gastroesophageal reflux disease)    • Hyperlipidemia    • Hypertension    • Seasonal allergies    • Wears partial dentures     1 small tooth removed and placed in cup      Past Surgical History:   Procedure Laterality Date   • APPENDECTOMY      • COLONOSCOPY     • CYSTOSCOPY  11/29/2021    [facility]   • EYE SURGERY     • HERNIA REPAIR     • AZ BLADDER INSTILLATION ANTICARCINOGENIC AGENT N/A 1/15/2020    Procedure: INSTILLATION MITOMYCIN;  Surgeon: Laron Kelly MD;  Location: [facility];  Service: Urology   • AZ CYSTO W/REMOVAL OF LESIONS SMALL N/A 1/15/2020    Procedure: Cystoscopy TRANSURETHRAL RESECTION OF BLADDER TUMOR (TURBT);  Surgeon: Laron Kelly MD;  Location: [facility];  Service: Urology   • AZ RPR 1ST INGUN HRNA AGE 5 YRS/> REDUCIBLE Left 2/3/2022    Procedure: INGUINAL HERNIA REPAIR;  Surgeon: Raheem Mcfarland MD;  [facility];  Service: General   • UPPER GASTROINTESTINAL ENDOSCOPY     • VEIN LIGATION AND STRIPPING       Current Outpatient Medications on File Prior to Visit   Medication Sig Dispense Refill   • carbidopa-levodopa (SINEMET)  mg per tablet Take 1 tablet by mouth 3 (three) times a day     • Cholecalciferol (VITAMIN D3) 1000 units CAPS Take 1 capsule by mouth daily     • cyanocobalamin (VITAMIN B-12) 1000 MCG tablet Take 1,000 mcg by mouth daily     • Diclofenac Sodium (VOLTAREN) 1 % Apply 2 g topically 4 (four) times a day for 10 days 80 g 0   • hydrochlorothiazide (HYDRODIURIL) 12.5 mg tablet TAKE ONE TABLET BY MOUTH EVERY DAY 90 tablet 1   • losartan (COZAAR) 100 MG tablet TAKE ONE TABLET BY MOUTH EVERY DAY 90 tablet 1   • Magnesium Gluconate 550 MG TABS Take 30 mg by mouth daily     • metoprolol tartrate (LOPRESSOR) 25 mg tablet Take 1 tablet (25 mg total) by mouth every 12 (twelve) hours 60 tablet 5   • multivitamin-minerals (CENTRUM ADULTS) tablet Take 1 tablet by mouth daily     • omeprazole (PriLOSEC) 20 mg delayed release capsule Take 1 capsule (20 mg total) by mouth daily Take 1/2 hour  prior to breakfast 90 capsule 3   • simvastatin (ZOCOR) 20 mg tablet TAKE 1 TABLET BY MOUTH DAILY 90 tablet 3   • methocarbamol (ROBAXIN) 500 mg tablet Take 1 tablet (500 mg total) by mouth daily at  bedtime (Patient not taking: Reported on 2/14/2024) 1 tablet 0     No current facility-administered medications on file prior to visit.     Family History   Problem Relation Age of Onset   • Heart disease Mother    • Hypertension Mother    • No Known Problems Son    • No Known Problems Son      Social History     Socioeconomic History   • Marital status:      Spouse name: Not on file   • Number of children: Not on file   • Years of education: Not on file   • Highest education level: Not on file   Occupational History   • Not on file   Tobacco Use   • Smoking status: Never   • Smokeless tobacco: Never   Vaping Use   • Vaping status: Never Used   Substance and Sexual Activity   • Alcohol use: Never   • Drug use: Never   • Sexual activity: Not Currently   Other Topics Concern   • Not on file   Social History Narrative   • Not on file     Social Determinants of Health     Financial Resource Strain: Medium Risk (5/1/2023)    Overall Financial Resource Strain (CARDIA)    • Difficulty of Paying Living Expenses: Somewhat hard   Food Insecurity: Not on file   Transportation Needs: No Transportation Needs (5/1/2023)    PRAPARE - Transportation    • Lack of Transportation (Medical): No    • Lack of Transportation (Non-Medical): No   Physical Activity: Not on file   Stress: Not on file   Social Connections: Not on file   Intimate Partner Violence: Not on file   Housing Stability: Not on file     There were no vitals filed for this visit.  Results for orders placed or performed in visit on 11/27/23   Urinalysis with microscopic   Result Value Ref Range    Color, UA Light Yellow     Clarity, UA Clear     Specific Gravity, UA 1.012 1.003 - 1.030    pH, UA 7.0 4.5, 5.0, 5.5, 6.0, 6.5, 7.0, 7.5, 8.0    Leukocytes, UA Trace (A) Negative    Nitrite, UA Negative Negative    Protein, UA Negative Negative mg/dl    Glucose, UA Negative Negative mg/dl    Ketones, UA Negative Negative mg/dl    Urobilinogen, UA <2.0 <2.0 mg/dl mg/dl     Bilirubin, UA Negative Negative    Occult Blood, UA Negative Negative    RBC, UA 4-10 (A) None Seen, 1-2 /hpf    WBC, UA 1-2 None Seen, 1-2 /hpf    Epithelial Cells Occasional None Seen, Occasional /hpf    Bacteria, UA None Seen None Seen, Occasional /hpf    Hyaline Casts, UA 0-3 (A) None Seen /lpf   Cytology, urine   Result Value Ref Range    Case Report       Non-gynecologic Cytology                          Case: [id number]                                  Authorizing Provider:  Shobha Xavier,     Collected:           12/06/2023 1337                                     ALYSA                                                                         Ordering Location:     [facility]        Received:            12/06/2023 1338                                     [facility]                                                               Pathologist:           Jose Mills MD                                                             Specimen:    Urine, Clean Catch                                                                         Final Diagnosis       A. Urine, Clean Catch:  Negative for high grade urothelial carcinoma ([facility]) - see comment.  Benign urothelial cells.  Benign squamous cells.  Scattered red blood cells.  Few neutrophils.    Satisfactory for evaluation.    Comment:  The above diagnostic category is from the recently published book, The Isabelle System for Reporting Urinary Cytology, and is in keeping with the ongoing effort for utilization of standardized diagnostic terminology in urine cytology.*    *The Isabelle System for Reporting Urinary Cytology. Akanksha Chavez, Avani Helton, Ramana Wick; 2016.           Gross Description       A. 40mL, yellow, clear, received in [facility]         Additional Information       [employer]'s FDA approved ,  and ThinPrep Imaging Duo System are utilized with strict adherence to the 's instruction manual to prepare  gynecologic and non-gynecologic cytology specimens for the production of ThinPrep slides as well as for gynecologic ThinPrep imaging. These processes have been validated by our laboratory and/or by the .    These tests were developed and their performance characteristics determined by [facility] or [facility]. They may not be cleared or approved by the U.S. Food and Drug Administration. The FDA has determined that such clearance or approval is not necessary. These tests are used for clinical purposes. They should not be regarded as investigational or for research. This laboratory has been approved by CLIA 88, designated as a high-complexity laboratory and is qualified to perform these tests.    Interpretation performed at [facility], [address] [address]            Weight (last 2 days)     None        There is no height or weight on file to calculate BMI.  BP      Temp      Pulse     Resp      SpO2      There were no vitals filed for this visit.There were no vitals filed for this visit.    LMP  (LMP Unknown)          Physical Exam  Vitals and nursing note reviewed.   Constitutional:       General: She is not in acute distress.     Appearance: Normal appearance. She is well-developed. She is not ill-appearing, toxic-appearing or diaphoretic.   HENT:      Head: Normocephalic.   Eyes:      General: No scleral icterus.        Right eye: No discharge.         Left eye: No discharge.      Conjunctiva/sclera: Conjunctivae normal.      Pupils: Pupils are equal, round, and reactive to light.   Cardiovascular:      Rate and Rhythm: Normal rate and regular rhythm.      Pulses:           Dorsalis pedis pulses are 2+ on the right side and 2+ on the left side.        Posterior tibial pulses are 2+ on the right side and 2+ on the left side.      Heart sounds: Normal heart sounds. No murmur heard.     No friction rub. No gallop.   Pulmonary:      Effort: No  respiratory distress.      Breath sounds: Normal breath sounds. No wheezing or rales.   Abdominal:      General: Bowel sounds are normal. There is no distension.      Palpations: Abdomen is soft. There is no mass.      Tenderness: There is no abdominal tenderness. There is no guarding or rebound.   Musculoskeletal:         General: No deformity.      Cervical back: Neck supple.   Feet:      Right foot:      Skin integrity: No ulcer, skin breakdown, erythema, warmth, callus or dry skin.      Left foot:      Skin integrity: No ulcer, skin breakdown, erythema, warmth, callus or dry skin.   Lymphadenopathy:      Cervical: No cervical adenopathy.   Neurological:      Mental Status: She is alert.      Coordination: Coordination normal.

## 2024-02-18 NOTE — ASSESSMENT & PLAN NOTE
Working with neurology currently on Sinemet patient will be obtaining second opinion at [place] in the near future await second opinion recommend strengthening of the muscles

## 2024-02-18 NOTE — ASSESSMENT & PLAN NOTE
Hypertension - controlled, I have counseled patient following healthy balance diet, I would like the patient reduce sodium, exercise routinely, I would like the patient continued the med current medical regiment and we will continue to monitor.  Blood pressure today 130/88 continue Lopressor/HCTZ, Cozaar

## 2024-02-18 NOTE — ASSESSMENT & PLAN NOTE
Lab Results   Component Value Date    HGBA1C 6.5 (H) 02/12/2024   I have counselled the pt to follow a healthy and balanced diet ,and recommend routine exercise.  Continue to optimize diet I will be ordering diabetic laboratories including comprehensive metabolic panel, hemoglobin A1c, urine microalbumin, lipid panel.  A1c well-controlled annual eye examination recommended

## 2024-02-21 PROBLEM — Z00.00 MEDICARE ANNUAL WELLNESS VISIT, SUBSEQUENT: Status: RESOLVED | Noted: 2019-09-02 | Resolved: 2024-02-21

## 2024-02-21 PROBLEM — R05.9 COUGH: Status: RESOLVED | Noted: 2018-04-05 | Resolved: 2024-02-21

## 2024-02-25 LAB
METANEPH FREE SERPL-MCNC: 25.1 PG/ML (ref 0–88)
NORMETANEPHRINE SERPL-MCNC: 89.7 PG/ML (ref 0–285.2)

## 2024-03-11 ENCOUNTER — TELEPHONE (OUTPATIENT)
Age: 79
End: 2024-03-11

## 2024-03-11 DIAGNOSIS — Z12.31 ENCOUNTER FOR SCREENING MAMMOGRAM FOR MALIGNANT NEOPLASM OF BREAST: Primary | ICD-10-CM

## 2024-03-11 NOTE — TELEPHONE ENCOUNTER
Casi called in, Marina has her mammo scheduled and needs a script put in her chart for it.   Please call [facility] once order is done.   Thank you!

## 2024-03-22 DIAGNOSIS — I10 ESSENTIAL HYPERTENSION: ICD-10-CM

## 2024-03-22 RX ORDER — LOSARTAN POTASSIUM 100 MG/1
100 TABLET ORAL DAILY
Qty: 90 TABLET | Refills: 1 | Status: SHIPPED | OUTPATIENT
Start: 2024-03-22

## 2024-03-26 ENCOUNTER — TELEPHONE (OUTPATIENT)
Dept: GASTROENTEROLOGY | Facility: CLINIC | Age: 79
End: 2024-03-26

## 2024-04-13 DIAGNOSIS — I16.0 HYPERTENSIVE URGENCY: ICD-10-CM

## 2024-04-13 DIAGNOSIS — I10 ESSENTIAL HYPERTENSION: ICD-10-CM

## 2024-04-16 DIAGNOSIS — E78.5 HYPERLIPIDEMIA, UNSPECIFIED HYPERLIPIDEMIA TYPE: ICD-10-CM

## 2024-04-16 RX ORDER — SIMVASTATIN 20 MG
20 TABLET ORAL DAILY
Qty: 90 TABLET | Refills: 1 | Status: SHIPPED | OUTPATIENT
Start: 2024-04-16

## 2024-06-03 ENCOUNTER — HOSPITAL ENCOUNTER (OUTPATIENT)
Dept: RADIOLOGY | Facility: HOSPITAL | Age: 79
Discharge: HOME | End: 2024-06-03

## 2024-06-03 DIAGNOSIS — R25.1 TREMOR, UNSPECIFIED: ICD-10-CM

## 2024-06-04 ENCOUNTER — HOSPITAL ENCOUNTER (OUTPATIENT)
Dept: VASCULAR ULTRASOUND | Facility: HOSPITAL | Age: 79
Discharge: HOME | End: 2024-06-04
Payer: COMMERCIAL

## 2024-06-04 DIAGNOSIS — I10 ESSENTIAL HYPERTENSION: ICD-10-CM

## 2024-06-04 PROCEDURE — 93975 VASCULAR STUDY: CPT | Performed by: SURGERY

## 2024-06-04 PROCEDURE — 93975 VASCULAR STUDY: CPT

## 2024-06-11 ENCOUNTER — HOSPITAL ENCOUNTER (OUTPATIENT)
Dept: ULTRASOUND IMAGING | Facility: HOSPITAL | Age: 79
Discharge: HOME | End: 2024-06-11
Attending: OBSTETRICS & GYNECOLOGY
Payer: COMMERCIAL

## 2024-06-11 DIAGNOSIS — N83.201 CYST OF RIGHT OVARY: ICD-10-CM

## 2024-06-11 PROCEDURE — 76830 TRANSVAGINAL US NON-OB: CPT

## 2024-06-11 PROCEDURE — 76856 US EXAM PELVIC COMPLETE: CPT

## 2024-06-24 ENCOUNTER — HOSPITAL ENCOUNTER (OUTPATIENT)
Dept: RADIOLOGY | Facility: HOSPITAL | Age: 79
Discharge: HOME | End: 2024-06-24
Payer: COMMERCIAL

## 2024-06-24 DIAGNOSIS — R25.1 TREMOR, UNSPECIFIED: ICD-10-CM

## 2024-06-24 DIAGNOSIS — E78.5 HYPERLIPIDEMIA, UNSPECIFIED HYPERLIPIDEMIA TYPE: ICD-10-CM

## 2024-06-24 PROCEDURE — A9584 IODINE I-123 IOFLUPANE: HCPCS

## 2024-06-24 PROCEDURE — 78803 RP LOCLZJ TUM SPECT 1 AREA: CPT

## 2024-06-24 RX ORDER — SIMVASTATIN 20 MG
20 TABLET ORAL DAILY
Qty: 90 TABLET | Refills: 1 | Status: SHIPPED | OUTPATIENT
Start: 2024-06-24

## 2024-06-24 RX ADMIN — POTASSIUM IODIDE 130 MG: 1 SOLUTION ORAL at 08:03

## 2024-07-01 ENCOUNTER — ESTABLISHED COMPREHENSIVE EXAM (OUTPATIENT)
Dept: URBAN - METROPOLITAN AREA CLINIC 6 | Facility: CLINIC | Age: 79
End: 2024-07-01

## 2024-07-01 DIAGNOSIS — H04.123: ICD-10-CM

## 2024-07-01 DIAGNOSIS — H35.371: ICD-10-CM

## 2024-07-01 DIAGNOSIS — H26.493: ICD-10-CM

## 2024-07-01 DIAGNOSIS — H11.041: ICD-10-CM

## 2024-07-01 DIAGNOSIS — Z96.1: ICD-10-CM

## 2024-07-01 DIAGNOSIS — H43.813: ICD-10-CM

## 2024-07-01 PROCEDURE — 92014 COMPRE OPH EXAM EST PT 1/>: CPT

## 2024-07-01 PROCEDURE — 92134 CPTRZ OPH DX IMG PST SGM RTA: CPT

## 2024-07-01 ASSESSMENT — VISUAL ACUITY
OD_SC: 20/30
OS_SC: 20/30+2

## 2024-07-01 ASSESSMENT — KERATOMETRY
OS_K2POWER_DIOPTERS: 46.25
OS_AXISANGLE2_DEGREES: 28
OS_AXISANGLE_DEGREES: 118
OS_K1POWER_DIOPTERS: 45.75
OD_K1POWER_DIOPTERS: 43.75
OD_AXISANGLE_DEGREES: 173
OD_AXISANGLE2_DEGREES: 83
OD_K2POWER_DIOPTERS: 45.75

## 2024-07-01 ASSESSMENT — TONOMETRY
OS_IOP_MMHG: 11
OD_IOP_MMHG: 11

## 2024-07-05 DIAGNOSIS — K21.9 GASTROESOPHAGEAL REFLUX DISEASE, UNSPECIFIED WHETHER ESOPHAGITIS PRESENT: ICD-10-CM

## 2024-07-05 RX ORDER — OMEPRAZOLE 20 MG/1
20 CAPSULE, DELAYED RELEASE ORAL DAILY
Qty: 90 CAPSULE | Refills: 0 | Status: SHIPPED | OUTPATIENT
Start: 2024-07-05 | End: 2024-10-03

## 2024-07-11 ENCOUNTER — TELEPHONE (OUTPATIENT)
Age: 79
End: 2024-07-11

## 2024-07-11 NOTE — TELEPHONE ENCOUNTER
Son called on behalf of patient (does not speak english) would like to know results of UMER test. Is taking medication for parkinson and would like to know if she is suppose to be taking or not.

## 2024-07-11 NOTE — TELEPHONE ENCOUNTER
This study was ordered by the patient's neurologist at [place], Dr. Zuleta.  Please have the patient follow-up with their office to review the results of her scan and for recommendations as to whether she should continue her medication or not.

## 2024-07-16 ENCOUNTER — HOSPITAL ENCOUNTER (OUTPATIENT)
Dept: MAMMOGRAPHY | Facility: HOSPITAL | Age: 79
Discharge: HOME | End: 2024-07-16
Payer: COMMERCIAL

## 2024-07-16 VITALS — HEIGHT: 61 IN | BODY MASS INDEX: 28.51 KG/M2 | WEIGHT: 151 LBS

## 2024-07-16 DIAGNOSIS — Z12.31 ENCOUNTER FOR SCREENING MAMMOGRAM FOR MALIGNANT NEOPLASM OF BREAST: ICD-10-CM

## 2024-07-16 DIAGNOSIS — I10 ESSENTIAL HYPERTENSION: ICD-10-CM

## 2024-07-16 PROCEDURE — 77063 BREAST TOMOSYNTHESIS BI: CPT

## 2024-07-16 PROCEDURE — 77067 SCR MAMMO BI INCL CAD: CPT

## 2024-07-17 RX ORDER — HYDROCHLOROTHIAZIDE 12.5 MG/1
12.5 TABLET ORAL DAILY
Qty: 100 TABLET | Refills: 1 | Status: SHIPPED | OUTPATIENT
Start: 2024-07-17

## 2024-08-05 ENCOUNTER — ANNUAL EXAM (OUTPATIENT)
Dept: GYNECOLOGY | Facility: CLINIC | Age: 79
End: 2024-08-05
Payer: COMMERCIAL

## 2024-08-05 VITALS
WEIGHT: 149 LBS | BODY MASS INDEX: 28.13 KG/M2 | DIASTOLIC BLOOD PRESSURE: 80 MMHG | SYSTOLIC BLOOD PRESSURE: 120 MMHG | HEIGHT: 61 IN

## 2024-08-05 DIAGNOSIS — N95.2 VAGINAL ATROPHY: ICD-10-CM

## 2024-08-05 DIAGNOSIS — Z12.31 SCREENING MAMMOGRAM FOR BREAST CANCER: Primary | ICD-10-CM

## 2024-08-05 DIAGNOSIS — N83.201 CYST OF RIGHT OVARY: ICD-10-CM

## 2024-08-05 PROCEDURE — G0101 CA SCREEN;PELVIC/BREAST EXAM: HCPCS | Performed by: OBSTETRICS & GYNECOLOGY

## 2024-08-05 NOTE — PROGRESS NOTES
"Ambulatory Visit  Name: Marina Jasso      : 1945      MRN: [id number]  Encounter Provider: Rhys Mejias MD  Encounter Date: 2024   Encounter department: [facility] GYNECOLOGY [place]    Assessment & Plan     Normal breast exam  Vaginal atrophy  Stable right ovarian cyst measuring 5 cm   Normal mammogram 2024  Colonoscopy with polyps 2018   history of bladder cancer        Plan: Rx mammogram.  Check pelvic ultrasound next  for stability of right simple ovarian cyst.  Recommend weightbearing and balancing exercises.        History of Present Illness      Marina Jasso is a 78 y.o. female who presents for annual exam with no complaints.  Patient has a known 5 cm simple ovarian cyst which is slightly increased since .  Last pelvic ultrasound showed the cyst to be 3.5 x 5 cm.  Patient denies any pelvic pain.  Denies any vaginal bleeding.  Not sexually active.  Denies any breast bowel or bladder issues.  No change in family history.  Medications reviewed.    Objective     /80   Ht 5' 1\" (1.549 m)   Wt 67.6 kg (149 lb)   LMP  (LMP Unknown)   BMI 28.15 kg/m²     Physical Exam  Vitals and nursing note reviewed.   Constitutional:       General: She is not in acute distress.     Appearance: She is well-developed.   HENT:      Head: Normocephalic and atraumatic.   Eyes:      Conjunctiva/sclera: Conjunctivae normal.   Cardiovascular:      Rate and Rhythm: Normal rate and regular rhythm.      Heart sounds: No murmur heard.  Pulmonary:      Effort: Pulmonary effort is normal. No respiratory distress.      Breath sounds: Normal breath sounds.   Chest:   Breasts:     Right: Normal.      Left: Normal.   Abdominal:      Palpations: Abdomen is soft.      Tenderness: There is no abdominal tenderness.   Genitourinary:     Vagina: Normal.      Cervix: Normal.      Uterus: Normal.       Adnexa: Left adnexa normal.        Right: Fullness present.       Rectum: Normal.      Comments: " Vaginal atrophy.  Normal urethra and Newington's glands.  No uterine prolapse cystocele or rectocele.  Musculoskeletal:         General: No swelling.      Cervical back: Neck supple.   Skin:     General: Skin is warm and dry.      Capillary Refill: Capillary refill takes less than 2 seconds.   Neurological:      Mental Status: She is alert.   Psychiatric:         Mood and Affect: Mood normal.       Administrative Statements

## 2024-08-28 ENCOUNTER — TELEPHONE (OUTPATIENT)
Dept: GASTROENTEROLOGY | Facility: CLINIC | Age: 79
End: 2024-08-28

## 2024-08-28 NOTE — TELEPHONE ENCOUNTER
Pt needs to be scheduled for an Office Visit  to discuss recall colon due to age - hx of colonic polyps (Dr Mauricio pt).  Called pt, however, phone was busy. Will call again to try to schedule.

## 2024-09-04 NOTE — TELEPHONE ENCOUNTER
I lmom for pt to please call back to schedule an office visit with one of our GI providers as Dr Mauricio has retired. Will call again on home number if do not hear back from her.

## 2024-09-18 ENCOUNTER — RA CDI HCC (OUTPATIENT)
Dept: OTHER | Facility: HOSPITAL | Age: 79
End: 2024-09-18

## 2024-09-20 ENCOUNTER — APPOINTMENT (OUTPATIENT)
Dept: LAB | Facility: CLINIC | Age: 79
End: 2024-09-20
Payer: COMMERCIAL

## 2024-09-20 DIAGNOSIS — E13.69 OTHER SPECIFIED DIABETES MELLITUS WITH OTHER SPECIFIED COMPLICATION, UNSPECIFIED WHETHER LONG TERM INSULIN USE (HCC): ICD-10-CM

## 2024-09-20 DIAGNOSIS — E11.9 TYPE 2 DIABETES MELLITUS WITHOUT COMPLICATION, WITH LONG-TERM CURRENT USE OF INSULIN (HCC): ICD-10-CM

## 2024-09-20 DIAGNOSIS — Z79.4 TYPE 2 DIABETES MELLITUS WITHOUT COMPLICATION, WITH LONG-TERM CURRENT USE OF INSULIN (HCC): ICD-10-CM

## 2024-09-20 DIAGNOSIS — Z79.4 ENCOUNTER FOR LONG-TERM (CURRENT) USE OF INSULIN (HCC): ICD-10-CM

## 2024-09-20 LAB
ALBUMIN SERPL BCG-MCNC: 4.1 G/DL (ref 3.5–5)
ALP SERPL-CCNC: 51 U/L (ref 34–104)
ALT SERPL W P-5'-P-CCNC: 16 U/L (ref 7–52)
ANION GAP SERPL CALCULATED.3IONS-SCNC: 4 MMOL/L (ref 4–13)
AST SERPL W P-5'-P-CCNC: 18 U/L (ref 13–39)
BILIRUB SERPL-MCNC: 0.4 MG/DL (ref 0.2–1)
BUN SERPL-MCNC: 21 MG/DL (ref 5–25)
CALCIUM SERPL-MCNC: 9.5 MG/DL (ref 8.4–10.2)
CHLORIDE SERPL-SCNC: 102 MMOL/L (ref 96–108)
CO2 SERPL-SCNC: 33 MMOL/L (ref 21–32)
CREAT SERPL-MCNC: 0.75 MG/DL (ref 0.6–1.3)
CREAT UR-MCNC: 93.8 MG/DL
EST. AVERAGE GLUCOSE BLD GHB EST-MCNC: 137 MG/DL
GFR SERPL CREATININE-BSD FRML MDRD: 76 ML/MIN/1.73SQ M
GLUCOSE P FAST SERPL-MCNC: 126 MG/DL (ref 65–99)
HBA1C MFR BLD: 6.4 %
MICROALBUMIN UR-MCNC: <7 MG/L
POTASSIUM SERPL-SCNC: 5.3 MMOL/L (ref 3.5–5.3)
PROT SERPL-MCNC: 7.3 G/DL (ref 6.4–8.4)
SODIUM SERPL-SCNC: 139 MMOL/L (ref 135–147)

## 2024-09-20 PROCEDURE — 80053 COMPREHEN METABOLIC PANEL: CPT

## 2024-09-20 PROCEDURE — 83036 HEMOGLOBIN GLYCOSYLATED A1C: CPT

## 2024-09-20 PROCEDURE — 36415 COLL VENOUS BLD VENIPUNCTURE: CPT

## 2024-09-20 PROCEDURE — 82570 ASSAY OF URINE CREATININE: CPT

## 2024-09-20 PROCEDURE — 82043 UR ALBUMIN QUANTITATIVE: CPT

## 2024-09-22 DIAGNOSIS — I10 ESSENTIAL HYPERTENSION: ICD-10-CM

## 2024-09-23 RX ORDER — LOSARTAN POTASSIUM 100 MG/1
100 TABLET ORAL DAILY
Qty: 90 TABLET | Refills: 1 | Status: SHIPPED | OUTPATIENT
Start: 2024-09-23 | End: 2024-09-24 | Stop reason: SDUPTHER

## 2024-09-24 ENCOUNTER — OFFICE VISIT (OUTPATIENT)
Dept: INTERNAL MEDICINE CLINIC | Facility: CLINIC | Age: 79
End: 2024-09-24
Payer: COMMERCIAL

## 2024-09-24 VITALS
DIASTOLIC BLOOD PRESSURE: 70 MMHG | HEART RATE: 59 BPM | OXYGEN SATURATION: 96 % | WEIGHT: 152.4 LBS | HEIGHT: 61 IN | SYSTOLIC BLOOD PRESSURE: 142 MMHG | BODY MASS INDEX: 28.77 KG/M2

## 2024-09-24 DIAGNOSIS — E78.5 HYPERLIPIDEMIA, UNSPECIFIED HYPERLIPIDEMIA TYPE: ICD-10-CM

## 2024-09-24 DIAGNOSIS — E11.9 TYPE 2 DIABETES MELLITUS WITHOUT COMPLICATION, WITH LONG-TERM CURRENT USE OF INSULIN (HCC): ICD-10-CM

## 2024-09-24 DIAGNOSIS — Z00.00 MEDICARE ANNUAL WELLNESS VISIT, SUBSEQUENT: ICD-10-CM

## 2024-09-24 DIAGNOSIS — I10 ESSENTIAL HYPERTENSION: ICD-10-CM

## 2024-09-24 DIAGNOSIS — K21.9 GASTROESOPHAGEAL REFLUX DISEASE, UNSPECIFIED WHETHER ESOPHAGITIS PRESENT: ICD-10-CM

## 2024-09-24 DIAGNOSIS — Z79.4 TYPE 2 DIABETES MELLITUS WITHOUT COMPLICATION, WITH LONG-TERM CURRENT USE OF INSULIN (HCC): ICD-10-CM

## 2024-09-24 DIAGNOSIS — I10 PRIMARY HYPERTENSION: ICD-10-CM

## 2024-09-24 DIAGNOSIS — Z23 ENCOUNTER FOR IMMUNIZATION: Primary | ICD-10-CM

## 2024-09-24 PROCEDURE — G0439 PPPS, SUBSEQ VISIT: HCPCS | Performed by: INTERNAL MEDICINE

## 2024-09-24 PROCEDURE — G0008 ADMIN INFLUENZA VIRUS VAC: HCPCS

## 2024-09-24 PROCEDURE — 90662 IIV NO PRSV INCREASED AG IM: CPT

## 2024-09-24 PROCEDURE — 99214 OFFICE O/P EST MOD 30 MIN: CPT | Performed by: INTERNAL MEDICINE

## 2024-09-24 RX ORDER — LOSARTAN POTASSIUM 100 MG/1
100 TABLET ORAL DAILY
Qty: 90 TABLET | Refills: 1 | Status: SHIPPED | OUTPATIENT
Start: 2024-09-24

## 2024-09-24 RX ORDER — METOPROLOL TARTRATE 25 MG/1
25 TABLET, FILM COATED ORAL EVERY 12 HOURS
Qty: 60 TABLET | Refills: 5 | Status: SHIPPED | OUTPATIENT
Start: 2024-09-24

## 2024-09-24 RX ORDER — HYDROCHLOROTHIAZIDE 12.5 MG/1
12.5 TABLET ORAL DAILY
Qty: 100 TABLET | Refills: 1 | Status: SHIPPED | OUTPATIENT
Start: 2024-09-24

## 2024-09-24 RX ORDER — SIMVASTATIN 20 MG
20 TABLET ORAL DAILY
Qty: 90 TABLET | Refills: 1 | Status: SHIPPED | OUTPATIENT
Start: 2024-09-24

## 2024-09-24 NOTE — ASSESSMENT & PLAN NOTE
Orders:    metoprolol tartrate (LOPRESSOR) 25 mg tablet; Take 1 tablet (25 mg total) by mouth every 12 (twelve) hours

## 2024-09-24 NOTE — PATIENT INSTRUCTIONS
Medicare Preventive Visit Patient Instructions  Thank you for completing your Welcome to Medicare Visit or Medicare Annual Wellness Visit today. Your next wellness visit will be due in one year (9/25/2025).  The screening/preventive services that you may require over the next 5-10 years are detailed below. Some tests may not apply to you based off risk factors and/or age. Screening tests ordered at today's visit but not completed yet may show as past due. Also, please note that scanned in results may not display below.  Preventive Screenings:  Service Recommendations Previous Testing/Comments   Colorectal Cancer Screening  * Colonoscopy    * Fecal Occult Blood Test (FOBT)/Fecal Immunochemical Test (FIT)  * Fecal DNA/Cologuard Test  * Flexible Sigmoidoscopy Age: 45-75 years old   Colonoscopy: every 10 years (may be performed more frequently if at higher risk)  OR  FOBT/FIT: every 1 year  OR  Cologuard: every 3 years  OR  Sigmoidoscopy: every 5 years  Screening may be recommended earlier than age 45 if at higher risk for colorectal cancer. Also, an individualized decision between you and your healthcare provider will decide whether screening between the ages of 76-85 would be appropriate. Colonoscopy: 09/12/2017  FOBT/FIT: Not on file  Cologuard: Not on file  Sigmoidoscopy: Not on file          Breast Cancer Screening Age: 40+ years old  Frequency: every 1-2 years  Not required if history of left and right mastectomy Mammogram: 07/16/2024        Cervical Cancer Screening Between the ages of 21-29, pap smear recommended once every 3 years.   Between the ages of 30-65, can perform pap smear with HPV co-testing every 5 years.   Recommendations may differ for women with a history of total hysterectomy, cervical cancer, or abnormal pap smears in past. Pap Smear: 08/05/2024        Hepatitis C Screening Once for adults born between 1945 and 1965  More frequently in patients at high risk for Hepatitis C Hep C Antibody:  04/27/2023        Diabetes Screening 1-2 times per year if you're at risk for diabetes or have pre-diabetes Fasting glucose: 126 mg/dL (9/20/2024)  A1C: 6.4 % (9/20/2024)      Cholesterol Screening Once every 5 years if you don't have a lipid disorder. May order more often based on risk factors. Lipid panel: 02/12/2024          Other Preventive Screenings Covered by Medicare:  Abdominal Aortic Aneurysm (AAA) Screening: covered once if your at risk. You're considered to be at risk if you have a family history of AAA.  Lung Cancer Screening: covers low dose CT scan once per year if you meet all of the following conditions: (1) Age 55-77; (2) No signs or symptoms of lung cancer; (3) Current smoker or have quit smoking within the last 15 years; (4) You have a tobacco smoking history of at least 20 pack years (packs per day multiplied by number of years you smoked); (5) You get a written order from a healthcare provider.  Glaucoma Screening: covered annually if you're considered high risk: (1) You have diabetes OR (2) Family history of glaucoma OR (3)  aged 50 and older OR (4)  American aged 65 and older  Osteoporosis Screening: covered every 2 years if you meet one of the following conditions: (1) You're estrogen deficient and at risk for osteoporosis based off medical history and other findings; (2) Have a vertebral abnormality; (3) On glucocorticoid therapy for more than 3 months; (4) Have primary hyperparathyroidism; (5) On osteoporosis medications and need to assess response to drug therapy.   Last bone density test (DXA Scan): 10/04/2022.  HIV Screening: covered annually if you're between the age of 15-65. Also covered annually if you are younger than 15 and older than 65 with risk factors for HIV infection. For pregnant patients, it is covered up to 3 times per pregnancy.    Immunizations:  Immunization Recommendations   Influenza Vaccine Annual influenza vaccination during flu season is  recommended for all persons aged >= 6 months who do not have contraindications   Pneumococcal Vaccine   * Pneumococcal conjugate vaccine = PCV13 (Prevnar 13), PCV15 (Vaxneuvance), PCV20 (Prevnar 20)  * Pneumococcal polysaccharide vaccine = PPSV23 (Pneumovax) Adults 19-65 yo with certain risk factors or if 65+ yo  If never received any pneumonia vaccine: recommend Prevnar 20 (PCV20)  Give PCV20 if previously received 1 dose of PCV13 or PPSV23   Hepatitis B Vaccine 3 dose series if at intermediate or high risk (ex: diabetes, end stage renal disease, liver disease)   Respiratory syncytial virus (RSV) Vaccine - COVERED BY MEDICARE PART D  * RSVPreF3 (Arexvy) CDC recommends that adults 60 years of age and older may receive a single dose of RSV vaccine using shared clinical decision-making (SCDM)   Tetanus (Td) Vaccine - COST NOT COVERED BY MEDICARE PART B Following completion of primary series, a booster dose should be given every 10 years to maintain immunity against tetanus. Td may also be given as tetanus wound prophylaxis.   Tdap Vaccine - COST NOT COVERED BY MEDICARE PART B Recommended at least once for all adults. For pregnant patients, recommended with each pregnancy.   Shingles Vaccine (Shingrix) - COST NOT COVERED BY MEDICARE PART B  2 shot series recommended in those 19 years and older who have or will have weakened immune systems or those 50 years and older     Health Maintenance Due:      Topic Date Due   • Colorectal Cancer Screening  09/12/2022   • Breast Cancer Screening: Mammogram  07/16/2025   • Hepatitis C Screening  Completed     Immunizations Due:      Topic Date Due   • COVID-19 Vaccine (5 - 2023-24 season) 09/01/2024   • Influenza Vaccine (1) 09/01/2024     Advance Directives   What are advance directives?  Advance directives are legal documents that state your wishes and plans for medical care. These plans are made ahead of time in case you lose your ability to make decisions for yourself. Advance  directives can apply to any medical decision, such as the treatments you want, and if you want to donate organs.   What are the types of advance directives?  There are many types of advance directives, and each state has rules about how to use them. You may choose a combination of any of the following:  Living will:  This is a written record of the treatment you want. You can also choose which treatments you do not want, which to limit, and which to stop at a certain time. This includes surgery, medicine, IV fluid, and tube feedings.   Durable power of  for healthcare (DPAHC):  This is a written record that states who you want to make healthcare choices for you when you are unable to make them for yourself. This person, called a proxy, is usually a family member or a friend. You may choose more than 1 proxy.  Do not resuscitate (DNR) order:  A DNR order is used in case your heart stops beating or you stop breathing. It is a request not to have certain forms of treatment, such as CPR. A DNR order may be included in other types of advance directives.  Medical directive:  This covers the care that you want if you are in a coma, near death, or unable to make decisions for yourself. You can list the treatments you want for each condition. Treatment may include pain medicine, surgery, blood transfusions, dialysis, IV or tube feedings, and a ventilator (breathing machine).  Values history:  This document has questions about your views, beliefs, and how you feel and think about life. This information can help others choose the care that you would choose.  Why are advance directives important?  An advance directive helps you control your care. Although spoken wishes may be used, it is better to have your wishes written down. Spoken wishes can be misunderstood, or not followed. Treatments may be given even if you do not want them. An advance directive may make it easier for your family to make difficult choices about  your care.   Weight Management   Why it is important to manage your weight:  Being overweight increases your risk of health conditions such as heart disease, high blood pressure, type 2 diabetes, and certain types of cancer. It can also increase your risk for osteoarthritis, sleep apnea, and other respiratory problems. Aim for a slow, steady weight loss. Even a small amount of weight loss can lower your risk of health problems.  How to lose weight safely:  A safe and healthy way to lose weight is to eat fewer calories and get regular exercise. You can lose up about 1 pound a week by decreasing the number of calories you eat by 500 calories each day.   Healthy meal plan for weight management:  A healthy meal plan includes a variety of foods, contains fewer calories, and helps you stay healthy. A healthy meal plan includes the following:  Eat whole-grain foods more often.  A healthy meal plan should contain fiber. Fiber is the part of grains, fruits, and vegetables that is not broken down by your body. Whole-grain foods are healthy and provide extra fiber in your diet. Some examples of whole-grain foods are whole-wheat breads and pastas, oatmeal, brown rice, and bulgur.  Eat a variety of vegetables every day.  Include dark, leafy greens such as spinach, kale, amara greens, and mustard greens. Eat yellow and orange vegetables such as carrots, sweet potatoes, and winter squash.   Eat a variety of fruits every day.  Choose fresh or canned fruit (canned in its own juice or light syrup) instead of juice. Fruit juice has very little or no fiber.  Eat low-fat dairy foods.  Drink fat-free (skim) milk or 1% milk. Eat fat-free yogurt and low-fat cottage cheese. Try low-fat cheeses such as mozzarella and other reduced-fat cheeses.  Choose meat and other protein foods that are low in fat.  Choose beans or other legumes such as split peas or lentils. Choose fish, skinless poultry (chicken or turkey), or lean cuts of red meat  (beef or pork). Before you cook meat or poultry, cut off any visible fat.   Use less fat and oil.  Try baking foods instead of frying them. Add less fat, such as margarine, sour cream, regular salad dressing and mayonnaise to foods. Eat fewer high-fat foods. Some examples of high-fat foods include french fries, doughnuts, ice cream, and cakes.  Eat fewer sweets.  Limit foods and drinks that are high in sugar. This includes candy, cookies, regular soda, and sweetened drinks.  Exercise:  Exercise at least 30 minutes per day on most days of the week. Some examples of exercise include walking, biking, dancing, and swimming. You can also fit in more physical activity by taking the stairs instead of the elevator or parking farther away from stores. Ask your healthcare provider about the best exercise plan for you.      © Copyright [employer] 2018 Information is for End User's use only and may not be sold, redistributed or otherwise used for commercial purposes. All illustrations and images included in CareNotes® are the copyrighted property of [employer].D.A[employer]M., Inc. or [employer] Erwin

## 2024-09-24 NOTE — ASSESSMENT & PLAN NOTE
Clinically stable and doing well continue the current medical regiment will continue monitor.  Orders:    omeprazole (PriLOSEC) 20 mg delayed release capsule; Take 1 capsule (20 mg total) by mouth daily Take 1/2 hour  prior to breakfast

## 2024-09-24 NOTE — PROGRESS NOTES
Diabetic Foot Exam    Patient's shoes and socks removed.    Right Foot/Ankle   Right Foot Inspection  Skin Exam: skin normal and skin intact. No dry skin, no warmth, no callus, no erythema, no maceration, no abnormal color, no pre-ulcer, no ulcer and no callus.     Toe Exam: ROM and strength within normal limits and swelling.     Sensory   Monofilament testing: intact    Vascular  Capillary refills: < 3 seconds  The right DP pulse is 2+. The right PT pulse is 2+.     Left Foot/Ankle  Left Foot Inspection  Skin Exam: skin normal and skin intact. No dry skin, no warmth, no erythema, no maceration, normal color, no pre-ulcer, no ulcer and no callus.     Toe Exam: ROM and strength within normal limits and swelling.     Sensory   Monofilament testing: intact    Vascular  Capillary refills: < 3 seconds  The left DP pulse is 2+. The left PT pulse is 2+.     Assign Risk Category  No deformity present  No loss of protective sensation  No weak pulses  Risk: 0  Ambulatory Visit  Name: Marina Jasso      : 1945      MRN: [id number]  Encounter Provider: Salty Staples DO  Encounter Date: 2024   [facility]    Assessment & Plan  Encounter for immunization    Orders:    influenza vaccine, high-dose, PF 0.5 mL (Fluzone High Dose)    Medicare annual wellness visit, subsequent  Assessment and plan 1.  Medicare subsequent annual wellness examination overall the patient is clinically stable and doing well, we encouraged the patient to follow a healthy and balanced diet.  We recommend that the patient exercise routinely approximately 30 minutes 5 times per week .  We have reviewed the patient's vaccines and have made recommendations for updates if necessary   annual flu shot   .  We will be ordering screening laboratories which are age appropriate.  Return to the office in   6 months   call if any problems.       Type 2 diabetes mellitus without complication, with  long-term current use of insulin ([facility])    Lab Results   Component Value Date    HGBA1C 6.4 (H) 09/20/2024   Controlled I will be ordering diabetic laboratories including comprehensive metabolic panel, hemoglobin A1c, urine microalbumin, lipid panel.  I have counselled the pt to follow a healthy and balanced diet ,and recommend routine exercise.  Patient reports me having the eye examination will contact both my associate to get a copy of this report    Orders:    Hemoglobin A1C; Future    Comprehensive metabolic panel; Future    Albumin / creatinine urine ratio; Future    Lipid Panel with Direct LDL reflex; Future    Hyperlipidemia, unspecified hyperlipidemia type    Orders:    simvastatin (ZOCOR) 20 mg tablet; Take 1 tablet (20 mg total) by mouth daily    Primary hypertension  Hypertension - controlled, I have counseled patient following healthy balance diet, I would like the patient reduce sodium, exercise routinely, I would like the patient continued the med current medical regiment and we will continue to monitor.  Patient does have chronic leg edema at this point time she did not want to upgrade to a loop diuretic she is not able to get the compression stockings on her symptoms are stable throughout the years she would like to observe at this point but will contact if any change or worsening recommend reduction of sodium in the diet and leg elevation       Essential hypertension  RTO in 6 months call if any problems  Orders:    metoprolol tartrate (LOPRESSOR) 25 mg tablet; Take 1 tablet (25 mg total) by mouth every 12 (twelve) hours    losartan (COZAAR) 100 MG tablet; Take 1 tablet (100 mg total) by mouth daily    hydroCHLOROthiazide 12.5 mg tablet; Take 1 tablet (12.5 mg total) by mouth daily    Hyperlipidemia, unspecified hyperlipidemia type  Hyperlipidemia controlled continue with current medical regiment recommend a low-cholesterol diet and recommend routine exercise we will continue to monitor the  progress.  Orders:    simvastatin (ZOCOR) 20 mg tablet; Take 1 tablet (20 mg total) by mouth daily    Gastroesophageal reflux disease, unspecified whether esophagitis present  Clinically stable and doing well continue the current medical regiment will continue monitor.  Orders:    omeprazole (PriLOSEC) 20 mg delayed release capsule; Take 1 capsule (20 mg total) by mouth daily Take 1/2 hour  prior to breakfast      Depression Screening and Follow-up Plan: Patient was screened for depression during today's encounter. They screened negative with a PHQ-2 score of 0.    Falls Plan of Care: balance, strength, and gait training instructions were provided. Home safety education provided.       Preventive health issues were discussed with patient, and age appropriate screening tests were ordered as noted in patient's After Visit Summary. Personalized health advice and appropriate referrals for health education or preventive services given if needed, as noted in patient's After Visit Summary.    History of Present Illness     HPI 78-year old female coming in for a follow up office visit regarding type 2 diabetes, hyperlipidemia, hypertension, GERD; the patient reports me compliant taking medications without untoward side effects the.  The patient is here to review his medical condition, update me on the medical condition and the patient reports me no hospitalizations and no ER visits.  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  Try to follow healthy balanced diet although she admits to enjoying her sweets.  She remains active here to review laboratories in detail.  No nausea no vomiting no diarrhea   Patient Care Team:  Salty Staples DO as PCP - General  MD Salty Slaughter DO Kaveh Kousari, MD Farooq Qureshi, MD Lindsay Gocek as  Care Manager (Care Coordination)    Review of Systems   Constitutional:  Negative for activity change, appetite change and unexpected weight change.   HENT:  Negative for congestion and postnasal drip.    Eyes:  Negative for visual disturbance.   Respiratory:  Negative for cough and shortness of breath.    Cardiovascular:  Negative for chest pain.   Gastrointestinal:  Negative for abdominal pain, diarrhea, nausea and vomiting.   Neurological:  Negative for dizziness, light-headedness and headaches.   Hematological:  Negative for adenopathy.     Medical History Reviewed by provider this encounter:       Annual Wellness Visit Questionnaire   Marina is here for her Subsequent Wellness visit.     Health Risk Assessment:   Patient rates overall health as good. Patient feels that their physical health rating is same. Patient is satisfied with their life. Eyesight was rated as same. Hearing was rated as same. Patient feels that their emotional and mental health rating is same. Patients states they are never, rarely angry. Patient states they are sometimes unusually tired/fatigued. Pain experienced in the last 7 days has been some. Patient's pain rating has been 7/10. Patient states that she has experienced no weight loss or gain in last 6 months.     Depression Screening:   PHQ-2 Score: 0      Fall Risk Screening:   In the past year, patient has experienced: history of falling in past year    Number of falls: 2 or more  Injured during fall?: No    Feels unsteady when standing or walking?: No    Worried about falling?: No      Urinary  Incontinence Screening:   Patient has not leaked urine accidently in the last six months.     Home Safety:  Patient does not have trouble with stairs inside or outside of their home. Patient has working smoke alarms and has working carbon monoxide detector. Home safety hazards include: none.     Nutrition:   Current diet is Regular.     Medications:   Patient is currently taking over-the-counter supplements. OTC medications include: see medication list. Patient is able to manage medications.     Activities of Daily Living (ADLs)/Instrumental Activities of Daily Living (IADLs):   Walk and transfer into and out of bed and chair?: Yes  Dress and groom yourself?: Yes    Bathe or shower yourself?: Yes    Feed yourself? Yes  Do your laundry/housekeeping?: Yes  Manage your money, pay your bills and track your expenses?: Yes  Make your own meals?: Yes    Do your own shopping?: Yes    Previous Hospitalizations:   Any hospitalizations or ED visits within the last 12 months?: No      Advance Care Planning:   Living will: No    Durable POA for healthcare: Yes      Comments: Both sons will help with medical decisions     Cognitive Screening:   Provider or family/friend/caregiver concerned regarding cognition?: No    PREVENTIVE SCREENINGS      Cardiovascular Screening:    General: Screening Not Indicated and History Lipid Disorder      Diabetes Screening:     General: Screening Not Indicated and History Diabetes      Breast Cancer Screening:     General: Screening Current      Cervical Cancer Screening:    General: Screening Not Indicated      Lung Cancer Screening:     General: Screening Not Indicated      Hepatitis C Screening:    General: Screening Current    Screening, Brief Intervention, and Referral to Treatment (SBIRT)    Screening  Typical number of drinks in a day: 0  Typical number of drinks in a week: 0  Interpretation: Low risk drinking behavior.    Single Item Drug Screening:  How often have you used an illegal drug  "(including marijuana) or a prescription medication for non-medical reasons in the past year? never    Single Item Drug Screen Score: 0  Interpretation: Negative screen for possible drug use disorder    Social Determinants of Health     Financial Resource Strain: Medium Risk (5/1/2023)    Overall Financial Resource Strain (CARDIA)     Difficulty of Paying Living Expenses: Somewhat hard   Transportation Needs: No Transportation Needs (5/1/2023)    PRAPARE - Transportation     Lack of Transportation (Medical): No     Lack of Transportation (Non-Medical): No     No results found.    Objective     /70 (BP Location: Left arm, Patient Position: Sitting, Cuff Size: Standard)   Pulse 59   Ht 5' 1\" (1.549 m)   Wt 69.1 kg (152 lb 6.4 oz)   LMP  (LMP Unknown)   SpO2 96%   BMI 28.80 kg/m²     Physical Exam  Vitals and nursing note reviewed.   Constitutional:       General: She is not in acute distress.     Appearance: Normal appearance. She is well-developed. She is not ill-appearing, toxic-appearing or diaphoretic.   HENT:      Head: Normocephalic and atraumatic.      Right Ear: External ear normal.      Left Ear: External ear normal.      Nose: Nose normal.      Mouth/Throat:      Mouth: Oropharynx is clear and moist.   Eyes:      Pupils: Pupils are equal, round, and reactive to light.   Cardiovascular:      Rate and Rhythm: Normal rate and regular rhythm.      Pulses: no weak pulses.           Dorsalis pedis pulses are 2+ on the right side and 2+ on the left side.        Posterior tibial pulses are 2+ on the right side and 2+ on the left side.      Heart sounds: Normal heart sounds. No murmur heard.  Pulmonary:      Effort: Pulmonary effort is normal.      Breath sounds: Normal breath sounds.   Abdominal:      General: There is no distension.      Palpations: Abdomen is soft.      Tenderness: There is no abdominal tenderness. There is no guarding.   Musculoskeletal:         General: No edema.   Feet:      Right " foot:      Skin integrity: No ulcer, skin breakdown, erythema, warmth, callus or dry skin.      Left foot:      Skin integrity: No ulcer, skin breakdown, erythema, warmth, callus or dry skin.   Neurological:      Mental Status: She is alert.   Psychiatric:         Mood and Affect: Mood and affect normal.

## 2024-09-24 NOTE — ASSESSMENT & PLAN NOTE
Hyperlipidemia controlled continue with current medical regiment recommend a low-cholesterol diet and recommend routine exercise we will continue to monitor the progress.  Orders:    simvastatin (ZOCOR) 20 mg tablet; Take 1 tablet (20 mg total) by mouth daily

## 2024-09-24 NOTE — ASSESSMENT & PLAN NOTE
Hypertension - controlled, I have counseled patient following healthy balance diet, I would like the patient reduce sodium, exercise routinely, I would like the patient continued the med current medical regiment and we will continue to monitor.  Patient does have chronic leg edema at this point time she did not want to upgrade to a loop diuretic she is not able to get the compression stockings on her symptoms are stable throughout the years she would like to observe at this point but will contact if any change or worsening recommend reduction of sodium in the diet and leg elevation

## 2024-09-24 NOTE — ASSESSMENT & PLAN NOTE
Lab Results   Component Value Date    HGBA1C 6.4 (H) 09/20/2024   Controlled I will be ordering diabetic laboratories including comprehensive metabolic panel, hemoglobin A1c, urine microalbumin, lipid panel.  I have counselled the pt to follow a healthy and balanced diet ,and recommend routine exercise.  Patient reports me having the eye examination will contact both my associate to get a copy of this report    Orders:    Hemoglobin A1C; Future    Comprehensive metabolic panel; Future    Albumin / creatinine urine ratio; Future    Lipid Panel with Direct LDL reflex; Future

## 2024-09-24 NOTE — ASSESSMENT & PLAN NOTE
RTO in 6 months call if any problems  Orders:    metoprolol tartrate (LOPRESSOR) 25 mg tablet; Take 1 tablet (25 mg total) by mouth every 12 (twelve) hours    losartan (COZAAR) 100 MG tablet; Take 1 tablet (100 mg total) by mouth daily    hydroCHLOROthiazide 12.5 mg tablet; Take 1 tablet (12.5 mg total) by mouth daily

## 2024-11-18 NOTE — OP NOTE
OPERATIVE REPORT  PATIENT NAME: Christophe Baldwin    :  1945  MRN: [id number]  Pt Location: [facility] OR ROOM 07    SURGERY DATE: 1/15/2020    Surgeon(s) and Role:     * Diana Faust MD - Primary    Preop Diagnosis:  Malignant neoplasm of urinary bladder, unspecified site ([facility] [address] ) [C67 9]    Post-Op Diagnosis Codes:     * Malignant neoplasm of urinary bladder, unspecified site ([facility] [address] ) [C67 9]    Procedure(s) (LRB):  Cystoscopy TRANSURETHRAL RESECTION OF BLADDER TUMOR (TURBT) (N/A)  INSTILLATION MITOMYCIN (N/A)    Specimen(s):  ID Type Source Tests Collected by Time Destination   1 : bladder urine for cytology Urine Urinary Bladder CYTOLOGY, URINE Diana Faust MD 1/15/2020 1500    2 : right ureteral orifice biopsy Tissue Urinary Bladder TISSUE EXAM Diana Faust MD 1/15/2020 1500    3 : bladder right lateral trigone biopsy Tissue Urinary Bladder TISSUE EXAM Diana Faust MD 1/15/2020 1505        Estimated Blood Loss:   Minimal    Drains:  Urethral Catheter Latex 18 Fr  (Active)   Number of days: 0       Anesthesia Type:   Choice    Operative Indications:  Malignant neoplasm of urinary bladder, unspecified site [facility]) [C67 9]      Operative Findings:  1  Small papillary tumor medial and distal to the right ureteral orifice, some papillary changes with proximal lateral to the right ureteral orifice both removed    Complications:   None    Procedure and Technique:  Christophe Baldwin is a 75y o -year-old female with a history of high-grade papillary bladder cancer previously treated at an outside institution  Flexible cystoscopy in the office revealed concern for some papillary regrowth around the right ureteral orifice  The remainder of the bladder was free and clear of bladder tumor  Risk and benefits of transurethral resection of the recurrent bladder tumor were discussed and reviewed  Informed consent was obtained  Patient was brought to the operating room on 1/15/2020    After the backHPI:     Back Pain  This is a chronic problem. The current episode started more than 1 year ago. The problem occurs constantly. The problem has been gradually worsening since onset. The pain is present in the lumbar spine. The quality of the pain is described as aching, shooting and stabbing. The pain does not radiate. The pain is at a severity of 8/10. The pain is moderate. The pain is The same all the time. He has tried chiropractic manipulation, muscle relaxant, heat, ice, home exercises, walking, NSAIDs and analgesics for the symptoms.     EMG with neuropathy.  MRI lumbar spine with multilevel degenerative changes.  Has been working on blood sugar control and reports his sugars have been much better lately.  We did increase his gabapentin to 600 mg 3 times a day with some benefit.    Pain ranges from a 6/10 to a 10/10 depending on activity.    Patient denies any new neurological symptoms. Nobowel or bladder incontinence, no weakness, and no falling.    Review of OARRS does not show any aberrant prescription behavior.     Past Medical History:   Diagnosis Date    Diabetes mellitus (HCC)     Hyperlipidemia     Hypertension     Peripheral neuropathy        Past Surgical History:   Procedure Laterality Date    COLONOSCOPY      COLONOSCOPY N/A 10/8/2024    COLONOSCOPY with Polypectomy at 15cm performed by Ky Edward MD at [facility] OR    HERNIA REPAIR         Allergies   Allergen Reactions    Atorvastatin      Joint stiffness, achiness         Current Outpatient Medications:     Semaglutide,0.25 or 0.5MG/DOS, (OZEMPIC, 0.25 OR 0.5 MG/DOSE,) 2 MG/3ML SOPN, DIAL AND INJECT UNDER THE SKIN 0.5 MG WEEKLY, Disp: 3 mL, Rfl: 2    insulin glargine (LANTUS SOLOSTAR) 100 UNIT/ML injection pen, INJECT 5 UNITS UNDER THE SKIN ONCE NIGHTLY, Disp: 3 mL, Rfl: 5    gabapentin (NEURONTIN) 100 MG capsule, TAKE 1 CAPSULE BY MOUTH 3 TIMES A DAY FOR 30 DAYS., Disp: 90 capsule, Rfl: 5    insulin glargine (LANTUS SOLOSTAR)  smooth induction of general LMA anesthesia, the patient was placed in the dorsal lithotomy position  Her genitalia was prepped and draped in a sterile fashion  Intravenous antibiotics were administered  A timeout was performed with all members of the operative team confirming the patient's identity and procedure to be performed  A 22 Algerian rigid cystoscope with 30° lens was inserted  The bladder was thoroughly inspected  Area of recurrent bladder tumor were identified as above  No other mucosal abnormalities or lesions were identified  The bilateral ureteral orifices were also visualized with clear efflux of urine  Cystoscopy removed and continuous flow resectoscope was replaced  Loupe resection of the lateral lesion was performed, 0 5cm  The tumor was sent as specimen  Cold cup forceps was used to resect the small papillary growth medial to the right orifice  Both were sent off separately as specimen  The base of the tumor was fulgurated with electrocautery  Hemostasis was excellent  Ureteral orifice was not involved with resection or cautery  Ureteral jet was seen at the completion of the case  The bladder was left full the cystoscope was removed  A 18 Algerian El catheter was inserted and the bladder was drained  40 mg of intravesical mitomycin was then instilled and 40 cc of sterile water into the bladder  The catheter was capped  Overall the patient tolerated the procedure well  The patient was extubated in the operating room and transferred to the PACU in stable condition at the conclusion of the case      Plan-patient return for pathology     I was present for the entire procedure    Patient Disposition:  HOME AFTER RECOVERY IN PACU    SIGNATURE: Silvia Leggett MD  DATE: January 15, 2020  TIME: 3:50 PM

## 2024-11-23 DIAGNOSIS — E78.5 HYPERLIPIDEMIA, UNSPECIFIED HYPERLIPIDEMIA TYPE: ICD-10-CM

## 2024-11-24 RX ORDER — SIMVASTATIN 20 MG
20 TABLET ORAL DAILY
Qty: 90 TABLET | Refills: 1 | Status: SHIPPED | OUTPATIENT
Start: 2024-11-24

## 2025-01-04 DIAGNOSIS — K21.9 GASTROESOPHAGEAL REFLUX DISEASE, UNSPECIFIED WHETHER ESOPHAGITIS PRESENT: ICD-10-CM

## 2025-01-29 ENCOUNTER — VBI (OUTPATIENT)
Dept: ADMINISTRATIVE | Facility: OTHER | Age: 80
End: 2025-01-29

## 2025-01-29 NOTE — TELEPHONE ENCOUNTER
01/29/25 4:17 PM     Chart reviewed for Blood Pressure was/were not submitted to the patient's insurance.     Buffy Cespedes MA   [facility]

## 2025-02-04 NOTE — TELEPHONE ENCOUNTER
Called and spoke with patient  Patient states that she is having some bladder pressure and burning with urination  Denies any other symptom  Advised patient to go for urine testing  Orders placed  Encouraged patient to hydrate well with water  [FreeTextEntry1] : The patient is a 78-year-old G1, P1 postmenopausal white female of [place] and [place] descent. She underwent menarche at age 11 and had her first child at age 30. She underwent menopause in her late 50s and never took any hormone replacement therapy. She has no family history of breast or ovarian cancer. Her brother passed away from esophageal cancer and her maternal grandfather had leukemia. She has a past medical history significant for A. fib for which she is on Eliquis and she also has hypertension and prediabetes for which she takes metformin. She underwent a screening mammography and ultrasound at [facility] on December 10, 2022 which showed a vague but spiculated density in the right breast 12:00 region which persisted on compression views and was seen as an irregular density 11 cm from the nipple on ultrasound measuring 1.5 cm. Ultrasound-guided core biopsy was performed on January 23, 2023 which showed a classical type invasive lobular cancer which was 11 cm from the nipple and was ER strongly positive at 100%, IL moderately positive at 5%, HER2/carla negative by IHC with a Ki-67 of 20%. She had a "coil" clip placed at the biopsy site. I reviewed her imaging from [facility] which she brought in on CD-ROM. This was a fairly vague area seen on mammography but easily seen on ultrasound measuring 1.5 cm. Pathology was reviewed from [facility] showing a classical type invasive lobular cancer. She underwent genetic panel testing in April 2023 which was negative.. MRI was performed on February 2, 2023 at [place] showing the known cancer in the right breast slightly upper inner quadrant measuring 1.8 x 1.7 x 8.9 cm with some slight enhancement superior and anterior with a 5 mm density. This separate finding was close to the primary cancer and she underwent a Hologic localization and then a partial mastectomy and sentinel lymph node biopsy on February 23, 2023. She did have a cardiology consult with her cardiologist, Nasrin Bush, preoperatively and was taken off her Eliquis prior to surgery. The final pathology showed a 1.5 cm classical type intermediate grade invasive lobular cancer with free margins and 2 negative right axillary sentinel lymph nodes making this a pathologic prognostic stage IA breast cancer. She was seen by Dr. Helms and Dr. Casas postoperatively and received a 3-week course of external beam radiation to the right breast which finished on May 9, 2023 and she was then placed on anastrozole.  She underwent her last bilateral mammography and ultrasound which was reviewed from December 12, 2024 and performed at [facility] which showed postsurgical changes in the right breast but no suspicious findings.  On exam today, she has healed well from her right breast partial mastectomy and sentinel lymph node biopsy and has no evidence of recurrence. I would like to see her again in another 6 months around August 2025. Her next routine bilateral mammography and ultrasound will be due again in December 2025 and she was given prescriptions.  She will remain on Arimidex and she is now following up with Dr. Lewis since Dr. Helms has left.

## 2025-02-19 DIAGNOSIS — I10 ESSENTIAL HYPERTENSION: ICD-10-CM

## 2025-02-20 RX ORDER — LOSARTAN POTASSIUM 100 MG/1
100 TABLET ORAL DAILY
Qty: 90 TABLET | Refills: 1 | Status: SHIPPED | OUTPATIENT
Start: 2025-02-20

## 2025-03-13 ENCOUNTER — VBI (OUTPATIENT)
Dept: ADMINISTRATIVE | Facility: OTHER | Age: 80
End: 2025-03-13

## 2025-03-13 NOTE — TELEPHONE ENCOUNTER
03/13/25 1:41 PM     Chart reviewed for Blood Pressure was/were not submitted to the patient's insurance.     Buffy Cespedes MA   [facility]

## 2025-03-26 DIAGNOSIS — I10 ESSENTIAL HYPERTENSION: ICD-10-CM

## 2025-03-26 DIAGNOSIS — K21.9 GASTROESOPHAGEAL REFLUX DISEASE, UNSPECIFIED WHETHER ESOPHAGITIS PRESENT: ICD-10-CM

## 2025-03-27 RX ORDER — METOPROLOL TARTRATE 25 MG/1
25 TABLET, FILM COATED ORAL 2 TIMES DAILY
Qty: 60 TABLET | Refills: 5 | Status: SHIPPED | OUTPATIENT
Start: 2025-03-27

## 2025-03-27 RX ORDER — OMEPRAZOLE 20 MG/1
CAPSULE, DELAYED RELEASE ORAL
Qty: 90 CAPSULE | Refills: 1 | Status: SHIPPED | OUTPATIENT
Start: 2025-03-27

## 2025-04-24 ENCOUNTER — APPOINTMENT (OUTPATIENT)
Dept: LAB | Facility: CLINIC | Age: 80
End: 2025-04-24
Attending: INTERNAL MEDICINE
Payer: COMMERCIAL

## 2025-04-24 ENCOUNTER — RESULTS FOLLOW-UP (OUTPATIENT)
Dept: INTERNAL MEDICINE CLINIC | Facility: CLINIC | Age: 80
End: 2025-04-24

## 2025-04-24 DIAGNOSIS — Z79.4 TYPE 2 DIABETES MELLITUS WITHOUT COMPLICATION, WITH LONG-TERM CURRENT USE OF INSULIN (HCC): ICD-10-CM

## 2025-04-24 DIAGNOSIS — E11.9 TYPE 2 DIABETES MELLITUS WITHOUT COMPLICATION, WITH LONG-TERM CURRENT USE OF INSULIN (HCC): ICD-10-CM

## 2025-04-24 LAB
ALBUMIN SERPL BCG-MCNC: 3.9 G/DL (ref 3.5–5)
ALP SERPL-CCNC: 47 U/L (ref 34–104)
ALT SERPL W P-5'-P-CCNC: 19 U/L (ref 7–52)
ANION GAP SERPL CALCULATED.3IONS-SCNC: 5 MMOL/L (ref 4–13)
AST SERPL W P-5'-P-CCNC: 18 U/L (ref 13–39)
BILIRUB SERPL-MCNC: 0.41 MG/DL (ref 0.2–1)
BUN SERPL-MCNC: 26 MG/DL (ref 5–25)
CALCIUM SERPL-MCNC: 8.9 MG/DL (ref 8.4–10.2)
CHLORIDE SERPL-SCNC: 103 MMOL/L (ref 96–108)
CHOLEST SERPL-MCNC: 165 MG/DL (ref ?–200)
CO2 SERPL-SCNC: 29 MMOL/L (ref 21–32)
CREAT SERPL-MCNC: 0.67 MG/DL (ref 0.6–1.3)
CREAT UR-MCNC: 84.3 MG/DL
EST. AVERAGE GLUCOSE BLD GHB EST-MCNC: 143 MG/DL
GFR SERPL CREATININE-BSD FRML MDRD: 83 ML/MIN/1.73SQ M
GLUCOSE P FAST SERPL-MCNC: 119 MG/DL (ref 65–99)
HBA1C MFR BLD: 6.6 %
HDLC SERPL-MCNC: 42 MG/DL
LDLC SERPL CALC-MCNC: 73 MG/DL (ref 0–100)
MICROALBUMIN UR-MCNC: 11.8 MG/L
MICROALBUMIN/CREAT 24H UR: 14 MG/G CREATININE (ref 0–30)
POTASSIUM SERPL-SCNC: 4 MMOL/L (ref 3.5–5.3)
PROT SERPL-MCNC: 6.9 G/DL (ref 6.4–8.4)
SODIUM SERPL-SCNC: 137 MMOL/L (ref 135–147)
TRIGL SERPL-MCNC: 250 MG/DL (ref ?–150)

## 2025-04-24 PROCEDURE — 80061 LIPID PANEL: CPT

## 2025-04-24 PROCEDURE — 82570 ASSAY OF URINE CREATININE: CPT

## 2025-04-24 PROCEDURE — 83036 HEMOGLOBIN GLYCOSYLATED A1C: CPT

## 2025-04-24 PROCEDURE — 36415 COLL VENOUS BLD VENIPUNCTURE: CPT

## 2025-04-24 PROCEDURE — 82043 UR ALBUMIN QUANTITATIVE: CPT

## 2025-04-24 PROCEDURE — 80053 COMPREHEN METABOLIC PANEL: CPT

## 2025-04-28 ENCOUNTER — TELEPHONE (OUTPATIENT)
Dept: ADMINISTRATIVE | Facility: OTHER | Age: 80
End: 2025-04-28

## 2025-04-28 ENCOUNTER — OFFICE VISIT (OUTPATIENT)
Dept: INTERNAL MEDICINE CLINIC | Facility: CLINIC | Age: 80
End: 2025-04-28
Payer: COMMERCIAL

## 2025-04-28 VITALS
WEIGHT: 158 LBS | TEMPERATURE: 96.3 F | BODY MASS INDEX: 29.83 KG/M2 | HEIGHT: 61 IN | SYSTOLIC BLOOD PRESSURE: 142 MMHG | OXYGEN SATURATION: 97 % | HEART RATE: 73 BPM | DIASTOLIC BLOOD PRESSURE: 72 MMHG

## 2025-04-28 DIAGNOSIS — Z79.4 TYPE 2 DIABETES MELLITUS WITHOUT COMPLICATION, WITH LONG-TERM CURRENT USE OF INSULIN (HCC): ICD-10-CM

## 2025-04-28 DIAGNOSIS — E11.9 TYPE 2 DIABETES MELLITUS WITHOUT COMPLICATION, WITH LONG-TERM CURRENT USE OF INSULIN (HCC): ICD-10-CM

## 2025-04-28 DIAGNOSIS — G25.0 TREMOR, ESSENTIAL: Primary | ICD-10-CM

## 2025-04-28 DIAGNOSIS — E78.5 HYPERLIPIDEMIA, UNSPECIFIED HYPERLIPIDEMIA TYPE: ICD-10-CM

## 2025-04-28 DIAGNOSIS — K21.9 GASTROESOPHAGEAL REFLUX DISEASE, UNSPECIFIED WHETHER ESOPHAGITIS PRESENT: ICD-10-CM

## 2025-04-28 DIAGNOSIS — I10 ESSENTIAL HYPERTENSION: ICD-10-CM

## 2025-04-28 PROBLEM — C67.9 MALIGNANT NEOPLASM OF URINARY BLADDER (HCC): Status: RESOLVED | Noted: 2019-07-16 | Resolved: 2025-04-28

## 2025-04-28 PROBLEM — G20.A1 PARKINSON DISEASE (HCC): Status: RESOLVED | Noted: 2023-09-23 | Resolved: 2025-04-28

## 2025-04-28 PROCEDURE — G2211 COMPLEX E/M VISIT ADD ON: HCPCS | Performed by: INTERNAL MEDICINE

## 2025-04-28 PROCEDURE — 99214 OFFICE O/P EST MOD 30 MIN: CPT | Performed by: INTERNAL MEDICINE

## 2025-04-28 RX ORDER — LOSARTAN POTASSIUM 100 MG/1
100 TABLET ORAL DAILY
Qty: 90 TABLET | Refills: 1 | Status: SHIPPED | OUTPATIENT
Start: 2025-04-28

## 2025-04-28 RX ORDER — OMEPRAZOLE 20 MG/1
20 CAPSULE, DELAYED RELEASE ORAL DAILY
Qty: 90 CAPSULE | Refills: 1 | Status: SHIPPED | OUTPATIENT
Start: 2025-04-28

## 2025-04-28 RX ORDER — HYDROCHLOROTHIAZIDE 12.5 MG/1
12.5 TABLET ORAL DAILY
Qty: 100 TABLET | Refills: 3 | Status: SHIPPED | OUTPATIENT
Start: 2025-04-28

## 2025-04-28 RX ORDER — SIMVASTATIN 20 MG
20 TABLET ORAL DAILY
Qty: 90 TABLET | Refills: 1 | Status: SHIPPED | OUTPATIENT
Start: 2025-04-28

## 2025-04-28 RX ORDER — METOPROLOL TARTRATE 25 MG/1
25 TABLET, FILM COATED ORAL 2 TIMES DAILY
Qty: 180 TABLET | Refills: 3 | Status: SHIPPED | OUTPATIENT
Start: 2025-04-28

## 2025-04-28 NOTE — ASSESSMENT & PLAN NOTE
Hypertension - controlled, I have counseled patient following healthy balance diet, I would like the patient reduce sodium, exercise routinely, I would like the patient continued the med current medical regiment and we will continue to monitor.  Refills of routine medications including losartan, Lopressor and hydrochlorothiazide provided  Orders:  •  losartan (COZAAR) 100 MG tablet; Take 1 tablet (100 mg total) by mouth daily  •  metoprolol tartrate (LOPRESSOR) 25 mg tablet; Take 1 tablet (25 mg total) by mouth 2 (two) times a day  •  hydroCHLOROthiazide 12.5 mg tablet; Take 1 tablet (12.5 mg total) by mouth daily

## 2025-04-28 NOTE — ASSESSMENT & PLAN NOTE
Clinically stable and doing well continue the current medical regiment will continue monitor.  Refill of omeprazole 20 mg once daily provided  Orders:  •  omeprazole (PriLOSEC) 20 mg delayed release capsule; Take 1 capsule (20 mg total) by mouth daily

## 2025-04-28 NOTE — TELEPHONE ENCOUNTER
----- Message from Michelle AVILA sent at 4/28/2025 10:40 AM EDT -----  Regarding: Care Gap Request  04/28/25 10:40 AM    Hello, our patient above has had Diabetic Eye Exam completed/performed. Please assist in updating the patient chart by making an External outreach to [phone] facility located [address] [place] [address]. The date of service is 09/2024.    Thank you,  Michelle Martinez MA  [facility]

## 2025-04-28 NOTE — ASSESSMENT & PLAN NOTE
I have counselled the pt to follow a healthy and balanced diet ,and recommend routine exercise.  I have counselled the pt to follow a healthy and balanced diet ,and recommend routine exercise.  I will be ordering diabetic laboratories including comprehensive metabolic panel, hemoglobin A1c, urine microalbumin, lipid panel.  Annual eye examination recommended currently she primarily is on diet control only  Lab Results   Component Value Date    HGBA1C 6.6 (H) 04/24/2025       Orders:  •  Comprehensive metabolic panel; Future  •  Hemoglobin A1C; Future  •  Albumin / creatinine urine ratio; Future  •  Lipid Panel with Direct LDL reflex; Future

## 2025-04-28 NOTE — ASSESSMENT & PLAN NOTE
Hyperlipidemia controlled continue with current medical regiment recommend a low-cholesterol diet and recommend routine exercise we will continue to monitor the progress.  Refill of Zocor 20 mg once daily provided  Orders:  •  simvastatin (ZOCOR) 20 mg tablet; Take 1 tablet (20 mg total) by mouth daily

## 2025-04-28 NOTE — PROGRESS NOTES
Name: Marina Jasso      : 1945      MRN: [id number]  Encounter Provider: Salty Staples DO  Encounter Date: 2025   Encounter department: [facility]  :  Assessment & Plan  Tremor, essential  Patient does report to me worsening of essential tremor she is currently on beta-blocker will refer patient to neurology for consultation with treatment options  Orders:  •  Ambulatory Referral to Neurology; Future    Type 2 diabetes mellitus without complication, with long-term current use of insulin (HCC)  I have counselled the pt to follow a healthy and balanced diet ,and recommend routine exercise.  I have counselled the pt to follow a healthy and balanced diet ,and recommend routine exercise.  I will be ordering diabetic laboratories including comprehensive metabolic panel, hemoglobin A1c, urine microalbumin, lipid panel.  Annual eye examination recommended currently she primarily is on diet control only  Lab Results   Component Value Date    HGBA1C 6.6 (H) 2025       Orders:  •  Comprehensive metabolic panel; Future  •  Hemoglobin A1C; Future  •  Albumin / creatinine urine ratio; Future  •  Lipid Panel with Direct LDL reflex; Future    Essential hypertension  Hypertension - controlled, I have counseled patient following healthy balance diet, I would like the patient reduce sodium, exercise routinely, I would like the patient continued the med current medical regiment and we will continue to monitor.  Refills of routine medications including losartan, Lopressor and hydrochlorothiazide provided  Orders:  •  losartan (COZAAR) 100 MG tablet; Take 1 tablet (100 mg total) by mouth daily  •  metoprolol tartrate (LOPRESSOR) 25 mg tablet; Take 1 tablet (25 mg total) by mouth 2 (two) times a day  •  hydroCHLOROthiazide 12.5 mg tablet; Take 1 tablet (12.5 mg total) by mouth daily    Hyperlipidemia, unspecified hyperlipidemia type  Hyperlipidemia controlled continue with current  medical regiment recommend a low-cholesterol diet and recommend routine exercise we will continue to monitor the progress.  Refill of Zocor 20 mg once daily provided  Orders:  •  simvastatin (ZOCOR) 20 mg tablet; Take 1 tablet (20 mg total) by mouth daily    Gastroesophageal reflux disease, unspecified whether esophagitis present  Clinically stable and doing well continue the current medical regiment will continue monitor.  Refill of omeprazole 20 mg once daily provided  Orders:  •  omeprazole (PriLOSEC) 20 mg delayed release capsule; Take 1 capsule (20 mg total) by mouth daily    I have spent a total time of 30 minutes in caring for this patient on the day of the visit/encounter including Diagnostic results, Instructions for management, Risk factor reductions, Impressions, Documenting in the medical record, Reviewing/placing orders in the medical record (including tests, medications, and/or procedures), and Obtaining or reviewing history  .   Return to office 6  months  call if any problems     History of Present Illness   HPI 79-year old female coming in for a follow up office visit regarding essential tremor, type 2 diabetes, hypertension, hyperlipidemia and GERD; the patient reports me compliant taking medications without untoward side effects the.  The patient is here to review his medical condition, update me on the medical condition and the patient reports me no hospitalizations and no ER visits.  No injuries no illnesses reports me trying to follow healthy and balanced diet here to review laboratories in detail.  She needs refills of all of her medications her GERD is currently stable.  Patient does report christiano worsening of the essential tremor of her hands and feet she had been to neurology in the past it was offered additional medication at that point in time she did not want to add more medicines or see a tremor specialist.  She has noticed worsening of the tremor over the last year.  To the point that  "she has difficulty with her silverware and feeding herself and would like to see neurology at this point in time.  Review of Systems   Constitutional:  Negative for activity change, appetite change and unexpected weight change.   HENT:  Negative for congestion and postnasal drip.    Eyes:  Negative for visual disturbance.   Respiratory:  Negative for cough and shortness of breath.    Cardiovascular:  Negative for chest pain.   Gastrointestinal:  Negative for abdominal pain, diarrhea, nausea and vomiting.   Neurological:  Negative for dizziness, light-headedness and headaches.   Hematological:  Negative for adenopathy.       Objective   /72 (BP Location: Left arm, Patient Position: Sitting, Cuff Size: Large)   Pulse 73   Temp (!) 96.3 °F (35.7 °C) (Tympanic)   Ht 5' 1\" (1.549 m)   Wt 71.7 kg (158 lb)   LMP  (LMP Unknown)   SpO2 97%   BMI 29.85 kg/m²      Physical Exam  Constitutional:       Appearance: She is well-developed.   HENT:      Head: Normocephalic and atraumatic.      Right Ear: External ear normal.      Left Ear: External ear normal.      Nose: Nose normal.   Eyes:      Pupils: Pupils are equal, round, and reactive to light.   Cardiovascular:      Rate and Rhythm: Normal rate and regular rhythm.      Heart sounds: Normal heart sounds. No murmur heard.  Pulmonary:      Effort: Pulmonary effort is normal.      Breath sounds: Normal breath sounds.   Abdominal:      General: There is no distension.      Palpations: Abdomen is soft.      Tenderness: There is no abdominal tenderness. There is no guarding.     Negative edema, distal pulses 2/2    "

## 2025-04-28 NOTE — TELEPHONE ENCOUNTER
Upon review of the In Basket request we have found/obtained the documentation. After careful review of the document we are unable to complete this request for Diabetic Eye Exam because the documentation does not have the proper verbiage (wording) needed to close the requested care gap(s).    Any additional questions or concerns should be emailed to the Practice Liaisons via the appropriate education email address, please do not reply via In Basket.    Thank you  Maria D Estevez MA   [facility]

## 2025-05-14 ENCOUNTER — OFFICE VISIT (OUTPATIENT)
Dept: INTERNAL MEDICINE CLINIC | Facility: CLINIC | Age: 80
End: 2025-05-14
Payer: COMMERCIAL

## 2025-05-14 VITALS
BODY MASS INDEX: 29.82 KG/M2 | HEART RATE: 67 BPM | OXYGEN SATURATION: 95 % | DIASTOLIC BLOOD PRESSURE: 80 MMHG | WEIGHT: 157.8 LBS | SYSTOLIC BLOOD PRESSURE: 138 MMHG

## 2025-05-14 DIAGNOSIS — H92.01 RIGHT EAR PAIN: Primary | ICD-10-CM

## 2025-05-14 DIAGNOSIS — M54.2 CERVICALGIA: ICD-10-CM

## 2025-05-14 PROCEDURE — 99213 OFFICE O/P EST LOW 20 MIN: CPT | Performed by: INTERNAL MEDICINE

## 2025-05-14 PROCEDURE — G2211 COMPLEX E/M VISIT ADD ON: HCPCS | Performed by: INTERNAL MEDICINE

## 2025-05-14 NOTE — PATIENT INSTRUCTIONS
- Your ear pain is likely referred pain from your neck.  Your neck muscles are very tight.  Please perform the neck stretches provided to you in the handout.  For pain control you may take Tylenol 1000 mg 3 times a day as needed.  I would also like for you to apply topical Voltaren gel as needed.  For muscle stiffness you can apply warm compresses.

## 2025-05-14 NOTE — PROGRESS NOTES
Name: Marina Jasso      : 1945      MRN: [id number]  Encounter Provider: Wilner Duncan MD  Encounter Date: 2025   [facility]  :  Assessment & Plan  Right ear pain  Suspect patient's right ear pain is referred pain from her neck.  Recommended applying warm compresses to her neck as needed.  Continue Tylenol 1000 mg 3 times a day as needed.  She has been given a handout detailing neck stretches and exercises.  She may also apply topical Voltaren gel as needed.       Cervicalgia  See plan above.               History of Present Illness   HPI  Patient presents today as an acute visit complaining of right ear pain. Her pain started today. She describes it as a pulsating pain.  She denies any associated fevers, chills or otorrhea.  Tenderness to palpation over cervical paraspinal muscles and right sided    Review of Systems  All other systems negative except for pertinent findings noted in HPI.     Objective   /80 (BP Location: Left arm, Patient Position: Sitting, Cuff Size: Standard)   Pulse 67   Wt 71.6 kg (157 lb 12.8 oz)   LMP  (LMP Unknown)   SpO2 95%   BMI 29.82 kg/m²      Physical Exam  General: NAD  HEENT: NCAT, EOMI, normal conjunctiva, right TM and external auditory canal normal in appearance  Cardiovascular: RRR, normal S1 and S2, no m/r/g  Pulmonary: Normal respiratory effort, no wheezes, rales or rhonchi  Musculoskeletal: Tenderness to palpation over right trapezius and posterior scalene muscles, tenderness to palpation over cervical paraspinal muscles, no TMJ tenderness  Skin: Normal skin color, no rashes   Psychiatric: Normal mood and affect

## 2025-06-24 ENCOUNTER — HOSPITAL ENCOUNTER (EMERGENCY)
Facility: HOSPITAL | Age: 80
Discharge: HOME | End: 2025-06-24
Attending: EMERGENCY MEDICINE | Admitting: EMERGENCY MEDICINE
Payer: COMMERCIAL

## 2025-06-24 ENCOUNTER — APPOINTMENT (EMERGENCY)
Dept: CT IMAGING | Facility: HOSPITAL | Age: 80
End: 2025-06-24
Payer: COMMERCIAL

## 2025-06-24 VITALS
OXYGEN SATURATION: 95 % | HEART RATE: 66 BPM | TEMPERATURE: 97.8 F | DIASTOLIC BLOOD PRESSURE: 64 MMHG | SYSTOLIC BLOOD PRESSURE: 141 MMHG | RESPIRATION RATE: 17 BRPM

## 2025-06-24 DIAGNOSIS — K92.1 BLACK STOOL: ICD-10-CM

## 2025-06-24 DIAGNOSIS — R10.9 ABDOMINAL PAIN: Primary | ICD-10-CM

## 2025-06-24 LAB
ALBUMIN SERPL BCG-MCNC: 4.3 G/DL (ref 3.5–5)
ALP SERPL-CCNC: 58 U/L (ref 34–104)
ALT SERPL W P-5'-P-CCNC: 16 U/L (ref 7–52)
ANION GAP SERPL CALCULATED.3IONS-SCNC: 6 MMOL/L (ref 4–13)
AST SERPL W P-5'-P-CCNC: 15 U/L (ref 13–39)
ATRIAL RATE: 67 BPM
BASOPHILS # BLD AUTO: 0.08 THOUSANDS/ÂΜL (ref 0–0.1)
BASOPHILS NFR BLD AUTO: 1 % (ref 0–1)
BILIRUB SERPL-MCNC: 0.37 MG/DL (ref 0.2–1)
BUN SERPL-MCNC: 16 MG/DL (ref 5–25)
CALCIUM SERPL-MCNC: 9.3 MG/DL (ref 8.4–10.2)
CHLORIDE SERPL-SCNC: 100 MMOL/L (ref 96–108)
CO2 SERPL-SCNC: 30 MMOL/L (ref 21–32)
CREAT SERPL-MCNC: 0.7 MG/DL (ref 0.6–1.3)
EOSINOPHIL # BLD AUTO: 0.31 THOUSAND/ÂΜL (ref 0–0.61)
EOSINOPHIL NFR BLD AUTO: 3 % (ref 0–6)
ERYTHROCYTE [DISTWIDTH] IN BLOOD BY AUTOMATED COUNT: 13.2 % (ref 11.6–15.1)
GFR SERPL CREATININE-BSD FRML MDRD: 82 ML/MIN/1.73SQ M
GLUCOSE SERPL-MCNC: 94 MG/DL (ref 65–140)
HCT VFR BLD AUTO: 40 % (ref 34.8–46.1)
HGB BLD-MCNC: 13.5 G/DL (ref 11.5–15.4)
IMM GRANULOCYTES # BLD AUTO: 0.03 THOUSAND/UL (ref 0–0.2)
IMM GRANULOCYTES NFR BLD AUTO: 0 % (ref 0–2)
LIPASE SERPL-CCNC: 25 U/L (ref 11–82)
LYMPHOCYTES # BLD AUTO: 3.11 THOUSANDS/ÂΜL (ref 0.6–4.47)
LYMPHOCYTES NFR BLD AUTO: 33 % (ref 14–44)
MCH RBC QN AUTO: 29.9 PG (ref 26.8–34.3)
MCHC RBC AUTO-ENTMCNC: 33.8 G/DL (ref 31.4–37.4)
MCV RBC AUTO: 89 FL (ref 82–98)
MONOCYTES # BLD AUTO: 0.87 THOUSAND/ÂΜL (ref 0.17–1.22)
MONOCYTES NFR BLD AUTO: 9 % (ref 4–12)
NEUTROPHILS # BLD AUTO: 5.03 THOUSANDS/ÂΜL (ref 1.85–7.62)
NEUTS SEG NFR BLD AUTO: 54 % (ref 43–75)
NRBC BLD AUTO-RTO: 0 /100 WBCS
P AXIS: 64 DEGREES
PLATELET # BLD AUTO: 323 THOUSANDS/UL (ref 149–390)
PMV BLD AUTO: 9.1 FL (ref 8.9–12.7)
POTASSIUM SERPL-SCNC: 4.3 MMOL/L (ref 3.5–5.3)
PR INTERVAL: 172 MS
PROT SERPL-MCNC: 7.3 G/DL (ref 6.4–8.4)
QRS AXIS: 21 DEGREES
QRSD INTERVAL: 74 MS
QT INTERVAL: 402 MS
QTC INTERVAL: 424 MS
RBC # BLD AUTO: 4.51 MILLION/UL (ref 3.81–5.12)
SODIUM SERPL-SCNC: 136 MMOL/L (ref 135–147)
T WAVE AXIS: 48 DEGREES
VENTRICULAR RATE: 67 BPM
WBC # BLD AUTO: 9.43 THOUSAND/UL (ref 4.31–10.16)

## 2025-06-24 PROCEDURE — 36415 COLL VENOUS BLD VENIPUNCTURE: CPT | Performed by: EMERGENCY MEDICINE

## 2025-06-24 PROCEDURE — 83690 ASSAY OF LIPASE: CPT | Performed by: EMERGENCY MEDICINE

## 2025-06-24 PROCEDURE — 93005 ELECTROCARDIOGRAM TRACING: CPT

## 2025-06-24 PROCEDURE — 93010 ELECTROCARDIOGRAM REPORT: CPT | Performed by: STUDENT IN AN ORGANIZED HEALTH CARE EDUCATION/TRAINING PROGRAM

## 2025-06-24 PROCEDURE — 99285 EMERGENCY DEPT VISIT HI MDM: CPT | Performed by: EMERGENCY MEDICINE

## 2025-06-24 PROCEDURE — 74177 CT ABD & PELVIS W/CONTRAST: CPT

## 2025-06-24 PROCEDURE — 96374 THER/PROPH/DIAG INJ IV PUSH: CPT

## 2025-06-24 PROCEDURE — 85025 COMPLETE CBC W/AUTO DIFF WBC: CPT | Performed by: EMERGENCY MEDICINE

## 2025-06-24 PROCEDURE — 99284 EMERGENCY DEPT VISIT MOD MDM: CPT

## 2025-06-24 PROCEDURE — 80053 COMPREHEN METABOLIC PANEL: CPT | Performed by: EMERGENCY MEDICINE

## 2025-06-24 RX ORDER — FAMOTIDINE 10 MG/ML
20 INJECTION, SOLUTION INTRAVENOUS ONCE
Status: COMPLETED | OUTPATIENT
Start: 2025-06-24 | End: 2025-06-24

## 2025-06-24 RX ORDER — SUCRALFATE 1 G/1
1 TABLET ORAL 3 TIMES DAILY
Qty: 90 TABLET | Refills: 0 | Status: SHIPPED | OUTPATIENT
Start: 2025-06-24

## 2025-06-24 RX ORDER — MAGNESIUM HYDROXIDE/ALUMINUM HYDROXICE/SIMETHICONE 120; 1200; 1200 MG/30ML; MG/30ML; MG/30ML
30 SUSPENSION ORAL ONCE
Status: COMPLETED | OUTPATIENT
Start: 2025-06-24 | End: 2025-06-24

## 2025-06-24 RX ADMIN — IOHEXOL 100 ML: 350 INJECTION, SOLUTION INTRAVENOUS at 18:11

## 2025-06-24 RX ADMIN — FAMOTIDINE 20 MG: 10 INJECTION INTRAVENOUS at 18:54

## 2025-06-24 RX ADMIN — ALUMINUM HYDROXIDE, MAGNESIUM HYDROXIDE, AND DIMETHICONE 30 ML: 200; 20; 200 SUSPENSION ORAL at 18:53

## 2025-06-24 NOTE — ED PROVIDER NOTES
Pt Name: Marina Jasso  MRN: [id number]  Birthdate 1945  Age/Sex: 79 y.o. female  Date of evaluation: 6/24/2025  PCP: Salty Staples DO    CHIEF COMPLAINT    Chief Complaint   Patient presents with    Abdominal Pain     Burning abd pain. Sent from urgent care for dark stools. Pt takes iron daily. Blood work just done there        FINAL IMPRESSION    Final diagnoses:   Abdominal pain   Black stool         DISPOSITION/PLAN    79-year-old female presenting with epigastric abdominal pain as well as dark stools.  Abdominal pain fairly suspicious for ulcer versus gastritis, black stool of uncertain etiology.  This could represent melena from upper GI bleed however could also be discoloration from iron or potentially from Pepto-Bismol although patient states black stools predated the latter.  Unfortunately, unable to obtain a stool sample throughout ER course via bowel movement or rectal examination, so unable to perform fecal occult testing.  Labs reassuring with normal hemoglobin, no signs or symptoms of anemia.  BUN not elevated, large volume upper GI bleed felt to be relatively unlikely.  CT reassuring.  I offered the patient admission to the hospital to facilitate expedited endoscopy to rule out upper GI bleed, however patient declined.  After thorough discussion of risk and benefits, she strongly prefers close outpatient follow-up which  is not completely unreasonable given overall stability and reassuring workup, however patient does understand that if there is a bleeding ulcer she may get worse, states she will return immediately for any bright red blood per rectum, hematemesis, increasing or changing pain, nausea, vomiting, any other symptoms.  Counseled regarding ulcer diet, started on Carafate in addition to home omeprazole, discharged with strict return precautions, follow-up with gastroenterology and primary care doctor.  Time reflects when diagnosis was documented in both MDM as applicable and  the Disposition within this note       Time User Action Codes Description Comment    6/24/2025  6:57 PM Brayan Mcbride Add [R10.9] Abdominal pain     6/24/2025  6:57 PM Brayan Mcbride Add [K92.1] Black stool           ED Disposition       ED Disposition   Discharge    Condition   Stable    Date/Time   Tue Jun 24, 2025  6:57 PM    Comment   Marina Jasso discharge to home/self care.                   Follow-up Information       Follow up With Specialties Details Why Contact Info Additional Information     [facility] Emergency Department Emergency Medicine Go to  If symptoms worsen [facility] [address]  [phone] [facility] Emergency Department, [facility] [place], [place], [address]    Salty Staples DO Internal Medicine Call in 1 day To discuss this visit and schedule close follow-up [address] [place] [address] [phone]       [facility] [place] [facility] Call in 1 day To discuss this visit and schedule close follow-up [address] [facility]  [address]  [facility] [address]  [phone] [facility] [place], [address] [facility] [facility] [address], [place], [place], [address] [phone]                HPI    79 y.o. female presenting with abdominal pain as well as black stools.  Patient states that she has had some burning pain in the epigastric region of the plan intermittently for the past few weeks, much worse over the past 2 to 3 days.  This pain is burning, moderate intensity, in the epigastric region, radiating throughout the abdomen, worse with eating, particularly nathaly or vinegar, better at rest.  She does note prior ulcer in the past although this was successfully treated years ago.  Patient states she first noticed some black  stools 2 days ago, today noticed some black material in her mouth and a black tongue.  Patient  does take an iron supplement, and did start Pepto-Bismol pills yesterday, but she states that the black stools occurred before the Pepto-Bismol.  Black tongue and black material in the mouth occurred after the Pepto-Bismol.  She denies trauma, fever, shortness of breath, numbness, weakness, chest pain, other symptoms.      Past Medical and Surgical History    Past Medical History[1]    Past Surgical History[2]    Family History[3]    Social History[4]        Allergies    Allergies[5]    Home Medications    Prior to Admission medications    Medication Sig Start Date End Date Taking? Authorizing Provider   Cholecalciferol (VITAMIN D3) 1000 units CAPS Take 1 capsule by mouth in the morning.    Historical Provider, MD   cyanocobalamin (VITAMIN B-12) 1000 MCG tablet Take 1,000 mcg by mouth in the morning.    Historical Provider, MD   Diclofenac Sodium (VOLTAREN) 1 % Apply 2 g topically 4 (four) times a day for 10 days 11/20/23 2/14/24  Janel Florian MD   hydroCHLOROthiazide 12.5 mg tablet Take 1 tablet (12.5 mg total) by mouth daily 4/28/25   Salty Staples DO   losartan (COZAAR) 100 MG tablet Take 1 tablet (100 mg total) by mouth daily 4/28/25   Salty Staples DO   Magnesium Gluconate 550 MG TABS Take 30 mg by mouth in the morning.    Historical Provider, MD   methocarbamol (ROBAXIN) 500 mg tablet Take 1 tablet (500 mg total) by mouth daily at bedtime 11/20/23   Janel Florian MD   metoprolol tartrate (LOPRESSOR) 25 mg tablet Take 1 tablet (25 mg total) by mouth 2 (two) times a day 4/28/25   Salty Staples DO   multivitamin-minerals (CENTRUM ADULTS) tablet Take 1 tablet by mouth in the morning.    Historical Provider, MD   omeprazole (PriLOSEC) 20 mg delayed release capsule Take 1 capsule (20 mg total) by mouth daily 4/28/25   Salty Staples DO   simvastatin (ZOCOR) 20 mg tablet Take 1 tablet (20 mg total) by mouth daily 4/28/25   Salty Staples DO           Review of Systems    Review of  Systems   Constitutional:  Negative for activity change, chills and fever.   HENT:  Negative for drooling and facial swelling.    Eyes:  Negative for pain, discharge and visual disturbance.   Respiratory:  Negative for apnea, cough, chest tightness, shortness of breath and wheezing.    Cardiovascular:  Negative for chest pain and leg swelling.   Gastrointestinal:  Positive for abdominal pain and nausea. Negative for constipation, diarrhea and vomiting.   Genitourinary:  Negative for difficulty urinating, dysuria and urgency.   Musculoskeletal:  Negative for arthralgias, back pain and gait problem.   Skin:  Negative for color change and rash.   Neurological:  Negative for dizziness, speech difficulty, weakness and headaches.   Psychiatric/Behavioral:  Negative for agitation, behavioral problems and confusion.            All other systems reviewed and negative.    Physical Exam      ED Triage Vitals [06/24/25 1309]   Temperature Pulse Respirations Blood Pressure SpO2   98.3 °F (36.8 °C) 69 18 136/71 97 %      Temp Source Heart Rate Source Patient Position - Orthostatic VS BP Location FiO2 (%)   Oral Monitor Sitting Right arm --      Pain Score       --               Physical Exam  Vitals and nursing note reviewed.   Constitutional:       General: She is not in acute distress.     Appearance: She is well-developed. She is not ill-appearing, toxic-appearing or diaphoretic.   HENT:      Head: Normocephalic and atraumatic.      Right Ear: External ear normal.      Left Ear: External ear normal.      Nose: Nose normal. No congestion or rhinorrhea.      Mouth/Throat:      Mouth: Mucous membranes are moist.      Pharynx: Oropharynx is clear. No oropharyngeal exudate or posterior oropharyngeal erythema.     Eyes:      Conjunctiva/sclera: Conjunctivae normal.      Pupils: Pupils are equal, round, and reactive to light.       Cardiovascular:      Rate and Rhythm: Normal rate and regular rhythm.      Pulses: Normal pulses.       Heart sounds: Normal heart sounds. No murmur heard.     No friction rub. No gallop.   Pulmonary:      Effort: Pulmonary effort is normal. No respiratory distress.      Breath sounds: Normal breath sounds. No wheezing or rales.   Abdominal:      General: There is no distension.      Palpations: Abdomen is soft.      Tenderness: There is abdominal tenderness. There is no guarding or rebound.      Comments: Tender to palpation in the epigastric region, no rebound or guarding, negative Garcia sign.     Musculoskeletal:         General: No deformity. Normal range of motion.      Cervical back: Normal range of motion and neck supple.      Right lower leg: No edema.      Left lower leg: No edema.     Skin:     General: Skin is warm and dry.      Capillary Refill: Capillary refill takes less than 2 seconds.      Findings: No erythema or rash.     Neurological:      Mental Status: She is alert and oriented to person, place, and time.     Psychiatric:         Behavior: Behavior normal.         Thought Content: Thought content normal.         Judgment: Judgment normal.              Diagnostic Results    EKG Interpretation    Rate:  67  BPM  Rhythm:  Normal Sinus Rhythm   Axis:  Normal   Intervals: Normal, no blocks, QTc  424 ms  Q waves:  No pathologic Q waves   T waves:  Normal   ST segments:  No significant elevations or depressions     Impression:  Normal sinus rhythm without evidence of acute ischemia or significant arrhythmia      EKG for comparison: ECG dated 24 Sep 2023 similar in character with no major changes    EKG interpreted by me.     Labs:    Results Reviewed       Procedure Component Value Units Date/Time    Comprehensive metabolic panel [[id number]] Collected: 06/24/25 1636    Lab Status: Final result Specimen: Blood from Arm, Left Updated: 06/24/25 170     Sodium 136 mmol/L      Potassium 4.3 mmol/L      Chloride 100 mmol/L      CO2 30 mmol/L      ANION GAP 6 mmol/L      BUN 16 mg/dL      Creatinine 0.70  mg/dL      Glucose 94 mg/dL      Calcium 9.3 mg/dL      AST 15 U/L      ALT 16 U/L      Alkaline Phosphatase 58 U/L      Total Protein 7.3 g/dL      Albumin 4.3 g/dL      Total Bilirubin 0.37 mg/dL      eGFR 82 ml/min/1.73sq m     Narrative:      National Kidney Disease Foundation guidelines for Chronic Kidney Disease (CKD):     Stage 1 with normal or high GFR (GFR > 90 mL/min/1.73 square meters)    Stage 2 Mild CKD (GFR = 60-89 mL/min/1.73 square meters)    Stage 3A Moderate CKD (GFR = 45-59 mL/min/1.73 square meters)    Stage 3B Moderate CKD (GFR = 30-44 mL/min/1.73 square meters)    Stage 4 Severe CKD (GFR = 15-29 mL/min/1.73 square meters)    Stage 5 End Stage CKD (GFR <15 mL/min/1.73 square meters)  Note: GFR calculation is accurate only with a steady state creatinine    Lipase [[id number]]  (Normal) Collected: 06/24/25 1636    Lab Status: Final result Specimen: Blood from Arm, Left Updated: 06/24/25 1702     Lipase 25 u/L     CBC and differential [[id number]] Collected: 06/24/25 1636    Lab Status: Final result Specimen: Blood from Arm, Left Updated: 06/24/25 1643     WBC 9.43 Thousand/uL      RBC 4.51 Million/uL      Hemoglobin 13.5 g/dL      Hematocrit 40.0 %      MCV 89 fL      MCH 29.9 pg      MCHC 33.8 g/dL      RDW 13.2 %      MPV 9.1 fL      Platelets 323 Thousands/uL      nRBC 0 /100 WBCs      Segmented % 54 %      Immature Grans % 0 %      Lymphocytes % 33 %      Monocytes % 9 %      Eosinophils Relative 3 %      Basophils Relative 1 %      Absolute Neutrophils 5.03 Thousands/µL      Absolute Immature Grans 0.03 Thousand/uL      Absolute Lymphocytes 3.11 Thousands/µL      Absolute Monocytes 0.87 Thousand/µL      Eosinophils Absolute 0.31 Thousand/µL      Basophils Absolute 0.08 Thousands/µL     iFOBT (FIT) [[id number]]     Lab Status: No result Specimen: Stool             All labs reviewed and utilized in the medical decision making process    Radiology:    CT abdomen pelvis with contrast   Final Result       No acute findings.               Workstation performed: [id number]             All radiology studies independently viewed by me and interpreted by the radiologist.    Procedure    Procedures        ED Course of Care and Re-Assessments      Labs reassuring, symptoms  resolved with famotidine as well as Maalox.  CT obtained, showed no acute disease or surgical emergency.  On rectal examination performed by medical student, no gross blood, rectal vault empty without stool sample obtained.  Patient unable to provide stool sample while in ER.    Medications   iohexol (OMNIPAQUE) 350 MG/ML injection (MULTI-DOSE) 100 mL (100 mL Intravenous Given 6/24/25 1811)   Famotidine (PF) (PEPCID) injection 20 mg (20 mg Intravenous Given 6/24/25 1854)   aluminum-magnesium hydroxide-simethicone (MAALOX) oral suspension 30 mL (30 mL Oral Given 6/24/25 1853)           PATIENT REFERRED TO:     [facility] Emergency Department  [address] [address]  [phone]  Go to   If symptoms worsen    Salty Staples DO  [address] [place] [address] [phone]    Call in 1 day  To discuss this visit and schedule close follow-up    [facility] [place]  [address] [place] [address] [address]  [facility] [address] [phone]  Call in 1 day  To discuss this visit and schedule close follow-up      DISCHARGE MEDICATIONS:    Discharge Medication List as of 6/24/2025  7:06 PM        START taking these medications    Details   sucralfate (CARAFATE) 1 g tablet Take 1 tablet (1 g total) by mouth 3 (three) times a day, Starting Tue 6/24/2025, Normal           CONTINUE these medications which have NOT CHANGED    Details   Cholecalciferol (VITAMIN D3) 1000 units CAPS Take 1 capsule by mouth in the morning., Historical Med      cyanocobalamin (VITAMIN B-12) 1000 MCG tablet Take 1,000 mcg by mouth in the morning., Historical Med      Diclofenac Sodium (VOLTAREN) 1  "% Apply 2 g topically 4 (four) times a day for 10 days, Starting Mon 11/20/2023, Until Wed 2/14/2024, Normal      hydroCHLOROthiazide 12.5 mg tablet Take 1 tablet (12.5 mg total) by mouth daily, Starting Mon 4/28/2025, Normal      losartan (COZAAR) 100 MG tablet Take 1 tablet (100 mg total) by mouth daily, Starting Mon 4/28/2025, Normal      Magnesium Gluconate 550 MG TABS Take 30 mg by mouth in the morning., Historical Med      methocarbamol (ROBAXIN) 500 mg tablet Take 1 tablet (500 mg total) by mouth daily at bedtime, Starting Mon 11/20/2023, Normal      metoprolol tartrate (LOPRESSOR) 25 mg tablet Take 1 tablet (25 mg total) by mouth 2 (two) times a day, Starting Mon 4/28/2025, Normal      multivitamin-minerals (CENTRUM ADULTS) tablet Take 1 tablet by mouth in the morning., Historical Med      omeprazole (PriLOSEC) 20 mg delayed release capsule Take 1 capsule (20 mg total) by mouth daily, Starting Mon 4/28/2025, Normal      simvastatin (ZOCOR) 20 mg tablet Take 1 tablet (20 mg total) by mouth daily, Starting Mon 4/28/2025, Normal                      Brayan Mcbride MD    Portions of the record may have been created with voice recognition software.  Occasional wrong word or \"sound alike\" substitutions may have occurred due to the inherent limitations of voice recognition software.  Please read the chart carefully and recognize, using context, where substitutions have occurred       [1]   Past Medical History:  Diagnosis Date    Arthritis     Back pain     sciatic pain right hip and down right leg    Cancer (HCC)     bladder cancer ; had surgical scraping    GERD (gastroesophageal reflux disease)     Hyperlipidemia     Hypertension     Seasonal allergies     Wears partial dentures     1 small tooth removed and placed in cup    [2]   Past Surgical History:  Procedure Laterality Date    APPENDECTOMY      COLONOSCOPY      CYSTOSCOPY  11/29/2021    [place]    EYE SURGERY      HERNIA REPAIR      [place] BLADDER " INSTILLATION ANTICARCINOGENIC AGENT N/A 1/15/2020    Procedure: INSTILLATION MITOMYCIN;  Surgeon: Laron Kelly MD;  Location: [facility];  Service: Urology    TN CYSTO W/REMOVAL OF LESIONS SMALL N/A 1/15/2020    Procedure: Cystoscopy TRANSURETHRAL RESECTION OF BLADDER TUMOR (TURBT);  Surgeon: Laron Kelly MD;  Location: [facility];  Service: Urology    TN RPR 1ST INGUN HRNA AGE 5 YRS/> REDUCIBLE Left 2/3/2022    Procedure: INGUINAL HERNIA REPAIR;  Surgeon: Raheem Mcfarland MD;  [facility];  Service: General    UPPER GASTROINTESTINAL ENDOSCOPY      VEIN LIGATION AND STRIPPING     [3]   Family History  Problem Relation Name Age of Onset    Heart disease Mother      Hypertension Mother      No Known Problems Father      No Known Problems Sister      No Known Problems Maternal Grandmother      No Known Problems Maternal Grandfather      No Known Problems Paternal Grandmother      No Known Problems Paternal Grandfather      No Known Problems Brother      No Known Problems Brother      No Known Problems Brother      No Known Problems Son      No Known Problems Son      No Known Problems Maternal Aunt      No Known Problems Maternal Aunt      No Known Problems Paternal Aunt     [4]   Social History  Tobacco Use    Smoking status: Never    Smokeless tobacco: Never   Vaping Use    Vaping status: Never Used   Substance Use Topics    Alcohol use: Never    Drug use: Never   [5] No Known Allergies       Brayan Mbcride MD  06/24/25 2009

## 2025-06-25 ENCOUNTER — TELEPHONE (OUTPATIENT)
Dept: INTERNAL MEDICINE CLINIC | Facility: CLINIC | Age: 80
End: 2025-06-25

## 2025-06-25 ENCOUNTER — NURSE TRIAGE (OUTPATIENT)
Age: 80
End: 2025-06-25

## 2025-06-25 NOTE — TELEPHONE ENCOUNTER
Tried to call pt to discuss symptoms but was not available.     Spoke with her son not on consent form so could not discuss symptoms but scheduled pt for urgent OV on 7/1 due to recent ED visit for melena. I reviewed notes from that visit prior to calling son.

## 2025-06-25 NOTE — TELEPHONE ENCOUNTER
----- Message from Jailyn NICHOLAS sent at 6/25/2025 10:14 AM EDT -----  Pt's son Wayne calling to schedule ED FU referral from ED states for:   R10.9 (ICD-10-CM) - Abdominal pain  K92.1 (ICD-10-CM) - Black stool    I offered 1st available where pt is established [address] in October and pt refused and wants to be seen sooner. Thank you

## 2025-06-25 NOTE — TELEPHONE ENCOUNTER
Patient stopped in and scheduled for 6/26 11a. Reluctant to schedule with resident wants to see PCP but did schedule with Dr Mills.

## 2025-06-25 NOTE — TELEPHONE ENCOUNTER
----- Message from Aleida AVILA sent at 6/25/2025  8:12 AM EDT -----  Regarding: FW: ED visit    ----- Message -----  From: Salty Staples DO  Sent: 6/25/2025   7:51 AM EDT  To: [facility]  Subject: FW: ED visit                                     Please have patient set up an ER follow-up with the medical resident myself tomorrow  ----- Message -----  From: Brayan Mcbride MD  Sent: 6/24/2025   8:09 PM EDT  To: Salty Staples DO  Subject: ED visit                                         Dr. Staples,    I had the pleasure of seeing Marina in the ED at [facility] today.  While her workup was overall reassuring, I do have some concern for a possible upper GI bleed, potentially from an ulcer.  I offered her admission, but she declined at this time, preferring outpatient followup.  I referred her to GI and gave very strict return precautions, but I wanted to let you know that she declined the admission so that you can check in with her outpatient.    Respectfully,  Brayan Mcbride MD

## 2025-06-26 ENCOUNTER — TELEPHONE (OUTPATIENT)
Age: 80
End: 2025-06-26

## 2025-06-26 ENCOUNTER — OFFICE VISIT (OUTPATIENT)
Dept: INTERNAL MEDICINE CLINIC | Facility: CLINIC | Age: 80
End: 2025-06-26
Payer: COMMERCIAL

## 2025-06-26 VITALS
TEMPERATURE: 98.3 F | RESPIRATION RATE: 16 BRPM | HEART RATE: 68 BPM | DIASTOLIC BLOOD PRESSURE: 82 MMHG | HEIGHT: 61 IN | BODY MASS INDEX: 28.74 KG/M2 | SYSTOLIC BLOOD PRESSURE: 130 MMHG | WEIGHT: 152.2 LBS | OXYGEN SATURATION: 96 %

## 2025-06-26 DIAGNOSIS — K92.1 MELENA: Primary | ICD-10-CM

## 2025-06-26 DIAGNOSIS — K21.9 GASTROESOPHAGEAL REFLUX DISEASE, UNSPECIFIED WHETHER ESOPHAGITIS PRESENT: ICD-10-CM

## 2025-06-26 PROCEDURE — G2211 COMPLEX E/M VISIT ADD ON: HCPCS

## 2025-06-26 PROCEDURE — 99213 OFFICE O/P EST LOW 20 MIN: CPT

## 2025-06-26 RX ORDER — OMEPRAZOLE 40 MG/1
40 CAPSULE, DELAYED RELEASE ORAL 2 TIMES DAILY
Qty: 60 CAPSULE | Refills: 1 | Status: SHIPPED | OUTPATIENT
Start: 2025-06-26

## 2025-06-26 NOTE — ASSESSMENT & PLAN NOTE
Patient was evaluated in the ED 6/24 for abdominal pain and dark stool.  Also reported 1 episode of dark emesis in the morning while brushing teeth.  Patient was taking as needed Aleve for headache for long time.  Fortunately, ED workup was negative for active bleeding.  Hemodynamically stable. Patient was offered admission for GI workup but she declined.  Patient was then discharged on omeprazole 20 mg every morning and Carafate 3 times a day.  Eval patient in office today, reporting symptoms overall improved. No more episode of dark/red emesis.  Stool color getting lighter.  Abdominal pain somewhat controlled.    Plan:  Will increase omeprazole to 40mg twice daily dose till seen by GI. Continue Carafate.  Patient has appointment with GI on 7/1.  Encouraged to keep with appointment.  Discussed with patient and her son over the phone in detail regarding return to hospital precautions i.e. dark/red emesis, worsening abdominal pain, darker stool, CP, SOB, dizziness.  They verbalized great understanding and agreement.

## 2025-06-26 NOTE — TELEPHONE ENCOUNTER
Pt's son (not on consent) called because he states pt cannot wait for 7/1 to have an appointment due to her symptoms. I spoke with Jessica and she advised there was not a sooner appt, and to advise the pt's son if her symptoms are bad he should go back to the ED. He disconnected the call

## 2025-06-26 NOTE — PROGRESS NOTES
Name: Marina Jasso      : 1945      MRN: [id number]  Encounter Provider: Julian Mills MD  Encounter Date: 2025   Encounter department: [facility]  :  Assessment & Plan  Melena  Patient was evaluated in the ED  for abdominal pain and dark stool.  Also reported 1 episode of dark emesis in the morning while brushing teeth.  Patient was taking as needed Aleve for headache for long time.  Fortunately, ED workup was negative for active bleeding.  Hemodynamically stable. Patient was offered admission for GI workup but she declined.  Patient was then discharged on omeprazole 20 mg every morning and Carafate 3 times a day.  Eval patient in office today, reporting symptoms overall improved. No more episode of dark/red emesis.  Stool color getting lighter.  Abdominal pain somewhat controlled.    Plan:  Will increase omeprazole to 40mg twice daily dose till seen by GI. Continue Carafate.  Patient has appointment with GI on .  Encouraged to keep with appointment.  Discussed with patient and her son over the phone in detail regarding return to hospital precautions i.e. dark/red emesis, worsening abdominal pain, darker stool, CP, SOB, dizziness.  They verbalized great understanding and agreement.       Gastroesophageal reflux disease, unspecified whether esophagitis present  Increase omeprazole 20 mg to twice daily to eval by GI specialist.  Orders:    omeprazole (PriLOSEC) 40 MG capsule; Take 1 capsule (40 mg total) by mouth in the morning and 1 capsule (40 mg total) before bedtime.           History of Present Illness   79 years old female presented in office for follow-up of recent ED visit.  Patient was recently seen in the ED secondary abdominal pain and 1 episode of dark emesis and dark stool.  Reporting overall symptoms improving.  Denies any dizziness, CP, SOB.  Epigastric pain somewhat controlled but still on and off.  Stool color is getting lighter.  No more dark/bloody  "emesis.  No other acute complaints.      Review of Systems- seen in above    Objective   /82 (BP Location: Left arm, Patient Position: Sitting, Cuff Size: Standard)   Pulse 68   Temp 98.3 °F (36.8 °C) (Tympanic)   Resp 16   Ht 5' 1\" (1.549 m)   Wt 69 kg (152 lb 3.2 oz)   LMP  (LMP Unknown)   SpO2 96%   BMI 28.76 kg/m²      Physical Exam  Vitals and nursing note reviewed.   Constitutional:       General: She is not in acute distress.     Appearance: She is well-developed. She is not ill-appearing.   HENT:      Head: Normocephalic and atraumatic.     Eyes:      General:         Right eye: No discharge.         Left eye: No discharge.      Conjunctiva/sclera: Conjunctivae normal.       Cardiovascular:      Rate and Rhythm: Normal rate and regular rhythm.      Pulses: Normal pulses.   Pulmonary:      Effort: Pulmonary effort is normal. No respiratory distress.      Breath sounds: Normal breath sounds. No wheezing or rhonchi.   Abdominal:      General: There is no distension.      Palpations: Abdomen is soft.      Tenderness: There is abdominal tenderness. There is no guarding or rebound.      Hernia: Hernia: epigastric.     Musculoskeletal:         General: No swelling. Normal range of motion.      Cervical back: Neck supple.     Skin:     General: Skin is warm and dry.      Capillary Refill: Capillary refill takes less than 2 seconds.     Neurological:      Mental Status: She is alert and oriented to person, place, and time.     Psychiatric:         Mood and Affect: Mood normal.         "

## 2025-06-26 NOTE — ASSESSMENT & PLAN NOTE
Increase omeprazole 20 mg to twice daily to eval by GI specialist.  Orders:    omeprazole (PriLOSEC) 40 MG capsule; Take 1 capsule (40 mg total) by mouth in the morning and 1 capsule (40 mg total) before bedtime.

## 2025-06-26 NOTE — TELEPHONE ENCOUNTER
Spoke with daughter in law reviewed medical communication consent needs to be filled. Reviewed he upcoming appt is 7/1 Tuesday. That was scheduled as urgent.

## 2025-06-26 NOTE — TELEPHONE ENCOUNTER
Patient daughter in law called, she wants provider to know patient was in Emergency Room and the Emergency Room doctor wanted her to have an EGD ASAP.  They did call gastroenterology and the soonest they can see her in 1 month from now.  Asking if Primary Care Provider can expedite EGD.  She also stated provider can call her with any questions.  (She is not on communication consent).

## 2025-06-27 ENCOUNTER — HOSPITAL ENCOUNTER (OUTPATIENT)
Dept: ULTRASOUND IMAGING | Facility: HOSPITAL | Age: 80
Discharge: HOME | End: 2025-06-27
Attending: OBSTETRICS & GYNECOLOGY
Payer: COMMERCIAL

## 2025-06-27 DIAGNOSIS — N83.201 CYST OF RIGHT OVARY: ICD-10-CM

## 2025-06-27 PROCEDURE — 76856 US EXAM PELVIC COMPLETE: CPT

## 2025-06-27 PROCEDURE — 76830 TRANSVAGINAL US NON-OB: CPT

## 2025-06-30 NOTE — H&P (VIEW-ONLY)
"Name: Marina Jasso      : 1945      MRN: [id number]  Encounter Provider: Lizbeth Goldsmith PA-C  Encounter Date: 2025   Encounter department: [facility] GASTROENTEROLOGY [place] [address]  :  Assessment & Plan  Gastroesophageal reflux disease, unspecified whether esophagitis present  Melena  Epigastric pain    Patient with recent ED visit due to melena and patient reports she was also spitting up dark black liquid. This has now resolved. She does report ongoing epigastric pain, increased reflux, nausea. Noted to be taking NSAIDs on a daily basis \"for a while\" but stopped this after ED visit.     - Will plan for EGD for further evaluation  - Continue omeprazole 40 mg twice daily  - Can continue Carafate three times daily as patient notes improvement with this  - Antireflux diet reviewed with patient  - Advised continued avoidance of NSAIDs    Orders:    EGD; Future        History of Present Illness   Marina Jsaso is a 79 y.o. female who presents for office visit.  Per chart review, she was recently seen in the emergency department due to abdominal pain and was sent to the emergency department from urgent care due to dark stools.  It was noted that patient does take supplement containing iron at home as well as Pepto-Bismol though patient stated that black stools came before the Pepto-Bismol.  They were unable to obtain a stool sample throughout the ER course with a bowel movement or rectal exam so fecal occult testing was not done.  Lab work was reassuring with normal hemoglobin, no signs or symptoms of anemia.  BUN not elevated.  CT scan was done which had no acute findings.  Patient was offered admission to the hospital to facilitate expedited endoscopy to rule out upper GI bleed but patient declined.  Patient was discharged on omeprazole 20 mg every morning and Carafate 3 times per day.  Omeprazole dose was increased to 40 mg twice daily by PCP until GI " evaluation.    Patient reports that about two days prior to going to the ED, she was having black stools and spitting up dark black liquid. She reports she is no longer having these symptoms. Reports associated epigastric pain, reflux, nausea. Denies vomiting. Reports some difficulty swallowing medications at times but reports this has been an ongoing issue. She was previously taking omeprazole 20 mg daily but recently increased to 40 mg daily as above. She does report that she had been taking medications such as Aleve, Advil on a daily basis due to headaches and back pain. She stopped taking these after going to the ED. She denies constipation, diarrhea, weight loss. No known family history of colon cancer. She is not planning to undergo further screening colonoscopies due to age. Last EGD in 2021 as below.       HPI  History obtained from: patient and patient's child  Review of Systems A complete review of systems is negative other than that noted above in the HPI.      Current Medications[1]  Objective   LMP  (LMP Unknown)     Physical Exam  Vitals reviewed.   Constitutional:       General: She is not in acute distress.     Appearance: Normal appearance.     Cardiovascular:      Rate and Rhythm: Normal rate.   Pulmonary:      Effort: Pulmonary effort is normal. No respiratory distress.   Abdominal:      Palpations: Abdomen is soft.      Tenderness: There is abdominal tenderness (Epigastric). There is no guarding or rebound.     Skin:     General: Skin is warm and dry.     Neurological:      General: No focal deficit present.      Mental Status: She is alert.     Psychiatric:         Mood and Affect: Mood normal.         Behavior: Behavior normal.         Lab Results: I personally reviewed relevant lab results.       Results for orders placed during the hospital encounter of 08/06/21    EGD    Impression  Intestinal metaplasia lower esophagus not greater than 1 cm above EG junction, mild antritis, red plaque  duodenum of questionable significance    RECOMMENDATION:  Follow up with me in clinic 3 months, decrease omeprazole to 20 mg daily, reflux precautions, colonoscopy 2024, EGD 5 years    Maxwell Mauricio MD               [1]   Current Outpatient Medications   Medication Sig Dispense Refill    Cholecalciferol (VITAMIN D3) 1000 units CAPS Take 1 capsule by mouth in the morning.      cyanocobalamin (VITAMIN B-12) 1000 MCG tablet Take 1,000 mcg by mouth in the morning.      Diclofenac Sodium (VOLTAREN) 1 % Apply 2 g topically 4 (four) times a day for 10 days 80 g 0    hydroCHLOROthiazide 12.5 mg tablet Take 1 tablet (12.5 mg total) by mouth daily 100 tablet 3    losartan (COZAAR) 100 MG tablet Take 1 tablet (100 mg total) by mouth daily 90 tablet 1    Magnesium Gluconate 550 MG TABS Take 30 mg by mouth in the morning.      methocarbamol (ROBAXIN) 500 mg tablet Take 1 tablet (500 mg total) by mouth daily at bedtime 1 tablet 0    metoprolol tartrate (LOPRESSOR) 25 mg tablet Take 1 tablet (25 mg total) by mouth 2 (two) times a day 180 tablet 3    multivitamin-minerals (CENTRUM ADULTS) tablet Take 1 tablet by mouth in the morning.      omeprazole (PriLOSEC) 40 MG capsule Take 1 capsule (40 mg total) by mouth in the morning and 1 capsule (40 mg total) before bedtime. 60 capsule 1    simvastatin (ZOCOR) 20 mg tablet Take 1 tablet (20 mg total) by mouth daily 90 tablet 1    sucralfate (CARAFATE) 1 g tablet Take 1 tablet (1 g total) by mouth 3 (three) times a day 90 tablet 0     No current facility-administered medications for this visit.

## 2025-06-30 NOTE — PROGRESS NOTES
"Name: Marina Jasso      : 1945      MRN: [id number]  Encounter Provider: Lizbeth Goldsmith PA-C  Encounter Date: 2025   Encounter department: [facility] GASTROENTEROLOGY [place] [address]  :  Assessment & Plan  Gastroesophageal reflux disease, unspecified whether esophagitis present  Melena  Epigastric pain    Patient with recent ED visit due to melena and patient reports she was also spitting up dark black liquid. This has now resolved. She does report ongoing epigastric pain, increased reflux, nausea. Noted to be taking NSAIDs on a daily basis \"for a while\" but stopped this after ED visit.     - Will plan for EGD for further evaluation  - Continue omeprazole 40 mg twice daily  - Can continue Carafate three times daily as patient notes improvement with this  - Antireflux diet reviewed with patient  - Advised continued avoidance of NSAIDs    Orders:    EGD; Future        History of Present Illness   Marina Jasso is a 79 y.o. female who presents for office visit.  Per chart review, she was recently seen in the emergency department due to abdominal pain and was sent to the emergency department from urgent care due to dark stools.  It was noted that patient does take supplement containing iron at home as well as Pepto-Bismol though patient stated that black stools came before the Pepto-Bismol.  They were unable to obtain a stool sample throughout the ER course with a bowel movement or rectal exam so fecal occult testing was not done.  Lab work was reassuring with normal hemoglobin, no signs or symptoms of anemia.  BUN not elevated.  CT scan was done which had no acute findings.  Patient was offered admission to the hospital to facilitate expedited endoscopy to rule out upper GI bleed but patient declined.  Patient was discharged on omeprazole 20 mg every morning and Carafate 3 times per day.  Omeprazole dose was increased to 40 mg twice daily by PCP until GI " evaluation.    Patient reports that about two days prior to going to the ED, she was having black stools and spitting up dark black liquid. She reports she is no longer having these symptoms. Reports associated epigastric pain, reflux, nausea. Denies vomiting. Reports some difficulty swallowing medications at times but reports this has been an ongoing issue. She was previously taking omeprazole 20 mg daily but recently increased to 40 mg daily as above. She does report that she had been taking medications such as Aleve, Advil on a daily basis due to headaches and back pain. She stopped taking these after going to the ED. She denies constipation, diarrhea, weight loss. No known family history of colon cancer. She is not planning to undergo further screening colonoscopies due to age. Last EGD in 2021 as below.       HPI  History obtained from: patient and patient's child  Review of Systems A complete review of systems is negative other than that noted above in the HPI.      Current Medications[1]  Objective   LMP  (LMP Unknown)     Physical Exam  Vitals reviewed.   Constitutional:       General: She is not in acute distress.     Appearance: Normal appearance.     Cardiovascular:      Rate and Rhythm: Normal rate.   Pulmonary:      Effort: Pulmonary effort is normal. No respiratory distress.   Abdominal:      Palpations: Abdomen is soft.      Tenderness: There is abdominal tenderness (Epigastric). There is no guarding or rebound.     Skin:     General: Skin is warm and dry.     Neurological:      General: No focal deficit present.      Mental Status: She is alert.     Psychiatric:         Mood and Affect: Mood normal.         Behavior: Behavior normal.         Lab Results: I personally reviewed relevant lab results.       Results for orders placed during the hospital encounter of 08/06/21    EGD    Impression  Intestinal metaplasia lower esophagus not greater than 1 cm above EG junction, mild antritis, red plaque  duodenum of questionable significance    RECOMMENDATION:  Follow up with me in clinic 3 months, decrease omeprazole to 20 mg daily, reflux precautions, colonoscopy 2024, EGD 5 years    Maxwell Mauricio MD               [1]   Current Outpatient Medications   Medication Sig Dispense Refill    Cholecalciferol (VITAMIN D3) 1000 units CAPS Take 1 capsule by mouth in the morning.      cyanocobalamin (VITAMIN B-12) 1000 MCG tablet Take 1,000 mcg by mouth in the morning.      Diclofenac Sodium (VOLTAREN) 1 % Apply 2 g topically 4 (four) times a day for 10 days 80 g 0    hydroCHLOROthiazide 12.5 mg tablet Take 1 tablet (12.5 mg total) by mouth daily 100 tablet 3    losartan (COZAAR) 100 MG tablet Take 1 tablet (100 mg total) by mouth daily 90 tablet 1    Magnesium Gluconate 550 MG TABS Take 30 mg by mouth in the morning.      methocarbamol (ROBAXIN) 500 mg tablet Take 1 tablet (500 mg total) by mouth daily at bedtime 1 tablet 0    metoprolol tartrate (LOPRESSOR) 25 mg tablet Take 1 tablet (25 mg total) by mouth 2 (two) times a day 180 tablet 3    multivitamin-minerals (CENTRUM ADULTS) tablet Take 1 tablet by mouth in the morning.      omeprazole (PriLOSEC) 40 MG capsule Take 1 capsule (40 mg total) by mouth in the morning and 1 capsule (40 mg total) before bedtime. 60 capsule 1    simvastatin (ZOCOR) 20 mg tablet Take 1 tablet (20 mg total) by mouth daily 90 tablet 1    sucralfate (CARAFATE) 1 g tablet Take 1 tablet (1 g total) by mouth 3 (three) times a day 90 tablet 0     No current facility-administered medications for this visit.

## 2025-07-01 ENCOUNTER — OFFICE VISIT (OUTPATIENT)
Dept: GASTROENTEROLOGY | Facility: CLINIC | Age: 80
End: 2025-07-01
Payer: COMMERCIAL

## 2025-07-01 ENCOUNTER — TELEPHONE (OUTPATIENT)
Dept: GASTROENTEROLOGY | Facility: CLINIC | Age: 80
End: 2025-07-01

## 2025-07-01 VITALS
WEIGHT: 152 LBS | BODY MASS INDEX: 28.7 KG/M2 | DIASTOLIC BLOOD PRESSURE: 76 MMHG | SYSTOLIC BLOOD PRESSURE: 126 MMHG | HEART RATE: 67 BPM | HEIGHT: 61 IN

## 2025-07-01 DIAGNOSIS — K92.1 MELENA: ICD-10-CM

## 2025-07-01 DIAGNOSIS — R10.13 EPIGASTRIC PAIN: ICD-10-CM

## 2025-07-01 DIAGNOSIS — K21.9 GASTROESOPHAGEAL REFLUX DISEASE, UNSPECIFIED WHETHER ESOPHAGITIS PRESENT: Primary | ICD-10-CM

## 2025-07-01 PROCEDURE — 99214 OFFICE O/P EST MOD 30 MIN: CPT

## 2025-07-01 RX ORDER — SODIUM CHLORIDE, SODIUM LACTATE, POTASSIUM CHLORIDE, CALCIUM CHLORIDE 600; 310; 30; 20 MG/100ML; MG/100ML; MG/100ML; MG/100ML
125 INJECTION, SOLUTION INTRAVENOUS CONTINUOUS
OUTPATIENT
Start: 2025-07-01

## 2025-07-01 NOTE — ASSESSMENT & PLAN NOTE
"  Patient with recent ED visit due to melena and patient reports she was also spitting up dark black liquid. This has now resolved. She does report ongoing epigastric pain, increased reflux, nausea. Noted to be taking NSAIDs on a daily basis \"for a while\" but stopped this after ED visit.     - Will plan for EGD for further evaluation  - Continue omeprazole 40 mg twice daily  - Can continue Carafate three times daily as patient notes improvement with this  - Antireflux diet reviewed with patient  - Advised continued avoidance of NSAIDs    Orders:    EGD; Future    "

## 2025-07-01 NOTE — TELEPHONE ENCOUNTER
Scheduled date of EGD(as of today): 7/14/25  Physician performing EGD: Dr Adair  Location of EGD: [facility]  Instructions reviewed with patient by: candice given at appt   Clearances: n/a

## 2025-07-07 ENCOUNTER — ANESTHESIA (OUTPATIENT)
Dept: ANESTHESIOLOGY | Facility: HOSPITAL | Age: 80
End: 2025-07-07

## 2025-07-07 ENCOUNTER — ANESTHESIA EVENT (OUTPATIENT)
Dept: ANESTHESIOLOGY | Facility: HOSPITAL | Age: 80
End: 2025-07-07

## 2025-07-07 ENCOUNTER — ANESTHESIA EVENT (OUTPATIENT)
Dept: GASTROENTEROLOGY | Facility: AMBULARY SURGERY CENTER | Age: 80
End: 2025-07-07
Payer: COMMERCIAL

## 2025-07-14 ENCOUNTER — HOSPITAL ENCOUNTER (OUTPATIENT)
Dept: GASTROENTEROLOGY | Facility: AMBULARY SURGERY CENTER | Age: 80
Setting detail: OUTPATIENT SURGERY
Discharge: HOME | End: 2025-07-14
Payer: COMMERCIAL

## 2025-07-14 ENCOUNTER — ANESTHESIA (OUTPATIENT)
Dept: GASTROENTEROLOGY | Facility: AMBULARY SURGERY CENTER | Age: 80
End: 2025-07-14
Payer: COMMERCIAL

## 2025-07-14 VITALS
RESPIRATION RATE: 18 BRPM | TEMPERATURE: 97.4 F | HEIGHT: 60 IN | BODY MASS INDEX: 30.04 KG/M2 | DIASTOLIC BLOOD PRESSURE: 79 MMHG | WEIGHT: 153 LBS | SYSTOLIC BLOOD PRESSURE: 142 MMHG | HEART RATE: 68 BPM | OXYGEN SATURATION: 94 %

## 2025-07-14 DIAGNOSIS — K21.9 GASTROESOPHAGEAL REFLUX DISEASE, UNSPECIFIED WHETHER ESOPHAGITIS PRESENT: ICD-10-CM

## 2025-07-14 DIAGNOSIS — R10.13 EPIGASTRIC PAIN: ICD-10-CM

## 2025-07-14 DIAGNOSIS — K92.1 MELENA: ICD-10-CM

## 2025-07-14 PROCEDURE — 43239 EGD BIOPSY SINGLE/MULTIPLE: CPT | Performed by: INTERNAL MEDICINE

## 2025-07-14 PROCEDURE — 88305 TISSUE EXAM BY PATHOLOGIST: CPT | Performed by: STUDENT IN AN ORGANIZED HEALTH CARE EDUCATION/TRAINING PROGRAM

## 2025-07-14 RX ORDER — PROPOFOL 10 MG/ML
INJECTION, EMULSION INTRAVENOUS AS NEEDED
Status: DISCONTINUED | OUTPATIENT
Start: 2025-07-14 | End: 2025-07-14

## 2025-07-14 RX ORDER — LIDOCAINE HYDROCHLORIDE 20 MG/ML
INJECTION, SOLUTION EPIDURAL; INFILTRATION; INTRACAUDAL; PERINEURAL AS NEEDED
Status: DISCONTINUED | OUTPATIENT
Start: 2025-07-14 | End: 2025-07-14

## 2025-07-14 RX ORDER — SODIUM CHLORIDE, SODIUM LACTATE, POTASSIUM CHLORIDE, CALCIUM CHLORIDE 600; 310; 30; 20 MG/100ML; MG/100ML; MG/100ML; MG/100ML
125 INJECTION, SOLUTION INTRAVENOUS CONTINUOUS
Status: DISCONTINUED | OUTPATIENT
Start: 2025-07-14 | End: 2025-07-18 | Stop reason: HOSPADM

## 2025-07-14 RX ADMIN — PROPOFOL 40 MG: 10 INJECTION, EMULSION INTRAVENOUS at 08:30

## 2025-07-14 RX ADMIN — LIDOCAINE HYDROCHLORIDE 100 MG: 20 INJECTION, SOLUTION EPIDURAL; INFILTRATION; INTRACAUDAL at 08:25

## 2025-07-14 RX ADMIN — PROPOFOL 80 MG: 10 INJECTION, EMULSION INTRAVENOUS at 08:26

## 2025-07-14 RX ADMIN — SODIUM CHLORIDE, SODIUM LACTATE, POTASSIUM CHLORIDE, AND CALCIUM CHLORIDE: .6; .31; .03; .02 INJECTION, SOLUTION INTRAVENOUS at 08:05

## 2025-07-14 RX ADMIN — Medication 40 MG: at 08:30

## 2025-07-14 RX ADMIN — SODIUM CHLORIDE, SODIUM LACTATE, POTASSIUM CHLORIDE, AND CALCIUM CHLORIDE: .6; .31; .03; .02 INJECTION, SOLUTION INTRAVENOUS at 08:22

## 2025-07-14 NOTE — INTERVAL H&P NOTE
H&P reviewed. After examining the patient I find no changes in the patients condition since the H&P had been written.    Vitals:    07/14/25 0722   BP: 147/70   Pulse: 60   Resp: 18   Temp: (!) 97.4 °F (36.3 °C)   SpO2: 95%

## 2025-07-14 NOTE — ANESTHESIA PREPROCEDURE EVALUATION
Procedure:  EGD    Relevant Problems   CARDIO   (+) Essential hypertension   (+) Hyperlipidemia   (+) Hypertension   (+) Hypertensive urgency   (+) Migraine      ENDO   (+) Type 2 diabetes mellitus without complication, with long-term current use of insulin (HCC)   (+) Type 2 diabetes mellitus without complication, without long-term current use of insulin (HCC)      GI/HEPATIC   (+) Gastroesophageal reflux disease      MUSCULOSKELETAL   (+) Acute right-sided low back pain with right-sided sciatica   (+) Low back pain   (+) Primary osteoarthritis of left knee   (+) Primary osteoarthritis of right knee      NEURO/PSYCH   (+) Headache   (+) Migraine        Physical Exam    Airway     Mallampati score: III  TM Distance: >3 FB  Neck ROM: full  Mouth opening: >= 4 cm      Cardiovascular  Cardiovascular exam normal    Dental   No notable dental hx     Pulmonary  Pulmonary exam normal     Neurological  - normal exam    Other Findings  post-pubertal.      Anesthesia Plan  ASA Score- 2     Anesthesia Type- IV sedation with anesthesia with ASA Monitors.         Additional Monitors:     Airway Plan: natural airway.           Plan Factors-Exercise tolerance (METS): >4 METS.    Chart reviewed.    Patient summary reviewed.    Patient is not a current smoker.              Induction- intravenous.    Postoperative Plan- .   Monitoring Plan - Monitoring plan - standard ASA monitoring          Informed Consent- Anesthetic plan and risks discussed with patient.        NPO Status:  Vitals Value Taken Time   Date of last liquid 07/14/25 07/14/25 07:14   Time of last liquid 0630 07/14/25 07:14   Date of last solid 07/13/25 07/14/25 07:14   Time of last solid 1830 07/14/25 07:14

## 2025-07-14 NOTE — ANESTHESIA POSTPROCEDURE EVALUATION
Post-Op Assessment Note    CV Status:  Stable  Pain Score: 0    Pain management: adequate       Mental Status:  Alert and awake   Hydration Status:  Euvolemic   PONV Controlled:  Controlled   Airway Patency:  Patent     Post Op Vitals Reviewed: Yes    No anethesia notable event occurred.    Staff: CRNA           Last Filed PACU Vitals:  Vitals Value Taken Time   Temp     Pulse 75    /93    Resp 19    SpO2 98

## 2025-07-18 PROCEDURE — 88305 TISSUE EXAM BY PATHOLOGIST: CPT | Performed by: STUDENT IN AN ORGANIZED HEALTH CARE EDUCATION/TRAINING PROGRAM

## 2025-08-14 ENCOUNTER — HOSPITAL ENCOUNTER (OUTPATIENT)
Facility: HOSPITAL | Age: 80
End: 2025-08-14
Attending: OBSTETRICS & GYNECOLOGY
Payer: COMMERCIAL

## 2025-08-18 ENCOUNTER — OFFICE VISIT (OUTPATIENT)
Dept: NEUROLOGY | Facility: CLINIC | Age: 80
End: 2025-08-18
Attending: INTERNAL MEDICINE
Payer: COMMERCIAL

## 2025-08-18 VITALS
DIASTOLIC BLOOD PRESSURE: 78 MMHG | HEART RATE: 74 BPM | BODY MASS INDEX: 29.84 KG/M2 | WEIGHT: 152 LBS | SYSTOLIC BLOOD PRESSURE: 140 MMHG | HEIGHT: 60 IN

## 2025-08-18 DIAGNOSIS — K21.9 GASTROESOPHAGEAL REFLUX DISEASE, UNSPECIFIED WHETHER ESOPHAGITIS PRESENT: ICD-10-CM

## 2025-08-18 DIAGNOSIS — G25.0 TREMOR, ESSENTIAL: ICD-10-CM

## 2025-08-18 PROCEDURE — 99205 OFFICE O/P NEW HI 60 MIN: CPT | Performed by: STUDENT IN AN ORGANIZED HEALTH CARE EDUCATION/TRAINING PROGRAM

## 2025-08-18 RX ORDER — OMEPRAZOLE 40 MG/1
40 CAPSULE, DELAYED RELEASE ORAL 2 TIMES DAILY
Qty: 60 CAPSULE | Refills: 1 | Status: SHIPPED | OUTPATIENT
Start: 2025-08-18

## (undated) DEVICE — CHLORAPREP HI-LITE 26ML ORANGE

## (undated) DEVICE — GLOVE INDICATOR PI UNDERGLOVE SZ 8 BLUE

## (undated) DEVICE — TRANSPOSAL ULTRAFLEX DUO/QUAD ULTRA CART MANIFOLD

## (undated) DEVICE — STANDARD SURGICAL GOWN, L: Brand: CONVERTORS

## (undated) DEVICE — NEEDLE 22 G X 1 1/2 SAFETY

## (undated) DEVICE — TUBING SUCTION 5MM X 12 FT

## (undated) DEVICE — EXIDINE 4 PCT

## (undated) DEVICE — SUT VICRYL 3-0 SH 27 IN J416H

## (undated) DEVICE — BETHLEHEM UNIVERSAL MINOR GEN: Brand: CARDINAL HEALTH

## (undated) DEVICE — SURGICAL GOWN, XL SMARTSLEEVE: Brand: CONVERTORS

## (undated) DEVICE — BAG URINE DRAINAGE 2000ML ANTI RFLX LF

## (undated) DEVICE — SUT MONOCRYL 4-0 PS-2 18 IN Y496G

## (undated) DEVICE — SUT VICRYL 1 CT-1 27 IN J261H

## (undated) DEVICE — SUT VICRYL 2-0 REEL 54 IN J286G

## (undated) DEVICE — PACK TUR

## (undated) DEVICE — DRAPE EQUIPMENT RF WAND

## (undated) DEVICE — PAD GROUNDING ADULT

## (undated) DEVICE — BASIC SINGLE BASIN-LF: Brand: MEDLINE INDUSTRIES, INC.

## (undated) DEVICE — SCD SEQUENTIAL COMPRESSION COMFORT SLEEVE MEDIUM KNEE LENGTH: Brand: KENDALL SCD

## (undated) DEVICE — VIAL DECANTER

## (undated) DEVICE — GLOVE SRG BIOGEL ECLIPSE 7

## (undated) DEVICE — CHEMOTHERAPY CONTAINER,HINGED LID, YELLOW: Brand: SHARPSAFETY

## (undated) DEVICE — PREMIUM DRY TRAY LF: Brand: MEDLINE INDUSTRIES, INC.

## (undated) DEVICE — Device: Brand: OLYMPUS

## (undated) DEVICE — GLOVE SRG BIOGEL 8

## (undated) DEVICE — SUT VICRYL 0 CT-1 27 IN J260H

## (undated) DEVICE — SPECIMEN CONTAINER STERILE PEEL PACK

## (undated) DEVICE — TOWEL SURG XR DETECT GREEN STRL RFD

## (undated) DEVICE — ADHESIVE SKIN HIGH VISCOSITY EXOFIN 1ML

## (undated) DEVICE — PENCIL ELECTROSURG E-Z CLEAN -0035H

## (undated) DEVICE — CATH FOLEY 18FR 5ML 2 WAY SILICONE ELASTIMER

## (undated) DEVICE — INTENDED FOR TISSUE SEPARATION, AND OTHER PROCEDURES THAT REQUIRE A SHARP SURGICAL BLADE TO PUNCTURE OR CUT.: Brand: BARD-PARKER SAFETY BLADES SIZE 15, STERILE

## (undated) DEVICE — SUT PDS II 3-0 SH 27 IN Z316H